# Patient Record
Sex: FEMALE
[De-identification: names, ages, dates, MRNs, and addresses within clinical notes are randomized per-mention and may not be internally consistent; named-entity substitution may affect disease eponyms.]

---

## 2017-03-30 ENCOUNTER — HOSPITAL ENCOUNTER (INPATIENT)
Dept: HOSPITAL 14 - H.ER | Age: 77
LOS: 6 days | Discharge: NURSING, REHAB OR LONG-TERM CARE | DRG: 856 | End: 2017-04-05
Attending: INTERNAL MEDICINE | Admitting: INTERNAL MEDICINE
Payer: MEDICARE

## 2017-03-30 VITALS — BODY MASS INDEX: 32.9 KG/M2

## 2017-03-30 DIAGNOSIS — E11.40: ICD-10-CM

## 2017-03-30 DIAGNOSIS — E78.5: ICD-10-CM

## 2017-03-30 DIAGNOSIS — B96.20: ICD-10-CM

## 2017-03-30 DIAGNOSIS — E78.00: ICD-10-CM

## 2017-03-30 DIAGNOSIS — Z95.0: ICD-10-CM

## 2017-03-30 DIAGNOSIS — N39.0: ICD-10-CM

## 2017-03-30 DIAGNOSIS — Z99.2: ICD-10-CM

## 2017-03-30 DIAGNOSIS — L08.89: ICD-10-CM

## 2017-03-30 DIAGNOSIS — Z79.82: ICD-10-CM

## 2017-03-30 DIAGNOSIS — M19.90: ICD-10-CM

## 2017-03-30 DIAGNOSIS — I13.2: ICD-10-CM

## 2017-03-30 DIAGNOSIS — I48.2: ICD-10-CM

## 2017-03-30 DIAGNOSIS — Z79.4: ICD-10-CM

## 2017-03-30 DIAGNOSIS — I50.32: ICD-10-CM

## 2017-03-30 DIAGNOSIS — Z87.442: ICD-10-CM

## 2017-03-30 DIAGNOSIS — Z95.5: ICD-10-CM

## 2017-03-30 DIAGNOSIS — M86.672: ICD-10-CM

## 2017-03-30 DIAGNOSIS — N18.6: ICD-10-CM

## 2017-03-30 DIAGNOSIS — T81.4XXA: Primary | ICD-10-CM

## 2017-03-30 DIAGNOSIS — Z79.01: ICD-10-CM

## 2017-03-30 DIAGNOSIS — E11.22: ICD-10-CM

## 2017-03-30 DIAGNOSIS — N25.81: ICD-10-CM

## 2017-03-30 DIAGNOSIS — I25.10: ICD-10-CM

## 2017-03-30 DIAGNOSIS — G89.29: ICD-10-CM

## 2017-03-30 DIAGNOSIS — Z95.1: ICD-10-CM

## 2017-03-30 DIAGNOSIS — E11.52: ICD-10-CM

## 2017-03-30 DIAGNOSIS — Z89.422: ICD-10-CM

## 2017-03-30 DIAGNOSIS — I48.0: ICD-10-CM

## 2017-03-30 DIAGNOSIS — D63.1: ICD-10-CM

## 2017-03-30 DIAGNOSIS — E03.9: ICD-10-CM

## 2017-03-30 LAB
ALBUMIN/GLOB SERPL: 0.9 {RATIO} (ref 1–2.1)
ALP SERPL-CCNC: 88 U/L (ref 38–126)
ALT SERPL-CCNC: 16 U/L (ref 9–52)
APTT BLD: 22.1 SECONDS (ref 23.3–32.5)
AST SERPL-CCNC: 23 U/L (ref 14–36)
BACTERIA #/AREA URNS HPF: (no result) /[HPF]
BASOPHILS # BLD AUTO: 0.1 K/UL (ref 0–0.2)
BASOPHILS NFR BLD: 1.3 % (ref 0–2)
BILIRUB SERPL-MCNC: 0.5 MG/DL (ref 0.2–1.3)
BILIRUB UR-MCNC: NEGATIVE MG/DL
BUN SERPL-MCNC: 16 MG/DL (ref 7–17)
CALCIUM SERPL-MCNC: 9.3 MG/DL (ref 8.4–10.2)
CHLORIDE SERPL-SCNC: 94 MMOL/L (ref 98–107)
CO2 SERPL-SCNC: 30 MMOL/L (ref 22–30)
COLOR UR: YELLOW
EOSINOPHIL # BLD AUTO: 0.2 K/UL (ref 0–0.7)
EOSINOPHIL NFR BLD: 2.3 % (ref 0–4)
ERYTHROCYTE [DISTWIDTH] IN BLOOD BY AUTOMATED COUNT: 15.1 % (ref 11.5–14.5)
GLOBULIN SER-MCNC: 4.2 GM/DL (ref 2.2–3.9)
GLUCOSE SERPL-MCNC: 123 MG/DL (ref 65–105)
GLUCOSE UR STRIP-MCNC: (no result) MG/DL
HCT VFR BLD CALC: 32.4 % (ref 34–47)
KETONES UR STRIP-MCNC: NEGATIVE MG/DL
LEUKOCYTE ESTERASE UR-ACNC: (no result) LEU/UL
LYMPHOCYTES # BLD AUTO: 1.4 K/UL (ref 1–4.3)
LYMPHOCYTES NFR BLD AUTO: 16.5 % (ref 20–40)
MCH RBC QN AUTO: 30.3 PG (ref 27–31)
MCHC RBC AUTO-ENTMCNC: 32.1 G/DL (ref 33–37)
MCV RBC AUTO: 94.5 FL (ref 81–99)
MONOCYTES # BLD: 1.3 K/UL (ref 0–0.8)
MONOCYTES NFR BLD: 14.5 % (ref 0–10)
NEUTROPHILS # BLD: 5.7 K/UL (ref 1.8–7)
NEUTROPHILS NFR BLD AUTO: 65.4 % (ref 50–75)
NRBC BLD AUTO-RTO: 0.1 % (ref 0–0)
PH UR STRIP: 7 [PH] (ref 5–8)
PLATELET # BLD: 236 K/UL (ref 130–400)
PMV BLD AUTO: 9.2 FL (ref 7.2–11.7)
POTASSIUM SERPL-SCNC: 4.3 MMOL/L (ref 3.6–5)
PROT SERPL-MCNC: 8.1 G/DL (ref 6.3–8.2)
PROT UR STRIP-MCNC: 100 MG/DL
RBC # UR STRIP: NEGATIVE /UL
RBC #/AREA URNS HPF: 3 /HPF (ref 0–3)
SODIUM SERPL-SCNC: 140 MMOL/L (ref 132–148)
SP GR UR STRIP: 1.01 (ref 1–1.03)
UROBILINOGEN UR-MCNC: (no result) MG/DL (ref 0.2–1)
WBC # BLD AUTO: 8.7 K/UL (ref 4.8–10.8)
WBC #/AREA URNS HPF: 34 /HPF (ref 0–5)

## 2017-03-30 NOTE — CP.PCM.CON
History of Present Illness





- History of Present Illness


History of Present Illness: 


77 yo female patient with PMHx of Anemia, arthritis, Atrial Fib, CAD, CHF, DM, 

HTN, HLD, ESRD (dialysis on Tues/Thurs/Sat), Hyperthyroidism, Hypothyroidism 

was seen at bedside ED this afternoon after request for podiatry consult. 

Patient presents with necrotic changes to Left foot. Patient was referred by 

Dr. Serna for scheduled TMA surgery. (add-on) Patient states that she had a 

lot of pain to Left foot today. Patient understands that we have plan for 

surgical amputation tomorrow with Dr. Serna. Patient is to be admitted for 

Left foot infection and IV Abx.  Denies F/C/N/V/SOB. today.





Past Patient History





- Infectious Disease


Hx of Infectious Diseases: None





- Past Medical History & Family History


Past Medical History?: Yes





- Past Social History


Smoking Status: Never Smoked





- CARDIAC


Hx Cardiac Disorders: Yes


Hx Atrial Fibrillation: Yes


Hx Congestive Heart Failure: Yes


Hx Hypertension: Yes


Hx Pacemaker: Yes





- PULMONARY


Hx Respiratory Disorders: Yes


Hx Pneumonia: Yes





- NEUROLOGICAL


Hx Neurological Disorder: Yes





- HEENT


Hx HEENT Problems: No





- RENAL


Hx Chronic Kidney Disease: Yes


Hx Dialysis: Yes (T,Th,F)


Date of Last Dialysis Treatment: 02/28/17





- ENDOCRINE/METABOLIC


Hx Endocrine Disorders: Yes


Hx Diabetes Insipidus: Yes


Hx Diabetes Mellitus Type 2: Yes


Hx Hyperthyroidism: Yes


Hx Hypothyroidism: Yes





- HEMATOLOGICAL/ONCOLOGICAL


Hx Blood Disorders: Yes


Hx Anemia: Yes


Hx Human Immunodeficiency Virus (HIV): No





- INTEGUMENTARY


Hx Dermatological Problems: No





- MUSCULOSKELETAL/RHEUMATOLOGICAL


Hx Musculoskeletal Disorders: Yes


Hx Arthritis: Yes


Hx Falls: Yes





- GASTROINTESTINAL


Hx Gastrointestinal Disorders: Yes


Hx Gall Bladder Disease: Yes





- GENITOURINARY/GYNECOLOGICAL


Hx Genitourinary Disorders: No





- PSYCHIATRIC


Hx Psychophysiologic Disorder: No


Hx Substance Use: No





- SURGICAL HISTORY


Hx Surgeries: Yes


Hx Cholecystectomy: Yes


Hx Coronary Artery Bypass Graft: Yes


Hx Coronary Stent: Yes


Other/Comment: Stent placement x2 (as per son) to left popliteal





- ANESTHESIA


Hx Anesthesia: Yes


Hx Anesthesia Reactions: No


Hx Malignant Hyperthermia: No





Meds


Allergies/Adverse Reactions: 


 Allergies











Allergy/AdvReac Type Severity Reaction Status Date / Time


 


No Known Allergies Allergy   Verified 02/03/16 18:53














Physical Exam





- Constitutional


Appears: Well, Non-toxic, No Acute Distress





- Extremities Exam


Additional comments: 


Left lower extremity exam





DERM: 


Necrotic changes noted to surgical site of Hallux amputation. Necrotic changes 

also noted to 2nd digit as well as 5th digit from distal end to level of PIPJ. 

No open wound is noted. No drainage is noted. Erythema is noted to Left foot 

around the skin necrosis. 





VASC: Palpable DP noted 2/4. Non-palpable PT. Blenchable digits, CRT less than 

3 seconds to all digits bilaterally noted.





ORTHO: Pain on palpation to right hallux, distal to MPJ





NEURO: Gross sensation diminished





- Neurological Exam


Neurological exam: Alert, Oriented x3





- Psychiatric Exam


Psychiatric exam: Normal Affect, Normal Mood





- Skin


Skin Exam: Normal Color, Warm





Results





- Vital Signs


Recent Vital Signs: 


 Last Vital Signs











Temp  98.2 F   03/30/17 16:58


 


Pulse  76   03/30/17 16:58


 


Resp  16   03/30/17 16:58


 


BP  162/72 H  03/30/17 16:58


 


Pulse Ox  100   03/30/17 16:58














Assessment & Plan





- Assessment and Plan (Free Text)


Assessment: 


76 year old female patient painful Left foot with gangrenous areas to Hallux 

amputation site, 2nd digit and 5th digit 


Plan: 


Patient was seen, evaluated and treated at bedside ED


discussed with Dr. Serna


Patient is scheduled for Left foot TMA tomorrow


Patient needs Medical clearance


Podiatry will follow in-house

## 2017-03-30 NOTE — ED PDOC
Lower Extremity Pain/Injury


Time Seen by Provider: 17 16:00


Chief Complaint (Nursing): Lower Extremity Problem/Injury


Chief Complaint (Provider): Left Foot Pain


History Per: Patient


History/Exam Limitations: no limitations


Current Symptoms Are (Timing): Still Present


Additional Complaint(s): 


17:43





Sophia Osuna is a 76 year old female with a history of CAD, CHF, hypertension, 

diabetes, hyperlipidemia, and end stage renal disease presents to the ED with a 

chief complaint of mild left foot pain and is here in preparation for TMA 

surgery tomorrow with Dr. Serna.





Past Medical History


Reviewed: Historical Data, Nursing Documentation, Vital Signs


Vital Signs: 





 Last Vital Signs











Temp  98.2 F   17 16:58


 


Pulse  76   17 16:58


 


Resp  16   17 16:58


 


BP  162/72 H  17 16:58


 


Pulse Ox  100   17 16:58














- Medical History


PMH: Anemia, Arthritis, Atrial Fibrillation, CAD, CHF, Diabetes, Gall Bladder 

Disease, HTN, Hypercholesterolemia, Hyperthyroidism, Hypothyroidism, Kidney 

Stones, Pneumonia, End Stage Renal Disease (TTF), Chronic Kidney Disease


   Denies: HIV





- Surgical History


Surgical History: CABG, Cholecystectomy, Coronary Stent, Pacemaker





- Family History


Family History: States: Unknown Family Hx





- Social History


Current smoker - smoking cessation education provided: No


Alcohol: Occasional





- Immunization History


Hx Tetanus Toxoid Vaccination: Yes


Hx Influenza Vaccination: Yes


Hx Pneumococcal Vaccination: Yes





- Home Medications


Home Medications: 


 Ambulatory Orders











 Medication  Instructions  Recorded


 


Aspirin [Aspirin EC] 81 mg PO DAILY #30 ect 06/16/15


 


Insulin Detemir [Levemir Flexpen] 40 unit SC HS 07/09/15


 


Rosuvastatin Calcium [Crestor] 40 mg PO HS 07/09/15


 


Pregabalin [Lyrica] 50 mg PO QPM 16


 


Levothyroxine [Synthroid] 75 mcg PO DAILY 17


 


amLODIPine [Norvasc] 10 mg PO DAILY 17


 


Meclizine [Meclizine*] 25 mg PO DAILY PRN 17


 


Metoprolol Tartrate [Lopressor] 50 mg PO BID 17


 


Pantoprazole [Protonix EC Tab] 40 mg PO DAILY 17


 


Sevelamer Carbonate [Renvela] 2 tab PO BID 17


 


guaiFENesin/Dextromethorphan 2 tsp PO QID PRN 17





[Robitussin DM]  


 


oxyCODONE/Acetaminophen [Percocet 1 tab PO Q6 PRN #10 tab 17





5/325 mg Tab]  


 


Clopidogrel [Plavix] 75 mg PO DAILY 17


 


Insulin Aspart [Novolog Flexpen] 20 units SC DAILY 17


 


Linagliptin [Tradjenta] 5 mg PO DAILY 17


 


Liraglutide [Victoza 3-[place]] 1.8 mg SC DAILY 17


 


Omeprazole [Omeprazole] 40 mg PO DAILY 17


 


Valsartan [Diovan] 160 mg PO DAILY 17


 


hydrALAZINE [Apresoline] 25 mg PO QID 17














- Allergies


Allergies/Adverse Reactions: 


 Allergies











Allergy/AdvReac Type Severity Reaction Status Date / Time


 


No Known Allergies Allergy   Verified 16 18:53














Review of Systems


ROS Statement: Except As Marked, All Systems Reviewed And Found Negative


Musculoskeletal: Positive for: Foot Pain (left foot pain)





Physical Exam





- Reviewed


Nursing Documentation Reviewed: Yes


Vital Signs Reviewed: Yes





- Physical Exam


Appears: Positive for: Non-toxic, No Acute Distress


Head Exam: Positive for: ATRAUMATIC, NORMOCEPHALIC


Skin: Positive for: Normal Color, Warm


ENT: Positive for: Normal ENT Inspection


Neck: Positive for: Painless ROM


Cardiovascular/Chest: Positive for: Regular Rate, Rhythm.  Negative for: Murmur


Respiratory: Positive for: Normal Breath Sounds.  Negative for: Wheezing


Gastrointestinal/Abdominal: Positive for: Soft.  Negative for: Tenderness


Extremity: Positive for: Normal ROM, Capillary Refill (less than 2 s), Other (

left foot has been wrapped by podiatry).  Negative for: Tenderness, Pedal Edema

, Calf Tenderness, Deformity, Swelling


Neurologic/Psych: Positive for: Alert, Oriented (x3)





- Laboratory Results


Result Diagrams: 


 17 19:25





 17 19:25





- ECG


O2 Sat by Pulse Oximetry: 100 (RA)


Pulse Ox Interpretation: Normal





Medical Decision Making


Medical Decision Makin:45





Initial Impression: Left Foot Pain surgery tomorrow 





Initial Plan:


* Type and Screen


* CMP


* CBC


* PTT


* PT


* Blood Culture


* Urine C&S


* Urinalysis


* Vancomycin 1 gm IV


* Zosyn 4.5 gm IV


* X-Ray Left Foot


* Reevaluation





labs reviewed. podiatry evaluated pt at bedside. iv abx ordered.


19:22


Spoke to Dr. Guillen, agreed to admit patient.


--------------------------------------------------------------------------------

----------------- 


Scribe Attestation:


Documented by Margot Pierson, acting as a scribe for Amaya Gambino MD.





Provider Scribe Attestation:


All medical record entries made by the Scribe were at my direction and 

personally dictated by me. I have reviewed the chart and agree that the record 

accurately reflects my personal performance of the history, physical exam, 

medical decision making, and the department course for this patient. I have 

also personally directed, reviewed, and agree with the discharge instructions 

and disposition.





Disposition





- Clinical Impression


Clinical Impression: 


 Left foot infection





- Patient ED Disposition


Is Patient to be Admitted: Yes


Discussed With : Fermín Guillen


Counseled Patient/Family Regarding: Studies Performed, Diagnosis





- Disposition


Disposition Time: 18:45


Condition: STABLE

## 2017-03-31 LAB
APTT BLD: 25.9 SECONDS (ref 23.3–32.5)
BASOPHILS # BLD AUTO: 0.1 K/UL (ref 0–0.2)
BASOPHILS NFR BLD: 1.7 % (ref 0–2)
BUN SERPL-MCNC: 22 MG/DL (ref 7–17)
CALCIUM SERPL-MCNC: 8.9 MG/DL (ref 8.4–10.2)
CHLORIDE SERPL-SCNC: 96 MMOL/L (ref 98–107)
CO2 SERPL-SCNC: 29 MMOL/L (ref 22–30)
EOSINOPHIL # BLD AUTO: 0.3 K/UL (ref 0–0.7)
EOSINOPHIL NFR BLD: 4 % (ref 0–4)
ERYTHROCYTE [DISTWIDTH] IN BLOOD BY AUTOMATED COUNT: 14.7 % (ref 11.5–14.5)
GLUCOSE SERPL-MCNC: 109 MG/DL (ref 65–105)
HCT VFR BLD CALC: 28.6 % (ref 34–47)
LYMPHOCYTES # BLD AUTO: 1.3 K/UL (ref 1–4.3)
LYMPHOCYTES NFR BLD AUTO: 17.7 % (ref 20–40)
MCH RBC QN AUTO: 30.4 PG (ref 27–31)
MCHC RBC AUTO-ENTMCNC: 32.6 G/DL (ref 33–37)
MCV RBC AUTO: 93.3 FL (ref 81–99)
MONOCYTES # BLD: 1 K/UL (ref 0–0.8)
MONOCYTES NFR BLD: 14.4 % (ref 0–10)
NEUTROPHILS # BLD: 4.5 K/UL (ref 1.8–7)
NEUTROPHILS NFR BLD AUTO: 62.2 % (ref 50–75)
NRBC BLD AUTO-RTO: 0.1 % (ref 0–0)
PLATELET # BLD: 237 K/UL (ref 130–400)
PMV BLD AUTO: 9 FL (ref 7.2–11.7)
POTASSIUM SERPL-SCNC: 4.4 MMOL/L (ref 3.6–5)
SODIUM SERPL-SCNC: 142 MMOL/L (ref 132–148)
WBC # BLD AUTO: 7.3 K/UL (ref 4.8–10.8)

## 2017-03-31 PROCEDURE — 0Y6N0ZF DETACHMENT AT LEFT FOOT, PARTIAL 5TH RAY, OPEN APPROACH: ICD-10-PCS | Performed by: PODIATRIST

## 2017-03-31 PROCEDURE — 0Y6N0Z9 DETACHMENT AT LEFT FOOT, PARTIAL 1ST RAY, OPEN APPROACH: ICD-10-PCS | Performed by: PODIATRIST

## 2017-03-31 PROCEDURE — 0Y6N0ZB DETACHMENT AT LEFT FOOT, PARTIAL 2ND RAY, OPEN APPROACH: ICD-10-PCS | Performed by: PODIATRIST

## 2017-03-31 PROCEDURE — 0Y6N0ZC DETACHMENT AT LEFT FOOT, PARTIAL 3RD RAY, OPEN APPROACH: ICD-10-PCS | Performed by: PODIATRIST

## 2017-03-31 PROCEDURE — 0Y6N0ZD DETACHMENT AT LEFT FOOT, PARTIAL 4TH RAY, OPEN APPROACH: ICD-10-PCS | Performed by: PODIATRIST

## 2017-03-31 RX ADMIN — PANTOPRAZOLE SODIUM SCH MG: 40 TABLET, DELAYED RELEASE ORAL at 18:06

## 2017-03-31 RX ADMIN — INSULIN LISPRO SCH: 100 INJECTION, SOLUTION INTRAVENOUS; SUBCUTANEOUS at 06:58

## 2017-03-31 RX ADMIN — INSULIN LISPRO SCH: 100 INJECTION, SOLUTION INTRAVENOUS; SUBCUTANEOUS at 12:03

## 2017-03-31 RX ADMIN — INSULIN LISPRO SCH: 100 INJECTION, SOLUTION INTRAVENOUS; SUBCUTANEOUS at 23:35

## 2017-03-31 RX ADMIN — PANTOPRAZOLE SODIUM SCH: 40 TABLET, DELAYED RELEASE ORAL at 17:45

## 2017-03-31 RX ADMIN — INSULIN LISPRO SCH: 100 INJECTION, SOLUTION INTRAVENOUS; SUBCUTANEOUS at 17:44

## 2017-03-31 NOTE — PCM.[email or web address]
Surgeon's Initial Post Op Note





- Surgeon's Notes


Surgeon: Cornelio Serna DPM


Assistant: Cassi Mcdonnell, PGY1


Type of Anesthesia: General IV, Local


Anesthesia Administered By: Dr. Pearosn


Pre-Operative Diagnosis: Left foot dry gangrene forefoot necrosis due to 

ischemia


Operative Findings: See dictation.  3-0 vycryl, 4-0 nylon


Post-Operative Diagnosis: Same as above


Operation Performed: Left foot transmetatarsal amputation


Specimen/Specimens Removed: Bone and soft tissue


Estimated Blood Loss: EBL {In ML}: 5


Blood Products Given: N/A


Drains Used: No Drains


Post-Op Condition: Good


Date of Surgery/Procedure: 03/31/17


Time of Surgery/Procedure: 13:20

## 2017-03-31 NOTE — CP.PCM.PN
Subjective





- Date & Time of Evaluation


Date of Evaluation: 03/31/17


Time of Evaluation: 07:30





- Subjective


Subjective: 


75 yo female patient with PMHx of Anemia, arthritis, Atrial Fib, CAD, CHF, DM, 

HTN, HLD, ESRD (dialysis on Tues/Thurs/Sat), Hyperthyroidism, Hypothyroidism 

was seen at bedside this morning for scheduled surgery of Left foot TMA with 

Dr. Serna today. Patient was resting comfortably in bed this morning without 

any acute distress. AAO x3. Dressing to Left foot remains c/d/i. Patient 

complains of pain to Left foot upon palpation. Patient was explained of the 

podiatry surgical plan and she agrees with the plan. Patient denies F/C/N/V/SOB 

today





Patient to OR today 1PM with Dr. Serna 


Medical clearance, Nephrology clearance, Cardiology clearance in chart 








Objective





- Vital Signs/Intake and Output


Vital Signs (last 24 hours): 


 











Temp Pulse Resp BP Pulse Ox


 


 98.7 F   67   20   144/61   96 


 


 03/31/17 08:18  03/31/17 08:18  03/31/17 08:18  03/31/17 08:18  03/31/17 08:18











- Medications


Medications: 


 Current Medications





Hydromorphone HCl (Dilaudid)  1 mg IVP Q4 PRN


   PRN Reason: Pain, severe (8-10)


   Last Admin: 03/31/17 02:39 Dose:  1 mg


Insulin Human Lispro (Humalog)  0 units SC ACHS BROCK


   PRN Reason: Protocol


   Last Admin: 03/31/17 06:58 Dose:  Not Given











- Labs


Labs: 


 





 03/31/17 05:30 





 03/31/17 05:30 





 











PT  12.3 SECONDS (9.6-11.2)  H  03/31/17  05:30    


 


INR  1.18  (0.92-1.08)  H  03/31/17  05:30    


 


APTT  25.9 SECONDS (23.3-32.5)   03/31/17  05:30    














- Constitutional


Appears: Well, Non-toxic, No Acute Distress





- Extremities Exam


Additional comments: 


Left foot dressing remains clean dry and intact





- Neurological Exam


Neurological Exam: Alert, Awake, Oriented x3





- Psychiatric Exam


Psychiatric exam: Normal Affect, Normal Mood





- Skin


Skin Exam: Normal Color, Warm





Assessment and Plan





- Assessment and Plan (Free Text)


Assessment: 


76 year old female patient painful Left foot with gangrenous areas to Hallux 

amputation site, 2nd digit and 5th digit 





Scheduled for Trans-metatarsal Amputation today at 1:00 PM with Dr. Serna


Plan: 


Pt was seen and examined in SDS


Pt NPO status was confirmed


All Pre-op testing was in the chart


Pt has exhausted all conservative treatment at this time and is opting for 

surgical intervention


Pt was explained procedure and post-operative course


All pt's questions were answered to satisfaction


No guarantees were made


Pt understands all risks, benefits and complications of procedure


Pt will follow-up with Dr. Serna





Medical clearance in chart


Nephrology clearance in chart


Cardiac clearance in chart

## 2017-03-31 NOTE — RAD
PROCEDURE:  Left foot dated 03/31/2017



HISTORY:

s/p left foot surgery



COMPARISON:

Comparison made with prior study dated 03/30/2017



TECHNIQUE:

PA and lateral views of the left foot performed.



FINDINGS:

Current study reveals interval transmetatarsal amputation changes of 

the 1st through 5th rays. Distal bone margins are sharp with no 

definitive evidence of cortical destructive changes at this time to 

suggest osteomyelitis. . The remaining osseous structures appear 

demineralized of likely due to disuse. Vascular calcifications are 

present. No evidence of gas seen within the soft tissues of the 

distal stump.  If symptoms cellulitis or osteomyelitis suspected 

clinically, recommend followup MRI. 



IMPRESSION:

Status post transmetatarsal amputation 1st through 5th rays.  Distant 

bone margins appear sharp with no definitive cortical destructive 

changes to suggest osteomyelitis at this time. .  Consider followup 

MRI if cellulitis or osteomyelitis suspected clinically

## 2017-03-31 NOTE — CP.PCM.PN
Subjective





- Date & Time of Evaluation


Date of Evaluation: 03/31/17


Time of Evaluation: 12:06





- Subjective


Subjective: 


full consult to follow





Objective





- Vital Signs/Intake and Output


Vital Signs (last 24 hours): 


 











Temp Pulse Resp BP Pulse Ox


 


 98.7 F   67   20   144/61   96 


 


 03/31/17 08:18  03/31/17 08:18  03/31/17 08:18  03/31/17 08:18  03/31/17 08:18











- Medications


Medications: 


 Current Medications





Hydromorphone HCl (Dilaudid)  1 mg IVP Q4 PRN


   PRN Reason: Pain, severe (8-10)


   Last Admin: 03/31/17 10:26 Dose:  1 mg


Insulin Human Lispro (Humalog)  0 units SC ACHS BROCK


   PRN Reason: Protocol


   Last Admin: 03/31/17 12:03 Dose:  Not Given











- Labs


Labs: 


 





 03/31/17 05:30 





 03/31/17 05:30 





 











PT  12.3 SECONDS (9.6-11.2)  H  03/31/17  05:30    


 


INR  1.18  (0.92-1.08)  H  03/31/17  05:30    


 


APTT  25.9 SECONDS (23.3-32.5)   03/31/17  05:30    














Assessment and Plan





- Assessment and Plan (Free Text)


Assessment: 


Full consult to follow.








There is no renal contraindication for her surgery today.  


Will plan on dialysis tomorrow per her usual schedule.

## 2017-03-31 NOTE — CP.PCM.CON
History of Present Illness





- History of Present Illness


History of Present Illness: 


76 year old female with a history of CAD, CHF, hypertension, diabetes, 

hyperlipidemia, and end stage renal disease


 presents to the ED with a chief complaint of mild left foot pain 


and is here in preparation for TMA surgery tomorrow with Dr. Serna.





Pt denies chest pains, SOB, AMAYA, Palpitations





PMH: Anemia, 


Arthritis, 


Atrial Fibrillation, 


CAD, 


CHF, 


Diabetes, 


Gall Bladder Disease, 


HTN, 


Hypercholesterolemia, 


Hypothyroidism, 


Kidney Stones, 


Pneumonia, 


End Stage Renal Disease, 


Chronic Kidney Disease


   





- Surgical History


Surgical History:  Cholecystectomy, Coronary Stent, Pacemaker








EKG: Pacemaker rhythm











Past Patient History





- Infectious Disease


Hx of Infectious Diseases: None





- Past Medical History & Family History


Past Medical History?: Yes





- Past Social History


Alcohol: Occasional





- CARDIAC


Hx Atrial Fibrillation: Yes


Hx Congestive Heart Failure: Yes


Hx Hypercholesterolemia: Yes


Hx Hypertension: Yes


Hx Pacemaker: Yes





- PULMONARY


Hx Pneumonia: Yes





- NEUROLOGICAL


Hx Neurological Disorder: No





- HEENT


Hx HEENT Problems: No





- RENAL


Hx Chronic Kidney Disease: Yes


Hx Kidney Stones: Yes





- ENDOCRINE/METABOLIC


Hx Hyperthyroidism: Yes


Hx Hypothyroidism: Yes





- HEMATOLOGICAL/ONCOLOGICAL


Hx Anemia: Yes


Hx Human Immunodeficiency Virus (HIV): No





- INTEGUMENTARY


Hx Dermatological Problems: No





- MUSCULOSKELETAL/RHEUMATOLOGICAL


Hx Arthritis: Yes





- GASTROINTESTINAL


Hx Gall Bladder Disease: Yes





- GENITOURINARY/GYNECOLOGICAL


Hx Genitourinary Disorders: No





- PSYCHIATRIC


Hx Psychophysiologic Disorder: No





- SURGICAL HISTORY


Hx Cholecystectomy: Yes


Hx Coronary Artery Bypass Graft: Yes


Hx Coronary Stent: Yes





- ANESTHESIA


Hx Anesthesia: Yes


Hx Anesthesia Reactions: No


Hx Malignant Hyperthermia: No





Meds


Allergies/Adverse Reactions: 


 Allergies











Allergy/AdvReac Type Severity Reaction Status Date / Time


 


No Known Allergies Allergy   Verified 02/03/16 18:53














- Medications


Medications: 


 Current Medications





Hydromorphone HCl (Dilaudid)  1 mg IVP Q4 PRN


   PRN Reason: Pain, severe (8-10)


   Last Admin: 03/31/17 02:39 Dose:  1 mg


Insulin Human Lispro (Humalog)  0 units SC ACHS BROCK


   PRN Reason: Protocol


   Last Admin: 03/31/17 06:58 Dose:  Not Given











Physical Exam





- Respiratory Exam


Respiratory Exam: NORMAL BREATHING PATTERN





- Cardiovascular Exam


Cardiovascular Exam: REGULAR RHYTHM


Additional comments: 


pacemaker rhythm





Results





- Vital Signs


Recent Vital Signs: 


 Last Vital Signs











Temp  98.5 F   03/31/17 05:52


 


Pulse  70   03/31/17 06:01


 


Resp  16   03/31/17 05:52


 


BP  144/74   03/31/17 06:01


 


Pulse Ox  100   03/30/17 23:38














- Labs


Result Diagrams: 


 03/31/17 05:30





 03/31/17 05:30


Labs: 


 Laboratory Results - last 24 hr











  03/30/17 03/30/17 03/30/17





  19:25 19:59 21:39


 


WBC  8.7  


 


RBC  3.43 L  


 


Hgb  10.4 L  


 


Hct  32.4 L  


 


MCV  94.5  


 


MCH  30.3  


 


MCHC  32.1 L  


 


RDW  15.1 H  


 


Plt Count  236  


 


MPV  9.2  


 


Neut % (Auto)  65.4  


 


Lymph % (Auto)  16.5 L  


 


Mono % (Auto)  14.5 H  


 


Eos % (Auto)  2.3  


 


Baso % (Auto)  1.3  


 


Neut #  5.7  


 


Lymph #  1.4  


 


Mono #  1.3 H  


 


Eos #  0.2  


 


Baso #  0.1  


 


PT  11.9 H  


 


INR  1.14 H  


 


APTT  22.1 L  


 


Sodium  140  


 


Potassium  4.3  


 


Chloride  94 L  


 


Carbon Dioxide  30  


 


Anion Gap  20  


 


BUN  16  


 


Creatinine  2.4 H  


 


Est GFR ( Amer)  24  


 


Est GFR (Non-Af Amer)  20  


 


Random Glucose  123 H  


 


Calcium  9.3  


 


Total Bilirubin  0.5  


 


AST  23  


 


ALT  16  


 


Alkaline Phosphatase  88  


 


Total Protein  8.1  


 


Albumin  3.9  


 


Globulin  4.2 H  


 


Albumin/Globulin Ratio  0.9 L  


 


Urine Color    Yellow


 


Urine Clarity    Slighty-cloudy


 


Urine pH    7.0


 


Ur Specific Gravity    1.015


 


Urine Protein    100


 


Urine Glucose (UA)    Neg


 


Urine Ketones    Negative


 


Urine Blood    Negative


 


Urine Nitrate    Negative


 


Urine Bilirubin    Negative


 


Urine Urobilinogen    0.2-1.0


 


Ur Leukocyte Esterase    Mod


 


Urine RBC (Auto)    3


 


Urine Microscopic WBC    34 H


 


Ur Squamous Epith Cells    6 H


 


Urine Bacteria    Few H


 


Hyaline Casts    0-2


 


Blood Type   AB POSITIVE 


 


Antibody Screen   Negative 


 


BBK History Checked   Patient has bt 














Assessment & Plan


(1) Necrotic toes


Assessment and Plan: 


Cardiac wise the patient is cleared for surgery


Status: Acute   Priority: High





(2) Left foot infection


Status: Acute





(3) ESRD on hemodialysis


Status: Acute   Priority: High





(4) CAD (coronary artery disease)


Status: Chronic   Priority: Medium





(5) HTN (hypertension)


Status: Chronic   Priority: Medium





(6) Hx of cardiac pacemaker


Status: Chronic   Priority: Medium

## 2017-03-31 NOTE — CP.PCM.HP
History of Present Illness





- History of Present Illness


History of Present Illness: 


CC:  L foot infection.





77 y/o F admitted to [facility] on 03/30/17 due to  L foot pain/ infection to have L 

foot surgery.  Pt with no relief of symptoms.





Pt scheduled by hi Podiatrist, Dr Serna for L TMA surgery 2nd to L foot 

surgical site infection, necrotic, associated to swelling and pain on site upon 

palpation of moderated intensity.





Pt is S/P L foot partial Hallux amputation on 03/03/17 2nd to L foot Hallux dry 

gangrene, after, Pt was discharged on 03/06/17 in stable post surgical 

condition to continue PT at home, to f/u with PMD and Podiatrist.  Pt refused 

to go to [facility].





Upon Podiatrist evaluation as out Pt, she was found with increased worsening 

changes in L foot surgical site and was referred to hospital for OR on 03/31/17 

after medical clearances.





Aggravating factor: Difficulty walking, decreased ROM. 





Pt denied:  Fever, chills, dizziness, CP, SOB, abdominal pain, n/v/d, urinary 

symptoms, sick contact.





PMHx: L foot Partial Hallux amputation, ESRD on HD, CKD, HTN, CAD with PPM, , 

Coronary stent, CHF, A Fib, Hypothyroidism, Hypercholesterolemia, Anemia.





CXR shows: No infiltrate.  L Foot X-Ray= No acute fx.  EKG=  Atrial-pace rhythm

, possible lateral infarct age undetermined, Inferior infarct age undetermined.

















Present on Admission





- Present on Admission


Any Indicators Present on Admission: Yes


History Surgical Site Infection Following: Orthopedic Procedures





Review of Systems





- Constitutional


Constitutional: Other (negative)





- EENT


Eyes: Other (negative)


Ears: Other (negative)


Nose/Mouth/Throat: Other (negative)





- Cardiovascular


Cardiovascular: Other (negative)





- Respiratory


Respiratory: Other (negative)





- Gastrointestinal


Gastrointestinal: Other (negative)





- Genitourinary


Genitourinary: Other (negative)





- Musculoskeletal


Musculoskeletal: Other (L foot pain 2nd to infection, s/p L foot amputation)





- Integumentary


Integumentary: Erythema (L foot), Swelling





- Neurological


Neurological: Other (negative)





- Psychiatric


Psychiatric: Other (negative)





- Endocrine


Endocrine: Other (negative)





- Hematologic/Lymphatic


Hematologic: Other (negative)





Past Patient History





- Infectious Disease


Hx of Infectious Diseases: None





- Past Medical History & Family History


Past Medical History?: Yes


Pertinent Family History: 


Unknown





- Past Social History


Smoking Status: Never Smoked


Alcohol: Occasional


Drugs: Denies





- CARDIAC


Hx Cardiac Disorders: Yes


Hx Atrial Fibrillation: Yes


Hx Congestive Heart Failure: Yes


Hx Hypercholesterolemia: Yes


Hx Hypertension: Yes


Hx Pacemaker: Yes





- PULMONARY


Hx Respiratory Disorders: Yes


Hx Pneumonia: Yes





- NEUROLOGICAL


Hx Neurological Disorder: No





- HEENT


Hx HEENT Problems: No





- RENAL


Hx Chronic Kidney Disease: Yes


Hx Dialysis: Yes


Hx Kidney Stones: Yes


Hx Renal Failure: Yes





- ENDOCRINE/METABOLIC


Hx Hyperthyroidism: Yes


Hx Hypothyroidism: Yes





- HEMATOLOGICAL/ONCOLOGICAL


Hx Anemia: Yes


Hx Human Immunodeficiency Virus (HIV): No





- INTEGUMENTARY


Hx Dermatological Problems: No





- MUSCULOSKELETAL/RHEUMATOLOGICAL


Hx Arthritis: Yes





- GASTROINTESTINAL


Hx Gall Bladder Disease: Yes





- GENITOURINARY/GYNECOLOGICAL


Hx Genitourinary Disorders: No





- PSYCHIATRIC


Hx Psychophysiologic Disorder: No





- SURGICAL HISTORY


Hx Surgeries: Yes


Hx Amputation: Yes (L foot partial hallux amputation,)


Hx Cholecystectomy: Yes


Hx Coronary Artery Bypass Graft: Yes


Hx Coronary Stent: Yes





- ANESTHESIA


Hx Anesthesia: Yes


Hx Anesthesia Reactions: No


Hx Malignant Hyperthermia: No





Meds


Allergies/Adverse Reactions: 


 Allergies











Allergy/AdvReac Type Severity Reaction Status Date / Time


 


No Known Allergies Allergy   Verified 02/03/16 18:53














Physical Exam





- Constitutional


Appears: No Acute Distress, Chronically Ill





- Head Exam


Head Exam: NORMAL INSPECTION





- Eye Exam


Eye Exam: PERRL





- ENT Exam


ENT Exam: Normal Oropharynx





- Neck Exam


Neck exam: Positive for: Normal Inspection





- Respiratory Exam


Respiratory Exam: NORMAL BREATHING PATTERN





- Cardiovascular Exam


Cardiovascular Exam: REGULAR RHYTHM


Additional comments: 


PPM





- GI/Abdominal Exam


GI & Abdominal Exam: Normal Bowel Sounds, Soft





- Extremities Exam


Additional comments: 


L foot gangrenous to Hallux amputation site , 2nd and  5th digit





- Back Exam


Back exam: NORMAL INSPECTION





- Neurological Exam


Neurological exam: Alert, Oriented x3


Additional comments: 


No focal motor deficit, decreased sensation R-L foot.





- Psychiatric Exam


Psychiatric exam: Normal Mood





- Skin


Skin Exam: Erythema (L foot), Warm





Results





- Vital Signs


Recent Vital Signs: 





 Last Vital Signs











Temp  98.7 F   03/31/17 08:18


 


Pulse  67   03/31/17 08:18


 


Resp  20   03/31/17 08:18


 


BP  144/61   03/31/17 08:18


 


Pulse Ox  96   03/31/17 08:18








reviewed J.P.





- Labs


Result Diagrams: 


 03/31/17 05:30





 03/31/17 05:30


Labs: 





 Laboratory Results - last 24 hr











  03/30/17 03/30/17 03/30/17





  19:25 19:59 21:39


 


WBC  8.7  


 


RBC  3.43 L  


 


Hgb  10.4 L  


 


Hct  32.4 L  


 


MCV  94.5  


 


MCH  30.3  


 


MCHC  32.1 L  


 


RDW  15.1 H  


 


Plt Count  236  


 


MPV  9.2  


 


Neut % (Auto)  65.4  


 


Lymph % (Auto)  16.5 L  


 


Mono % (Auto)  14.5 H  


 


Eos % (Auto)  2.3  


 


Baso % (Auto)  1.3  


 


Neut #  5.7  


 


Lymph #  1.4  


 


Mono #  1.3 H  


 


Eos #  0.2  


 


Baso #  0.1  


 


PT  11.9 H  


 


INR  1.14 H  


 


APTT  22.1 L  


 


Sodium  140  


 


Potassium  4.3  


 


Chloride  94 L  


 


Carbon Dioxide  30  


 


Anion Gap  20  


 


BUN  16  


 


Creatinine  2.4 H  


 


Est GFR ( Amer)  24  


 


Est GFR (Non-Af Amer)  20  


 


POC Glucose (mg/dL)   


 


Random Glucose  123 H  


 


Calcium  9.3  


 


Total Bilirubin  0.5  


 


AST  23  


 


ALT  16  


 


Alkaline Phosphatase  88  


 


Total Protein  8.1  


 


Albumin  3.9  


 


Globulin  4.2 H  


 


Albumin/Globulin Ratio  0.9 L  


 


Urine Color    Yellow


 


Urine Clarity    Slighty-cloudy


 


Urine pH    7.0


 


Ur Specific Gravity    1.015


 


Urine Protein    100


 


Urine Glucose (UA)    Neg


 


Urine Ketones    Negative


 


Urine Blood    Negative


 


Urine Nitrate    Negative


 


Urine Bilirubin    Negative


 


Urine Urobilinogen    0.2-1.0


 


Ur Leukocyte Esterase    Mod


 


Urine RBC (Auto)    3


 


Urine Microscopic WBC    34 H


 


Ur Squamous Epith Cells    6 H


 


Urine Bacteria    Few H


 


Hyaline Casts    0-2


 


Blood Type   AB POSITIVE 


 


Antibody Screen   Negative 


 


BBK History Checked   Patient has bt 














  03/31/17 03/31/17 03/31/17





  05:30 06:44 10:44


 


WBC  7.3  


 


RBC  3.07 L  


 


Hgb  9.3 L  


 


Hct  28.6 L  


 


MCV  93.3  


 


MCH  30.4  


 


MCHC  32.6 L  


 


RDW  14.7 H  


 


Plt Count  237  


 


MPV  9.0  


 


Neut % (Auto)  62.2  


 


Lymph % (Auto)  17.7 L  


 


Mono % (Auto)  14.4 H  


 


Eos % (Auto)  4.0  


 


Baso % (Auto)  1.7  


 


Neut #  4.5  


 


Lymph #  1.3  


 


Mono #  1.0 H  


 


Eos #  0.3  


 


Baso #  0.1  


 


PT  12.3 H  


 


INR  1.18 H  


 


APTT  25.9  


 


Sodium  142  


 


Potassium  4.4  


 


Chloride  96 L  


 


Carbon Dioxide  29  


 


Anion Gap  21 H  


 


BUN  22 H  


 


Creatinine  3.1 H  


 


Est GFR ( Amer)  18  


 


Est GFR (Non-Af Amer)  15  


 


POC Glucose (mg/dL)   114 H  85


 


Random Glucose  109 H  


 


Calcium  8.9  


 


Total Bilirubin   


 


AST   


 


ALT   


 


Alkaline Phosphatase   


 


Total Protein   


 


Albumin   


 


Globulin   


 


Albumin/Globulin Ratio   


 


Urine Color   


 


Urine Clarity   


 


Urine pH   


 


Ur Specific Gravity   


 


Urine Protein   


 


Urine Glucose (UA)   


 


Urine Ketones   


 


Urine Blood   


 


Urine Nitrate   


 


Urine Bilirubin   


 


Urine Urobilinogen   


 


Ur Leukocyte Esterase   


 


Urine RBC (Auto)   


 


Urine Microscopic WBC   


 


Ur Squamous Epith Cells   


 


Urine Bacteria   


 


Hyaline Casts   


 


Blood Type   


 


Antibody Screen   


 


BBK History Checked   








reviewed J.P.





- EKG Data


EKG comments: 


reviewed J.P.





- Imaging and Cardiology


  ** Chest x-ray


Status: Report reviewed by me ([email or web address])


Additional comment: 


L foot X-Ray= Reviewed J.P.





Assessment & Plan





- Assessment and Plan (Free Text)


Assessment: 


L foot gangrenous area to Hallux amputation site, 2nd and 5th digit  Acute high





L foot pain Chronic high





PVD  Chronic high





Diabetic Neuropathy  Chronic high





ESRD on HD Chronic high





CAD  Chronic





CHF  Chronic





Hx of Paroxysmal A Fib  Chronic





DMII  Chronic





Hx Cardiac Pacemaker  Chronic





HTN  Chronic





Hypothyroidism  Chronic





Hyperlypidemia  Chronic





Anemia  Chronic.











Plan: 


Pt was cleared by Cardiology, Nephrology, Pt is medically cleared for surgery.





- Date & Time


Date: 03/31/17


Time: 12:00

## 2017-03-31 NOTE — CP.PCM.CON
History of Present Illness





- History of Present Illness


History of Present Illness: 


76 year old female w/ PMH of ESRD, HTN, CAD, DM that presented w/ L foot pain. 

Shewas evaluated by podiatry and is going for TMA  surgery today. I saw the 

patient preoperatively. She had just received a dose of narcots and was mildly 

confused and unable to give me any further history. 





ros: a full detailed ROS is negative except as in my hpi








famhx: unable to obtain as pt is confused


sochx: unable to obtain as pt is confused





Past Patient History





- Infectious Disease


Hx of Infectious Diseases: None





- Past Medical History & Family History


Past Medical History?: Yes





- Past Social History


Alcohol: Occasional





- CARDIAC


Hx Atrial Fibrillation: Yes


Hx Congestive Heart Failure: Yes


Hx Hypercholesterolemia: Yes


Hx Hypertension: Yes


Hx Pacemaker: Yes





- PULMONARY


Hx Pneumonia: Yes





- NEUROLOGICAL


Hx Neurological Disorder: No





- HEENT


Hx HEENT Problems: No





- RENAL


Hx Chronic Kidney Disease: Yes


Hx Dialysis: Yes


Hx Kidney Stones: Yes





- ENDOCRINE/METABOLIC


Hx Hyperthyroidism: Yes


Hx Hypothyroidism: Yes





- HEMATOLOGICAL/ONCOLOGICAL


Hx Anemia: Yes


Hx Human Immunodeficiency Virus (HIV): No





- INTEGUMENTARY


Hx Dermatological Problems: No





- MUSCULOSKELETAL/RHEUMATOLOGICAL


Hx Arthritis: Yes





- GASTROINTESTINAL


Hx Gall Bladder Disease: Yes





- GENITOURINARY/GYNECOLOGICAL


Hx Genitourinary Disorders: No





- PSYCHIATRIC


Hx Psychophysiologic Disorder: No





- SURGICAL HISTORY


Hx Cholecystectomy: Yes


Hx Coronary Artery Bypass Graft: Yes


Hx Coronary Stent: Yes





- ANESTHESIA


Hx Anesthesia: Yes


Hx Anesthesia Reactions: No


Hx Malignant Hyperthermia: No





Meds


Allergies/Adverse Reactions: 


 Allergies











Allergy/AdvReac Type Severity Reaction Status Date / Time


 


No Known Allergies Allergy   Verified 02/03/16 18:53














- Medications


Medications: 


 Current Medications





Acetaminophen (Tylenol 325mg Tab)  650 mg PO Q4 PRN


   PRN Reason: Pain, Mild (1-3)


Amlodipine Besylate (Norvasc)  10 mg PO DAILY [facility]


Atorvastatin Calcium (Lipitor)  80 mg PO HS [facility]


Hydromorphone HCl (Dilaudid)  1 mg IVP Q4 PRN


   PRN Reason: Pain, severe (8-10)


   Last Admin: 03/31/17 10:26 Dose:  1 mg


Vancomycin HCl 750 mg/ Sodium (Chloride)  250 mls @ 166.667 mls/hr IVPB TTS [facility]


Piperacillin Sod/Tazobactam (Sod 2.25 gm/ Sodium Ch[facility])  100 mls @ 100 mls/

hr IVPB Q8 [facility]


   Last Admin: 03/31/17 15:01 Dose:  Not Given


Insulin Human Lispro (Humalog)  0 units SC ACHS [facility]


   PRN Reason: Protocol


   Last Admin: 03/31/17 12:03 Dose:  Not Given


Levothyroxine Sodium (Synthroid)  75 mcg PO DAILY@0630 [facility]


Metoprolol Tartrate (Lopressor)  50 mg PO BID [facility]


Oxycodone/Acetaminophen (Percocet 5/325 Mg Tab)  1 tab PO Q4 PRN


   PRN Reason: Pain, moderate (4-7)


   Stop: 04/03/17 15:19


Pantoprazole Sodium (Protonix Ec Tab)  40 mg PO DAILY [facility]


Pregabalin (Lyrica)  50 mg PO QPM [facility]


Sevelamer HCl (Renagel)  800 mg PO BID [facility]


Valsartan (Diovan)  160 mg PO DAILY [facility]











Physical Exam





- Constitutional


Appears: Non-toxic





- Head Exam


Head Exam: ATRAUMATIC





- Eye Exam


Eye Exam: Normal appearance





- ENT Exam


ENT Exam: Normal Exam





- Respiratory Exam


Respiratory Exam: NORMAL BREATHING PATTERN





- Cardiovascular Exam


Cardiovascular Exam: +S1, +S2





- GI/Abdominal Exam


GI & Abdominal Exam: Normal Bowel Sounds





- Extremities Exam


Additional comments: 


no edema





- Neurological Exam


Additional comments: 


confused





- Psychiatric Exam


Psychiatric exam: Flat Affect





- Skin


Additional comments: 


dressing on LLE





Results





- Vital Signs


Recent Vital Signs: 


 Last Vital Signs











Temp  97.4 F L  03/31/17 16:30


 


Pulse  74   03/31/17 16:30


 


Resp  18   03/31/17 16:30


 


BP  122/63   03/31/17 16:30


 


Pulse Ox  97   03/31/17 16:30














- Labs


Result Diagrams: 


 03/31/17 05:30





 03/31/17 05:30


Labs: 


 Laboratory Results - last 24 hr











  03/30/17 03/30/17 03/30/17





  19:25 19:59 21:39


 


WBC  8.7  


 


RBC  3.43 L  


 


Hgb  10.4 L  


 


Hct  32.4 L  


 


MCV  94.5  


 


MCH  30.3  


 


MCHC  32.1 L  


 


RDW  15.1 H  


 


Plt Count  236  


 


MPV  9.2  


 


Neut % (Auto)  65.4  


 


Lymph % (Auto)  16.5 L  


 


Mono % (Auto)  14.5 H  


 


Eos % (Auto)  2.3  


 


Baso % (Auto)  1.3  


 


Neut #  5.7  


 


Lymph #  1.4  


 


Mono #  1.3 H  


 


Eos #  0.2  


 


Baso #  0.1  


 


PT  11.9 H  


 


INR  1.14 H  


 


APTT  22.1 L  


 


Sodium  140  


 


Potassium  4.3  


 


Chloride  94 L  


 


Carbon Dioxide  30  


 


Anion Gap  20  


 


BUN  16  


 


Creatinine  2.4 H  


 


Est GFR ( Amer)  24  


 


Est GFR (Non-Af Amer)  20  


 


POC Glucose (mg/dL)   


 


Random Glucose  123 H  


 


Calcium  9.3  


 


Total Bilirubin  0.5  


 


AST  23  


 


ALT  16  


 


Alkaline Phosphatase  88  


 


Total Protein  8.1  


 


Albumin  3.9  


 


Globulin  4.2 H  


 


Albumin/Globulin Ratio  0.9 L  


 


Urine Color    Yellow


 


Urine Clarity    Slighty-cloudy


 


Urine pH    7.0


 


Ur Specific Gravity    1.015


 


Urine Protein    100


 


Urine Glucose (UA)    Neg


 


Urine Ketones    Negative


 


Urine Blood    Negative


 


Urine Nitrate    Negative


 


Urine Bilirubin    Negative


 


Urine Urobilinogen    0.2-1.0


 


Ur Leukocyte Esterase    Mod


 


Urine RBC (Auto)    3


 


Urine Microscopic WBC    34 H


 


Ur Squamous Epith Cells    6 H


 


Urine Bacteria    Few H


 


Hyaline Casts    0-2


 


Blood Type   AB POSITIVE 


 


Antibody Screen   Negative 


 


BBK History Checked   Patient has bt 














  03/31/17 03/31/17 03/31/17





  05:30 06:44 10:44


 


WBC  7.3  


 


RBC  3.07 L  


 


Hgb  9.3 L  


 


Hct  28.6 L  


 


MCV  93.3  


 


MCH  30.4  


 


MCHC  32.6 L  


 


RDW  14.7 H  


 


Plt Count  237  


 


MPV  9.0  


 


Neut % (Auto)  62.2  


 


Lymph % (Auto)  17.7 L  


 


Mono % (Auto)  14.4 H  


 


Eos % (Auto)  4.0  


 


Baso % (Auto)  1.7  


 


Neut #  4.5  


 


Lymph #  1.3  


 


Mono #  1.0 H  


 


Eos #  0.3  


 


Baso #  0.1  


 


PT  12.3 H  


 


INR  1.18 H  


 


APTT  25.9  


 


Sodium  142  


 


Potassium  4.4  


 


Chloride  96 L  


 


Carbon Dioxide  29  


 


Anion Gap  21 H  


 


BUN  22 H  


 


Creatinine  3.1 H  


 


Est GFR ( Amer)  18  


 


Est GFR (Non-Af Amer)  15  


 


POC Glucose (mg/dL)   114 H  85


 


Random Glucose  109 H  


 


Calcium  8.9  


 


Total Bilirubin   


 


AST   


 


ALT   


 


Alkaline Phosphatase   


 


Total Protein   


 


Albumin   


 


Globulin   


 


Albumin/Globulin Ratio   


 


Urine Color   


 


Urine Clarity   


 


Urine pH   


 


Ur Specific Gravity   


 


Urine Protein   


 


Urine Glucose (UA)   


 


Urine Ketones   


 


Urine Blood   


 


Urine Nitrate   


 


Urine Bilirubin   


 


Urine Urobilinogen   


 


Ur Leukocyte Esterase   


 


Urine RBC (Auto)   


 


Urine Microscopic WBC   


 


Ur Squamous Epith Cells   


 


Urine Bacteria   


 


Hyaline Casts   


 


Blood Type   


 


Antibody Screen   


 


BBK History Checked   














  03/31/17





  15:19


 


WBC 


 


RBC 


 


Hgb 


 


Hct 


 


MCV 


 


MCH 


 


MCHC 


 


RDW 


 


Plt Count 


 


MPV 


 


Neut % (Auto) 


 


Lymph % (Auto) 


 


Mono % (Auto) 


 


Eos % (Auto) 


 


Baso % (Auto) 


 


Neut # 


 


Lymph # 


 


Mono # 


 


Eos # 


 


Baso # 


 


PT 


 


INR 


 


APTT 


 


Sodium 


 


Potassium 


 


Chloride 


 


Carbon Dioxide 


 


Anion Gap 


 


BUN 


 


Creatinine 


 


Est GFR ( Amer) 


 


Est GFR (Non-Af Amer) 


 


POC Glucose (mg/dL)  133 H


 


Random Glucose 


 


Calcium 


 


Total Bilirubin 


 


AST 


 


ALT 


 


Alkaline Phosphatase 


 


Total Protein 


 


Albumin 


 


Globulin 


 


Albumin/Globulin Ratio 


 


Urine Color 


 


Urine Clarity 


 


Urine pH 


 


Ur Specific Gravity 


 


Urine Protein 


 


Urine Glucose (UA) 


 


Urine Ketones 


 


Urine Blood 


 


Urine Nitrate 


 


Urine Bilirubin 


 


Urine Urobilinogen 


 


Ur Leukocyte Esterase 


 


Urine RBC (Auto) 


 


Urine Microscopic WBC 


 


Ur Squamous Epith Cells 


 


Urine Bacteria 


 


Hyaline Casts 


 


Blood Type 


 


Antibody Screen 


 


BBK History Checked 














Assessment & Plan





- Assessment and Plan (Free Text)


Assessment: 


ESRD /  CAD / HTN / Anemia/ Secondary hyperpara of renal origin/ Necrotic Toes 

L 





plan:


hd tomorrow


bp acceptable


will check phos - on renvela currently


willl resume epogen.


for surgery today

## 2017-03-31 NOTE — RAD
HISTORY:

preop  



COMPARISON:

2/28/2017 



FINDINGS:



LUNGS:

No active pulmonary disease.



PLEURA:

No significant pleural effusion identified, no pneumothorax apparent.



CARDIOVASCULAR:

Permanent pacemaker. Vascular stent in the region of right subclavian 

vein



OSSEOUS STRUCTURES:

No significant abnormalities.



VISUALIZED UPPER ABDOMEN:

Normal.



OTHER FINDINGS:

None.



IMPRESSION:

No acute infiltrate. Pacemaker noted.

## 2017-03-31 NOTE — RAD
PROCEDURE:  Left Foot Radiographs.



HISTORY:

left foot pain  



COMPARISON:

3/3/2017



FINDINGS:



BONES:

Status post amputation 1st digit at base of 1st proximal phalanx. 

Unchanged from prior examination.  No osseous fracture. Examination 

limited to two views. 



JOINTS:

Normal. 



SOFT TISSUES:

Vascular calcifications 



OTHER FINDINGS:

None.



IMPRESSION:

No acute fracture.  Amputation 1st digit.  No change from 3/3/2017.

## 2017-03-31 NOTE — CARD
--------------- APPROVED REPORT --------------





EKG Measurement

Heart [id number]

MO [id number]

[id number]

[id number]

[id number]



<Conclusion>

Atrial-paced rhythm

Possible Lateral infarct, age undetermined

Inferior infarct, age undetermined

Abnormal ECG

## 2017-04-01 PROCEDURE — 5A1D60Z: ICD-10-PCS | Performed by: INTERNAL MEDICINE

## 2017-04-01 RX ADMIN — INSULIN LISPRO SCH UNITS: 100 INJECTION, SOLUTION INTRAVENOUS; SUBCUTANEOUS at 12:42

## 2017-04-01 RX ADMIN — INSULIN LISPRO SCH: 100 INJECTION, SOLUTION INTRAVENOUS; SUBCUTANEOUS at 07:09

## 2017-04-01 RX ADMIN — HYDROMORPHONE HYDROCHLORIDE PRN MG: 1 INJECTION, SOLUTION INTRAMUSCULAR; INTRAVENOUS; SUBCUTANEOUS at 20:39

## 2017-04-01 RX ADMIN — PANTOPRAZOLE SODIUM SCH MG: 40 TABLET, DELAYED RELEASE ORAL at 09:47

## 2017-04-01 RX ADMIN — INSULIN LISPRO SCH: 100 INJECTION, SOLUTION INTRAVENOUS; SUBCUTANEOUS at 22:35

## 2017-04-01 RX ADMIN — INSULIN LISPRO SCH UNITS: 100 INJECTION, SOLUTION INTRAVENOUS; SUBCUTANEOUS at 17:08

## 2017-04-01 NOTE — CP.PCM.PN
Subjective





- Subjective


Subjective: 


Pain L foot





Objective





- Vital Signs/Intake and Output


Vital Signs (last 24 hours): 


 











Temp Pulse Resp BP Pulse Ox


 


 97.8 F   63   20   155/72 H  98 


 


 04/01/17 17:00  04/01/17 17:00  04/01/17 17:00  04/01/17 17:00  04/01/17 17:00











- Medications


Medications: 


 Current Medications





Acetaminophen (Tylenol 325mg Tab)  650 mg PO Q4 PRN


   PRN Reason: Pain, Mild (1-3)


Amlodipine Besylate (Norvasc)  10 mg PO DAILY [facility]


   Last Admin: 04/01/17 09:47 Dose:  Not Given


Atorvastatin Calcium (Lipitor)  80 mg PO HS [facility]


   Last Admin: 03/31/17 23:32 Dose:  80 mg


Epoetin Edgardo (Procrit)  10,000 unit IV TTS [facility]


Hydromorphone HCl (Dilaudid)  1 mg IVP Q4 PRN


   PRN Reason: Pain, severe (8-10)


   Last Admin: 04/01/17 13:52 Dose:  1 mg


Vancomycin HCl 750 mg/ Sodium (Chloride)  250 mls @ 166.667 mls/hr IVPB TTS [facility]


   Last Admin: 04/01/17 09:49 Dose:  166.667 mls/hr


Piperacillin Sod/Tazobactam (Sod 2.25 gm/ Sodium [facility])  100 mls @ 100 mls/

hr IVPB Q8 [facility]


   Last Admin: 04/01/17 16:00 Dose:  100 mls/hr


Insulin Human Lispro (Humalog)  0 units SC ACHS [facility]


   PRN Reason: Protocol


   Last Admin: 04/01/17 17:08 Dose:  2 units


Levothyroxine Sodium (Synthroid)  75 mcg PO DAILY@0630 [facility]


   Last Admin: 04/01/17 06:59 Dose:  75 mcg


Metoprolol Tartrate (Lopressor)  50 mg PO BID [facility]


   Last Admin: 04/01/17 12:39 Dose:  50 mg


Oxycodone/Acetaminophen (Percocet 5/325 Mg Tab)  1 tab PO Q4 PRN


   PRN Reason: Pain, moderate (4-7)


   Stop: 04/03/17 15:19


Pantoprazole Sodium (Protonix Ec Tab)  40 mg PO DAILY [facility]


   Last Admin: 04/01/17 09:47 Dose:  40 mg


Pregabalin (Lyrica)  50 mg PO QPM [facility]


   Last Admin: 03/31/17 18:06 Dose:  50 mg


Sevelamer HCl (Renagel)  800 mg PO BID [facility]


   Last Admin: 04/01/17 09:49 Dose:  800 mg


Valsartan (Diovan)  160 mg PO DAILY [facility]


   Last Admin: 04/01/17 09:45 Dose:  160 mg











- Labs


Labs: 


 





 03/31/17 05:30 





 03/31/17 05:30 





 











PT  12.3 SECONDS (9.6-11.2)  H  03/31/17  05:30    


 


INR  1.18  (0.92-1.08)  H  03/31/17  05:30    


 


APTT  25.9 SECONDS (23.3-32.5)   03/31/17  05:30    














- Constitutional


Appears: No Acute Distress, Chronically Ill





- Head Exam


Head Exam: NORMAL INSPECTION





- Eye Exam


Eye Exam: PERRL





- ENT Exam


ENT Exam: Normal Oropharynx





- Neck Exam


Neck Exam: Normal Inspection





- Respiratory Exam


Respiratory Exam: NORMAL BREATHING PATTERN





- Cardiovascular Exam


Cardiovascular Exam: REGULAR RHYTHM


Additional comments: 


PPM





- GI/Abdominal Exam


GI & Abdominal Exam: Soft, Normal Bowel Sounds





- Extremities Exam


Extremities Exam: Tenderness (L foot , dressing in place)





- Back Exam


Back Exam: NORMAL INSPECTION





- Neurological Exam


Neurological Exam: Alert, Oriented x3


Additional comments: 


no focal motor deficit , decreased sensation  R L foot





- Psychiatric Exam


Psychiatric exam: Normal Mood





- Skin


Skin Exam: Warm





Assessment and Plan





- Assessment and Plan (Free Text)


Assessment: 


s/p L foot TMA Acute High


PVD Chronic high


DM neuropathy Chronic high


ESKD HD chronic high


CAD


CHF


Hc PAF


PPM


DMII


HTN


Hypothyroidism


Hyperlipidemia


Anemia


Plan: 


Continue Percocet , Merren and rest of treatment , PT

## 2017-04-01 NOTE — CP.PCM.PN
Subjective





- Date & Time of Evaluation


Date of Evaluation: 04/01/17


Time of Evaluation: 07:40





- Subjective


Subjective: 


75 yo female patient with PMHx of Anemia, arthritis, Atrial Fib, CAD, CHF, DM, 

HTN, HLD, ESRD (dialysis on Tues/Thurs/Sat), Hyperthyroidism, Hypothyroidism 

was seen at bedside this morning. The patient is 1 day s/p Left foot TMA by Dr. Serna. Patient was resting comfortably in bed this morning without any acute 

distress. AAO x3. Dressing to Left foot remains c/d/i. Patient complains of 

mild pain to Left foot upon palpation. Patient denies F/C/N/V/SOB today











Objective





- Vital Signs/Intake and Output


Vital Signs (last 24 hours): 


 











Temp Pulse Resp BP Pulse Ox


 


 98 F   74   18   139/75   93 L


 


 04/01/17 08:40  04/01/17 08:40  04/01/17 08:40  04/01/17 08:40  04/01/17 08:40











- Medications


Medications: 


 Current Medications





Acetaminophen (Tylenol 325mg Tab)  650 mg PO Q4 PRN


   PRN Reason: Pain, Mild (1-3)


Amlodipine Besylate (Norvasc)  10 mg PO DAILY [facility]


   Last Admin: 04/01/17 09:47 Dose:  Not Given


Atorvastatin Calcium (Lipitor)  80 mg PO HS [facility]


   Last Admin: 03/31/17 23:32 Dose:  80 mg


Epoetin Edgardo (Procrit)  10,000 unit IV TTS [facility]


Hydromorphone HCl (Dilaudid)  1 mg IVP Q4 PRN


   PRN Reason: Pain, severe (8-10)


   Last Admin: 04/01/17 06:59 Dose:  1 mg


Vancomycin HCl 750 mg/ Sodium (Chloride)  250 mls @ 166.667 mls/hr IVPB TTS [facility]


   Last Admin: 04/01/17 09:49 Dose:  166.667 mls/hr


Piperacillin Sod/Tazobactam (Sod 2.25 gm/ Sodium [facility])  100 mls @ 100 mls/

hr IVPB Q8 [facility]


   Last Admin: 04/01/17 09:48 Dose:  100 mls/hr


Insulin Human Lispro (Humalog)  0 units SC ACHS [facility]


   PRN Reason: Protocol


   Last Admin: 04/01/17 07:09 Dose:  Not Given


Levothyroxine Sodium (Synthroid)  75 mcg PO DAILY@0630 [facility]


   Last Admin: 04/01/17 06:59 Dose:  75 mcg


Metoprolol Tartrate (Lopressor)  50 mg PO BID [facility]


   Last Admin: 04/01/17 09:46 Dose:  Not Given


Oxycodone/Acetaminophen (Percocet 5/325 Mg Tab)  1 tab PO Q4 PRN


   PRN Reason: Pain, moderate (4-7)


   Stop: 04/03/17 15:19


Pantoprazole Sodium (Protonix Ec Tab)  40 mg PO DAILY [facility]


   Last Admin: 04/01/17 09:47 Dose:  40 mg


Pregabalin (Lyrica)  50 mg PO QPM [facility]


   Last Admin: 03/31/17 18:06 Dose:  50 mg


Sevelamer HCl (Renagel)  800 mg PO BID [facility]


   Last Admin: 04/01/17 09:49 Dose:  800 mg


Valsartan (Diovan)  160 mg PO DAILY [facility]


   Last Admin: 04/01/17 09:45 Dose:  160 mg











- Labs


Labs: 


 





 03/31/17 05:30 





 03/31/17 05:30 





 











PT  12.3 SECONDS (9.6-11.2)  H  03/31/17  05:30    


 


INR  1.18  (0.92-1.08)  H  03/31/17  05:30    


 


APTT  25.9 SECONDS (23.3-32.5)   03/31/17  05:30    














- Constitutional


Appears: Well, Non-toxic, No Acute Distress





- Extremities Exam


Additional comments: 


Left lower extremity exam





DERM: 


Surgical site well coapted with all sutures intact. No dehiscence or maceration 

is noted. No drainage is noted. No erythema is noted. No acute sign of 

infection is noted.





VASC: Palpable DP noted 2/4. Non-palpable PT. Blenchable digits, CRT less than 

3 seconds to all digits bilaterally noted.





ORTHO: Pain on palpation to right hallux, distal to MPJ





NEURO: Gross sensation diminished





- Neurological Exam


Neurological Exam: Alert, Awake, Oriented x3





- Psychiatric Exam


Psychiatric exam: Normal Affect, Normal Mood





- Skin


Skin Exam: Normal Color, Warm





Assessment and Plan





- Assessment and Plan (Free Text)


Assessment: 


76 year old female patient 1 day s/p Left foot TMA by Dr. Serna


Plan: 


Patient was seen, evaluated and treated at bedside


labs and vitals reviewed


discussed with Dr. Serna


Left foot dressed with Adaptic, DSD


Continue IV Abx


Patient to be non-weight bearing to Left foot


Podiatry will follow in-house

## 2017-04-01 NOTE — CP.PCM.PN
Subjective





- Date & Time of Evaluation


Date of Evaluation: 04/01/17


Time of Evaluation: 11:00





- Subjective


Subjective: 


Appears comfortable in bed





Objective





- Vital Signs/Intake and Output


Vital Signs (last 24 hours): 


 











Temp Pulse Resp BP Pulse Ox


 


 98 F   74   18   139/75   93 L


 


 04/01/17 08:40  04/01/17 08:40  04/01/17 08:40  04/01/17 08:40  04/01/17 08:40











- Medications


Medications: 


 Current Medications





Acetaminophen (Tylenol 325mg Tab)  650 mg PO Q4 PRN


   PRN Reason: Pain, Mild (1-3)


Amlodipine Besylate (Norvasc)  10 mg PO DAILY [facility]


   Last Admin: 04/01/17 09:47 Dose:  Not Given


Atorvastatin Calcium (Lipitor)  80 mg PO HS [facility]


   Last Admin: 03/31/17 23:32 Dose:  80 mg


Epoetin Edgardo (Procrit)  10,000 unit IV TTS [facility]


Hydromorphone HCl (Dilaudid)  1 mg IVP Q4 PRN


   PRN Reason: Pain, severe (8-10)


   Last Admin: 04/01/17 06:59 Dose:  1 mg


Vancomycin HCl 750 mg/ Sodium (Chloride)  250 mls @ 166.667 mls/hr IVPB TTS [facility]


   Last Admin: 04/01/17 09:49 Dose:  166.667 mls/hr


Piperacillin Sod/Tazobactam (Sod 2.25 gm/ Sodium Chloride)  100 mls @ 100 mls/

hr IVPB Q8 [facility]


   Last Admin: 04/01/17 09:48 Dose:  100 mls/hr


Insulin Human Lispro (Humalog)  0 units SC ACHS [facility]


   PRN Reason: Protocol


   Last Admin: 04/01/17 07:09 Dose:  Not Given


Levothyroxine Sodium (Synthroid)  75 mcg PO DAILY@0630 [facility]


   Last Admin: 04/01/17 06:59 Dose:  75 mcg


Metoprolol Tartrate (Lopressor)  50 mg PO BID [facility]


   Last Admin: 04/01/17 09:46 Dose:  Not Given


Oxycodone/Acetaminophen (Percocet 5/325 Mg Tab)  1 tab PO Q4 PRN


   PRN Reason: Pain, moderate (4-7)


   Stop: 04/03/17 15:19


Pantoprazole Sodium (Protonix Ec Tab)  40 mg PO DAILY [facility]


   Last Admin: 04/01/17 09:47 Dose:  40 mg


Pregabalin (Lyrica)  50 mg PO QPM [facility]


   Last Admin: 03/31/17 18:06 Dose:  50 mg


Sevelamer HCl (Renagel)  800 mg PO BID [facility]


   Last Admin: 04/01/17 09:49 Dose:  800 mg


Valsartan (Diovan)  160 mg PO DAILY [facility]


   Last Admin: 04/01/17 09:45 Dose:  160 mg











- Labs


Labs: 


 





 03/31/17 05:30 





 03/31/17 05:30 





 











PT  12.3 SECONDS (9.6-11.2)  H  03/31/17  05:30    


 


INR  1.18  (0.92-1.08)  H  03/31/17  05:30    


 


APTT  25.9 SECONDS (23.3-32.5)   03/31/17  05:30    














- Respiratory Exam


Additional comments: 


Lungs clear





- Cardiovascular Exam


Cardiovascular Exam: REGULAR RHYTHM





- Extremities Exam


Additional comments: 


No edema. Lt foot dressed





Assessment and Plan





- Assessment and Plan (Free Text)


Assessment: 


ESRD on HD


S/P Lt transmetatarsl amputation


CAD, A.fib


DM


Plan: 


Scheduled for HD today


Orders entered. Consent obtained


BP stable

## 2017-04-02 RX ADMIN — HYDROMORPHONE HYDROCHLORIDE PRN MG: 1 INJECTION, SOLUTION INTRAMUSCULAR; INTRAVENOUS; SUBCUTANEOUS at 14:49

## 2017-04-02 RX ADMIN — HYDROMORPHONE HYDROCHLORIDE PRN MG: 1 INJECTION, SOLUTION INTRAMUSCULAR; INTRAVENOUS; SUBCUTANEOUS at 03:06

## 2017-04-02 RX ADMIN — INSULIN LISPRO SCH UNITS: 100 INJECTION, SOLUTION INTRAVENOUS; SUBCUTANEOUS at 13:23

## 2017-04-02 RX ADMIN — OXYCODONE HYDROCHLORIDE AND ACETAMINOPHEN PRN TAB: 5; 325 TABLET ORAL at 15:47

## 2017-04-02 RX ADMIN — INSULIN LISPRO SCH: 100 INJECTION, SOLUTION INTRAVENOUS; SUBCUTANEOUS at 21:40

## 2017-04-02 RX ADMIN — PANTOPRAZOLE SODIUM SCH MG: 40 TABLET, DELAYED RELEASE ORAL at 09:09

## 2017-04-02 RX ADMIN — INSULIN LISPRO SCH UNITS: 100 INJECTION, SOLUTION INTRAVENOUS; SUBCUTANEOUS at 09:08

## 2017-04-02 RX ADMIN — INSULIN LISPRO SCH UNITS: 100 INJECTION, SOLUTION INTRAVENOUS; SUBCUTANEOUS at 17:02

## 2017-04-02 NOTE — CP.PCM.PN
Subjective





- Date & Time of Evaluation


Date of Evaluation: 04/09/17


Time of Evaluation: 03:35





- Subjective


Subjective: 


C/o pain in Rt foot





Objective





- Vital Signs/Intake and Output


Vital Signs (last 24 hours): 


 











Temp Pulse Resp BP Pulse Ox


 


 98.5 F   69   18   136/66   93 L


 


 04/02/17 07:59  04/02/17 07:59  04/02/17 07:59  04/02/17 09:09  04/02/17 07:59











- Medications


Medications: 


 Current Medications





Acetaminophen (Tylenol 325mg Tab)  650 mg PO Q4 PRN


   PRN Reason: Pain, Mild (1-3)


Amlodipine Besylate (Norvasc)  10 mg PO DAILY [facility]


   Last Admin: 04/02/17 09:09 Dose:  10 mg


Atorvastatin Calcium (Lipitor)  80 mg PO HS [facility]


   Last Admin: 04/01/17 22:36 Dose:  80 mg


Docusate Sodium (Colace)  200 mg PO DAILY [facility]


Epoetin Edgardo (Procrit)  10,000 unit IV TTS [facility]


   Last Admin: 04/01/17 20:47 Dose:  10,000 unit


Hydromorphone HCl (Dilaudid)  1 mg IVP Q4 PRN


   PRN Reason: Pain, severe (8-10)


   Last Admin: 04/02/17 14:49 Dose:  1 mg


Vancomycin HCl 750 mg/ Sodium (Chloride)  250 mls @ 166.667 mls/hr IVPB TTS [facility]


   Last Admin: 04/01/17 09:49 Dose:  166.667 mls/hr


Meropenem 500 mg/ Sodium (Chloride)  100 mls @ 100 mls/hr IVPB Q12H [facility]


Insulin Human Lispro (Humalog)  0 units SC ACHS [facility]


   PRN Reason: Protocol


   Last Admin: 04/02/17 13:23 Dose:  4 units


Lactulose (Enulose)  20 gm PO BID [facility]


   PRN Reason: Constipation


Levothyroxine Sodium (Synthroid)  75 mcg PO DAILY@0630 [facility]


   Last Admin: 04/02/17 08:00 Dose:  75 mcg


Metoprolol Tartrate (Lopressor)  50 mg PO BID [facility]


   Last Admin: 04/02/17 09:09 Dose:  50 mg


Oxycodone/Acetaminophen (Percocet 5/325 Mg Tab)  1 tab PO Q4 PRN


   PRN Reason: Pain, moderate (4-7)


   Stop: 04/03/17 15:19


   Last Admin: 04/02/17 15:47 Dose:  1 tab


Pantoprazole Sodium (Protonix Ec Tab)  40 mg PO DAILY [facility]


   Last Admin: 04/02/17 09:09 Dose:  40 mg


Pregabalin (Lyrica)  50 mg PO QPM [facility]


   Last Admin: 04/01/17 22:35 Dose:  50 mg


Sevelamer HCl (Renagel)  800 mg PO BID [facility]


   Last Admin: 04/02/17 09:08 Dose:  800 mg


Valsartan (Diovan)  160 mg PO DAILY [facility]


   Last Admin: 04/02/17 09:08 Dose:  160 mg











- Labs


Labs: 


 





 03/31/17 05:30 





 03/31/17 05:30 





 











PT  12.3 SECONDS (9.6-11.2)  H  03/31/17  05:30    


 


INR  1.18  (0.92-1.08)  H  03/31/17  05:30    


 


APTT  25.9 SECONDS (23.3-32.5)   03/31/17  05:30    














- Respiratory Exam


Additional comments: 


Lungs clear





- Cardiovascular Exam


Cardiovascular Exam: Irregular Rhythm





- Extremities Exam


Additional comments: 


BNo edema. Rt foot dressed





Assessment and Plan





- Assessment and Plan (Free Text)


Assessment: 


ESRD


S/P Rt TMA


CAD,A>fib


DM


Plan: 


Etable on dialysis. Continue HD per schedule

## 2017-04-02 NOTE — CP.PCM.CON
History of Present Illness





- History of Present Illness


History of Present Illness: 


76 year old female with a history of CAD, CHF, hypertension, diabetes, 

hyperlipidemia, and end stage renal disease presents to the ED with a chief 

complaint of mild left foot pain


Underwent TMA for chronic left foot infection


referred for ID ecal of ESBL + E coli urine culture


denies burning /pain on urination 














- Medical History


PMH: Anemia, Arthritis, Atrial Fibrillation, CAD, CHF, Diabetes, Gall Bladder 

Disease, HTN, Hypercholesterolemia, Hyperthyroidism, Hypothyroidism, Kidney 

Stones, Pneumonia, End Stage Renal Disease (TTF), Chronic Kidney Disease


   Denies: HIV





- Surgical History


Surgical History: CABG, Cholecystectomy, Coronary Stent, Pacemaker








Review of Systems





- Constitutional


Constitutional: As Per HPI, Anorexia, Malaise.  absent: Fever





- EENT


Eyes: absent: As Per HPI, Blind Spots, Blurred Vision, Change in Vision, 

Decreased Night Vision, Diplopia, Discharge, Dry Eye, Exophthalmos, Floaters, 

Irritation, Itchy Eyes, Loss of Peripheral Vision, Pain, Photophobia, Requires 

Corrective Lenses, Sees Flashes, Spots in Vision, Tunnel Vision, Other Visual 

Disturbances, Loss of Vision, Other


Ears: absent: As Per HPI, Decreased Hearing, Ear Discharge, Ear Pain, Tinnitus, 

Abnormal Hearing, Disequilibrium, Dizziness, Other


Nose/Mouth/Throat: absent: As Per HPI, Epistaxis, Nasal Congestion, Nasal 

Discharge, Nasal Obstruction, Nasal Trauma, Nose Pain, Post Nasal Drip, Sinus 

Pain, Sinus Pressure, Bleeding Gums, Change in Voice, Dental Pain, Dry Mouth, 

Dysphagia, Halitosis, Hoarsness, Lip Swelling, Mouth Lesions, Mouth Pain, 

Odynophagia, Sore Throat, Throat Swelling, Tongue Swelling, Facial Pain, Neck 

Pain, Neck Mass, Other





- Breasts


Breasts: absent: As Per HPI, Change in Shape, Mass, Pain, Nipple Discharge, 

Nipple Inversion, Skin Changes, Swelling, Other





- Cardiovascular


Cardiovascular: absent: As Per HPI, Acrocyanosis, Chest Pain, Chest Pain at Rest

, Chest Pain with Activity, Claudication, Diaphoresis, Dyspnea, Dyspnea on 

Exertion, Edema, Irregular Heart Rhythm, Pain Radiating to Arm/Neck/Jaw, Leg 

Edema, Leg Ulcers, Lightheadedness, Orthopnea, Palpitations, Paroxysmal 

Nocturnal Dyspnea, Pedal Edema, Radiating Pain, Rapid Heart Rate, Slow Heart 

Rate, Syncope, Other





- Respiratory


Respiratory: absent: As Per HPI, Cough, Dyspnea, Hemoptysis, Dyspnea on Exertion

, Wheezing, Snoring, Stridor, Pain on Inspiration, Chest Congestion, Excessive 

Mucous Production, Change in Mucous Color, Pain with Coughing, Other





- Gastrointestinal


Gastrointestinal: absent: As Per HPI, Abdominal Pain, Belching, Bloating, 

Change in Bowel Habits, Change in Stool Character, Coffee Ground Emesis, 

Constipation, Cramping, Diarrhea, Dyspepsia, Dysphagia, Early Satiety, 

Excessive Flatus, Fecal Incontinence, Heartburn, Hematemesis, Hematochezia, 

Loose Stools, Melena, Nausea, Odynophagia, Temesmus, Vomiting, Other





- Genitourinary


Genitourinary: As Per HPI





- Reproductive: Female


Reproductive:Female: absent: As Per HPI, Amenorrhea, Amenorrhea/Birth Control, 

Currently Menstual, Cycle <21 Days, Cycle >35 Days, Cycle Variable, Menses 1-7 

Days, Menses >/= 8 Days, Menses Variable, Cycle > 4 Weeks Between, No Menses 

for 6 Months, Heavy Menses, Light Menses, Normal Menses, Spotting Between Cycles

, S/P Hysterectomy, Menopausal, Post Menopausal, Premenarche, Abnormal Vaginal 

Bleeding, Dysmenorrhea, Dyspareunia, Genital Lesions, Genital Pruritis, Pelvic 

Pain, Prolapse Symptoms, Sexual Dysfunction, Vaginal Discharge, Vaginal Dryness

, Vaginal Odor, Vaginal Pruritis, Other





- Menstruation


Menstruation: absent: As Per HPI, Amenorrhea, Amenorrhea/Birth Control, 

Currently Menstual, Cycle <21 Days, Cycle >35 Days, Cycle Variable, Menses 1-7 

Days, Menses >/= 8 Days, Menses Variable, Cycle > 4 Weeks Between, No Menses 

for 6 Months, Heavy Menses, Light Menses, Normal Menses, Spotting Between Cycles

, S/P Hysterectomy, Menopausal, Post Menopausal, Premenarche, Abnormal Vaginal 

Bleeding, Dysmenorrhea, Other





- Musculoskeletal


Musculoskeletal: As Per HPI





- Integumentary


Integumentary: As Per HPI, Wounds





- Neurological


Neurological: As Per HPI, Tingling





- Psychiatric


Psychiatric: absent: As Per HPI, Abnormal Sleep Pattern, Anhedonia, Anxiety, 

Auditory Hallucinations, Behavioral Changes, Change in Appetite, Change in 

Libido, Confusion, Depression, Difficulty Concentrating, Hallucinations, 

Homicidal Ideation, Hopelessness, Irritability, Memory Loss, Mood Swings, Panic 

Attacks, Paranoia, Suicidal Ideation, Visual Hallucinations, Tactile 

Hallucinations, Other





- Endocrine


Endocrine: As Per HPI





- Hematologic/Lymphatic


Hematologic: absent: As Per HPI, Easy Bleeding, Easy Bruising, Lymphadenopathy, 

Other





Past Patient History





- Infectious Disease


Hx of Infectious Diseases: None





- Past Medical History & Family History


Past Medical History?: Yes





- Past Social History


Smoking Status: Never Smoked


Alcohol: Occasional


Drugs: Denies





- CARDIAC


Hx Cardiac Disorders: Yes


Hx Atrial Fibrillation: Yes


Hx Congestive Heart Failure: Yes


Hx Hypercholesterolemia: Yes


Hx Hypertension: Yes


Hx Pacemaker: Yes





- PULMONARY


Hx Respiratory Disorders: Yes


Hx Pneumonia: Yes





- NEUROLOGICAL


Hx Neurological Disorder: No





- HEENT


Hx HEENT Problems: No





- RENAL


Hx Chronic Kidney Disease: Yes


Hx Dialysis: Yes


Hx Kidney Stones: Yes


Hx Renal Failure: Yes





- ENDOCRINE/METABOLIC


Hx Hyperthyroidism: Yes


Hx Hypothyroidism: Yes





- HEMATOLOGICAL/ONCOLOGICAL


Hx Anemia: Yes


Hx Human Immunodeficiency Virus (HIV): No





- INTEGUMENTARY


Hx Dermatological Problems: No





- MUSCULOSKELETAL/RHEUMATOLOGICAL


Hx Arthritis: Yes





- GASTROINTESTINAL


Hx Gall Bladder Disease: Yes





- GENITOURINARY/GYNECOLOGICAL


Hx Genitourinary Disorders: No





- PSYCHIATRIC


Hx Psychophysiologic Disorder: No





- SURGICAL HISTORY


Hx Surgeries: Yes


Hx Amputation: Yes (L foot partial hallux amputation,)


Hx Cholecystectomy: Yes


Hx Coronary Artery Bypass Graft: Yes


Hx Coronary Stent: Yes





- ANESTHESIA


Hx Anesthesia: Yes


Hx Anesthesia Reactions: No


Hx Malignant Hyperthermia: No





Meds


Allergies/Adverse Reactions: 


 Allergies











Allergy/AdvReac Type Severity Reaction Status Date / Time


 


No Known Allergies Allergy   Verified 02/03/16 18:53














- Medications


Medications: 


 Current Medications





Acetaminophen (Tylenol 325mg Tab)  650 mg PO Q4 PRN


   PRN Reason: Pain, Mild (1-3)


Amlodipine Besylate (Norvasc)  10 mg PO DAILY [facility]


   Last Admin: 04/02/17 09:09 Dose:  10 mg


Atorvastatin Calcium (Lipitor)  80 mg PO HS [facility]


   Last Admin: 04/01/17 22:36 Dose:  80 mg


Epoetin Edgardo (Procrit)  10,000 unit IV TTS [facility]


   Last Admin: 04/01/17 20:47 Dose:  10,000 unit


Hydromorphone HCl (Dilaudid)  1 mg IVP Q4 PRN


   PRN Reason: Pain, severe (8-10)


   Last Admin: 04/02/17 03:06 Dose:  1 mg


Vancomycin HCl 750 mg/ Sodium (Chloride)  250 mls @ 166.667 mls/hr IVPB TTS [facility]


   Last Admin: 04/01/17 09:49 Dose:  166.667 mls/hr


Piperacillin Sod/Tazobactam (Sod 2.25 gm/ Sodium [facility])  100 mls @ 100 mls/

hr IVPB Q8 [facility]


   Last Admin: 04/02/17 09:07 Dose:  100 mls/hr


Insulin Human Lispro (Humalog)  0 units SC ACHS [facility]


   PRN Reason: Protocol


   Last Admin: 04/02/17 13:23 Dose:  4 units


Levothyroxine Sodium (Synthroid)  75 mcg PO DAILY@0630 [facility]


   Last Admin: 04/02/17 08:00 Dose:  75 mcg


Metoprolol Tartrate (Lopressor)  50 mg PO BID [facility]


   Last Admin: 04/02/17 09:09 Dose:  50 mg


Oxycodone/Acetaminophen (Percocet 5/325 Mg Tab)  1 tab PO Q4 PRN


   PRN Reason: Pain, moderate (4-7)


   Stop: 04/03/17 15:19


Pantoprazole Sodium (Protonix Ec Tab)  40 mg PO DAILY [facility]


   Last Admin: 04/02/17 09:09 Dose:  40 mg


Pregabalin (Lyrica)  50 mg PO QPM [facility]


   Last Admin: 04/01/17 22:35 Dose:  50 mg


Sevelamer HCl (Renagel)  800 mg PO BID [facility]


   Last Admin: 04/02/17 09:08 Dose:  800 mg


Valsartan (Diovan)  160 mg PO DAILY [facility]


   Last Admin: 04/02/17 09:08 Dose:  160 mg











Physical Exam





- Constitutional


Appears: Non-toxic, Chronically Ill





- Head Exam


Head Exam: ATRAUMATIC, NORMAL INSPECTION, NORMOCEPHALIC





- Eye Exam


Eye Exam: Normal appearance, PERRL.  absent: Scleral icterus





- ENT Exam


ENT Exam: Mucous Membranes Moist, Normal External Ear Exam, Normal Oropharynx





- Neck Exam


Neck exam: Negative for: Lymphadenopathy, Thyromegaly





- Respiratory Exam


Respiratory Exam: Decreased Breath Sounds, Clear to Auscultation Bilateral





- Cardiovascular Exam


Cardiovascular Exam: REGULAR RHYTHM, +S1, +S2





- GI/Abdominal Exam


GI & Abdominal Exam: Diminished Bowel Sounds, Soft.  absent: Tenderness





- Rectal Exam


Rectal Exam: Deferred





-  Exam


 Exam: NORMAL INSPECTION





- Extremities Exam


Extremities exam: Positive for: pedal pulses present.  Negative for: calf 

tenderness, pedal edema, tenderness





- Back Exam


Back exam: absent: CVA tenderness (L), CVA tenderness (R), paraspinal tenderness





- Neurological Exam


Neurological exam: Alert, CN II-XII Intact, Oriented x3, Reflexes Normal





- Psychiatric Exam


Psychiatric exam: Normal Mood





- Skin


Skin Exam: Dry





Results





- Vital Signs


Recent Vital Signs: 


 Last Vital Signs











Temp  98.5 F   04/02/17 07:59


 


Pulse  69   04/02/17 07:59


 


Resp  18   04/02/17 07:59


 


BP  136/66   04/02/17 09:09


 


Pulse Ox  93 L  04/02/17 07:59














- Labs


Result Diagrams: 


 03/31/17 05:30





 03/31/17 05:30


Labs: 


 Laboratory Results - last 24 hr











  04/01/17 04/01/17 04/02/17





  17:02 21:40 05:55


 


POC Glucose (mg/dL)  152 H  201 H  169 H














  04/02/17





  11:05


 


POC Glucose (mg/dL)  235 H














Assessment & Plan





- Assessment and Plan (Free Text)


Assessment: 


positive blood c/s  likely a contaminant


urine c/s noted  - colonization vs infection


cont merrem for 7 days

## 2017-04-02 NOTE — CP.PCM.PN
Subjective





- Date & Time of Evaluation


Date of Evaluation: 04/02/17





- Subjective


Subjective: 


Pain  L foot





Objective





- Vital Signs/Intake and Output


Vital Signs (last 24 hours): 


 











Temp Pulse Resp BP Pulse Ox


 


 98.5 F   69   18   136/66   93 L


 


 04/02/17 07:59  04/02/17 07:59  04/02/17 07:59  04/02/17 09:09  04/02/17 07:59











- Medications


Medications: 


 Current Medications





Acetaminophen (Tylenol 325mg Tab)  650 mg PO Q4 PRN


   PRN Reason: Pain, Mild (1-3)


Amlodipine Besylate (Norvasc)  10 mg PO DAILY [facility]


   Last Admin: 04/02/17 09:09 Dose:  10 mg


Atorvastatin Calcium (Lipitor)  80 mg PO HS [facility]


   Last Admin: 04/01/17 22:36 Dose:  80 mg


Epoetin Edgardo (Procrit)  10,000 unit IV TTS [facility]


   Last Admin: 04/01/17 20:47 Dose:  10,000 unit


Hydromorphone HCl (Dilaudid)  1 mg IVP Q4 PRN


   PRN Reason: Pain, severe (8-10)


   Last Admin: 04/02/17 14:49 Dose:  1 mg


Vancomycin HCl 750 mg/ Sodium (Chloride)  250 mls @ 166.667 mls/hr IVPB TTS [facility]


   Last Admin: 04/01/17 09:49 Dose:  166.667 mls/hr


Meropenem 500 mg/ Sodium (Chloride)  100 mls @ 100 mls/hr IVPB Q12H [facility]


Insulin Human Lispro (Humalog)  0 units SC ACHS [facility]


   PRN Reason: Protocol


   Last Admin: 04/02/17 13:23 Dose:  4 units


Levothyroxine Sodium (Synthroid)  75 mcg PO DAILY@0630 [facility]


   Last Admin: 04/02/17 08:00 Dose:  75 mcg


Metoprolol Tartrate (Lopressor)  50 mg PO BID [facility]


   Last Admin: 04/02/17 09:09 Dose:  50 mg


Oxycodone/Acetaminophen (Percocet 5/325 Mg Tab)  1 tab PO Q4 PRN


   PRN Reason: Pain, moderate (4-7)


   Stop: 04/03/17 15:19


Pantoprazole Sodium (Protonix Ec Tab)  40 mg PO DAILY [facility]


   Last Admin: 04/02/17 09:09 Dose:  40 mg


Pregabalin (Lyrica)  50 mg PO QPM [facility]


   Last Admin: 04/01/17 22:35 Dose:  50 mg


Sevelamer HCl (Renagel)  800 mg PO BID [facility]


   Last Admin: 04/02/17 09:08 Dose:  800 mg


Valsartan (Diovan)  160 mg PO DAILY [facility]


   Last Admin: 04/02/17 09:08 Dose:  160 mg











- Labs


Labs: 


 





 03/31/17 05:30 





 03/31/17 05:30 





 











PT  12.3 SECONDS (9.6-11.2)  H  03/31/17  05:30    


 


INR  1.18  (0.92-1.08)  H  03/31/17  05:30    


 


APTT  25.9 SECONDS (23.3-32.5)   03/31/17  05:30    














- Constitutional


Appears: No Acute Distress, Chronically Ill





- Head Exam


Head Exam: NORMAL INSPECTION





- Eye Exam


Eye Exam: PERRL





- ENT Exam


ENT Exam: Normal Oropharynx





- Neck Exam


Neck Exam: Normal Inspection





- Respiratory Exam


Respiratory Exam: NORMAL BREATHING PATTERN





- Cardiovascular Exam


Cardiovascular Exam: REGULAR RHYTHM


Additional comments: 


PPM





- GI/Abdominal Exam


GI & Abdominal Exam: Soft, Normal Bowel Sounds





- Extremities Exam


Extremities Exam: Tenderness (L foot  dressing)





- Back Exam


Back Exam: NORMAL INSPECTION





- Neurological Exam


Neurological Exam: Alert, Oriented x3


Additional comments: 


no focal motor deficit  , decreased sensation distal legs  , R L foot





- Skin


Skin Exam: Warm





Assessment and Plan





- Assessment and Plan (Free Text)


Assessment: 


s/p L TMA Acute High


UTI


PVD Chronic high


DM Neuropathy Chronic high


ESKD Chronic high


CAD


CHF


PAF-PPM


DMII


HTN


Hypothyroidism


Hyperlipidemia


Anemia


















































































































































Plan: 


Continue Dilaudid , Percocet ,Lyrica , Merren and rest of treatment

## 2017-04-03 RX ADMIN — INSULIN LISPRO SCH: 100 INJECTION, SOLUTION INTRAVENOUS; SUBCUTANEOUS at 21:27

## 2017-04-03 RX ADMIN — INSULIN LISPRO SCH UNITS: 100 INJECTION, SOLUTION INTRAVENOUS; SUBCUTANEOUS at 12:37

## 2017-04-03 RX ADMIN — INSULIN LISPRO SCH UNITS: 100 INJECTION, SOLUTION INTRAVENOUS; SUBCUTANEOUS at 16:46

## 2017-04-03 RX ADMIN — PANTOPRAZOLE SODIUM SCH MG: 40 TABLET, DELAYED RELEASE ORAL at 08:51

## 2017-04-03 RX ADMIN — OXYCODONE HYDROCHLORIDE AND ACETAMINOPHEN PRN TAB: 5; 325 TABLET ORAL at 12:41

## 2017-04-03 RX ADMIN — HYDROMORPHONE HYDROCHLORIDE PRN MG: 1 INJECTION, SOLUTION INTRAMUSCULAR; INTRAVENOUS; SUBCUTANEOUS at 00:40

## 2017-04-03 RX ADMIN — INSULIN LISPRO SCH: 100 INJECTION, SOLUTION INTRAVENOUS; SUBCUTANEOUS at 08:48

## 2017-04-03 RX ADMIN — HYDROMORPHONE HYDROCHLORIDE PRN MG: 1 INJECTION, SOLUTION INTRAMUSCULAR; INTRAVENOUS; SUBCUTANEOUS at 08:55

## 2017-04-03 NOTE — CP.PCM.PN
Subjective





- Date & Time of Evaluation


Date of Evaluation: 04/03/17


Time of Evaluation: 08:13





- Subjective


Subjective: 


76 year old female with PMHx of anemia, arthritis, Atrial Fib, CAD, CHF, DM, HTN

, HLD, ESRD (dialysis on Tues/Thurs/Sat), Hyperthyroidism, Hypothyroidism was 

seen resting comfortably at bedside with attending, Dr. Serna 3 days s/p 

Left foot TMA. AAO x3. Dressing to Left foot remains c/d/i. Patient complains 

of mild pain to Left foot upon palpation. She admits to less pain now compared 

to before the surgery. Patient denies F/C/N/V/SOB/CP





Objective





- Vital Signs/Intake and Output


Vital Signs (last 24 hours): 


 











Temp Pulse Resp BP Pulse Ox


 


 98.9 F   67   20   131/77   97 


 


 04/02/17 21:00  04/02/17 21:00  04/02/17 21:00  04/02/17 21:00  04/02/17 21:00











- Medications


Medications: 


 Current Medications





Acetaminophen (Tylenol 325mg Tab)  650 mg PO Q4 PRN


   PRN Reason: Pain, Mild (1-3)


Amlodipine Besylate (Norvasc)  10 mg PO DAILY [facility]


   Last Admin: 04/02/17 09:09 Dose:  10 mg


Atorvastatin Calcium (Lipitor)  80 mg PO HS [facility]


   Last Admin: 04/02/17 21:49 Dose:  80 mg


Docusate Sodium (Colace)  200 mg PO DAILY [facility]


   Last Admin: 04/02/17 20:36 Dose:  200 mg


Epoetin Edgardo (Procrit)  10,000 unit IV TTS [facility]


   Last Admin: 04/01/17 20:47 Dose:  10,000 unit


Heparin Sodium (Porcine) (Heparin)  5,000 units SC Q12 [facility]


   PRN Reason: Protocol


   Last Admin: 04/02/17 20:37 Dose:  5,000 units


Hydromorphone HCl (Dilaudid)  1 mg IVP Q4 PRN


   PRN Reason: Pain, severe (8-10)


   Last Admin: 04/03/17 00:40 Dose:  1 mg


Vancomycin HCl 750 mg/ Sodium (Chloride)  250 mls @ 166.667 mls/hr IVPB TTS [facility]


   Last Admin: 04/01/17 09:49 Dose:  166.667 mls/hr


Meropenem 500 mg/ Sodium (Chloride)  100 mls @ 100 mls/hr IVPB Q12H [facility]


   Last Admin: 04/03/17 01:18 Dose:  100 mls/hr


Insulin Human Lispro (Humalog)  0 units SC ACHS [facility]


   PRN Reason: Protocol


   Last Admin: 04/02/17 21:40 Dose:  Not Given


Lactulose (Enulose)  20 gm PO BID PRN


   PRN Reason: Constipation


   Last Admin: 04/02/17 17:07 Dose:  20 gm


Levothyroxine Sodium (Synthroid)  75 mcg PO DAILY@0630 [facility]


   Last Admin: 04/03/17 06:15 Dose:  75 mcg


Metoprolol Tartrate (Lopressor)  50 mg PO BID [facility]


   Last Admin: 04/02/17 17:02 Dose:  50 mg


Oxycodone/Acetaminophen (Percocet 5/325 Mg Tab)  1 tab PO Q4 PRN


   PRN Reason: Pain, moderate (4-7)


   Stop: 04/03/17 15:19


   Last Admin: 04/02/17 15:47 Dose:  1 tab


Pantoprazole Sodium (Protonix Ec Tab)  40 mg PO DAILY [facility]


   Last Admin: 04/02/17 09:09 Dose:  40 mg


Pregabalin (Lyrica)  50 mg PO QPM [facility]


   Last Admin: 04/02/17 18:50 Dose:  50 mg


Sevelamer HCl (Renagel)  800 mg PO BID [facility]


   Last Admin: 04/02/17 17:03 Dose:  800 mg


Valsartan (Diovan)  160 mg PO DAILY [facility]


   Last Admin: 04/02/17 09:08 Dose:  160 mg











- Labs


Labs: 


 





 03/31/17 05:30 





 03/31/17 05:30 





 











PT  12.3 SECONDS (9.6-11.2)  H  03/31/17  05:30    


 


INR  1.18  (0.92-1.08)  H  03/31/17  05:30    


 


APTT  28.0 SECONDS (23.3-32.5)   04/02/17  20:30    














- Constitutional


Appears: Well, Non-toxic, No Acute Distress





- Extremities Exam


Additional comments: 


Left lower extremity focused exam:


VASC: Palpable DP noted 2/4. Non-palpable PT. No edema noted.  Skin temperature 

warm to warm from proximal to distal





DERM: Surgical site well coapted with all sutures intact. No dehiscence or 

maceration is noted. No drainage is noted. No erythema is noted. No acute sign 

of infection noted.





ORTHO: Pain on palpation to TMA site





NEURO: Gross sensation diminished





- Neurological Exam


Neurological Exam: Alert, Awake, Oriented x3





- Psychiatric Exam


Psychiatric exam: Normal Affect, Normal Mood





Assessment and Plan





- Assessment and Plan (Free Text)


Assessment: 


76 year old female patient 3 days s/p Left foot TMA by Dr. Serna


Plan: 


Patient was examined and evaluated with attending, Dr. Serna


labs and vitals reviewed


Left foot dressed with betadine, DSD


Continue IV Abx


Patient to be non-weight bearing to Left foot


Podiatry will follow in-house

## 2017-04-03 NOTE — CP.PCM.PN
Subjective





- Date & Time of Evaluation


Date of Evaluation: 04/03/17


Time of Evaluation: 05:20





- Subjective


Subjective: 


Comfortable in bed





Objective





- Vital Signs/Intake and Output


Vital Signs (last 24 hours): 


 











Temp Pulse Resp BP Pulse Ox


 


 98.8 F   64   20   162/73 H  95 


 


 04/03/17 16:53  04/03/17 16:53  04/03/17 16:53  04/03/17 16:53  04/03/17 16:53











- Medications


Medications: 


 Current Medications





Acetaminophen (Tylenol 325mg Tab)  650 mg PO Q4 PRN


   PRN Reason: Pain, Mild (1-3)


Amlodipine Besylate (Norvasc)  10 mg PO DAILY [facility]


   Last Admin: 04/03/17 08:50 Dose:  10 mg


Atorvastatin Calcium (Lipitor)  80 mg PO HS [facility]


   Last Admin: 04/02/17 21:49 Dose:  80 mg


Docusate Sodium (Colace)  200 mg PO DAILY [facility]


   Last Admin: 04/03/17 08:45 Dose:  200 mg


Epoetin Edgardo (Procrit)  10,000 unit IV TTS [facility]


   Last Admin: 04/01/17 20:47 Dose:  10,000 unit


Heparin Sodium (Porcine) (Heparin)  5,000 units SC Q12 [facility]


   PRN Reason: Protocol


   Last Admin: 04/03/17 08:47 Dose:  5,000 units


Hydromorphone HCl (Dilaudid)  1 mg IVP Q4 PRN


   PRN Reason: Pain, severe (8-10)


   Last Admin: 04/03/17 08:55 Dose:  1 mg


Vancomycin HCl 750 mg/ Sodium (Chloride)  250 mls @ 166.667 mls/hr IVPB TTS [facility]


   Last Admin: 04/01/17 09:49 Dose:  166.667 mls/hr


Meropenem 500 mg/ Sodium (Chloride)  100 mls @ 100 mls/hr IVPB Q12H [facility]


   Last Admin: 04/03/17 16:16 Dose:  100 mls/hr


Insulin Human Lispro (Humalog)  0 units SC ACHS [facility]


   PRN Reason: Protocol


   Last Admin: 04/03/17 16:46 Dose:  2 units


Lactulose (Enulose)  20 gm PO BID PRN


   PRN Reason: Constipation


   Last Admin: 04/02/17 17:07 Dose:  20 gm


Levothyroxine Sodium (Synthroid)  75 mcg PO DAILY@0630 [facility]


   Last Admin: 04/03/17 06:15 Dose:  75 mcg


Metoprolol Tartrate (Lopressor)  50 mg PO BID [facility]


   Last Admin: 04/03/17 16:44 Dose:  50 mg


Pantoprazole Sodium (Protonix Ec Tab)  40 mg PO DAILY [facility]


   Last Admin: 04/03/17 08:51 Dose:  40 mg


Pregabalin (Lyrica)  50 mg PO QPM [facility]


   Last Admin: 04/02/17 18:50 Dose:  50 mg


Sevelamer HCl (Renagel)  800 mg PO BID [facility]


   Last Admin: 04/03/17 16:45 Dose:  800 mg


Valsartan (Diovan)  160 mg PO DAILY [facility]


   Last Admin: 04/03/17 08:46 Dose:  160 mg











- Labs


Labs: 


 





 03/31/17 05:30 





 03/31/17 05:30 





 











PT  12.3 SECONDS (9.6-11.2)  H  03/31/17  05:30    


 


INR  1.18  (0.92-1.08)  H  03/31/17  05:30    


 


APTT  28.0 SECONDS (23.3-32.5)   04/02/17  20:30    














- Respiratory Exam


Respiratory Exam: NORMAL BREATHING PATTERN





- Cardiovascular Exam


Cardiovascular Exam: REGULAR RHYTHM





- Extremities Exam


Additional comments: 


No edema. Rt foot dressed





Assessment and Plan





- Assessment and Plan (Free Text)


Assessment: 


ESRD on HD


CAD, A.fib


HTN


DM


Plan: 


For dialysis tomorrow


Labs in am

## 2017-04-03 NOTE — OP
PROCEDURE DATE: 03/31/2017



PRIMARY SURGEON:  Dr. Cornelio Serna.



ASSISTANT:  Dr. Cassi Mcdonnell, PGY-1.



ANESTHESIOLOGIST:  Dr. Shannan Pearson.



ANESTHESIA TYPE:  IV sedation with local.



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



PROCEDURE PERFORMED:  Left foot transmetatarsal amputation.



INDICATIONS:  The patient is a 76-year-old female with the above diagnosis.  
The patient has exhausted all conservative treatment options at this time and 
now is in need of surgical intervention.  The patient's daughter signed the 
consent after careful explanation of risks, benefits, complications and 
alternatives for the surgical procedure.  No guarantees were neither given nor 
implied.  The patient's n.p.o. status was confirmed prior to taking the patient 
to the OR.



PREPARATION:  The patient was brought to the operating room and placed on the 
operating room table in a supine position.  A well-padded pneumatic ankle 
tourniquet was placed on the patient's left ankle loosely in a supramalleolar 
position for potential use intraoperatively to control arterial flow.  After 
induction of IV sedation, the patient received a total of 15 mL of 0.5% 
Marcaine plain in a local block type fashion to the left foot.  Once local 
anesthesia was confirmed to be achieved, the left foot was then prepped and 
draped in the usual sterile manner.  Esmarch was used to exsanguinate the 
patient's left foot after holding in elevated position for 2 minutes and the 
procedure began.



DESCRIPTION OF PROCEDURE:  The attending was present during the entire case.  
Attention was then directed to the dorsal aspect of the left foot in which, 
using a #15 blade, a circumferential incision was made surrounding the distal 
aspect of the metatarsals down to the level of bone leaving more skin and soft 
tissue plantarly than dorsally.  All excess bleeders were ligated and 
cauterized as needed using electrocautery.  The left foot was disarticulated at 
level of the metatarsophalangeal joints using sharp dissection using a #15 
blade.  All the tissue was then freed from all 5 metatarsal heads using a 
combination of sharp dissection with a #15 blade and periosteal elevators to 
the level of the distal 1/3 margin of their respective metatarsals.  At this 
level, all 5 metatarsals were transected using an oscillating saw and removed 
from the left foot.  The remaining sharp bone was then debrided down using hand 
rasp and oscillating saw to smoothness.  Plantar tissue flap was then brought 
up dorsally to cover the metatarsal stumps.  Soft tissue coverage was noted to 
be adequate at this time with no protruding bony margins into soft tissue flap.
  Wound site was then flushed with copious amounts of normal sterile saline.  
Subcutaneous tissue layer was reapproximated using #3-0 Vicryl.  The skin was 
fully reapproximated using #4-0 nylon in a interrupted simple to horizontal-
mattress stitch pattern.  The wound was then dressed with Betadine-soaked 
Adaptic, 4 x 4 gauze, Amanda, Kerlix and loose Ace.



POSTOPERATIVE CONDITION:  The patient tolerated the anesthesia and procedure 
well and was escorted to the recovery room with vital signs stable and 
neurovascular status intact to the left foot.  The patient will follow up with 
Dr. Serna while in-house on outpatient basis upon discharge.





__________________________________________

Cassi Mcdonnell DPM



__________________________________________

Cornelio Serna DPM







cc:   



DD: 04/03/2017 10:28:01  [id number]

TT: 04/03/2017 11:14:33

Job # [id number]

gamaliel MORRIS

## 2017-04-03 NOTE — CP.PCM.PN
Subjective





- Date & Time of Evaluation


Date of Evaluation: 04/03/17


Time of Evaluation: 12:20





- Subjective


Subjective: 


F/U  L TMA.





Pt c/o of pain in left foot.





Objective





- Vital Signs/Intake and Output


Vital Signs (last 24 hours): 


 











Temp Pulse Resp BP Pulse Ox


 


 98.0 F   64   18   162/73 H  95 


 


 04/03/17 09:00  04/03/17 16:44  04/03/17 09:00  04/03/17 16:44  04/03/17 09:00











- Medications


Medications: 


 Current Medications





Acetaminophen (Tylenol 325mg Tab)  650 mg PO Q4 PRN


   PRN Reason: Pain, Mild (1-3)


Amlodipine Besylate (Norvasc)  10 mg PO DAILY [facility]


   Last Admin: 04/03/17 08:50 Dose:  10 mg


Atorvastatin Calcium (Lipitor)  80 mg PO HS [facility]


   Last Admin: 04/02/17 21:49 Dose:  80 mg


Docusate Sodium (Colace)  200 mg PO DAILY [facility]


   Last Admin: 04/03/17 08:45 Dose:  200 mg


Epoetin Edgardo (Procrit)  10,000 unit IV TTS [facility]


   Last Admin: 04/01/17 20:47 Dose:  10,000 unit


Heparin Sodium (Porcine) (Heparin)  5,000 units SC Q12 [facility]


   PRN Reason: Protocol


   Last Admin: 04/03/17 08:47 Dose:  5,000 units


Hydromorphone HCl (Dilaudid)  1 mg IVP Q4 PRN


   PRN Reason: Pain, severe (8-10)


   Last Admin: 04/03/17 08:55 Dose:  1 mg


Vancomycin HCl 750 mg/ Sodium (Chloride)  250 mls @ 166.667 mls/hr IVPB TTS [facility]


   Last Admin: 04/01/17 09:49 Dose:  166.667 mls/hr


Meropenem 500 mg/ Sodium (Chloride)  100 mls @ 100 mls/hr IVPB Q12H [facility]


   Last Admin: 04/03/17 16:16 Dose:  100 mls/hr


Insulin Human Lispro (Humalog)  0 units SC ACHS [facility]


   PRN Reason: Protocol


   Last Admin: 04/03/17 16:46 Dose:  2 units


Lactulose (Enulose)  20 gm PO BID PRN


   PRN Reason: Constipation


   Last Admin: 04/02/17 17:07 Dose:  20 gm


Levothyroxine Sodium (Synthroid)  75 mcg PO DAILY@0630 [facility]


   Last Admin: 04/03/17 06:15 Dose:  75 mcg


Metoprolol Tartrate (Lopressor)  50 mg PO BID [facility]


   Last Admin: 04/03/17 16:44 Dose:  50 mg


Pantoprazole Sodium (Protonix Ec Tab)  40 mg PO DAILY [facility]


   Last Admin: 04/03/17 08:51 Dose:  40 mg


Pregabalin (Lyrica)  50 mg PO QPM [facility]


   Last Admin: 04/02/17 18:50 Dose:  50 mg


Sevelamer HCl (Renagel)  800 mg PO BID [facility]


   Last Admin: 04/03/17 16:45 Dose:  800 mg


Valsartan (Diovan)  160 mg PO DAILY [facility]


   Last Admin: 04/03/17 08:46 Dose:  160 mg











- Labs


Labs: 


 





 03/31/17 05:30 





 03/31/17 05:30 





 











PT  12.3 SECONDS (9.6-11.2)  H  03/31/17  05:30    


 


INR  1.18  (0.92-1.08)  H  03/31/17  05:30    


 


APTT  28.0 SECONDS (23.3-32.5)   04/02/17  20:30    














- Constitutional


Appears: No Acute Distress, Chronically Ill





- Head Exam


Head Exam: NORMAL INSPECTION





- Eye Exam


Eye Exam: PERRL





- ENT Exam


ENT Exam: Normal Oropharynx





- Neck Exam


Neck Exam: Normal Inspection





- Respiratory Exam


Respiratory Exam: NORMAL BREATHING PATTERN





- Cardiovascular Exam


Cardiovascular Exam: REGULAR RHYTHM


Additional comments: 


PPM





- GI/Abdominal Exam


GI & Abdominal Exam: Soft, Normal Bowel Sounds





- Extremities Exam


Extremities Exam: Tenderness (L foot)


Additional comments: 


L foot dressing in place.





- Back Exam


Back Exam: NORMAL INSPECTION





- Neurological Exam


Neurological Exam: Alert, Oriented x3


Additional comments: 


No focal motor sensory deficit





- Psychiatric Exam


Psychiatric exam: Normal Mood





- Skin


Skin Exam: Warm





Assessment and Plan





- Assessment and Plan (Free Text)


Assessment: 


S/P  L TMA  Acute high





UTI E Coli ESBL Acute High





PVD  Chronic high





DM Neuropathy  Chronin high





ESRD  Chronic high





CAD


CHF


PAF-PPM


DMII


HTN


Hypothyroidism


Hyperlipidemia


Anemia


Plan: 


Continue Meropenem, Vanco, Dilaudid and rest of Tx, F/U PT eval.

## 2017-04-04 VITALS — RESPIRATION RATE: 20 BRPM

## 2017-04-04 LAB
BUN SERPL-MCNC: 36 MG/DL (ref 7–17)
CALCIUM SERPL-MCNC: 8.8 MG/DL (ref 8.4–10.2)
CHLORIDE SERPL-SCNC: 92 MMOL/L (ref 98–107)
CO2 SERPL-SCNC: 24 MMOL/L (ref 22–30)
ERYTHROCYTE [DISTWIDTH] IN BLOOD BY AUTOMATED COUNT: 15.4 % (ref 11.5–14.5)
GLUCOSE SERPL-MCNC: 141 MG/DL (ref 65–105)
HCT VFR BLD CALC: 29.3 % (ref 34–47)
MCH RBC QN AUTO: 29.5 PG (ref 27–31)
MCHC RBC AUTO-ENTMCNC: 31.6 G/DL (ref 33–37)
MCV RBC AUTO: 93.3 FL (ref 81–99)
PHOSPHATE SERPL-MCNC: 6.8 MG/DL (ref 2.5–4.5)
PLATELET # BLD: 233 K/UL (ref 130–400)
POTASSIUM SERPL-SCNC: 4.7 MMOL/L (ref 3.6–5)
SODIUM SERPL-SCNC: 132 MMOL/L (ref 132–148)
WBC # BLD AUTO: 11.6 K/UL (ref 4.8–10.8)

## 2017-04-04 RX ADMIN — HYDROMORPHONE HYDROCHLORIDE PRN MG: 1 INJECTION, SOLUTION INTRAMUSCULAR; INTRAVENOUS; SUBCUTANEOUS at 01:27

## 2017-04-04 RX ADMIN — PANTOPRAZOLE SODIUM SCH MG: 40 TABLET, DELAYED RELEASE ORAL at 09:07

## 2017-04-04 RX ADMIN — HYDROMORPHONE HYDROCHLORIDE PRN MG: 1 INJECTION, SOLUTION INTRAMUSCULAR; INTRAVENOUS; SUBCUTANEOUS at 09:02

## 2017-04-04 RX ADMIN — INSULIN LISPRO SCH UNITS: 100 INJECTION, SOLUTION INTRAVENOUS; SUBCUTANEOUS at 11:39

## 2017-04-04 RX ADMIN — HYDROMORPHONE HYDROCHLORIDE PRN MG: 1 INJECTION, SOLUTION INTRAMUSCULAR; INTRAVENOUS; SUBCUTANEOUS at 17:35

## 2017-04-04 RX ADMIN — INSULIN LISPRO SCH UNITS: 100 INJECTION, SOLUTION INTRAVENOUS; SUBCUTANEOUS at 07:10

## 2017-04-04 RX ADMIN — HYDROMORPHONE HYDROCHLORIDE PRN MG: 1 INJECTION, SOLUTION INTRAMUSCULAR; INTRAVENOUS; SUBCUTANEOUS at 13:09

## 2017-04-04 RX ADMIN — INSULIN LISPRO SCH: 100 INJECTION, SOLUTION INTRAVENOUS; SUBCUTANEOUS at 22:20

## 2017-04-04 RX ADMIN — INSULIN LISPRO SCH UNITS: 100 INJECTION, SOLUTION INTRAVENOUS; SUBCUTANEOUS at 17:38

## 2017-04-04 NOTE — CP.PCM.PN
Subjective





- Date & Time of Evaluation


Date of Evaluation: 04/04/17


Time of Evaluation: 07:33





- Subjective


Subjective: 


76 year old female with PMHx of anemia, arthritis, Atrial Fib, CAD, CHF, DM, HTN

, HLD, ESRD (dialysis on Tues/Thurs/Sat), Hyperthyroidism, Hypothyroidism was 

seen resting comfortably at bedside 4 days s/p Left foot TMA. AAO x3. Dressing 

to Left foot remains c/d/i. Patient complains of mild pain to Left foot upon 

palpation, and complained of pain last night that was relieved by pain 

medication. She admits to less pain now compared to before the surgery. Patient 

denies F/C/N/V/SOB/CP





Objective





- Vital Signs/Intake and Output


Vital Signs (last 24 hours): 


 











Temp Pulse Resp BP Pulse Ox


 


 98.8 F   71   18   152/55 H  95 


 


 04/04/17 01:00  04/04/17 01:00  04/04/17 01:00  04/04/17 01:00  04/04/17 01:00











- Medications


Medications: 


 Current Medications





Acetaminophen (Tylenol 325mg Tab)  650 mg PO Q4 PRN


   PRN Reason: Pain, Mild (1-3)


Amlodipine Besylate (Norvasc)  10 mg PO DAILY [facility]


   Last Admin: 04/03/17 08:50 Dose:  10 mg


Atorvastatin Calcium (Lipitor)  80 mg PO HS [facility]


   Last Admin: 04/03/17 21:28 Dose:  80 mg


Docusate Sodium (Colace)  200 mg PO DAILY [facility]


   Last Admin: 04/03/17 08:45 Dose:  200 mg


Epoetin Edgardo (Procrit)  10,000 unit IV TTS [facility]


   Last Admin: 04/01/17 20:47 Dose:  10,000 unit


Heparin Sodium (Porcine) (Heparin)  5,000 units SC Q12 [facility]


   PRN Reason: Protocol


   Last Admin: 04/03/17 21:26 Dose:  5,000 units


Hydromorphone HCl (Dilaudid)  1 mg IVP Q4 PRN


   PRN Reason: Pain, severe (8-10)


   Last Admin: 04/04/17 01:27 Dose:  1 mg


Vancomycin HCl 750 mg/ Sodium (Chloride)  250 mls @ 166.667 mls/hr IVPB TTS [facility]


   Last Admin: 04/01/17 09:49 Dose:  166.667 mls/hr


Meropenem 500 mg/ Sodium (Chloride)  100 mls @ 100 mls/hr IVPB Q12H [facility]


   Last Admin: 04/04/17 01:30 Dose:  100 mls/hr


Insulin Human Lispro (Humalog)  0 units SC ACHS [facility]


   PRN Reason: Protocol


   Last Admin: 04/04/17 07:10 Dose:  2 units


Lactulose (Enulose)  20 gm PO BID PRN


   PRN Reason: Constipation


   Last Admin: 04/02/17 17:07 Dose:  20 gm


Levothyroxine Sodium (Synthroid)  75 mcg PO DAILY@0630 [facility]


   Last Admin: 04/04/17 05:56 Dose:  75 mcg


Metoprolol Tartrate (Lopressor)  50 mg PO BID [facility]


   Last Admin: 04/03/17 16:44 Dose:  50 mg


Pantoprazole Sodium (Protonix Ec Tab)  40 mg PO DAILY [facility]


   Last Admin: 04/03/17 08:51 Dose:  40 mg


Pregabalin (Lyrica)  50 mg PO QPM [facility]


   Last Admin: 04/03/17 19:55 Dose:  50 mg


Sevelamer HCl (Renagel)  800 mg PO BID [facility]


   Last Admin: 04/03/17 16:45 Dose:  800 mg


Valsartan (Diovan)  160 mg PO DAILY [facility]


   Last Admin: 04/03/17 08:46 Dose:  160 mg











- Labs


Labs: 


 





 03/31/17 05:30 





 03/31/17 05:30 





 











PT  12.3 SECONDS (9.6-11.2)  H  03/31/17  05:30    


 


INR  1.18  (0.92-1.08)  H  03/31/17  05:30    


 


APTT  28.0 SECONDS (23.3-32.5)   04/02/17  20:30    














- Constitutional


Appears: Well, Non-toxic, No Acute Distress





- Extremities Exam


Additional comments: 


Left lower extremity focused exam:





VASC: Palpable DP pulse noted 2/4. Non-palpable PT pulse. No edema noted.  Skin 

temperature warm to warm from proximal to distal.





DERM: Surgical site well coapted with all sutures intact. No dehiscence or 

maceration is noted. No drainage is noted. Blanchable erythema noted to the 

distal aspect of the foot. No acute sign of infection noted.





ORTHO: Pain on palpation to TMA site





NEURO: Gross sensation diminished





- Neurological Exam


Neurological Exam: Alert, Awake, Oriented x3





- Psychiatric Exam


Psychiatric exam: Normal Affect, Normal Mood





Assessment and Plan





- Assessment and Plan (Free Text)


Assessment: 


76 year old female patient 4 days s/p Left foot TMA by Dr. Serna


Plan: 


Patient was examined and evaluated with 


Chart and vitals reviewed


Left foot dressed with betadine, DSD


Continue IV Abx


Patient to be non-weight bearing to Left foot


Podiatry will follow in-house

## 2017-04-04 NOTE — CP.PCM.PN
Subjective





- Date & Time of Evaluation


Date of Evaluation: 04/04/17


Time of Evaluation: 11:20





- Subjective


Subjective: 


F/U  S/P L TMA


Pain L foot





Objective





- Vital Signs/Intake and Output


Vital Signs (last 24 hours): 


 











Temp Pulse Resp BP Pulse Ox


 


 98.2 F   76   18   164/83 H  93 L


 


 04/04/17 12:05  04/04/17 12:05  04/04/17 12:05  04/04/17 12:05  04/04/17 12:05











- Medications


Medications: 


 Current Medications





Acetaminophen (Tylenol 325mg Tab)  650 mg PO Q4 PRN


   PRN Reason: Pain, Mild (1-3)


Amlodipine Besylate (Norvasc)  10 mg PO DAILY [facility]


   Last Admin: 04/03/17 08:50 Dose:  10 mg


Atorvastatin Calcium (Lipitor)  80 mg PO HS [facility]


   Last Admin: 04/03/17 21:28 Dose:  80 mg


Docusate Sodium (Colace)  200 mg PO DAILY [facility]


   Last Admin: 04/04/17 09:07 Dose:  200 mg


Epoetin Edgardo (Procrit)  10,000 unit IV TTS [facility]


   Last Admin: 04/01/17 20:47 Dose:  10,000 unit


Heparin Sodium (Porcine) (Heparin)  5,000 units SC Q12 [facility]


   PRN Reason: Protocol


   Last Admin: 04/04/17 09:05 Dose:  5,000 units


Hydromorphone HCl (Dilaudid)  1 mg IVP Q4 PRN


   PRN Reason: Pain, severe (8-10)


   Last Admin: 04/04/17 13:09 Dose:  1 mg


Vancomycin HCl 750 mg/ Sodium (Chloride)  250 mls @ 166.667 mls/hr IVPB TTS [facility]


   Last Admin: 04/01/17 09:49 Dose:  166.667 mls/hr


Meropenem 500 mg/ Sodium (Chloride)  100 mls @ 100 mls/hr IVPB Q12H [facility]


   Last Admin: 04/04/17 13:10 Dose:  100 mls/hr


Insulin Human Lispro (Humalog)  0 units SC ACHS [facility]


   PRN Reason: Protocol


   Last Admin: 04/04/17 11:39 Dose:  2 units


Lactulose (Enulose)  20 gm PO BID PRN


   PRN Reason: Constipation


   Last Admin: 04/02/17 17:07 Dose:  20 gm


Levothyroxine Sodium (Synthroid)  75 mcg PO DAILY@0630 [facility]


   Last Admin: 04/04/17 05:56 Dose:  75 mcg


Metoprolol Tartrate (Lopressor)  50 mg PO BID [facility]


   Last Admin: 04/04/17 11:30 Dose:  Not Given


Pantoprazole Sodium (Protonix Ec Tab)  40 mg PO DAILY [facility]


   Last Admin: 04/04/17 09:07 Dose:  40 mg


Pregabalin (Lyrica)  50 mg PO QPM [facility]


   Last Admin: 04/03/17 19:55 Dose:  50 mg


Sevelamer HCl (Renagel)  800 mg PO BID [facility]


   Last Admin: 04/04/17 09:00 Dose:  800 mg


Valsartan (Diovan)  160 mg PO DAILY [facility]


   Last Admin: 04/04/17 11:30 Dose:  Not Given











- Labs


Labs: 


 





 04/04/17 06:25 





 04/04/17 06:25 





 











PT  12.3 SECONDS (9.6-11.2)  H  03/31/17  05:30    


 


INR  1.18  (0.92-1.08)  H  03/31/17  05:30    


 


APTT  28.0 SECONDS (23.3-32.5)   04/02/17  20:30    














- Constitutional


Appears: No Acute Distress, Chronically Ill





- Head Exam


Head Exam: NORMAL INSPECTION





- Eye Exam


Eye Exam: PERRL





- ENT Exam


ENT Exam: Normal Exam





- Neck Exam


Neck Exam: Normal Inspection





- Respiratory Exam


Respiratory Exam: NORMAL BREATHING PATTERN





- Cardiovascular Exam


Cardiovascular Exam: REGULAR RHYTHM


Additional comments: 


PPM





- GI/Abdominal Exam


GI & Abdominal Exam: Soft, Normal Bowel Sounds





- Extremities Exam


Extremities Exam: Tenderness (L foot)


Additional comments: 


L foot dressing in place.





- Back Exam


Back Exam: NORMAL INSPECTION





- Neurological Exam


Neurological Exam: Alert, Oriented x3


Additional comments: 


No focal motor/sensory deficit.





- Psychiatric Exam


Psychiatric exam: Normal Mood





- Skin


Skin Exam: Warm





Assessment and Plan





- Assessment and Plan (Free Text)


Assessment: 


S/P L TMA  Acute high





UTI ESBL E Coli





PVD  Chronic high





DM Neuropathy  Chronic high





ESRD  Chronic high





CAD


CHF


PAF PPM


DMII


HTN


Hypothyroidism


Hyperlipidemia


Anemia.





Plan: 


continue Dilaudid , Lyrica , Merren , Vanco and rest of treatment

## 2017-04-04 NOTE — CP.PCM.PN
Subjective





- Date & Time of Evaluation


Date of Evaluation: 04/04/17


Time of Evaluation: 08:00





- Subjective


Subjective: 


76 year old female with a history of CAD, CHF, hypertension, diabetes, 

hyperlipidemia, and end stage renal disease presents to the ED with a chief 

complaint of mild left foot pain


Underwent TMA for chronic left foot infection


referred for ID eval of ESBL + E coli urine culture  started on IV merrem


blood grew corynebacterium- likely a contaminant 





Objective





- Vital Signs/Intake and Output


Vital Signs (last 24 hours): 


 











Temp Pulse Resp BP Pulse Ox


 


 98.7 F   70   20   155/68 H  95 


 


 04/04/17 08:21  04/04/17 08:21  04/04/17 08:21  04/04/17 08:21  04/04/17 08:21











- Medications


Medications: 


 Current Medications





Acetaminophen (Tylenol 325mg Tab)  650 mg PO Q4 PRN


   PRN Reason: Pain, Mild (1-3)


Amlodipine Besylate (Norvasc)  10 mg PO DAILY [facility]


   Last Admin: 04/03/17 08:50 Dose:  10 mg


Atorvastatin Calcium (Lipitor)  80 mg PO HS [facility]


   Last Admin: 04/03/17 21:28 Dose:  80 mg


Docusate Sodium (Colace)  200 mg PO DAILY [facility]


   Last Admin: 04/04/17 09:07 Dose:  200 mg


Epoetin Edgardo (Procrit)  10,000 unit IV TTS [facility]


   Last Admin: 04/01/17 20:47 Dose:  10,000 unit


Heparin Sodium (Porcine) (Heparin)  5,000 units SC Q12 [facility]


   PRN Reason: Protocol


   Last Admin: 04/04/17 09:05 Dose:  5,000 units


Hydromorphone HCl (Dilaudid)  1 mg IVP Q4 PRN


   PRN Reason: Pain, severe (8-10)


   Last Admin: 04/04/17 09:02 Dose:  1 mg


Vancomycin HCl 750 mg/ Sodium (Chloride)  250 mls @ 166.667 mls/hr IVPB TTS [facility]


   Last Admin: 04/01/17 09:49 Dose:  166.667 mls/hr


Meropenem 500 mg/ Sodium (Chloride)  100 mls @ 100 mls/hr IVPB Q12H [facility]


   Last Admin: 04/04/17 01:30 Dose:  100 mls/hr


Insulin Human Lispro (Humalog)  0 units SC ACHS [facility]


   PRN Reason: Protocol


   Last Admin: 04/04/17 07:10 Dose:  2 units


Lactulose (Enulose)  20 gm PO BID PRN


   PRN Reason: Constipation


   Last Admin: 04/02/17 17:07 Dose:  20 gm


Levothyroxine Sodium (Synthroid)  75 mcg PO DAILY@0630 [facility]


   Last Admin: 04/04/17 05:56 Dose:  75 mcg


Metoprolol Tartrate (Lopressor)  50 mg PO BID [facility]


   Last Admin: 04/03/17 16:44 Dose:  50 mg


Pantoprazole Sodium (Protonix Ec Tab)  40 mg PO DAILY [facility]


   Last Admin: 04/04/17 09:07 Dose:  40 mg


Pregabalin (Lyrica)  50 mg PO QPM [facility]


   Last Admin: 04/03/17 19:55 Dose:  50 mg


Sevelamer HCl (Renagel)  800 mg PO BID [facility]


   Last Admin: 04/03/17 16:45 Dose:  800 mg


Valsartan (Diovan)  160 mg PO DAILY [facility]


   Last Admin: 04/03/17 08:46 Dose:  160 mg











- Labs


Labs: 


 





 04/04/17 06:25 





 04/04/17 06:25 





 











PT  12.3 SECONDS (9.6-11.2)  H  03/31/17  05:30    


 


INR  1.18  (0.92-1.08)  H  03/31/17  05:30    


 


APTT  28.0 SECONDS (23.3-32.5)   04/02/17  20:30    














- Constitutional


Appears: Non-toxic, Chronically Ill





- Head Exam


Head Exam: NORMOCEPHALIC





- Eye Exam


Eye Exam: absent: Scleral icterus





- ENT Exam


ENT Exam: Mucous Membranes Dry





- Neck Exam


Neck Exam: absent: Lymphadenopathy





- Respiratory Exam


Respiratory Exam: Decreased Breath Sounds





- Cardiovascular Exam


Cardiovascular Exam: REGULAR RHYTHM





- GI/Abdominal Exam


GI & Abdominal Exam: Distended, Soft.  absent: Tenderness





- Rectal Exam


Rectal Exam: Deferred





-  Exam


 Exam: NORMAL INSPECTION





- Extremities Exam


Extremities Exam: Tenderness.  absent: Calf Tenderness





- Back Exam


Back Exam: absent: CVA tenderness (L), CVA tenderness (R)





- Neurological Exam


Neurological Exam: Alert, Awake, Oriented x3





Assessment and Plan





- Assessment and Plan (Free Text)


Plan: 


to cont iv rx vanco/merrem fo0r 7 days post op 


cont wound care

## 2017-04-04 NOTE — CP.PCM.PN
Subjective





- Date & Time of Evaluation


Date of Evaluation: 04/04/17


Time of Evaluation: 11:00





- Subjective


Subjective: 


Appears more comfortable today





Objective





- Vital Signs/Intake and Output


Vital Signs (last 24 hours): 


 











Temp Pulse Resp BP Pulse Ox


 


 98.7 F   70   20   155/68 H  95 


 


 04/04/17 08:21  04/04/17 08:21  04/04/17 08:21  04/04/17 08:21  04/04/17 08:21











- Medications


Medications: 


 Current Medications





Acetaminophen (Tylenol 325mg Tab)  650 mg PO Q4 PRN


   PRN Reason: Pain, Mild (1-3)


Amlodipine Besylate (Norvasc)  10 mg PO DAILY [facility]


   Last Admin: 04/03/17 08:50 Dose:  10 mg


Atorvastatin Calcium (Lipitor)  80 mg PO HS [facility]


   Last Admin: 04/03/17 21:28 Dose:  80 mg


Docusate Sodium (Colace)  200 mg PO DAILY [facility]


   Last Admin: 04/04/17 09:07 Dose:  200 mg


Epoetin Edgardo (Procrit)  10,000 unit IV TTS [facility]


   Last Admin: 04/01/17 20:47 Dose:  10,000 unit


Heparin Sodium (Porcine) (Heparin)  5,000 units SC Q12 [facility]


   PRN Reason: Protocol


   Last Admin: 04/04/17 09:05 Dose:  5,000 units


Hydromorphone HCl (Dilaudid)  1 mg IVP Q4 PRN


   PRN Reason: Pain, severe (8-10)


   Last Admin: 04/04/17 09:02 Dose:  1 mg


Vancomycin HCl 750 mg/ Sodium (Chloride)  250 mls @ 166.667 mls/hr IVPB TTS [facility]


   Last Admin: 04/01/17 09:49 Dose:  166.667 mls/hr


Meropenem 500 mg/ Sodium (Chloride)  100 mls @ 100 mls/hr IVPB Q12H [facility]


   Last Admin: 04/04/17 01:30 Dose:  100 mls/hr


Insulin Human Lispro (Humalog)  0 units SC ACHS [facility]


   PRN Reason: Protocol


   Last Admin: 04/04/17 11:39 Dose:  2 units


Lactulose (Enulose)  20 gm PO BID PRN


   PRN Reason: Constipation


   Last Admin: 04/02/17 17:07 Dose:  20 gm


Levothyroxine Sodium (Synthroid)  75 mcg PO DAILY@0630 [facility]


   Last Admin: 04/04/17 05:56 Dose:  75 mcg


Metoprolol Tartrate (Lopressor)  50 mg PO BID [facility]


   Last Admin: 04/04/17 11:30 Dose:  Not Given


Pantoprazole Sodium (Protonix Ec Tab)  40 mg PO DAILY [facility]


   Last Admin: 04/04/17 09:07 Dose:  40 mg


Pregabalin (Lyrica)  50 mg PO QPM [facility]


   Last Admin: 04/03/17 19:55 Dose:  50 mg


Sevelamer HCl (Renagel)  800 mg PO BID [facility]


   Last Admin: 04/04/17 09:00 Dose:  800 mg


Valsartan (Diovan)  160 mg PO DAILY [facility]


   Last Admin: 04/04/17 11:30 Dose:  Not Given











- Labs


Labs: 


 





 04/04/17 06:25 





 04/04/17 06:25 





 











PT  12.3 SECONDS (9.6-11.2)  H  03/31/17  05:30    


 


INR  1.18  (0.92-1.08)  H  03/31/17  05:30    


 


APTT  28.0 SECONDS (23.3-32.5)   04/02/17  20:30    














- Respiratory Exam


Additional comments: 


Lungs clear





- Cardiovascular Exam


Cardiovascular Exam: REGULAR RHYTHM





- Extremities Exam


Additional comments: 


No edema Rt foot dressed





Assessment and Plan





- Assessment and Plan (Free Text)


Assessment: 


ESRD on HD


CAD


HTN


S/P Rt TMA


Plan: 


For dialysis today


Labs stable

## 2017-04-05 VITALS — DIASTOLIC BLOOD PRESSURE: 66 MMHG | SYSTOLIC BLOOD PRESSURE: 145 MMHG | TEMPERATURE: 99.2 F

## 2017-04-05 VITALS — HEART RATE: 75 BPM | OXYGEN SATURATION: 93 %

## 2017-04-05 RX ADMIN — PANTOPRAZOLE SODIUM SCH MG: 40 TABLET, DELAYED RELEASE ORAL at 08:37

## 2017-04-05 RX ADMIN — HYDROMORPHONE HYDROCHLORIDE PRN MG: 1 INJECTION, SOLUTION INTRAMUSCULAR; INTRAVENOUS; SUBCUTANEOUS at 10:52

## 2017-04-05 RX ADMIN — INSULIN LISPRO SCH UNITS: 100 INJECTION, SOLUTION INTRAVENOUS; SUBCUTANEOUS at 08:43

## 2017-04-05 RX ADMIN — INSULIN LISPRO SCH UNITS: 100 INJECTION, SOLUTION INTRAVENOUS; SUBCUTANEOUS at 12:30

## 2017-04-05 NOTE — PQF GENQUE
***** This form is a permanent part of the medical record*****



4/5/17  Dr. OKSANA Guillen,



 Would you please document the findings of the pathology report in your notes. 
If acute osteomyelitis is ruled in would you please clarify the possible 
etiology ?diabetic related or other etiology or unable to determine.



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  
Necrotic changes also noted to 2nd digit as well as 5th digit from distal end 
to level of PIPJ. No open wound is noted. No drainage is noted. Erythema is 
noted to Left foot around the skin necrosis. Left foot TMA performed. Pathology 
has come back with " bone underlying gangrene is viable and shows acute 
osteomyelitis" and other information contained in the pathology report.

          

Clarification of your documentation is requested to better reflect the severity 
of illness and intensity of treatment of your patient.  





  

PHYSICIAN'S RESPONSE



[ ] Osteomyelitis due to diabetes



[ ] Osteomyelitis due to Other explanation  ( please clarify) ____________



[ ] Unable to determine



Based on your medical judgment of the clinical indicators outlined above please 
clarify the following:



[]  Practitioner response 



[]  If unable to determine, please check the box, sign and date.  





Present On Admission (POA) Indicator:





[] Present at the time of admission 



[] Not present at the time of admission



[] Clinically Undetermined









In responding to this query, please exercise your independent professional 
judgment.  The fact that a question is asked does not imply that any particular 
answer is desired or expected.  Thank you for your clarification on this 
documentation.



If you have any questions please call:[phone]

* Thank you,

   Jasmina Early RN

   CDMP 
[facility]

## 2017-04-05 NOTE — CP.PCM.PN
Subjective





- Date & Time of Evaluation


Date of Evaluation: 04/05/17


Time of Evaluation: 10:27





- Subjective


Subjective: 


seen and examined


feels ok


no n/v





Objective





- Vital Signs/Intake and Output


Vital Signs (last 24 hours): 


 











Temp Pulse Resp BP Pulse Ox


 


 98.5 F   75   20   147/89   93 L


 


 04/05/17 08:57  04/05/17 08:57  04/05/17 08:57  04/05/17 08:57  04/05/17 08:57











- Medications


Medications: 


 Current Medications





Acetaminophen (Tylenol 325mg Tab)  650 mg PO Q4 PRN


   PRN Reason: Pain, Mild (1-3)


Amlodipine Besylate (Norvasc)  10 mg PO DAILY [facility]


   Last Admin: 04/05/17 08:38 Dose:  10 mg


Atorvastatin Calcium (Lipitor)  80 mg PO HS [facility]


   Last Admin: 04/04/17 22:11 Dose:  80 mg


Docusate Sodium (Colace)  200 mg PO DAILY [facility]


   Last Admin: 04/05/17 08:33 Dose:  200 mg


Epoetin Edgardo (Procrit)  10,000 unit IV TTS [facility]


   Last Admin: 04/01/17 20:47 Dose:  10,000 unit


Heparin Sodium (Porcine) (Heparin)  5,000 units SC Q12 [facility]


   PRN Reason: Protocol


   Last Admin: 04/05/17 08:34 Dose:  5,000 units


Hydromorphone HCl (Dilaudid)  1 mg IVP Q4 PRN


   PRN Reason: Pain, severe (8-10)


   Last Admin: 04/04/17 17:35 Dose:  1 mg


Vancomycin HCl 750 mg/ Sodium (Chloride)  250 mls @ 166.667 mls/hr IVPB TTS [facility]


   Last Admin: 04/01/17 09:49 Dose:  166.667 mls/hr


Meropenem 500 mg/ Sodium (Chloride)  100 mls @ 100 mls/hr IVPB Q12H [facility]


   Last Admin: 04/05/17 01:48 Dose:  100 mls/hr


Insulin Human Lispro (Humalog)  0 units SC ACHS [facility]


   PRN Reason: Protocol


   Last Admin: 04/05/17 08:43 Dose:  4 units


Lactulose (Enulose)  20 gm PO BID PRN


   PRN Reason: Constipation


   Last Admin: 04/02/17 17:07 Dose:  20 gm


Levothyroxine Sodium (Synthroid)  75 mcg PO DAILY@0630 [facility]


   Last Admin: 04/05/17 06:18 Dose:  75 mcg


Metoprolol Tartrate (Lopressor)  50 mg PO BID [facility]


   Last Admin: 04/05/17 08:36 Dose:  50 mg


Pantoprazole Sodium (Protonix Ec Tab)  40 mg PO DAILY [facility]


   Last Admin: 04/05/17 08:37 Dose:  40 mg


Pregabalin (Lyrica)  50 mg PO QPM [facility]


   Last Admin: 04/04/17 18:48 Dose:  50 mg


Sevelamer HCl (Renagel)  800 mg PO BID [facility]


   Last Admin: 04/05/17 08:37 Dose:  800 mg


Valsartan (Diovan)  160 mg PO DAILY [facility]


   Last Admin: 04/05/17 08:33 Dose:  160 mg











- Labs


Labs: 


 





 04/04/17 06:25 





 04/04/17 06:25 





 











PT  12.3 SECONDS (9.6-11.2)  H  03/31/17  05:30    


 


INR  1.18  (0.92-1.08)  H  03/31/17  05:30    


 


APTT  28.0 SECONDS (23.3-32.5)   04/02/17  20:30    














- Constitutional


Appears: Non-toxic





- Head Exam


Head Exam: ATRAUMATIC





- Eye Exam


Eye Exam: Normal appearance





- ENT Exam


ENT Exam: Mucous Membranes Moist





- Neck Exam


Neck Exam: Normal Inspection





- Respiratory Exam


Respiratory Exam: NORMAL BREATHING PATTERN





- Cardiovascular Exam


Cardiovascular Exam: +S1, +S2





- GI/Abdominal Exam


GI & Abdominal Exam: Soft





- Extremities Exam


Additional comments: 


no edema





- Neurological Exam


Neurological Exam: Alert, Oriented x3





- Psychiatric Exam


Psychiatric exam: Normal Affect





- Skin


Additional comments: 


dressing LLE





Assessment and Plan





- Assessment and Plan (Free Text)


Assessment: 


ESRD/ HTN/ L TMA/ Anemia/ DM /Anemia of renal disease








plan:


HD tomorrow


bp acceptable


epo w/ hd


dm per primary


will inc renveal to 1600 tid

## 2017-04-05 NOTE — CP.PCM.PN
Subjective





- Date & Time of Evaluation


Date of Evaluation: 04/05/17


Time of Evaluation: 09:00





- Subjective


Subjective: 


F/U  S/P L TMA





Pt with less pain in L foot.











Objective





- Vital Signs/Intake and Output


Vital Signs (last 24 hours): 


 











Temp Pulse Resp BP Pulse Ox


 


 98.5 F   75   20   147/89   93 L


 


 04/05/17 08:57  04/05/17 08:57  04/05/17 08:57  04/05/17 08:57  04/05/17 08:57











- Medications


Medications: 


 Current Medications





Acetaminophen (Tylenol 325mg Tab)  650 mg PO Q4 PRN


   PRN Reason: Pain, Mild (1-3)


Amlodipine Besylate (Norvasc)  10 mg PO DAILY [facility]


   Last Admin: 04/05/17 08:38 Dose:  10 mg


Atorvastatin Calcium (Lipitor)  80 mg PO HS [facility]


   Last Admin: 04/04/17 22:11 Dose:  80 mg


Docusate Sodium (Colace)  200 mg PO DAILY [facility]


   Last Admin: 04/05/17 08:33 Dose:  200 mg


Epoetin Edgardo (Procrit)  10,000 unit IV TTS [facility]


   Last Admin: 04/01/17 20:47 Dose:  10,000 unit


Heparin Sodium (Porcine) (Heparin)  5,000 units SC Q12 [facility]


   PRN Reason: Protocol


   Last Admin: 04/05/17 08:34 Dose:  5,000 units


Hydromorphone HCl (Dilaudid)  1 mg IVP Q4 PRN


   PRN Reason: Pain, severe (8-10)


   Last Admin: 04/05/17 10:52 Dose:  1 mg


Vancomycin HCl 750 mg/ Sodium (Chloride)  250 mls @ 166.667 mls/hr IVPB TTS [facility]


   Last Admin: 04/01/17 09:49 Dose:  166.667 mls/hr


Meropenem 500 mg/ Sodium (Chloride)  100 mls @ 100 mls/hr IVPB Q12H [facility]


   Last Admin: 04/05/17 13:08 Dose:  100 mls/hr


Insulin Human Lispro (Humalog)  0 units SC ACHS [facility]


   PRN Reason: Protocol


   Last Admin: 04/05/17 12:30 Dose:  2 units


Lactulose (Enulose)  20 gm PO BID PRN


   PRN Reason: Constipation


   Last Admin: 04/02/17 17:07 Dose:  20 gm


Levothyroxine Sodium (Synthroid)  75 mcg PO DAILY@0630 [facility]


   Last Admin: 04/05/17 06:18 Dose:  75 mcg


Metoprolol Tartrate (Lopressor)  50 mg PO BID [facility]


   Last Admin: 04/05/17 08:36 Dose:  50 mg


Pantoprazole Sodium (Protonix Ec Tab)  40 mg PO DAILY [facility]


   Last Admin: 04/05/17 08:37 Dose:  40 mg


Pregabalin (Lyrica)  50 mg PO QPM [facility]


   Last Admin: 04/04/17 18:48 Dose:  50 mg


Sevelamer HCl (Renagel)  800 mg PO BID [facility]


   Last Admin: 04/05/17 08:37 Dose:  800 mg


Sevelamer HCl (Renagel)  1,600 mg PO TID [facility]


   Last Admin: 04/05/17 12:28 Dose:  1,600 mg


Valsartan (Diovan)  160 mg PO DAILY [facility]


   Last Admin: 04/05/17 08:33 Dose:  160 mg











- Labs


Labs: 


 





 04/04/17 06:25 





 04/04/17 06:25 





 











PT  12.3 SECONDS (9.6-11.2)  H  03/31/17  05:30    


 


INR  1.18  (0.92-1.08)  H  03/31/17  05:30    


 


APTT  28.0 SECONDS (23.3-32.5)   04/02/17  20:30    














- Constitutional


Appears: No Acute Distress, Chronically Ill





- Head Exam


Head Exam: NORMAL INSPECTION





- Eye Exam


Eye Exam: PERRL





- ENT Exam


ENT Exam: Normal Oropharynx





- Neck Exam


Neck Exam: Normal Inspection





- Respiratory Exam


Respiratory Exam: NORMAL BREATHING PATTERN





- Cardiovascular Exam


Cardiovascular Exam: REGULAR RHYTHM


Additional comments: 


PPM





- GI/Abdominal Exam


GI & Abdominal Exam: Soft, Normal Bowel Sounds





- Extremities Exam


Extremities Exam: Tenderness (L foot.)


Additional comments: 


L foot dressing in place





- Back Exam


Back Exam: NORMAL INSPECTION





- Neurological Exam


Neurological Exam: Alert, Oriented x3


Additional comments: 


No focal motor/sensory deficit.





- Psychiatric Exam


Psychiatric exam: Normal Mood





- Skin


Skin Exam: Warm





Assessment and Plan





- Assessment and Plan (Free Text)


Assessment: 


S/P L TMA  Acute high





PVD  Chronic high





DM Neuropathy  Chronic high





ESRD  Chronic high





CAD


CHF


PAF-PPM


DMII


HTN


Hypothyroidism


Hyperlipidemia


Anemia.











Plan: 


Pt improved and stable to be discharged to [facility] today.

## 2017-04-05 NOTE — CP.PCM.PN
Subjective





- Date & Time of Evaluation


Date of Evaluation: 04/05/17


Time of Evaluation: 07:19





- Subjective


Subjective: 


76 year old female with PMHx of anemia, arthritis, Atrial Fib, CAD, CHF, DM, HTN

, HLD, ESRD (dialysis on Tues/Thurs/Sat), Hyperthyroidism, Hypothyroidism was 

seen resting comfortably at bedside 5 days s/p Left foot TMA. AAO x3. Dressing 

to Left foot remains c/d/i. Patient states that she does have pain to her left 

foot. She admits to less pain now compared to before the surgery. Patient 

denies n/v/f/c/sob/cp.








Objective





- Vital Signs/Intake and Output


Vital Signs (last 24 hours): 


 











Temp Pulse Resp BP Pulse Ox


 


 97.8 F   84   20   148/64   97 


 


 04/04/17 22:00  04/04/17 22:00  04/04/17 22:00  04/04/17 22:00  04/04/17 22:00











- Medications


Medications: 


 Current Medications





Acetaminophen (Tylenol 325mg Tab)  650 mg PO Q4 PRN


   PRN Reason: Pain, Mild (1-3)


Amlodipine Besylate (Norvasc)  10 mg PO DAILY [facility]


   Last Admin: 04/04/17 18:03 Dose:  Not Given


Atorvastatin Calcium (Lipitor)  80 mg PO HS [facility]


   Last Admin: 04/04/17 22:11 Dose:  80 mg


Docusate Sodium (Colace)  200 mg PO DAILY [facility]


   Last Admin: 04/04/17 09:07 Dose:  200 mg


Epoetin Edgardo (Procrit)  10,000 unit IV TTS [facility]


   Last Admin: 04/01/17 20:47 Dose:  10,000 unit


Heparin Sodium (Porcine) (Heparin)  5,000 units SC Q12 [facility]


   PRN Reason: Protocol


   Last Admin: 04/04/17 22:07 Dose:  5,000 units


Hydromorphone HCl (Dilaudid)  1 mg IVP Q4 PRN


   PRN Reason: Pain, severe (8-10)


   Last Admin: 04/04/17 17:35 Dose:  1 mg


Vancomycin HCl 750 mg/ Sodium (Chloride)  250 mls @ 166.667 mls/hr IVPB TTS [facility]


   Last Admin: 04/01/17 09:49 Dose:  166.667 mls/hr


Meropenem 500 mg/ Sodium (Chloride)  100 mls @ 100 mls/hr IVPB Q12H [facility]


   Last Admin: 04/05/17 01:48 Dose:  100 mls/hr


Insulin Human Lispro (Humalog)  0 units SC ACHS [facility]


   PRN Reason: Protocol


   Last Admin: 04/04/17 22:20 Dose:  Not Given


Lactulose (Enulose)  20 gm PO BID PRN


   PRN Reason: Constipation


   Last Admin: 04/02/17 17:07 Dose:  20 gm


Levothyroxine Sodium (Synthroid)  75 mcg PO DAILY@0630 [facility]


   Last Admin: 04/05/17 06:18 Dose:  75 mcg


Metoprolol Tartrate (Lopressor)  50 mg PO BID [facility]


   Last Admin: 04/04/17 18:00 Dose:  Not Given


Pantoprazole Sodium (Protonix Ec Tab)  40 mg PO DAILY [facility]


   Last Admin: 04/04/17 09:07 Dose:  40 mg


Pregabalin (Lyrica)  50 mg PO QPM [facility]


   Last Admin: 04/04/17 18:48 Dose:  50 mg


Sevelamer HCl (Renagel)  800 mg PO BID [facility]


   Last Admin: 04/04/17 17:39 Dose:  800 mg


Valsartan (Diovan)  160 mg PO DAILY [facility]


   Last Admin: 04/04/17 11:30 Dose:  Not Given











- Labs


Labs: 


 





 04/04/17 06:25 





 04/04/17 06:25 





 











PT  12.3 SECONDS (9.6-11.2)  H  03/31/17  05:30    


 


INR  1.18  (0.92-1.08)  H  03/31/17  05:30    


 


APTT  28.0 SECONDS (23.3-32.5)   04/02/17  20:30    














- Constitutional


Appears: Non-toxic, No Acute Distress





- Extremities Exam


Additional comments: 


Left lower extremity focused exam:





VASC: Palpable DP pulse noted 2/4. Non-palpable PT pulse. No edema noted.  Skin 

temperature warm to warm from proximal to distal.





DERM: Surgical site well coapted with all sutures intact. No dehiscence or 

maceration is noted. No drainage is noted. Blanchable erythema noted to the 

distal aspect of the foot. No acute sign of infection noted.





ORTHO: Pain on palpation to TMA site





NEURO: Gross sensation diminished





- Neurological Exam


Neurological Exam: Alert, Awake, Oriented x3





- Psychiatric Exam


Psychiatric exam: Normal Affect, Normal Mood





Assessment and Plan





- Assessment and Plan (Free Text)


Assessment: 


76 year old female patient 5 days s/p Left foot TMA by Dr. Serna


Plan: 


Patient was examined and evaluated with 


Chart and vitals reviewed


Left foot dressed with betadine, DSD


Continue IV Abx per ID


Patient to be non-weight bearing to Left foot


Podiatry will follow in-house

## 2017-06-17 ENCOUNTER — HOSPITAL ENCOUNTER (INPATIENT)
Dept: HOSPITAL 14 - H.ER | Age: 77
LOS: 10 days | Discharge: NURSING, REHAB OR LONG-TERM CARE | DRG: 558 | End: 2017-06-27
Attending: INTERNAL MEDICINE | Admitting: INTERNAL MEDICINE
Payer: COMMERCIAL

## 2017-06-17 VITALS — BODY MASS INDEX: 32.9 KG/M2

## 2017-06-17 DIAGNOSIS — I48.2: ICD-10-CM

## 2017-06-17 DIAGNOSIS — R10.9: ICD-10-CM

## 2017-06-17 DIAGNOSIS — Z87.01: ICD-10-CM

## 2017-06-17 DIAGNOSIS — Z90.49: ICD-10-CM

## 2017-06-17 DIAGNOSIS — E78.5: ICD-10-CM

## 2017-06-17 DIAGNOSIS — E11.40: ICD-10-CM

## 2017-06-17 DIAGNOSIS — Z95.1: ICD-10-CM

## 2017-06-17 DIAGNOSIS — G89.29: ICD-10-CM

## 2017-06-17 DIAGNOSIS — I25.10: ICD-10-CM

## 2017-06-17 DIAGNOSIS — Z95.5: ICD-10-CM

## 2017-06-17 DIAGNOSIS — I48.0: ICD-10-CM

## 2017-06-17 DIAGNOSIS — B96.20: ICD-10-CM

## 2017-06-17 DIAGNOSIS — L03.116: ICD-10-CM

## 2017-06-17 DIAGNOSIS — E78.00: ICD-10-CM

## 2017-06-17 DIAGNOSIS — E03.9: ICD-10-CM

## 2017-06-17 DIAGNOSIS — Z87.442: ICD-10-CM

## 2017-06-17 DIAGNOSIS — J98.11: ICD-10-CM

## 2017-06-17 DIAGNOSIS — Z79.82: ICD-10-CM

## 2017-06-17 DIAGNOSIS — T87.44: Primary | ICD-10-CM

## 2017-06-17 DIAGNOSIS — B95.62: ICD-10-CM

## 2017-06-17 DIAGNOSIS — T87.81: ICD-10-CM

## 2017-06-17 DIAGNOSIS — E11.22: ICD-10-CM

## 2017-06-17 DIAGNOSIS — N18.6: ICD-10-CM

## 2017-06-17 DIAGNOSIS — Z99.2: ICD-10-CM

## 2017-06-17 DIAGNOSIS — L97.529: ICD-10-CM

## 2017-06-17 DIAGNOSIS — M86.172: ICD-10-CM

## 2017-06-17 DIAGNOSIS — I50.9: ICD-10-CM

## 2017-06-17 DIAGNOSIS — Z89.432: ICD-10-CM

## 2017-06-17 DIAGNOSIS — I73.9: ICD-10-CM

## 2017-06-17 DIAGNOSIS — D63.8: ICD-10-CM

## 2017-06-17 DIAGNOSIS — Z95.0: ICD-10-CM

## 2017-06-17 DIAGNOSIS — I13.2: ICD-10-CM

## 2017-06-17 DIAGNOSIS — J18.9: ICD-10-CM
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
## 2017-06-17 DIAGNOSIS — C44.519: ICD-10-CM

## 2017-06-17 DIAGNOSIS — Z79.02: ICD-10-CM

## 2017-06-17 DIAGNOSIS — E11.69: ICD-10-CM

## 2017-06-17 LAB
ALBUMIN/GLOB SERPL: 0.9 {RATIO} (ref 1–2.1)
ALP SERPL-CCNC: 122 U/L (ref 38–126)
ALT SERPL-CCNC: 31 U/L (ref 9–52)
AST SERPL-CCNC: 38 U/L (ref 14–36)
BASE EXCESS BLDV CALC-SCNC: 6.5 MMOL/L (ref 0–2)
BASOPHILS # BLD AUTO: 0.1 K/UL (ref 0–0.2)
BASOPHILS NFR BLD: 0.6 % (ref 0–2)
BASOPHILS NFR BLD: 1 % (ref 0–2)
BILIRUB SERPL-MCNC: 0.6 MG/DL (ref 0.2–1.3)
BUN SERPL-MCNC: 14 MG/DL (ref 7–17)
CALCIUM SERPL-MCNC: 9.4 MG/DL (ref 8.4–10.2)
CHLORIDE SERPL-SCNC: 93 MMOL/L (ref 98–107)
CO2 SERPL-SCNC: 28 MMOL/L (ref 22–30)
EOSINOPHIL # BLD AUTO: 0.1 K/UL (ref 0–0.7)
EOSINOPHIL NFR BLD: 1 % (ref 0–4)
EOSINOPHIL NFR BLD: 1 % (ref 0–7)
ERYTHROCYTE [DISTWIDTH] IN BLOOD BY AUTOMATED COUNT: 17.1 % (ref 11.5–14.5)
GLOBULIN SER-MCNC: 4.3 GM/DL (ref 2.2–3.9)
GLUCOSE SERPL-MCNC: 142 MG/DL (ref 65–105)
HCT VFR BLD CALC: 33 % (ref 34–47)
LG PLATELETS BLD QL SMEAR: PRESENT
LYMPHOCYTES # BLD AUTO: 0.9 K/UL (ref 1–4.3)
LYMPHOCYTES NFR BLD AUTO: 6.1 % (ref 20–40)
MCH RBC QN AUTO: 28.4 PG (ref 27–31)
MCHC RBC AUTO-ENTMCNC: 31.8 G/DL (ref 33–37)
MCV RBC AUTO: 89.5 FL (ref 81–99)
MONOCYTES # BLD: 1.6 K/UL (ref 0–0.8)
MONOCYTES NFR BLD: 11.1 % (ref 0–10)
MYELOCYTES NFR BLD: 1 % (ref 0–0)
NEUTROPHILS # BLD: 11.5 K/UL (ref 1.8–7)
NEUTROPHILS NFR BLD AUTO: 81.2 % (ref 50–75)
NEUTROPHILS NFR BLD AUTO: 87 % (ref 42–75)
NRBC BLD AUTO-RTO: 0 % (ref 0–0)
PCO2 BLDV: 51 MMHG (ref 40–60)
PH BLDV: 7.41 [PH] (ref 7.32–7.43)
PLATELET # BLD: 323 K/UL (ref 130–400)
PMV BLD AUTO: 9 FL (ref 7.2–11.7)
POTASSIUM SERPL-SCNC: 3.9 MMOL/L (ref 3.6–5)
PROT SERPL-MCNC: 8.2 G/DL (ref 6.3–8.2)
SODIUM SERPL-SCNC: 135 MMOL/L (ref 132–148)
TOTAL CELLS COUNTED BLD: 100
WBC # BLD AUTO: 14.1 K/UL (ref 4.8–10.8)

## 2017-06-17 NOTE — CP.PCM.CON
History of Present Illness





- History of Present Illness


History of Present Illness: 





SURGERY CONSULT NOTE FOR DR. CARBAJAL





77F presents to [facility] with leg pain during dialysis. Patient has a rommel 

catheter in right upper arm for dialysis access. She is a hard stick and has 

been a hard stick every time she comes to the hospital. She requires IV 

antibiotics at this time and surgery is consulted for central line access.





PMH: DM, ESRD, CHF, HTN, HLD, Thyroid disease


PSH: CABG, Breanna, coronary stent, pacemaker, rommel catheter


Allergies: NKDA





Past Patient History





- Infectious Disease


Hx of Infectious Diseases: None





- Past Medical History & Family History


Past Medical History?: Yes





- Past Social History


Alcohol: None


Drugs: Denies





- CARDIAC


Hx Cardiac Disorders: Yes





- PULMONARY


Hx Respiratory Disorders: Yes





- NEUROLOGICAL


Hx Neurological Disorder: No





- HEENT


Hx HEENT Problems: No





- RENAL


Hx Chronic Kidney Disease: Yes





- ENDOCRINE/METABOLIC


Hx Endocrine Disorders: Yes





- HEMATOLOGICAL/ONCOLOGICAL


Hx Blood Disorders: Yes





- INTEGUMENTARY


Hx Dermatological Problems: No





- MUSCULOSKELETAL/RHEUMATOLOGICAL


Hx Musculoskeletal Disorders: Yes





- GASTROINTESTINAL


Hx Gall Bladder Disease: Yes





- GENITOURINARY/GYNECOLOGICAL


Hx Genitourinary Disorders: No





- PSYCHIATRIC


Hx Psychophysiologic Disorder: No





- SURGICAL HISTORY


Hx Cholecystectomy: Yes


Hx Coronary Artery Bypass Graft: Yes


Hx Coronary Stent: Yes





- ANESTHESIA


Hx Anesthesia: Yes


Hx Anesthesia Reactions: No


Hx Malignant Hyperthermia: No





Meds


Allergies/Adverse Reactions: 


 Allergies











Allergy/AdvReac Type Severity Reaction Status Date / Time


 


No Known Allergies Allergy   Verified 02/03/16 18:53














Physical Exam





- Constitutional


Appears: Non-toxic, No Acute Distress





- Eye Exam


Eye Exam: EOMI, PERRL





- ENT Exam


ENT Exam: Mucous Membranes Moist





- Respiratory Exam


Respiratory Exam: Clear to Auscultation Bilateral, NORMAL BREATHING PATTERN





- Cardiovascular Exam


Cardiovascular Exam: REGULAR RHYTHM, +S1, +S2





- GI/Abdominal Exam


GI & Abdominal Exam: Soft.  absent: Distended, Firm, Guarding, Rebound, Rigid, 

Tenderness





- Extremities Exam


Extremities exam: Negative for: pedal edema, tenderness


Additional comments: 





left toes amputated, dressing on





- Neurological Exam


Neurological exam: Alert, Oriented x3





- Skin


Skin Exam: Dry, Intact, Normal Color, Warm





Results





- Vital Signs


Recent Vital Signs: 


 Last Vital Signs











Temp  98.4 F   06/17/17 21:49


 


Pulse  93 H  06/17/17 21:49


 


Resp  16   06/17/17 21:49


 


BP  124/60   06/17/17 21:49


 


Pulse Ox  100   06/17/17 19:21














- Labs


Result Diagrams: 


 06/17/17 18:00





 06/17/17 18:00





Assessment & Plan





- Assessment and Plan (Free Text)


Assessment: 





77F with ESRD





- difficult peripheral venous access


- needs stat antibiotics


- right groin central line placed





Further recs discuss with Dr Philip Keller, PGY1

## 2017-06-17 NOTE — CP.PCM.CON
History of Present Illness





- History of Present Illness


History of Present Illness: 





76 year old female with PMHx of anemia, arthritis, Atrial Fib, CAD, CHF, DM, HTN

, HLD, ESRD (dialysis on Tues/Thurs/Sat), Hyperthyroidism, Hypothyroidism was 

seen at bedside ED. She is 11 weeks s/p Left foot TMA. Patient states that she 

has been experiencing some chills and body aches over the couple days and her 

daughter brought her to ED. Patient complains of pain to Left foot surgical 

site especially upon palpation. Patient denies of any N/V/F/C or SOB today.





Past Patient History





- Infectious Disease


Hx of Infectious Diseases: None





- Past Medical History & Family History


Past Medical History?: Yes





- Past Social History


Alcohol: None


Drugs: Denies





- CARDIAC


Hx Atrial Fibrillation: Yes


Hx Congestive Heart Failure: Yes


Hx Hypercholesterolemia: Yes


Hx Hypertension: Yes


Hx Pacemaker: Yes





- PULMONARY


Hx Pneumonia: Yes





- NEUROLOGICAL


Hx Neurological Disorder: No





- HEENT


Hx HEENT Problems: No





- RENAL


Hx Chronic Kidney Disease: Yes


Hx Kidney Stones: Yes





- ENDOCRINE/METABOLIC


Hx Hyperthyroidism: Yes


Hx Hypothyroidism: Yes





- HEMATOLOGICAL/ONCOLOGICAL


Hx Anemia: Yes


Hx Human Immunodeficiency Virus (HIV): No





- INTEGUMENTARY


Hx Dermatological Problems: No





- MUSCULOSKELETAL/RHEUMATOLOGICAL


Hx Arthritis: Yes





- GASTROINTESTINAL


Hx Gall Bladder Disease: Yes





- GENITOURINARY/GYNECOLOGICAL


Hx Genitourinary Disorders: No





- PSYCHIATRIC


Hx Psychophysiologic Disorder: No


Hx Substance Use: No





- SURGICAL HISTORY


Hx Cholecystectomy: Yes


Hx Coronary Artery Bypass Graft: Yes


Hx Coronary Stent: Yes





- ANESTHESIA


Hx Anesthesia: Yes


Hx Anesthesia Reactions: No


Hx Malignant Hyperthermia: No





Meds


Allergies/Adverse Reactions: 


 Allergies











Allergy/AdvReac Type Severity Reaction Status Date / Time


 


No Known Allergies Allergy   Verified 02/03/16 18:53














- Medications


Medications: 


 Current Medications





Vancomycin HCl 1 gm/ Sodium (Chloride)  250 mls @ 166.667 mls/hr IVPB ONCE ONE


   Stop: 06/17/17 18:57


   Last Admin: 06/17/17 18:40 Dose:  166.667 mls/hr











Physical Exam





- Constitutional


Appears: Well, Non-toxic, No Acute Distress





- Extremities Exam


Additional comments: 





Left lower extremity focused exam:





DERM: Open wound is noted to Left foot stump surgical site measuring 5cm x 2cm 

x 0.2cm with necrotic base. Mild sero-sanguinous drainage is noted. NO purulent 

discharge is noted. No probe to bone. Skin erythematous around the wound. No mal

-odor is noted.








VASC: Palpable DP pulse noted 2/4. Non-palpable PT pulse. No edema noted.  Skin 

temperature warm to warm from proximal to distal.





ORTHO: Pain on palpation to TMA site





NEURO: Gross sensation diminished








- Neurological Exam


Neurological exam: Alert, Oriented x3





- Psychiatric Exam


Psychiatric exam: Normal Affect, Normal Mood





- Skin


Skin Exam: Normal Color, Warm





Results





- Vital Signs


Recent Vital Signs: 


 Last Vital Signs











Temp  98.8 F   06/17/17 16:56


 


Pulse  95 H  06/17/17 16:56


 


Resp  16   06/17/17 16:56


 


BP  126/66   06/17/17 16:56


 


Pulse Ox  95   06/17/17 18:16














- Labs


Result Diagrams: 


 06/17/17 18:00





 06/17/17 18:00





Assessment & Plan





- Assessment and Plan (Free Text)


Assessment: 





76 year old female patient presenting with wound dehiscence to Left TMA site (

DOS 3/31/17)


Plan: 





Patient was examined and evaluated at bedside ED with all questions and 

concerns addressed


discussed in detail with attending Dr. Serna


Chart and vitals reviewed


Left foot dressed with betadine, DSD


Wound culture taken Left foot


Continue IV Abx


Podiatry will follow in-house

## 2017-06-17 NOTE — ED PDOC
Lower Extremity Pain/Injury


Time Seen by Provider: 17 17:05


Chief Complaint (Nursing): Lower Extremity Problem/Injury


Chief Complaint (Provider): Left foot pain 


History Per: Patient


History/Exam Limitations: no limitations


Current Symptoms Are (Timing): Still Present


Severity: Moderate


Additional Complaint(s): 


Sophia Osuna is a 78 y/o female, with a past medical history of Diabetes 

Mellitus and Chronic Renal Failure, presenting to the ER on 2017 after 

being referred from her dialysis center, after completing dialysis, with left 

foot/stump pain. Patient is s/p amputation of digits from her left foot and is 

complaining of pain, myalgia, fever, and chills. 





Past Medical History


Reviewed: Historical Data, Nursing Documentation, Vital Signs


Vital Signs: 





 Last Vital Signs











Temp  98.8 F   17 16:56


 


Pulse  95 H  17 16:56


 


Resp  16   17 16:56


 


BP  126/66   17 16:56


 


Pulse Ox  95   17 16:56














- Medical History


PMH: Anemia, Arthritis, Atrial Fibrillation, CAD, CHF, Diabetes, Gall Bladder 

Disease, HTN, Hypercholesterolemia, Hyperthyroidism, Hypothyroidism, Kidney 

Stones, Pneumonia, End Stage Renal Disease (TTF), Chronic Kidney Disease


   Denies: HIV





- Surgical History


Surgical History: CABG, Cholecystectomy, Coronary Stent, Pacemaker





- Family History


Family History: States: Unknown Family Hx





- Social History


Current smoker - smoking cessation education provided: No


Alcohol: None


Drugs: Denies





- Immunization History


Hx Tetanus Toxoid Vaccination: Yes


Hx Influenza Vaccination: Yes


Hx Pneumococcal Vaccination: Yes





- Home Medications


Home Medications: 


 Ambulatory Orders











 Medication  Instructions  Recorded


 


Aspirin [Aspirin EC] 81 mg PO DAILY #30 ect 06/16/15


 


Insulin Detemir [Levemir Flexpen] 40 unit SC HS 07/09/15


 


Rosuvastatin Calcium [Crestor] 40 mg PO HS 07/09/15


 


Pregabalin [Lyrica] 50 mg PO QPM 16


 


Levothyroxine [Synthroid] 75 mcg PO DAILY 17


 


amLODIPine [Norvasc] 10 mg PO DAILY 17


 


Meclizine [Meclizine*] 25 mg PO DAILY PRN 17


 


Metoprolol Tartrate [Lopressor] 50 mg PO BID 17


 


Pantoprazole [Protonix EC Tab] 40 mg PO DAILY 17


 


guaiFENesin/Dextromethorphan 2 tsp PO QID PRN 17





[Robitussin DM]  


 


oxyCODONE/Acetaminophen [Percocet 1 tab PO Q6 PRN #10 tab 17





5/325 mg Tab]  


 


Clopidogrel [Plavix] 75 mg PO DAILY 17


 


Insulin Aspart [Novolog Flexpen] 20 units SC DAILY 17


 


Linagliptin [Tradjenta] 5 mg PO DAILY 17


 


Liraglutide [Victoza 3-[place]] 1.8 mg SC DAILY 17


 


Omeprazole 40 mg PO DAILY 17


 


Valsartan [Diovan] 160 mg PO DAILY 17


 


hydrALAZINE [Apresoline] 25 mg PO QID 17


 


Lactulose [Enulose] 20 gm PO BID PRN #0 udc 17


 


Meropenem [Merrem IV] 500 mg IV Q12 #12 pds 17


 


Sevelamer [Renagel] 1,600 mg PO TID  tab 17


 


Vancomycin 750mg [Vancomycin 750 750 mg IV TTS #6 bag 17





mg in NS]  














- Allergies


Allergies/Adverse Reactions: 


 Allergies











Allergy/AdvReac Type Severity Reaction Status Date / Time


 


No Known Allergies Allergy   Verified 16 18:53














Review of Systems


ROS Statement: Except As Marked, All Systems Reviewed And Found Negative


Constitutional: Positive for: Fever, Chills, Other ((+) myalgia )


Musculoskeletal: Positive for: Foot Pain ((+) left foot pain )


Neurological: Negative for: Weakness, Numbness





Physical Exam





- Reviewed


Nursing Documentation Reviewed: Yes


Vital Signs Reviewed: Yes





- Physical Exam


Appears: Positive for: Non-toxic, No Acute Distress


Head Exam: Positive for: ATRAUMATIC, NORMOCEPHALIC


Skin: Positive for: Normal Color.  Negative for: Rash


Eye Exam: Positive for: Normal appearance, EOMI, PERRL


Neck: Positive for: Normal, Painless ROM, Supple


Cardiovascular/Chest: Positive for: Regular Rate, Rhythm.  Negative for: Murmur


Respiratory: Positive for: Normal Breath Sounds.  Negative for: Wheezing, 

Respiratory Distress


Gastrointestinal/Abdominal: Positive for: Normal Exam, Soft.  Negative for: 

Tenderness


Extremity: Positive for: Normal ROM, Tenderness, Other (left foot stump 

inferior flap erythematous and tender with necrotic ulcer laterally with small 

amount of purulent drainage ).  Negative for: Deformity, Swelling


Neurologic/Psych: Positive for: Alert, Oriented.  Negative for: Motor/Sensory 

Deficits





- ECG


O2 Sat by Pulse Oximetry: 95





Medical Decision Making


Medical Decision Makin:05





Initial Impression- 78 y/o female with left foot/stump pain





Initial Plan-


* VBG Shock Panel


* CMP


* Urine dip 


* CBC w/ differential 


* XR Left Foot


* Blood Cx


* Re-evaluate





17:20


Additional orders-


* Wound Cx


* Vancomycin IVPB





Documented by Gildardo Welch, acting as a scribe for Francisco Javier Brown MD.





All medical record entries made by the Scribe were at my direction and 

personally dictated by me. I


have reviewed the chart and agree that the record accurately reflects my 

personal performance of the


history, physical exam, medical decision making, and the department course for 

this patient. I have


also personally directed, reviewed, and agree with the discharge instructions 

and disposition.





Disposition





- Clinical Impression


Clinical Impression: 


 ESRD on hemodialysis, Infection of amputation stump








- Patient ED Disposition


Is Patient to be Admitted: Yes





- Disposition


Disposition Time: 18:15


Condition: FAIR





- Pt Status Changed To:


Hospital Disposition Of: Inpatient





- Admit Certification


Admit to Inpatient:: After my assessment, the patient will require 

hospitalization for at least two midnights.  This is because of the severity of 

symptoms shown, intensity of services needed, and/or the medical risk in this 

patient being treated as an outpatient.





- POA


Present On Arrival: Surgical Site Infection

## 2017-06-17 NOTE — RAD
PROCEDURE:  Left foot dated 06/17/2017 



HISTORY:

Status post amputation.  Rule out infection.



COMPARISON:

Comparison made with prior study 03/31/2017.



FINDINGS:



BONES:

Re- demonstrated are transmetatarsal amputation involving 1st through 

5th metatarsals. .  The distal bone margins exhibit irregular lucent 

changes which may represent osteomyelitis particularly involving the 

5th and 4th metatarsals. Additionally,, there appears to be localized 

ulceration along the lateral soft tissues just distal to the distal 

margin of the 5th metatarsal. There is diffuse soft tissue swelling 

and infiltration of the surrounding metatarsals and stump likely 

representing cellulitis. No definitive air is seen within the soft 

tissues. Findings findings are felt to represent osteomyelitis and 

surrounding cellulitis therefore followup MRI recommended confirm. 



JOINTS:

The the joint spaces of the bones of the midfoot appear preserved.  

Questionable degenerative osteoarthritis involving the at tibiotalar 

articulation. 



SOFT TISSUES:

Vascular calcifications are present. 



OTHER FINDINGS:

None.



IMPRESSION:

Transmetatarsal amputation changes 1st through 5th metatarsals with 

areas of lucency involving the distal margins of wall of the 

metatarsals (particularly the 4th and 5th metatarsals). Additionally, 

there appears to be localized ulceration along the lateral soft 

tissues just distal to the distal margin of the 5th metatarsal.  

There is also diffuse surrounding soft tissue swelling.  Findings are 

consistent with osteomyelitis and cellulitis.  Recommend followup 

MRI. Note that this report was placed in PA review folder for 

followup. In addition, findings discussed with Dr. Brown at 

approximately 6:54 p.m. with written down and read back verification.

## 2017-06-18 LAB
ALBUMIN/GLOB SERPL: 0.9 {RATIO} (ref 1–2.1)
ALP SERPL-CCNC: 105 U/L (ref 38–126)
ALT SERPL-CCNC: 30 U/L (ref 9–52)
APTT BLD: 30.8 SECONDS (ref 25.6–37.1)
AST SERPL-CCNC: 49 U/L (ref 14–36)
BILIRUB SERPL-MCNC: 0.3 MG/DL (ref 0.2–1.3)
BUN SERPL-MCNC: 16 MG/DL (ref 7–17)
CALCIUM SERPL-MCNC: 9.1 MG/DL (ref 8.4–10.2)
CHLORIDE SERPL-SCNC: 95 MMOL/L (ref 98–107)
CHOLEST SERPL-MCNC: 89 MG/DL (ref 0–199)
CO2 SERPL-SCNC: 31 MMOL/L (ref 22–30)
ERYTHROCYTE [DISTWIDTH] IN BLOOD BY AUTOMATED COUNT: 16.7 % (ref 11.5–14.5)
GLOBULIN SER-MCNC: 4 GM/DL (ref 2.2–3.9)
GLUCOSE SERPL-MCNC: 106 MG/DL (ref 65–105)
HCT VFR BLD CALC: 28.4 % (ref 34–47)
MCH RBC QN AUTO: 28.4 PG (ref 27–31)
MCHC RBC AUTO-ENTMCNC: 31.8 G/DL (ref 33–37)
MCV RBC AUTO: 89.3 FL (ref 81–99)
PLATELET # BLD: 333 K/UL (ref 130–400)
POTASSIUM SERPL-SCNC: 3.6 MMOL/L (ref 3.6–5)
PROT SERPL-MCNC: 7.3 G/DL (ref 6.3–8.2)
SODIUM SERPL-SCNC: 137 MMOL/L (ref 132–148)
T4 SERPL-MCNC: 6.29 UG/DL (ref 5.5–11)
TSH SERPL-ACNC: 3.72 MIU/ML (ref 0.46–4.68)
WBC # BLD AUTO: 10.1 K/UL (ref 4.8–10.8)

## 2017-06-18 PROCEDURE — 05H533Z INSERTION OF INFUSION DEVICE INTO RIGHT SUBCLAVIAN VEIN, PERCUTANEOUS APPROACH: ICD-10-PCS

## 2017-06-18 RX ADMIN — OXYCODONE HYDROCHLORIDE AND ACETAMINOPHEN PRN TAB: 5; 325 TABLET ORAL at 00:26

## 2017-06-18 RX ADMIN — INSULIN DETEMIR SCH UNITS: 100 INJECTION, SOLUTION SUBCUTANEOUS at 22:30

## 2017-06-18 RX ADMIN — OXYCODONE HYDROCHLORIDE AND ACETAMINOPHEN PRN TAB: 5; 325 TABLET ORAL at 16:41

## 2017-06-18 RX ADMIN — OXYCODONE HYDROCHLORIDE AND ACETAMINOPHEN PRN TAB: 5; 325 TABLET ORAL at 05:55

## 2017-06-18 NOTE — CP.PCM.CON
History of Present Illness





- History of Present Illness


History of Present Illness: 





Infectious Disease Consultation Note-





Asked to see this patient at the request of 





HPI-


History obtained mostly from the medical chart and the nurse.


Pt. is a 77 year old female with ESRD on HD , DM II who was admitted yesterday 

from the HD center with complaints of pain in her left foot stump site.


Pt. is s /p left foot TMA amputation 11 weeks ago and has wound dehiscence and 

is being f/u with podiatry.


I'm being asked to evaluate for abx management and possible infected stump site.


pt. currently is resting comfortably in bed and other than pain at stump site 

denies any other complaints.


Also as per pt's nurse pt. has a mid-chest wound that as per pt's daughter has 

been present for 2 years and is superficial and occcasionally bleeds but has 

never had any pus.





Review of Systems





- Review of Systems


Review of Systems: 





ROS-


denies any fever or chills, denies any HA, denies any cough, denies any sob, 

denies any chest pain, denies any abd. pain, denies any diarrhea, denies any n/v

,


pain at left foot stump site, denies any pus discharge











Past Patient History





- Infectious Disease


Hx of Infectious Diseases: None





- Past Medical History & Family History


Past Medical History?: Yes





- Past Social History


Smoking Status: Never Smoked


Alcohol: None


Drugs: Denies


Home Situation {Lives}: With Family





- CARDIAC


Hx Cardiac Disorders: Yes





- PULMONARY


Hx Respiratory Disorders: Yes





- NEUROLOGICAL


Hx Neurological Disorder: No





- HEENT


Hx HEENT Problems: No





- RENAL


Hx Chronic Kidney Disease: Yes





- ENDOCRINE/METABOLIC


Hx Endocrine Disorders: Yes


Hx Diabetes Mellitus Type 2: Yes





- INTEGUMENTARY


Hx Dermatological Problems: No





- MUSCULOSKELETAL/RHEUMATOLOGICAL


Hx Musculoskeletal Disorders: Yes





- GASTROINTESTINAL


Hx Gall Bladder Disease: Yes





- GENITOURINARY/GYNECOLOGICAL


Hx Genitourinary Disorders: No





- PSYCHIATRIC


Hx Psychophysiologic Disorder: No





- SURGICAL HISTORY


Hx Cholecystectomy: Yes


Hx Coronary Artery Bypass Graft: Yes


Hx Coronary Stent: Yes





- ANESTHESIA


Hx Anesthesia: Yes


Hx Anesthesia Reactions: No


Hx Malignant Hyperthermia: No





Meds


Allergies/Adverse Reactions: 


 Allergies











Allergy/AdvReac Type Severity Reaction Status Date / Time


 


No Known Allergies Allergy   Verified 02/03/16 18:53














- Medications


Medications: 


 Current Medications





Amlodipine Besylate (Norvasc)  10 mg PO DAILY [facility]


   Last Admin: 06/18/17 09:06 Dose:  10 mg


Aspirin (Ecotrin)  81 mg PO DAILY [facility]


   Last Admin: 06/18/17 09:04 Dose:  81 mg


Atorvastatin Calcium (Lipitor)  80 mg PO DAILY [facility]


   Last Admin: 06/18/17 09:06 Dose:  80 mg


Epoetin Edgardo (Procrit)  4,000 unit IV TTS [facility]


Heparin Sodium (Porcine) (Heparin)  5,000 units SC Q12 [facility]


   PRN Reason: Protocol


   Last Admin: 06/18/17 09:05 Dose:  5,000 units


Vancomycin HCl 1 gm/ Sodium (Chloride)  250 mls @ 166.667 mls/hr IVPB DAILY [facility]


   Last Admin: 06/18/17 16:44 Dose:  166.667 mls/hr


Piperacillin Sod/Tazobactam (Sod 2.25 gm/ Sodium Chloride)  100 mls @ 100 mls/

hr IVPB Q8 [facility]


   Last Admin: 06/18/17 16:45 Dose:  100 mls/hr


Insulin Detemir (Levemir)  30 units SC HS [facility]


Levothyroxine Sodium (Synthroid)  75 mcg PO DAILY@0630 [facility]


   Last Admin: 06/18/17 05:46 Dose:  75 mcg


Metoprolol Tartrate (Lopressor)  100 mg PO Q12 [facility]


   Last Admin: 06/18/17 09:02 Dose:  100 mg


Oxycodone/Acetaminophen (Percocet 5/325 Mg Tab)  2 tab PO Q4 PRN


   PRN Reason: Pain, moderate (4-7)


   Stop: 06/21/17 08:18


   Last Admin: 06/18/17 16:41 Dose:  2 tab


Pantoprazole Sodium (Protonix Ec Tab)  40 mg PO DAILY@0600 [facility]


Pregabalin (Lyrica)  50 mg PO DAILY [facility]


   Last Admin: 06/18/17 13:06 Dose:  50 mg


Sevelamer HCl (Renagel)  800 mg PO TID [facility]


   Last Admin: 06/18/17 16:43 Dose:  800 mg


Sitagliptin Phosphate (Januvia)  25 mg PO DAILY [facility]


   Last Admin: 06/18/17 09:05 Dose:  25 mg


Valsartan (Diovan)  160 mg PO DAILY [facility]


   Last Admin: 06/18/17 09:03 Dose:  160 mg











Physical Exam





- Constitutional


Appears: Non-toxic, No Acute Distress





- Head Exam


Head Exam: ATRAUMATIC





- Eye Exam


Eye Exam: EOMI


Pupil Exam: PERRL





- ENT Exam


ENT Exam: Normal Oropharynx





- Neck Exam


Neck exam: Positive for: Full Rom





- Respiratory Exam


Respiratory Exam: Clear to Auscultation Bilateral, NORMAL BREATHING PATTERN





- Cardiovascular Exam


Cardiovascular Exam: RRR, +S1, +S2


Additional comments: 





midchest superficail area of denuded skin 2 x 4 cm wirh mild bleeding, no pus, 

no malodor





- GI/Abdominal Exam


GI & Abdominal Exam: Normal Bowel Sounds, Soft


Additional comments: 





NT, ND





- Extremities Exam


Additional comments: 





left foot TMA site the lateral edge with eschar necrotic , no discharge, no 

malodor





- Neurological Exam


Neurological exam: Alert, Oriented x3





Results





- Vital Signs


Recent Vital Signs: 


 Last Vital Signs











Temp  99.4 F   06/18/17 16:14


 


Pulse  63   06/18/17 16:14


 


Resp  20   06/18/17 16:14


 


BP  146/67   06/18/17 16:14


 


Pulse Ox  95   06/18/17 16:14














- Labs


Result Diagrams: 


 06/18/17 06:05





 06/18/17 06:05


Labs: 


 Laboratory Results - last 24 hr











  06/18/17 06/18/17 06/18/17





  02:15 06:05 06:05


 


WBC   10.1 


 


RBC   3.18 L 


 


Hgb   9.0 L 


 


Hct   28.4 L 


 


MCV   89.3 


 


MCH   28.4 


 


MCHC   31.8 L 


 


RDW   16.7 H 


 


Plt Count   333 


 


PT    15.2 H


 


INR    1.3 H


 


APTT    30.8


 


Sodium   


 


Potassium   


 


Chloride   


 


Carbon Dioxide   


 


Anion Gap   


 


BUN   


 


Creatinine   


 


Est GFR ( Amer)   


 


Est GFR (Non-Af Amer)   


 


POC Glucose (mg/dL)  123 H  


 


Random Glucose   


 


Calcium   


 


Total Bilirubin   


 


AST   


 


ALT   


 


Alkaline Phosphatase   


 


Total Protein   


 


Albumin   


 


Globulin   


 


Albumin/Globulin Ratio   


 


Triglycerides   


 


Cholesterol   


 


LDL Cholesterol Direct   


 


HDL Cholesterol   


 


Thyroxine (T4)   


 


TSH 3rd Generation   














  06/18/17 06/18/17 06/18/17





  06:05 06:51 11:37


 


WBC   


 


RBC   


 


Hgb   


 


Hct   


 


MCV   


 


MCH   


 


MCHC   


 


RDW   


 


Plt Count   


 


PT   


 


INR   


 


APTT   


 


Sodium  137  


 


Potassium  3.6  


 


Chloride  95 L  


 


Carbon Dioxide  31 H  


 


Anion Gap  15  


 


BUN  16  


 


Creatinine  3.1 H  


 


Est GFR ( Amer)  18  


 


Est GFR (Non-Af Amer)  15  


 


POC Glucose (mg/dL)   124 H  184 H


 


Random Glucose  106 H  


 


Calcium  9.1  


 


Total Bilirubin  0.3  


 


AST  49 H D  


 


ALT  30  


 


Alkaline Phosphatase  105  


 


Total Protein  7.3  


 


Albumin  3.4 L  


 


Globulin  4.0 H  


 


Albumin/Globulin Ratio  0.9 L  


 


Triglycerides  121  


 


Cholesterol  89  


 


LDL Cholesterol Direct  < 30  


 


HDL Cholesterol  30  


 


Thyroxine (T4)  6.29  


 


TSH 3rd Generation  3.72  














  06/18/17





  16:34


 


WBC 


 


RBC 


 


Hgb 


 


Hct 


 


MCV 


 


MCH 


 


MCHC 


 


RDW 


 


Plt Count 


 


PT 


 


INR 


 


APTT 


 


Sodium 


 


Potassium 


 


Chloride 


 


Carbon Dioxide 


 


Anion Gap 


 


BUN 


 


Creatinine 


 


Est GFR ( Amer) 


 


Est GFR (Non-Af Amer) 


 


POC Glucose (mg/dL)  169 H


 


Random Glucose 


 


Calcium 


 


Total Bilirubin 


 


AST 


 


ALT 


 


Alkaline Phosphatase 


 


Total Protein 


 


Albumin 


 


Globulin 


 


Albumin/Globulin Ratio 


 


Triglycerides 


 


Cholesterol 


 


LDL Cholesterol Direct 


 


HDL Cholesterol 


 


Thyroxine (T4) 


 


TSH 3rd Generation 








 Microbiology





06/17/17 18:00   Blood   Blood Culture - Preliminary


                            NO GROWTH AFTER 24 HOURS


06/17/17 18:00   Blood   Blood Culture - Preliminary


                            NO GROWTH AFTER 24 HOURS


06/17/17 17:40   Foot - Left   Gram Stain - Final





 





 Accession No. : [id number]


Patient Name / ID : ASA MARI  / [id number]


Exam Date : 06/17/2017 17:22:06 ( Approved )


Study Comment : 


Sex / Age : F  / 77Y





Creator : Dayo Ruiz MD


Dictator : Dayo Ruiz MD


 : 


Approver : Dayo Ruiz MD


Approver2 : 





Report Date : 06/17/2017 18:55:00


My Comment : 


********************************************************************************

***





PROCEDURE:  Left foot dated 06/17/2017 





HISTORY:


Status post amputation.  Rule out infection.





COMPARISON:


Comparison made with prior study 03/31/2017.





FINDINGS:





BONES:


Re- demonstrated are transmetatarsal amputation involving 1st through 5th 

metatarsals. .  The distal bone margins exhibit irregular lucent changes which 

may represent osteomyelitis particularly involving the 5th and 4th metatarsals. 

Additionally,, there appears to be localized ulceration along the lateral soft 

tissues just distal to the distal margin of the 5th metatarsal. There is 

diffuse soft tissue swelling and infiltration of the surrounding metatarsals 

and stump likely representing cellulitis. No definitive air is seen within the 

soft tissues. Findings findings are felt to represent osteomyelitis and 

surrounding cellulitis therefore followup MRI recommended confirm. 





JOINTS:


The the joint spaces of the bones of the midfoot appear preserved.  

Questionable degenerative osteoarthritis involving the at tibiotalar 

articulation. 





SOFT TISSUES:


Vascular calcifications are present. 





OTHER FINDINGS:


None.





IMPRESSION:


Transmetatarsal amputation changes 1st through 5th metatarsals with areas of 

lucency involving the distal margins of wall of the metatarsals (particularly 

the 4th and 5th metatarsals). Additionally, there appears to be localized 

ulceration along the lateral soft tissues just distal to the distal margin of 

the 5th metatarsal.  There is also diffuse surrounding soft tissue swelling.  

Findings are consistent with osteomyelitis and cellulitis.  Recommend followup 

MRI. Note that this report was placed in PA review folder for followup. In 

addition, findings discussed with Dr. Brown at approximately 6:54 p.m. with 

written down and read back verification.























Assessment & Plan


(1) Infection of amputation stump


Status: Acute   





(2) ESRD on hemodialysis


Status: Acute   Priority: High   





- Assessment and Plan (Free Text)


Assessment: 





A/P-


77 year old female with DM II, ESRD on HD, s/p left foot TMA admitted with left 

foot TMA infected stump.





afebrile


minimal leukocytosis resolving.


blood cx- neg x 2


wound cx- pending


foot xray- OM as per report.





plan-


check ESR.


check MRI if no contraindication for f/o OM .


No objection to continuing with current abx vanco and zosyn pending further 

results.


may need arterial dopplers .


May need further debridement or amputation pending further results.








Thank you for allowing me to take part in the care of this patient.

## 2017-06-18 NOTE — CP.PCM.HP
History of Present Illness





- History of Present Illness


History of Present Illness: 


CC:  Pain L foot stump.





76 y/o F with Hx of ESRD on HD TTS, was sent via EMS to [facility] from 

HD center  dialysis for evaluation and Tx of intermittent L foot Stump pain, 

onset 3 wks PTA with no relief.





Pt c/o of increased L foot aching pain, severe intensity of 9:10 not relief 

with pain medication, associated to tenderness and purulent discharge from site 

on DOA.





Worsening symptoms:  C/O of RUQ abdominal pain x one week increased with meals, 

denied: Nausea, vomiting or diarrhea , complains of  open skin lesion mid upper 

sternum for  one year , initially  She had  a verrucae , She took it out  by 

herself , there after lesion  was opening and  closing.





Pt denied: Fever, chills, n/v/d, melena, SOB, CP, dizziness, urinary symptoms, 

headache, sick contact.





PMHx:  S/P L TMA, PVD, Hx CABG, Coronary Stent, DM Neuropathy, DMII, ESRD on HD

, CKD, CAD, CHF, PAF, PPM, HTN, Hypothyroidism, Hyperlipidemia, Anemia.





Foot X-Ray consistent with Osteomyelitis and Cellulites.





Abdomen X-Ray shows:  No bowel obstructions.    











Present on Admission





- Present on Admission


Any Indicators Present on Admission: Yes


History Surgical Site Infection Following: Orthopedic Procedures





Review of Systems





- Constitutional


Constitutional: Other (negative)





- EENT


Eyes: Other (negative)


Ears: Other (negative)


Nose/Mouth/Throat: Other (negative)





- Cardiovascular


Cardiovascular: Other (negative)





- Respiratory


Respiratory: Other (negative)





- Gastrointestinal


Gastrointestinal: Abdominal Pain (RUQ)





- Genitourinary


Genitourinary: Other (negative)





- Musculoskeletal


Musculoskeletal: Other (L foot pain)





- Integumentary


Integumentary: Lesions (mid upper chest)





- Neurological


Neurological: Other (negative)





- Psychiatric


Psychiatric: Other (negative)





- Endocrine


Endocrine: Other (negative)





- Hematologic/Lymphatic


Hematologic: Other (negative)





Past Patient History





- Infectious Disease


Hx of Infectious Diseases: None





- Past Medical History & Family History


Past Medical History?: Yes


Pertinent Family History: 





Unknown





- Past Social History


Alcohol: None


Drugs: Denies





- CARDIAC


Hx Cardiac Disorders: Yes


Hx Atrial Fibrillation: Yes


Hx Congestive Heart Failure: Yes


Hx Hypercholesterolemia: Yes


Hx Hypertension: Yes


Hx Pacemaker: Yes





- PULMONARY


Hx Respiratory Disorders: Yes


Hx Pneumonia: Yes





- NEUROLOGICAL


Hx Neurological Disorder: No





- HEENT


Hx HEENT Problems: No





- RENAL


Hx Chronic Kidney Disease: Yes


Hx Dialysis: Yes


Hx Kidney Stones: Yes


Hx Renal Failure: Yes





- ENDOCRINE/METABOLIC


Hx Endocrine Disorders: Yes


Hx Diabetes Mellitus Type 2: Yes


Hx Hypothyroidism: Yes





- HEMATOLOGICAL/ONCOLOGICAL


Hx Blood Disorders: Yes


Hx Anemia: Yes





- INTEGUMENTARY


Hx Dermatological Problems: No





- MUSCULOSKELETAL/RHEUMATOLOGICAL


Hx Musculoskeletal Disorders: Yes


Hx Arthritis: Yes





- GASTROINTESTINAL


Hx Gastrointestinal Disorders: Yes


Hx Gall Bladder Disease: Yes





- GENITOURINARY/GYNECOLOGICAL


Hx Genitourinary Disorders: No





- PSYCHIATRIC


Hx Psychophysiologic Disorder: No





- SURGICAL HISTORY


Hx Surgeries: Yes


Hx Amputation: Yes (L TMA)


Hx Cholecystectomy: Yes


Hx Coronary Artery Bypass Graft: Yes


Hx Coronary Stent: Yes





- ANESTHESIA


Hx Anesthesia: Yes


Hx Anesthesia Reactions: No


Hx Malignant Hyperthermia: No





Meds


Allergies/Adverse Reactions: 


 Allergies











Allergy/AdvReac Type Severity Reaction Status Date / Time


 


No Known Allergies Allergy   Verified 02/03/16 18:53














Physical Exam





- Constitutional


Appears: No Acute Distress, Chronically Ill





- Head Exam


Head Exam: NORMAL INSPECTION





- Eye Exam


Eye Exam: PERRL





- ENT Exam


ENT Exam: Normal Oropharynx





- Neck Exam


Neck exam: Positive for: Normal Inspection





- Respiratory Exam


Respiratory Exam: NORMAL BREATHING PATTERN





- Cardiovascular Exam


Cardiovascular Exam: Irregular Rhythm


Additional comments: 


 Mid upper chest with chronic non healing superficial abrasion.   PPM.





- GI/Abdominal Exam


GI & Abdominal Exam: Normal Bowel Sounds, Soft, Tenderness (RUQ).  absent: 

Guarding, Rebound





- Extremities Exam


Additional comments: 


 Pain on palpation in L TMA, dressing , open wound L foot surgical stump, 

necrotic base , no supuration , DP palpable , R femoral LTC in place.





- Back Exam


Additional comments: 





Sacrum with dry healed scar and redness.





- Neurological Exam


Neurological exam: Alert, Oriented x3


Additional comments: 


Gross sensation  diminished L R foot , motor no focal deficit.





- Psychiatric Exam


Psychiatric exam: Normal Affect, Normal Mood





- Skin


Skin Exam: Warm


Additional comments: 





upper  area skin  mid upper  sternum , round aprox  3 cm , small bleeding after 

taking out dressing, 





Results





- Vital Signs


Recent Vital Signs: 





 Last Vital Signs











Temp  99.4 F   06/18/17 16:14


 


Pulse  63   06/18/17 16:14


 


Resp  20   06/18/17 16:14


 


BP  146/67   06/18/17 16:14


 


Pulse Ox  95   06/18/17 16:14








reviewed J.P.





- Labs


Result Diagrams: 


 06/20/17 06:00





 06/20/17 06:00


Labs: 





 Laboratory Results - last 24 hr











  06/18/17 06/18/17 06/18/17





  02:15 06:05 06:05


 


WBC   10.1 


 


RBC   3.18 L 


 


Hgb   9.0 L 


 


Hct   28.4 L 


 


MCV   89.3 


 


MCH   28.4 


 


MCHC   31.8 L 


 


RDW   16.7 H 


 


Plt Count   333 


 


PT    15.2 H


 


INR    1.3 H


 


APTT    30.8


 


Sodium   


 


Potassium   


 


Chloride   


 


Carbon Dioxide   


 


Anion Gap   


 


BUN   


 


Creatinine   


 


Est GFR ( Amer)   


 


Est GFR (Non-Af Amer)   


 


POC Glucose (mg/dL)  123 H  


 


Random Glucose   


 


Calcium   


 


Total Bilirubin   


 


AST   


 


ALT   


 


Alkaline Phosphatase   


 


Total Protein   


 


Albumin   


 


Globulin   


 


Albumin/Globulin Ratio   


 


Triglycerides   


 


Cholesterol   


 


LDL Cholesterol Direct   


 


HDL Cholesterol   


 


Thyroxine (T4)   


 


TSH 3rd Generation   














  06/18/17 06/18/17 06/18/17





  06:05 06:51 11:37


 


WBC   


 


RBC   


 


Hgb   


 


Hct   


 


MCV   


 


MCH   


 


MCHC   


 


RDW   


 


Plt Count   


 


PT   


 


INR   


 


APTT   


 


Sodium  137  


 


Potassium  3.6  


 


Chloride  95 L  


 


Carbon Dioxide  31 H  


 


Anion Gap  15  


 


BUN  16  


 


Creatinine  3.1 H  


 


Est GFR ( Amer)  18  


 


Est GFR (Non-Af Amer)  15  


 


POC Glucose (mg/dL)   124 H  184 H


 


Random Glucose  106 H  


 


Calcium  9.1  


 


Total Bilirubin  0.3  


 


AST  49 H D  


 


ALT  30  


 


Alkaline Phosphatase  105  


 


Total Protein  7.3  


 


Albumin  3.4 L  


 


Globulin  4.0 H  


 


Albumin/Globulin Ratio  0.9 L  


 


Triglycerides  121  


 


Cholesterol  89  


 


LDL Cholesterol Direct  < 30  


 


HDL Cholesterol  30  


 


Thyroxine (T4)  6.29  


 


TSH 3rd Generation  3.72  














  06/18/17





  16:34


 


WBC 


 


RBC 


 


Hgb 


 


Hct 


 


MCV 


 


MCH 


 


MCHC 


 


RDW 


 


Plt Count 


 


PT 


 


INR 


 


APTT 


 


Sodium 


 


Potassium 


 


Chloride 


 


Carbon Dioxide 


 


Anion Gap 


 


BUN 


 


Creatinine 


 


Est GFR ( Amer) 


 


Est GFR (Non-Af Amer) 


 


POC Glucose (mg/dL)  169 H


 


Random Glucose 


 


Calcium 


 


Total Bilirubin 


 


AST 


 


ALT 


 


Alkaline Phosphatase 


 


Total Protein 


 


Albumin 


 


Globulin 


 


Albumin/Globulin Ratio 


 


Triglycerides 


 


Cholesterol 


 


LDL Cholesterol Direct 


 


HDL Cholesterol 


 


Thyroxine (T4) 


 


TSH 3rd Generation 








reviewed J.P.





- EKG Data


EKG comments: 





reviewed J.P.





- Impressions


Impression: 





Foot and Abdomen X-Ray reviewed J.P.





Assessment & Plan


(1) Osteomyelitis of left foot


Status: Acute   





(2) Infection of amputation stump


Status: Acute   





(3) ESRD on hemodialysis


Status: Acute   Priority: High   





(4) Abdominal pain


Status: Acute   Priority: High   





(5) Anemia


Status: Chronic   Priority: Medium   





(6) DM2 (diabetes mellitus, type 2)


Status: Chronic   Priority: Medium   





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


Status: Acute   





(8) HTN (hypertension)


Status: Chronic   Priority: Medium   





(9) Skin lesion of chest wall


Status: Acute   





- Assessment and Plan (Free Text)


Plan: 


F/U  Abd U-S, continue Vanco, Zosyn, Percocet, Heparin, Diovan, Lopressor and 

rest of Tx. Surgical and Podiatrist consult appreciated,  F/U:  GI, ID, 

Nephrology consults.








- Date & Time


Date: 06/18/17


Time: 14:00

## 2017-06-18 NOTE — CP.PCM.PN
Subjective





- Date & Time of Evaluation


Date of Evaluation: 06/18/17


Time of Evaluation: 11:45





- Subjective


Subjective: 





76 year old female 11 weeks s/p Left foot TMA was seen at bedside this morning 

concerning Left foot wound dehiscence. Dressing to Left foot remains clean dry 

and intact. Patient denies of any overnight distress. Patient complains of pain 

to left foot while changing dressing but no pain is induced when resting. 

Patient denies of any N/V/F/C or SOB today.





Objective





- Vital Signs/Intake and Output


Vital Signs (last 24 hours): 


 











Temp Pulse Resp BP Pulse Ox


 


 98.8 F   72   20   167/92 H  96 


 


 06/18/17 08:44  06/18/17 09:06  06/18/17 08:44  06/18/17 09:06  06/18/17 08:44











- Medications


Medications: 


 Current Medications





Amlodipine Besylate (Norvasc)  10 mg PO DAILY [facility]


   Last Admin: 06/18/17 09:06 Dose:  10 mg


Aspirin (Ecotrin)  81 mg PO DAILY [facility]


   Last Admin: 06/18/17 09:04 Dose:  81 mg


Atorvastatin Calcium (Lipitor)  80 mg PO DAILY [facility]


   Last Admin: 06/18/17 09:06 Dose:  80 mg


Heparin Sodium (Porcine) (Heparin)  5,000 units SC Q12 [facility]


   PRN Reason: Protocol


   Last Admin: 06/18/17 09:05 Dose:  5,000 units


Insulin Detemir (Levemir)  30 units SC HS [facility]


Levothyroxine Sodium (Synthroid)  75 mcg PO DAILY@0630 [facility]


   Last Admin: 06/18/17 05:46 Dose:  75 mcg


Metoprolol Tartrate (Lopressor)  100 mg PO Q12 [facility]


   Last Admin: 06/18/17 09:02 Dose:  100 mg


Oxycodone/Acetaminophen (Percocet 5/325 Mg Tab)  2 tab PO Q4 PRN


   PRN Reason: Pain, moderate (4-7)


   Stop: 06/21/17 08:18


Pantoprazole Sodium (Protonix Ec Tab)  40 mg PO DAILY@0600 [facility]


Pregabalin (Lyrica)  50 mg PO DAILY [facility]


Sevelamer HCl (Renagel)  800 mg PO TID [facility]


   Last Admin: 06/18/17 09:02 Dose:  800 mg


Sitagliptin Phosphate (Januvia)  25 mg PO DAILY [facility]


   Last Admin: 06/18/17 09:05 Dose:  25 mg


Valsartan (Diovan)  160 mg PO DAILY [facility]


   Last Admin: 06/18/17 09:03 Dose:  160 mg











- Labs


Labs: 


 





 06/18/17 06:05 





 06/18/17 06:05 





 











PT  15.2 Seconds (9.8-13.1)  H  06/18/17  06:05    


 


INR  1.3  (0.9-1.2)  H  06/18/17  06:05    


 


APTT  30.8 Seconds (25.6-37.1)   06/18/17  06:05    














- Constitutional


Appears: Well, Non-toxic, No Acute Distress





- Extremities Exam


Additional comments: 





Left lower extremity focused exam:





DERM: Open wound is noted to Left foot stump surgical site measuring 5cm x 2cm 

x 0.2cm with necrotic base. Mild sero-sanguinous drainage is noted. NO purulent 

discharge is noted. No probe to bone. Skin erythematous around the wound. No mal

-odor is noted.








VASC: Palpable DP pulse noted 2/4. Non-palpable PT pulse. No edema noted.  Skin 

temperature warm to warm from proximal to distal.





ORTHO: Pain on palpation to TMA site





NEURO: Gross sensation diminished





- Neurological Exam


Neurological Exam: Alert, Awake, Oriented x3





- Psychiatric Exam


Psychiatric exam: Normal Affect, Normal Mood





- Skin


Skin Exam: Normal Color, Warm





Assessment and Plan





- Assessment and Plan (Free Text)


Assessment: 





76 year old female patient presenting with wound dehiscence to Left TMA site (

DOS 3/31/17)


Plan: 





Patient was examined and evaluated at bedside ED with all questions and 

concerns addressed


discussed in detail with attending Dr. Serna


Chart and vitals reviewed


Left foot dressed with betadine, DSD


Wound culture Left foot; pending


Continue IV Abx as per ID


Podiatry will follow in-house

## 2017-06-18 NOTE — PCM.PROC
Procedures


Attestation:: I certify that I have explained the specified Operation(s) or 

Procedure(s), risks, benefits and reasonable alternatives to the Patient and/or 

other person responsible. The opportunity was given to ask questions and all 

questions answered





- Central Line Placement


  ** Right Femoral Triple Lumen Catheter


Aseptic technique was employed throughout the procedure: Full sterile barriers (

mask, hair cover, sterile gown, sterile gloves), Full body sterile drape, 

Chloraprep Antiseptic: 2 minute prep for Femoral


CVP Time Out Performed: Yes


Central Line Prep: Chlorhexidine-Alcohol Combination


Local Anesthesia Used: Lidocaine 1%


Amount of Anesthesia Used (mls): 2


Ultrasound Used for Placement: Yes


Central Line Lumen Inserted: triple


Central Line Length: 20 cm


Post Procedure: Sutured in Place, Good Blood Return, All Ports Aspirated, 

Flushed, Capped, Sterile Dressing Applied


Secured by: Suture


Post procedure dressing: Gauze, Clear vapor permeable, Chlorhexidine disc (

Biopatch)


Patient Tolerated Procedure: Well


Immediate Complications: None

## 2017-06-18 NOTE — CP.PCM.CON
History of Present Illness





- History of Present Illness


History of Present Illness: 





76 y/o female with ESRD on maintenance HD, Amputation of Lt toes, CAD,CHF, 

chronic A.fib, DM ,HLD is admitted for c/o Pain at lt foot amputation site


Pts dalysis schedule is TTS & she had dialysis yesterday





Past Patient History





- Infectious Disease


Hx of Infectious Diseases: None





- Past Medical History & Family History


Past Medical History?: Yes





- Past Social History


Alcohol: None


Drugs: Denies





- CARDIAC


Hx Cardiac Disorders: Yes





- PULMONARY


Hx Respiratory Disorders: Yes





- NEUROLOGICAL


Hx Neurological Disorder: No





- HEENT


Hx HEENT Problems: No





- RENAL


Hx Chronic Kidney Disease: Yes





- ENDOCRINE/METABOLIC


Hx Endocrine Disorders: Yes





- HEMATOLOGICAL/ONCOLOGICAL


Hx Blood Disorders: Yes





- INTEGUMENTARY


Hx Dermatological Problems: No





- MUSCULOSKELETAL/RHEUMATOLOGICAL


Hx Musculoskeletal Disorders: Yes





- GASTROINTESTINAL


Hx Gall Bladder Disease: Yes





- GENITOURINARY/GYNECOLOGICAL


Hx Genitourinary Disorders: No





- PSYCHIATRIC


Hx Psychophysiologic Disorder: No





- SURGICAL HISTORY


Hx Cholecystectomy: Yes


Hx Coronary Artery Bypass Graft: Yes


Hx Coronary Stent: Yes





- ANESTHESIA


Hx Anesthesia: Yes


Hx Anesthesia Reactions: No


Hx Malignant Hyperthermia: No





Meds


Allergies/Adverse Reactions: 


 Allergies











Allergy/AdvReac Type Severity Reaction Status Date / Time


 


No Known Allergies Allergy   Verified 02/03/16 18:53














- Medications


Medications: 


 Current Medications





Amlodipine Besylate (Norvasc)  10 mg PO DAILY [facility]


   Last Admin: 06/18/17 09:06 Dose:  10 mg


Aspirin (Ecotrin)  81 mg PO DAILY [facility]


   Last Admin: 06/18/17 09:04 Dose:  81 mg


Atorvastatin Calcium (Lipitor)  80 mg PO DAILY [facility]


   Last Admin: 06/18/17 09:06 Dose:  80 mg


Heparin Sodium (Porcine) (Heparin)  5,000 units SC Q12 [facility]


   PRN Reason: Protocol


   Last Admin: 06/18/17 09:05 Dose:  5,000 units


Vancomycin HCl 1 gm/ Sodium (Chloride)  250 mls @ 166.667 mls/hr IVPB DAILY [facility]


Piperacillin Sod/Tazobactam (Sod 2.25 gm/ Sodium Chloride)  100 mls @ 100 mls/

hr IVPB Q8 [facility]


Insulin Detemir (Levemir)  30 units SC HS [facility]


Levothyroxine Sodium (Synthroid)  75 mcg PO DAILY@0630 [facility]


   Last Admin: 06/18/17 05:46 Dose:  75 mcg


Metoprolol Tartrate (Lopressor)  100 mg PO Q12 [facility]


   Last Admin: 06/18/17 09:02 Dose:  100 mg


Oxycodone/Acetaminophen (Percocet 5/325 Mg Tab)  2 tab PO Q4 PRN


   PRN Reason: Pain, moderate (4-7)


   Stop: 06/21/17 08:18


Pantoprazole Sodium (Protonix Ec Tab)  40 mg PO DAILY@0600 [facility]


Pregabalin (Lyrica)  50 mg PO DAILY [facility]


   Last Admin: 06/18/17 13:06 Dose:  50 mg


Sevelamer HCl (Renagel)  800 mg PO TID [facility]


   Last Admin: 06/18/17 13:04 Dose:  800 mg


Sitagliptin Phosphate (Januvia)  25 mg PO DAILY [facility]


   Last Admin: 06/18/17 09:05 Dose:  25 mg


Valsartan (Diovan)  160 mg PO DAILY [facility]


   Last Admin: 06/18/17 09:03 Dose:  160 mg











Physical Exam





- Constitutional


Appears: No Acute Distress


Additional comments: 





C/o pain Lt foot





- Head Exam


Head Exam: ATRAUMATIC, NORMOCEPHALIC





- Eye Exam


Additional comments: 





No icterus





- ENT Exam


ENT Exam: Mucous Membranes Dry





- Neck Exam


Additional comments: 





JVD neg





- Respiratory Exam


Additional comments: 





Lungs clear





- Cardiovascular Exam


Cardiovascular Exam: Irregular Rhythm





- GI/Abdominal Exam


GI & Abdominal Exam: Soft


Additional comments: 





Tenderness Over RLQ





- Extremities Exam


Additional comments: 





No edema. Lt foot dressed





Results





- Vital Signs


Recent Vital Signs: 


 Last Vital Signs











Temp  98.8 F   06/18/17 08:44


 


Pulse  72   06/18/17 09:06


 


Resp  20   06/18/17 08:44


 


BP  167/92 H  06/18/17 09:06


 


Pulse Ox  96   06/18/17 08:44














- Labs


Result Diagrams: 


 06/18/17 06:05





 06/18/17 06:05


Labs: 


 Laboratory Results - last 24 hr











  06/18/17 06/18/17 06/18/17





  02:15 06:05 06:05


 


WBC   10.1 


 


RBC   3.18 L 


 


Hgb   9.0 L 


 


Hct   28.4 L 


 


MCV   89.3 


 


MCH   28.4 


 


MCHC   31.8 L 


 


RDW   16.7 H 


 


Plt Count   333 


 


PT    15.2 H


 


INR    1.3 H


 


APTT    30.8


 


Sodium   


 


Potassium   


 


Chloride   


 


Carbon Dioxide   


 


Anion Gap   


 


BUN   


 


Creatinine   


 


Est GFR ( Amer)   


 


Est GFR (Non-Af Amer)   


 


POC Glucose (mg/dL)  123 H  


 


Random Glucose   


 


Calcium   


 


Total Bilirubin   


 


AST   


 


ALT   


 


Alkaline Phosphatase   


 


Total Protein   


 


Albumin   


 


Globulin   


 


Albumin/Globulin Ratio   


 


Triglycerides   


 


Cholesterol   


 


LDL Cholesterol Direct   


 


HDL Cholesterol   


 


Thyroxine (T4)   


 


TSH 3rd Generation   














  06/18/17 06/18/17 06/18/17





  06:05 06:51 11:37


 


WBC   


 


RBC   


 


Hgb   


 


Hct   


 


MCV   


 


MCH   


 


MCHC   


 


RDW   


 


Plt Count   


 


PT   


 


INR   


 


APTT   


 


Sodium  137  


 


Potassium  3.6  


 


Chloride  95 L  


 


Carbon Dioxide  31 H  


 


Anion Gap  15  


 


BUN  16  


 


Creatinine  3.1 H  


 


Est GFR ( Amer)  18  


 


Est GFR (Non-Af Amer)  15  


 


POC Glucose (mg/dL)   124 H  184 H


 


Random Glucose  106 H  


 


Calcium  9.1  


 


Total Bilirubin  0.3  


 


AST  49 H D  


 


ALT  30  


 


Alkaline Phosphatase  105  


 


Total Protein  7.3  


 


Albumin  3.4 L  


 


Globulin  4.0 H  


 


Albumin/Globulin Ratio  0.9 L  


 


Triglycerides  121  


 


Cholesterol  89  


 


LDL Cholesterol Direct  < 30  


 


HDL Cholesterol  30  


 


Thyroxine (T4)  6.29  


 


TSH 3rd Generation  3.72  














Assessment & Plan





- Assessment and Plan (Free Text)


Assessment: 





ESRD on maintenance HD


S/P Lt foot toes amputation


Abdominal pain


CAD, A.fib


DM


Plan: 





Continue HD every TTS


Abdominal is ordered


Labs stable


Epogen with HD

## 2017-06-18 NOTE — RAD
Abdomen dated 06/18/2017



Single AP supine portable view of the abdomen performed.  Comparison 

made with prior study 09/25/2014. Comparison also made with CT scan 

of the abdomen and pelvis dated 07/10/2015. Comparison also made with 

chest radiograph dated 03/30/2017 



No evidence of acute mechanical bowel obstruction. . Metallic clips 

seen in the right upper quadrant of the abdomen consisted prior 

cholecystectomy. Re- demonstrated are splenic artery calcifications. 

Calcification of the abdominal aorta and iliac arteries. Central 

venous catheter overlying the right aspect of the pelvis. 



Vague calcification again seen overlying hepatic parenchyma. 



There is scoliotic deformity of the lower thoracic and lumbar spine. 



Patchy atelectasis and or infiltrate changes in the left lung base.  



Cardiomegaly. Distal tips of disease bipolar pacemaker wires are 

present 



Impression: No evidence acute mechanical bowel obstruction.  Left 

lower lobe atelectasis and or infiltrate. Cardiomegaly. 



In situ central venous line overlying the right aspect of the pelvis 

as above.

## 2017-06-19 PROCEDURE — 0JBR0ZZ EXCISION OF LEFT FOOT SUBCUTANEOUS TISSUE AND FASCIA, OPEN APPROACH: ICD-10-PCS | Performed by: PODIATRIST

## 2017-06-19 RX ADMIN — INSULIN DETEMIR SCH UNITS: 100 INJECTION, SOLUTION SUBCUTANEOUS at 23:16

## 2017-06-19 RX ADMIN — PANTOPRAZOLE SODIUM SCH: 40 TABLET, DELAYED RELEASE ORAL at 06:08

## 2017-06-19 RX ADMIN — PANTOPRAZOLE SODIUM SCH MG: 40 TABLET, DELAYED RELEASE ORAL at 10:44

## 2017-06-19 NOTE — CP.PCM.PN
Subjective





- Date & Time of Evaluation


Date of Evaluation: 06/19/17


Time of Evaluation: 12:00





- Subjective


Subjective: 





General Surgery progress note for Dr. Palacios





General Surgery was asked to come see the patient again regarding a non-healing 

wound on the chest for 2 years. They are requesting a biopsy. Patient describes 

some bumps on her skin which she tried to remove at home. Patient says the 

wound periodically improves and then worsens again. Patient states that it 

starts bleeding when she gets dialysis. Patient denies any previous work up for 

this problem





Objective





- Vital Signs/Intake and Output


Vital Signs (last 24 hours): 


 











Temp Pulse Resp BP Pulse Ox


 


 97.8 F   61   20   168/69 H  96 


 


 06/19/17 07:35  06/19/17 07:35  06/19/17 07:35  06/19/17 10:43  06/19/17 07:35











- Medications


Medications: 


 Current Medications





Amlodipine Besylate (Norvasc)  10 mg PO DAILY [facility]


   Last Admin: 06/19/17 10:43 Dose:  10 mg


Aspirin (Ecotrin)  81 mg PO DAILY [facility]


   Last Admin: 06/19/17 10:44 Dose:  81 mg


Atorvastatin Calcium (Lipitor)  80 mg PO DAILY [facility]


   Last Admin: 06/19/17 10:44 Dose:  80 mg


Collagenase (Santyl)  1 applic TOP DAILY [facility]


Epoetin Edgardo (Procrit)  4,000 unit IV TTS [facility]


Heparin Sodium (Porcine) (Heparin)  5,000 units SC Q12 [facility]


   PRN Reason: Protocol


   Last Admin: 06/19/17 10:45 Dose:  5,000 units


Vancomycin HCl 1 gm/ Sodium (Chloride)  250 mls @ 166.667 mls/hr IVPB DAILY [facility]


   Last Admin: 06/19/17 10:42 Dose:  166.667 mls/hr


Piperacillin Sod/Tazobactam (Sod 2.25 gm/ Sodium Chloride)  100 mls @ 100 mls/

hr IVPB Q8 [facility]


   Last Admin: 06/19/17 10:42 Dose:  100 mls/hr


Insulin Detemir (Levemir)  30 units SC HS [facility]


   Last Admin: 06/18/17 22:30 Dose:  30 units


Levothyroxine Sodium (Synthroid)  75 mcg PO DAILY@0630 [facility]


   Last Admin: 06/19/17 10:44 Dose:  75 mcg


Metoprolol Tartrate (Lopressor)  100 mg PO Q12 [facility]


   Last Admin: 06/19/17 10:43 Dose:  100 mg


Oxycodone/Acetaminophen (Percocet 5/325 Mg Tab)  2 tab PO Q4 PRN


   PRN Reason: Pain, moderate (4-7)


   Stop: 06/21/17 08:18


   Last Admin: 06/18/17 16:41 Dose:  2 tab


Pantoprazole Sodium (Protonix Ec Tab)  40 mg PO DAILY@0600 [facility]


   Last Admin: 06/19/17 10:44 Dose:  40 mg


Pregabalin (Lyrica)  50 mg PO DAILY [facility]


   Last Admin: 06/19/17 10:47 Dose:  50 mg


Sevelamer HCl (Renagel)  800 mg PO TID [facility]


   Last Admin: 06/19/17 10:43 Dose:  800 mg


Sitagliptin Phosphate (Januvia)  25 mg PO DAILY [facility]


   Last Admin: 06/19/17 10:44 Dose:  25 mg


Valsartan (Diovan)  160 mg PO DAILY [facility]


   Last Admin: 06/19/17 10:43 Dose:  160 mg











- Labs


Labs: 


 





 06/18/17 06:05 





 06/18/17 06:05 





 











PT  15.2 Seconds (9.8-13.1)  H  06/18/17  06:05    


 


INR  1.3  (0.9-1.2)  H  06/18/17  06:05    


 


APTT  30.8 Seconds (25.6-37.1)   06/18/17  06:05    














- Constitutional


Appears: Well, Non-toxic, No Acute Distress





- Head Exam


Head Exam: ATRAUMATIC, NORMOCEPHALIC





- Eye Exam


Eye Exam: Periorbital tenderness.  absent: Conjunctival injection, Scleral 

icterus





- ENT Exam


ENT Exam: Mucous Membranes Moist, Normal Oropharynx





- Respiratory Exam


Respiratory Exam: NORMAL BREATHING PATTERN.  absent: Accessory Muscle Use, 

Respiratory Distress





- Cardiovascular Exam


Cardiovascular Exam: RRR





- GI/Abdominal Exam


GI & Abdominal Exam: absent: Distended





- Extremities Exam


Extremities Exam: absent: Calf Tenderness, Pedal Edema, Tenderness


Additional comments: 





right femoral triple lumen catheter in place with no erythema, discharge, bleed 

or hematoma appreciated





- Neurological Exam


Neurological Exam: Alert, Awake, Oriented x3





- Psychiatric Exam


Psychiatric exam: Normal Affect, Normal Mood





- Skin


Additional comments: 





superficial skin wound in the upper chest approximately 3cm wide and 2cm 

vertical not extending into the subcutaneous tissue, actively bleeding, no 

purulent drainage expressible, no surrounding erythema. Borders of elevated 

scar tissue present.





Assessment and Plan





- Assessment and Plan (Free Text)


Assessment: 





77F with extensive PMH including CHF, DM, and ESRD on HD with a chronic non-

healing skin wound of the chest for 2 years





Plan:


-Continue current manaement per primary


-Apply compression dressings and change as needed


-Consult wound care


-Further recs regarding biopsy after evaluation by Dr. Palacios


Thank you for this consult





Discussed with Dr. Philip Hamm, PGY-1


[phone]

## 2017-06-19 NOTE — CP.PCM.PN
Subjective





- Date & Time of Evaluation


Date of Evaluation: 06/19/17


Time of Evaluation: 10:20





- Subjective


Subjective: 





Pain L foot





Objective





- Vital Signs/Intake and Output


Vital Signs (last 24 hours): 


 











Temp Pulse Resp BP Pulse Ox


 


 98.7 F   64   20   131/65   94 L


 


 06/19/17 16:20  06/19/17 16:20  06/19/17 16:20  06/19/17 16:20  06/19/17 16:20











- Medications


Medications: 


 Current Medications





Amlodipine Besylate (Norvasc)  10 mg PO DAILY [facility]


   Last Admin: 06/19/17 10:43 Dose:  10 mg


Aspirin (Ecotrin)  81 mg PO DAILY [facility]


   Last Admin: 06/19/17 10:44 Dose:  81 mg


Atorvastatin Calcium (Lipitor)  80 mg PO DAILY [facility]


   Last Admin: 06/19/17 10:44 Dose:  80 mg


Collagenase (Santyl)  1 applic TOP DAILY [facility]


Epoetin Edgardo (Procrit)  4,000 unit IV TTS [facility]


Heparin Sodium (Porcine) (Heparin)  5,000 units SC Q12 [facility]


   PRN Reason: Protocol


   Last Admin: 06/19/17 10:45 Dose:  5,000 units


Vancomycin HCl 1 gm/ Sodium (Chloride)  250 mls @ 166.667 mls/hr IVPB DAILY [facility]


   Last Admin: 06/19/17 10:42 Dose:  166.667 mls/hr


Piperacillin Sod/Tazobactam (Sod 2.25 gm/ Sodium Chloride)  100 mls @ 100 mls/

hr IVPB Q8 [facility]


   Last Admin: 06/19/17 10:42 Dose:  100 mls/hr


Insulin Detemir (Levemir)  30 units SC HS [facility]


   Last Admin: 06/18/17 22:30 Dose:  30 units


Levothyroxine Sodium (Synthroid)  75 mcg PO DAILY@0630 [facility]


   Last Admin: 06/19/17 10:44 Dose:  75 mcg


Metoprolol Tartrate (Lopressor)  100 mg PO Q12 [facility]


   Last Admin: 06/19/17 10:43 Dose:  100 mg


Oxycodone/Acetaminophen (Percocet 5/325 Mg Tab)  2 tab PO Q4 [facility]


   PRN Reason: Pain, moderate (4-7)


   Stop: 06/21/17 08:18


   Last Admin: 06/18/17 16:41 Dose:  2 tab


Pantoprazole Sodium (Protonix Ec Tab)  40 mg PO DAILY@0600 [facility]


   Last Admin: 06/19/17 10:44 Dose:  40 mg


Pregabalin (Lyrica)  50 mg PO DAILY [facility]


   Last Admin: 06/19/17 10:47 Dose:  50 mg


Sevelamer HCl (Renagel)  800 mg PO TID [facility]


   Last Admin: 06/19/17 10:43 Dose:  800 mg


Sitagliptin Phosphate (Januvia)  25 mg PO DAILY [facility]


   Last Admin: 06/19/17 10:44 Dose:  25 mg


Valsartan (Diovan)  160 mg PO DAILY [facility]


   Last Admin: 06/19/17 10:43 Dose:  160 mg











- Labs


Labs: 


 





 06/18/17 06:05 





 06/18/17 06:05 





 











PT  15.2 Seconds (9.8-13.1)  H  06/18/17  06:05    


 


INR  1.3  (0.9-1.2)  H  06/18/17  06:05    


 


APTT  30.8 Seconds (25.6-37.1)   06/18/17  06:05    














- Constitutional


Appears: No Acute Distress, Chronically Ill





- Eye Exam


Eye Exam: PERRL





- ENT Exam


ENT Exam: Normal Exam





- Neck Exam


Neck Exam: Normal Inspection





- Respiratory Exam


Respiratory Exam: NORMAL BREATHING PATTERN





- Cardiovascular Exam


Cardiovascular Exam: Irregular Rhythm





- GI/Abdominal Exam


GI & Abdominal Exam: Soft, Tenderness (RUQ).  absent: Guarding, Rebound





- Extremities Exam


Extremities Exam: Tenderness (L foot, dressing in place)





- Back Exam


Back Exam: NORMAL INSPECTION





- Neurological Exam


Neurological Exam: Alert, Oriented x3


Additional comments: 





Decreased sensation  R L foot , no focal motor deficit





- Skin


Additional comments: 





mid upper chest round  3 cm lesionopen skin area ,mild bleeding  taking out 

dressing





Assessment and Plan


(1) ESRD on hemodialysis


Status: Acute   





(2) Infection of amputation stump


Status: Acute   





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


Status: Acute   





(4) Osteomyelitis of left foot


Status: Acute   





(5) Skin lesion of chest wall


Status: Acute   





(6) Abdominal pain


Status: Acute   





- Assessment and Plan (Free Text)


Plan: 





Continure Vanco , HD, f/u MRI L foot, Podiatric and  ID  consultant  

recommended conservative  treatment with antibiotic , no Surgiical treatment , f

/u GI consult US  negative , Hx  Lap Dayday for  abdominal pain, Surgical consult 

for  non healing lesion for one year mid upper chest

## 2017-06-19 NOTE — RAD
Right foot radiographs 



Comparison: None available 



Indication: Right heel pain 



Findings: 



Osseous demineralization limits evaluation for acute fracture lines. 

No acute displaced fracture identified.  Mild degenerative changes. 

Small calcaneal enthesophyte/heel spur.  No evidence of dislocation.  

Vascular calcifications.  Soft tissue swelling.  No evidence of 

radiopaque foreign body. 



Impression: 



No acute displaced fracture or dislocation identified. If symptoms 

persist or if there is continued clinical concern, x-ray follow-up in 

7-10 days should be considered. 



Osseous demineralization.  Degenerative changes.

## 2017-06-19 NOTE — CP.PCM.PN
Subjective





- Date & Time of Evaluation


Date of Evaluation: 06/19/17


Time of Evaluation: 13:10





- Subjective


Subjective: 





ID Note-





Pt. seen and examined today.


Denies any fever or chills.





s/p bedside debridement of the necrotic TMA site by podiatry this am.





Objective





- Vital Signs/Intake and Output


Vital Signs (last 24 hours): 


 











Temp Pulse Resp BP Pulse Ox


 


 97.8 F   61   20   168/69 H  96 


 


 06/19/17 07:35  06/19/17 07:35  06/19/17 07:35  06/19/17 10:43  06/19/17 07:35











- Medications


Medications: 


 Current Medications





Amlodipine Besylate (Norvasc)  10 mg PO DAILY [facility]


   Last Admin: 06/19/17 10:43 Dose:  10 mg


Aspirin (Ecotrin)  81 mg PO DAILY [facility]


   Last Admin: 06/19/17 10:44 Dose:  81 mg


Atorvastatin Calcium (Lipitor)  80 mg PO DAILY [facility]


   Last Admin: 06/19/17 10:44 Dose:  80 mg


Collagenase (Santyl)  1 applic TOP DAILY [facility]


Epoetin Edgardo (Procrit)  4,000 unit IV TTS [facility]


Heparin Sodium (Porcine) (Heparin)  5,000 units SC Q12 [facility]


   PRN Reason: Protocol


   Last Admin: 06/19/17 10:45 Dose:  5,000 units


Vancomycin HCl 1 gm/ Sodium (Chloride)  250 mls @ 166.667 mls/hr IVPB DAILY [facility]


   Last Admin: 06/19/17 10:42 Dose:  166.667 mls/hr


Piperacillin Sod/Tazobactam (Sod 2.25 gm/ Sodium Chloride)  100 mls @ 100 mls/

hr IVPB Q8 [facility]


   Last Admin: 06/19/17 10:42 Dose:  100 mls/hr


Insulin Detemir (Levemir)  30 units SC HS [facility]


   Last Admin: 06/18/17 22:30 Dose:  30 units


Levothyroxine Sodium (Synthroid)  75 mcg PO DAILY@0630 [facility]


   Last Admin: 06/19/17 10:44 Dose:  75 mcg


Metoprolol Tartrate (Lopressor)  100 mg PO Q12 [facility]


   Last Admin: 06/19/17 10:43 Dose:  100 mg


Oxycodone/Acetaminophen (Percocet 5/325 Mg Tab)  2 tab PO Q4 [facility]


   PRN Reason: Pain, moderate (4-7)


   Stop: 06/21/17 08:18


   Last Admin: 06/18/17 16:41 Dose:  2 tab


Pantoprazole Sodium (Protonix Ec Tab)  40 mg PO DAILY@0600 [facility]


   Last Admin: 06/19/17 10:44 Dose:  40 mg


Pregabalin (Lyrica)  50 mg PO DAILY [facility]


   Last Admin: 06/19/17 10:47 Dose:  50 mg


Sevelamer HCl (Renagel)  800 mg PO TID [facility]


   Last Admin: 06/19/17 10:43 Dose:  800 mg


Sitagliptin Phosphate (Januvia)  25 mg PO DAILY [facility]


   Last Admin: 06/19/17 10:44 Dose:  25 mg


Valsartan (Diovan)  160 mg PO DAILY [facility]


   Last Admin: 06/19/17 10:43 Dose:  160 mg











- Labs


Labs: 


 





 06/18/17 06:05 





 06/18/17 06:05 





 











PT  15.2 Seconds (9.8-13.1)  H  06/18/17  06:05    


 


INR  1.3  (0.9-1.2)  H  06/18/17  06:05    


 


APTT  30.8 Seconds (25.6-37.1)   06/18/17  06:05    














- Additional Findings


Additional findings: 





- Constitutional


Appears: Non-toxic, No Acute Distress





- Head Exam


Head Exam: ATRAUMATIC





- Eye Exam


Eye Exam: EOMI


Pupil Exam: PERRL





- ENT Exam


ENT Exam: Normal Oropharynx





- Neck Exam


Neck exam: Positive for: Full Rom





- Respiratory Exam


Respiratory Exam: Clear to Auscultation Bilateral, NORMAL BREATHING PATTERN





- Cardiovascular Exam


Cardiovascular Exam: RRR, +S1, +S2


Additional comments: 





midchest superficail area of denuded skin 2 x 4 cm wirh mild bleeding, no pus, 

no malodor





- GI/Abdominal Exam


GI & Abdominal Exam: Normal Bowel Sounds, Soft


Additional comments: 





NT, ND





- Extremities Exam


Additional comments: 





left foot TMA site dressing clean/dry/intact





- Neurological Exam


Neurological exam: Alert, Oriented x 3





 Laboratory Results - last 72 hr











  06/17/17 06/17/17 06/17/17





  17:18 17:24 18:00


 


WBC    14.1 H


 


RBC    3.69 L


 


Hgb    10.5 L


 


Hct    33.0 L


 


MCV    89.5  D


 


MCH    28.4


 


MCHC    31.8 L


 


RDW    17.1 H


 


Plt Count    323


 


MPV    9.0


 


Neut % (Auto)    81.2 H


 


Lymph % (Auto)    6.1 L


 


Mono % (Auto)    11.1 H


 


Eos % (Auto)    1.0


 


Baso % (Auto)    0.6


 


Neut #    11.5 H


 


Lymph #    0.9 L


 


Mono #    1.6 H


 


Eos #    0.1


 


Baso #    0.1


 


Neutrophils % (Manual)    87 H


 


Lymphocytes % (Manual)    5 L


 


Monocytes % (Manual)    5


 


Eosinophils % (Manual)    1


 


Basophils % (Manual)    1


 


Myelocytes %    1 H


 


Platelet Estimate    Normal


 


Large Platelets    Present


 


Hypochromasia (manual)    Slight


 


Anisocytosis (manual)    Slight


 


PT   


 


INR   


 


APTT   


 


pO2  21 L  


 


VBG pH  7.41  


 


VBG pCO2  51  


 


VBG HCO3  28.8  


 


VBG Total CO2  33.9 H  


 


VBG O2 Sat (Calc)  45.1  


 


VBG Base Excess  6.5 H  


 


VBG Potassium  3.9  


 


Sodium  130.0 L  


 


Chloride  95.0 L  


 


Glucose  140 H  


 


Lactate  1.6  


 


FiO2  21.0  


 


Potassium   


 


Carbon Dioxide   


 


Anion Gap   


 


BUN   


 


Creatinine   


 


Est GFR ( Amer)   


 


Est GFR (Non-Af Amer)   


 


POC Glucose (mg/dL)   166 H 


 


Random Glucose   


 


Hemoglobin A1c   


 


Calcium   


 


Total Bilirubin   


 


AST   


 


ALT   


 


Alkaline Phosphatase   


 


Total Protein   


 


Albumin   


 


Globulin   


 


Albumin/Globulin Ratio   


 


Triglycerides   


 


Cholesterol   


 


LDL Cholesterol Direct   


 


HDL Cholesterol   


 


Thyroxine (T4)   


 


TSH 3rd Generation   


 


Venous Blood Potassium  3.9  














  06/17/17 06/18/17 06/18/17





  18:00 02:15 06:05


 


WBC    10.1


 


RBC    3.18 L


 


Hgb    9.0 L


 


Hct    28.4 L


 


MCV    89.3


 


MCH    28.4


 


MCHC    31.8 L


 


RDW    16.7 H


 


Plt Count    333


 


MPV   


 


Neut % (Auto)   


 


Lymph % (Auto)   


 


Mono % (Auto)   


 


Eos % (Auto)   


 


Baso % (Auto)   


 


Neut #   


 


Lymph #   


 


Mono #   


 


Eos #   


 


Baso #   


 


Neutrophils % (Manual)   


 


Lymphocytes % (Manual)   


 


Monocytes % (Manual)   


 


Eosinophils % (Manual)   


 


Basophils % (Manual)   


 


Myelocytes %   


 


Platelet Estimate   


 


Large Platelets   


 


Hypochromasia (manual)   


 


Anisocytosis (manual)   


 


PT   


 


INR   


 


APTT   


 


pO2   


 


VBG pH   


 


VBG pCO2   


 


VBG HCO3   


 


VBG Total CO2   


 


VBG O2 Sat (Calc)   


 


VBG Base Excess   


 


VBG Potassium   


 


Sodium  135  


 


Chloride  93 L  


 


Glucose   


 


Lactate   


 


FiO2   


 


Potassium  3.9  


 


Carbon Dioxide  28  


 


Anion Gap  18  


 


BUN  14  


 


Creatinine  2.2 H  


 


Est GFR ( Amer)  26  


 


Est GFR (Non-Af Amer)  22  


 


POC Glucose (mg/dL)   123 H 


 


Random Glucose  142 H  


 


Hemoglobin A1c   


 


Calcium  9.4  


 


Total Bilirubin  0.6  


 


AST  38 H D  


 


ALT  31  


 


Alkaline Phosphatase  122  


 


Total Protein  8.2  


 


Albumin  3.9  


 


Globulin  4.3 H  


 


Albumin/Globulin Ratio  0.9 L  


 


Triglycerides   


 


Cholesterol   


 


LDL Cholesterol Direct   


 


HDL Cholesterol   


 


Thyroxine (T4)   


 


TSH 3rd Generation   


 


Venous Blood Potassium   














  06/18/17 06/18/17 06/18/17





  06:05 06:05 06:05


 


WBC   


 


RBC   


 


Hgb   


 


Hct   


 


MCV   


 


MCH   


 


MCHC   


 


RDW   


 


Plt Count   


 


MPV   


 


Neut % (Auto)   


 


Lymph % (Auto)   


 


Mono % (Auto)   


 


Eos % (Auto)   


 


Baso % (Auto)   


 


Neut #   


 


Lymph #   


 


Mono #   


 


Eos #   


 


Baso #   


 


Neutrophils % (Manual)   


 


Lymphocytes % (Manual)   


 


Monocytes % (Manual)   


 


Eosinophils % (Manual)   


 


Basophils % (Manual)   


 


Myelocytes %   


 


Platelet Estimate   


 


Large Platelets   


 


Hypochromasia (manual)   


 


Anisocytosis (manual)   


 


PT  15.2 H  


 


INR  1.3 H  


 


APTT  30.8  


 


pO2   


 


VBG pH   


 


VBG pCO2   


 


VBG HCO3   


 


VBG Total CO2   


 


VBG O2 Sat (Calc)   


 


VBG Base Excess   


 


VBG Potassium   


 


Sodium   137 


 


Chloride   95 L 


 


Glucose   


 


Lactate   


 


FiO2   


 


Potassium   3.6 


 


Carbon Dioxide   31 H 


 


Anion Gap   15 


 


BUN   16 


 


Creatinine   3.1 H 


 


Est GFR ( Amer)   18 


 


Est GFR (Non-Af Amer)   15 


 


POC Glucose (mg/dL)   


 


Random Glucose   106 H 


 


Hemoglobin A1c    6.2


 


Calcium   9.1 


 


Total Bilirubin   0.3 


 


AST   49 H D 


 


ALT   30 


 


Alkaline Phosphatase   105 


 


Total Protein   7.3 


 


Albumin   3.4 L 


 


Globulin   4.0 H 


 


Albumin/Globulin Ratio   0.9 L 


 


Triglycerides   121 


 


Cholesterol   89 


 


LDL Cholesterol Direct   < 30 


 


HDL Cholesterol   30 


 


Thyroxine (T4)   6.29 


 


TSH 3rd Generation   3.72 


 


Venous Blood Potassium   














  06/18/17 06/18/17 06/18/17





  06:51 11:37 16:34


 


WBC   


 


RBC   


 


Hgb   


 


Hct   


 


MCV   


 


MCH   


 


MCHC   


 


RDW   


 


Plt Count   


 


MPV   


 


Neut % (Auto)   


 


Lymph % (Auto)   


 


Mono % (Auto)   


 


Eos % (Auto)   


 


Baso % (Auto)   


 


Neut #   


 


Lymph #   


 


Mono #   


 


Eos #   


 


Baso #   


 


Neutrophils % (Manual)   


 


Lymphocytes % (Manual)   


 


Monocytes % (Manual)   


 


Eosinophils % (Manual)   


 


Basophils % (Manual)   


 


Myelocytes %   


 


Platelet Estimate   


 


Large Platelets   


 


Hypochromasia (manual)   


 


Anisocytosis (manual)   


 


PT   


 


INR   


 


APTT   


 


pO2   


 


VBG pH   


 


VBG pCO2   


 


VBG HCO3   


 


VBG Total CO2   


 


VBG O2 Sat (Calc)   


 


VBG Base Excess   


 


VBG Potassium   


 


Sodium   


 


Chloride   


 


Glucose   


 


Lactate   


 


FiO2   


 


Potassium   


 


Carbon Dioxide   


 


Anion Gap   


 


BUN   


 


Creatinine   


 


Est GFR ( Amer)   


 


Est GFR (Non-Af Amer)   


 


POC Glucose (mg/dL)  124 H  184 H  169 H


 


Random Glucose   


 


Hemoglobin A1c   


 


Calcium   


 


Total Bilirubin   


 


AST   


 


ALT   


 


Alkaline Phosphatase   


 


Total Protein   


 


Albumin   


 


Globulin   


 


Albumin/Globulin Ratio   


 


Triglycerides   


 


Cholesterol   


 


LDL Cholesterol Direct   


 


HDL Cholesterol   


 


Thyroxine (T4)   


 


TSH 3rd Generation   


 


Venous Blood Potassium   














  06/18/17 06/19/17 06/19/17





  21:47 05:58 10:48


 


WBC   


 


RBC   


 


Hgb   


 


Hct   


 


MCV   


 


MCH   


 


MCHC   


 


RDW   


 


Plt Count   


 


MPV   


 


Neut % (Auto)   


 


Lymph % (Auto)   


 


Mono % (Auto)   


 


Eos % (Auto)   


 


Baso % (Auto)   


 


Neut #   


 


Lymph #   


 


Mono #   


 


Eos #   


 


Baso #   


 


Neutrophils % (Manual)   


 


Lymphocytes % (Manual)   


 


Monocytes % (Manual)   


 


Eosinophils % (Manual)   


 


Basophils % (Manual)   


 


Myelocytes %   


 


Platelet Estimate   


 


Large Platelets   


 


Hypochromasia (manual)   


 


Anisocytosis (manual)   


 


PT   


 


INR   


 


APTT   


 


pO2   


 


VBG pH   


 


VBG pCO2   


 


VBG HCO3   


 


VBG Total CO2   


 


VBG O2 Sat (Calc)   


 


VBG Base Excess   


 


VBG Potassium   


 


Sodium   


 


Chloride   


 


Glucose   


 


Lactate   


 


FiO2   


 


Potassium   


 


Carbon Dioxide   


 


Anion Gap   


 


BUN   


 


Creatinine   


 


Est GFR ( Amer)   


 


Est GFR (Non-Af Amer)   


 


POC Glucose (mg/dL)  148 H  113 H  140 H


 


Random Glucose   


 


Hemoglobin A1c   


 


Calcium   


 


Total Bilirubin   


 


AST   


 


ALT   


 


Alkaline Phosphatase   


 


Total Protein   


 


Albumin   


 


Globulin   


 


Albumin/Globulin Ratio   


 


Triglycerides   


 


Cholesterol   


 


LDL Cholesterol Direct   


 


HDL Cholesterol   


 


Thyroxine (T4)   


 


TSH 3rd Generation   


 


Venous Blood Potassium   








 Microbiology





06/17/17 17:40   Foot - Left   Gram Stain - Final


06/17/17 17:40   Foot - Left   Wound Culture - Preliminary


                            Gram Negative Ashwin


                            Gram Positive Cocci


06/17/17 18:00   Blood   Blood Culture - Preliminary


                            NO GROWTH AFTER 24 HOURS


06/17/17 18:00   Blood   Blood Culture - Preliminary


                            NO GROWTH AFTER 24 HOURS





 





 














Assessment and Plan


(1) Infection of amputation stump


Status: Acute   





(2) ESRD on hemodialysis


Status: Acute   





- Assessment and Plan (Free Text)


Assessment: 





A/P-


77 year old female with DM II, ESRD on HD, s/p left foot TMA admitted with left 

foot TMA infected stump.





s/p  excisional debridement  of hyperkeratotic borders of the TMA ulcer


afebrile


minimal leukocytosis resolved.


blood cx- neg x 2


wound cx- GNR and GPC


foot xray- OM as per report.





plan-


as per podiatry no further surgical intervention needed at this time.


since OM on plain xray advise to treat with IV abx for at least 4-6 weeks as 

outpatient.


await ID and sensitivity of the GNR and GPC before deciding on abx as 

outpatient .


for now continue current vanco and zosyn pending ID and sensitivity of the foot 

wound cx.


pt. may be able to receive abx post HD days.

## 2017-06-19 NOTE — CP.PCM.PN
Subjective





- Date & Time of Evaluation


Date of Evaluation: 06/19/17


Time of Evaluation: 06:40





- Subjective


Subjective: 





76 year old female pt seen at bedside this AM 11 weeks s/p Left foot TMA 

concerning Left foot wound dehiscence with Dr. Serna present. Dressing to 

Left foot remains clean dry and intact. Patient denies of any overnight 

distress. Pt does say the foot is tender, denies any calf pain however. Denies f

/n/v/c/sob/cp at this time. 

















Objective





- Vital Signs/Intake and Output


Vital Signs (last 24 hours): 


 











Temp Pulse Resp BP Pulse Ox


 


 99.4 F   63   20   149/73   95 


 


 06/18/17 16:14  06/18/17 21:38  06/18/17 16:14  06/18/17 21:38  06/18/17 16:14











- Medications


Medications: 


 Current Medications





Amlodipine Besylate (Norvasc)  10 mg PO DAILY [facility] to [facility]


   Last Admin: 06/18/17 09:06 Dose:  10 mg


Aspirin (Ecotrin)  81 mg PO DAILY [facility] to [facility]


   Last Admin: 06/18/17 09:04 Dose:  81 mg


Atorvastatin Calcium (Lipitor)  80 mg PO DAILY [facility] to [facility]


   Last Admin: 06/18/17 09:06 Dose:  80 mg


Epoetin Edgardo (Procrit)  4,000 unit IV TTS [facility] to [facility]


Heparin Sodium (Porcine) (Heparin)  5,000 units SC Q12 [facility] to [facility]


   PRN Reason: Protocol


   Last Admin: 06/18/17 20:18 Dose:  5,000 units


Vancomycin HCl 1 gm/ Sodium (Chloride)  250 mls @ 166.667 mls/hr IVPB DAILY [facility] to [facility]


   Last Admin: 06/18/17 16:44 Dose:  166.667 mls/hr


Piperacillin Sod/Tazobactam (Sod 2.25 gm/ Sodium Chloride)  100 mls @ 100 mls/

hr IVPB Q8 [facility] to [facility]


   Last Admin: 06/19/17 00:36 Dose:  100 mls/hr


Insulin Detemir (Levemir)  30 units SC HS [facility]


   Last Admin: 06/18/17 22:30 Dose:  30 units


Levothyroxine Sodium (Synthroid)  75 mcg PO DAILY@0630 [facility]


   Last Admin: 06/19/17 06:08 Dose:  Not Given


Metoprolol Tartrate (Lopressor)  100 mg PO Q12 [facility] to [facility]


   Last Admin: 06/18/17 21:38 Dose:  100 mg


Oxycodone/Acetaminophen (Percocet 5/325 Mg Tab)  2 tab PO Q4 PRN


   PRN Reason: Pain, moderate (4-7)


   Stop: 06/21/17 08:18


   Last Admin: 06/18/17 16:41 Dose:  2 tab


Pantoprazole Sodium (Protonix Ec Tab)  40 mg PO DAILY@0600 [facility]


   Last Admin: 06/19/17 06:08 Dose:  Not Given


Pregabalin (Lyrica)  50 mg PO DAILY [facility]


   Last Admin: 06/18/17 13:06 Dose:  50 mg


Sevelamer HCl (Renagel)  800 mg PO TID [facility]


   Last Admin: 06/18/17 16:43 Dose:  800 mg


Sitagliptin Phosphate (Januvia)  25 mg PO DAILY [facility]


   Last Admin: 06/18/17 09:05 Dose:  25 mg


Valsartan (Diovan)  160 mg PO DAILY [facility]


   Last Admin: 06/18/17 09:03 Dose:  160 mg











- Labs


Labs: 


 





 06/18/17 06:05 





 06/18/17 06:05 





 











PT  15.2 Seconds (9.8-13.1)  H  06/18/17  06:05    


 


INR  1.3  (0.9-1.2)  H  06/18/17  06:05    


 


APTT  30.8 Seconds (25.6-37.1)   06/18/17  06:05    














- Constitutional


Appears: Non-toxic, No Acute Distress





- Extremities Exam


Extremities Exam: absent: Calf Tenderness


Additional comments: 





Left lower extremity focused exam:





DERM: open wound is noted to left foot stump surgical site measuring 5cm x 2cm 

x 0.2cm with necrotic base. No drainage noted today, no purulence,No probe to 

bone. Skin erythematous around the wound. No mal-odor is noted.








VASC: Palpable DP pulse noted 2/4. Non-palpable PT pulse. No edema noted.  Skin 

temperature warm to warm from proximal to distal.





ORTHO: tenderness to palpation to TMA site





NEURO: Gross sensation diminished





- Neurological Exam


Neurological Exam: Alert, Awake, Oriented x3





- Psychiatric Exam


Psychiatric exam: Normal Affect, Normal Mood





Assessment and Plan





- Assessment and Plan (Free Text)


Assessment: 





76 year old female patient presenting with wound dehiscence to Left TMA site (

DOS 3/31/17)


Plan: 


Patient S&E at bedside with Dr. Serna present


Chart, labs and vitals reviewed


All sutures removed from left foot stump site with sterile #15 blade and 

ulceration was excisionally debrided of hyperkeratotic borders


Pt tolerated procedure well without incident.


Foot redressed with DSD. Santyl ordered and to be applied tomorrow


C/w IV abx as per ID (vanc and zosyn)


F/u wound cx left foot. 


No surgical interventions indicated at this time


Podiatry will follow

## 2017-06-19 NOTE — US
HISTORY:

right quad pain



COMPARISON:

None available.



TECHNIQUE:

Sonographic evaluation of the right upper quadrant of the abdomen.



FINDINGS:



LIVER:

Measures 19.3 cm in length. Echotexture appears within normal limits. 

No focal hepatic mass identified.  The main portal vein appears 

patent with normal directional flow. No intrahepatic bile duct 

dilatation. 



GALLBLADDER:

Cholecystectomy. 



COMMON BILE DUCT:

Measures approximately 7-8 mm, within normal limits in a patient of 

this age and cholecystectomy state. 



PANCREAS:

Not visualized.



RIGHT KIDNEY:

Measures 9.8 x 4.7 x 4.1 cm. No obstructing calculus or 

hydronephrosis identified. 



AORTA:

Limited visualization appears grossly unremarkable.



IVC:

Limited visualization appears grossly unremarkable.



OTHER FINDINGS:

None .



IMPRESSION:

Cholecystectomy.



Hepatomegaly.

## 2017-06-19 NOTE — CP.PCM.PN
Subjective





- Date & Time of Evaluation


Date of Evaluation: 06/19/17


Time of Evaluation: 11:11





- Subjective


Subjective: 





No new event reported overnight


Patient and bed


Complaining of some pain in the foot








Objective





- Vital Signs/Intake and Output


Vital Signs (last 24 hours): 


 











Temp Pulse Resp BP Pulse Ox


 


 97.8 F   61   20   168/69 H  96 


 


 06/19/17 07:35  06/19/17 07:35  06/19/17 07:35  06/19/17 10:43  06/19/17 07:35











- Medications


Medications: 


 Current Medications





Amlodipine Besylate (Norvasc)  10 mg PO DAILY [facility]


   Last Admin: 06/19/17 10:43 Dose:  10 mg


Aspirin (Ecotrin)  81 mg PO DAILY [facility]


   Last Admin: 06/19/17 10:44 Dose:  81 mg


Atorvastatin Calcium (Lipitor)  80 mg PO DAILY [facility]


   Last Admin: 06/19/17 10:44 Dose:  80 mg


Epoetin Edgardo (Procrit)  4,000 unit IV TTS [facility]


Heparin Sodium (Porcine) (Heparin)  5,000 units SC Q12 [facility]


   PRN Reason: Protocol


   Last Admin: 06/19/17 10:45 Dose:  5,000 units


Vancomycin HCl 1 gm/ Sodium (Chloride)  250 mls @ 166.667 mls/hr IVPB DAILY [facility]


   Last Admin: 06/19/17 10:42 Dose:  166.667 mls/hr


Piperacillin Sod/Tazobactam (Sod 2.25 gm/ Sodium Chloride)  100 mls @ 100 mls/

hr IVPB Q8 [facility]


   Last Admin: 06/19/17 10:42 Dose:  100 mls/hr


Insulin Detemir (Levemir)  30 units SC HS [facility]


   Last Admin: 06/18/17 22:30 Dose:  30 units


Levothyroxine Sodium (Synthroid)  75 mcg PO DAILY@0630 [facility]


   Last Admin: 06/19/17 10:44 Dose:  75 mcg


Metoprolol Tartrate (Lopressor)  100 mg PO Q12 [facility]


   Last Admin: 06/19/17 10:43 Dose:  100 mg


Oxycodone/Acetaminophen (Percocet 5/325 Mg Tab)  2 tab PO Q4 PRN


   PRN Reason: Pain, moderate (4-7)


   Stop: 06/21/17 08:18


   Last Admin: 06/18/17 16:41 Dose:  2 tab


Pantoprazole Sodium (Protonix Ec Tab)  40 mg PO DAILY@0600 [facility]


   Last Admin: 06/19/17 10:44 Dose:  40 mg


Pregabalin (Lyrica)  50 mg PO DAILY [facility]


   Last Admin: 06/19/17 10:47 Dose:  50 mg


Sevelamer HCl (Renagel)  800 mg PO TID [facility]


   Last Admin: 06/19/17 10:43 Dose:  800 mg


Sitagliptin Phosphate (Januvia)  25 mg PO DAILY [facility]


   Last Admin: 06/19/17 10:44 Dose:  25 mg


Valsartan (Diovan)  160 mg PO DAILY [facility]


   Last Admin: 06/19/17 10:43 Dose:  160 mg











- Labs


Labs: 


 





 06/18/17 06:05 





 06/18/17 06:05 





 











PT  15.2 Seconds (9.8-13.1)  H  06/18/17  06:05    


 


INR  1.3  (0.9-1.2)  H  06/18/17  06:05    


 


APTT  30.8 Seconds (25.6-37.1)   06/18/17  06:05    














- Constitutional


Appears: No Acute Distress





- ENT Exam


ENT Exam: Mucous Membranes Moist





- Respiratory Exam


Respiratory Exam: NORMAL BREATHING PATTERN.  absent: Chest Wall Tenderness





- Cardiovascular Exam


Cardiovascular Exam: absent: JVD, Rubs





- GI/Abdominal Exam


GI & Abdominal Exam: Normal Bowel Sounds





- Extremities Exam


Extremities Exam: absent: Calf Tenderness





- Back Exam


Back Exam: absent: CVA tenderness (L), CVA tenderness (R)





- Neurological Exam


Neurological Exam: Alert





Assessment and Plan


(1) ESRD on hemodialysis


Assessment & Plan: 


Patient with end stage renal disease on maintenance dialysis admitted with 

infected transmetatarsal amputation site.


Patient receiving antibiotics as per ID


Scheduled for hemodialysis TTS


Continue monitoring


Status: Acute

## 2017-06-20 LAB
BUN SERPL-MCNC: 34 MG/DL (ref 7–17)
CALCIUM SERPL-MCNC: 8.6 MG/DL (ref 8.4–10.2)
CHLORIDE SERPL-SCNC: 93 MMOL/L (ref 98–107)
CO2 SERPL-SCNC: 23 MMOL/L (ref 22–30)
ERYTHROCYTE [DISTWIDTH] IN BLOOD BY AUTOMATED COUNT: 17.5 % (ref 11.5–14.5)
GLUCOSE SERPL-MCNC: 128 MG/DL (ref 65–105)
HCT VFR BLD CALC: 30.1 % (ref 34–47)
MCH RBC QN AUTO: 28.3 PG (ref 27–31)
MCHC RBC AUTO-ENTMCNC: 30.9 G/DL (ref 33–37)
MCV RBC AUTO: 91.6 FL (ref 81–99)
PLATELET # BLD: 386 K/UL (ref 130–400)
POTASSIUM SERPL-SCNC: 4.6 MMOL/L (ref 3.6–5)
SODIUM SERPL-SCNC: 130 MMOL/L (ref 132–148)
WBC # BLD AUTO: 10.9 K/UL (ref 4.8–10.8)

## 2017-06-20 PROCEDURE — B518ZZA FLUOROSCOPY OF SUPERIOR VENA CAVA, GUIDANCE: ICD-10-PCS

## 2017-06-20 PROCEDURE — 3E04329 INTRODUCTION OF OTHER ANTI-INFECTIVE INTO CENTRAL VEIN, PERCUTANEOUS APPROACH: ICD-10-PCS

## 2017-06-20 PROCEDURE — 5A1D60Z: ICD-10-PCS | Performed by: SPECIALIST

## 2017-06-20 PROCEDURE — 02HV33Z INSERTION OF INFUSION DEVICE INTO SUPERIOR VENA CAVA, PERCUTANEOUS APPROACH: ICD-10-PCS

## 2017-06-20 RX ADMIN — OXYCODONE HYDROCHLORIDE AND ACETAMINOPHEN PRN TAB: 5; 325 TABLET ORAL at 07:25

## 2017-06-20 RX ADMIN — COLLAGENASE SANTYL SCH UNIT: 250 OINTMENT TOPICAL at 14:35

## 2017-06-20 RX ADMIN — PANTOPRAZOLE SODIUM SCH MG: 40 TABLET, DELAYED RELEASE ORAL at 07:23

## 2017-06-20 RX ADMIN — INSULIN DETEMIR SCH UNITS: 100 INJECTION, SOLUTION SUBCUTANEOUS at 22:00

## 2017-06-20 RX ADMIN — OXYCODONE HYDROCHLORIDE AND ACETAMINOPHEN PRN TAB: 5; 325 TABLET ORAL at 01:11

## 2017-06-20 RX ADMIN — OXYCODONE HYDROCHLORIDE AND ACETAMINOPHEN PRN TAB: 5; 325 TABLET ORAL at 14:39

## 2017-06-20 RX ADMIN — ERYTHROPOIETIN SCH UNIT: 4000 INJECTION, SOLUTION INTRAVENOUS; SUBCUTANEOUS at 14:35

## 2017-06-20 NOTE — CP.PCM.PN
Subjective





- Date & Time of Evaluation


Date of Evaluation: 06/20/17


Time of Evaluation: 11:00





- Subjective


Subjective: 





She was seen on hemodialysis


Patient is awake and conscious


Tolerating dialysis well


Ultrafiltration goal about 3109-7596 mL


Potassium about 2 mEq


Sodium 138


Bicarbonate 34


Chest no rales


Heart no rubs


Abdomen soft


Extremity no edema


Status post transmetatarsal on the left with infected wound patient receiving 

intravenous antibiotics as per primary team


Continue monitoring.





Objective





- Vital Signs/Intake and Output


Vital Signs (last 24 hours): 


 











Temp Pulse Resp BP Pulse Ox


 


 97.9 F   62   20   146/67   94 L


 


 06/20/17 08:38  06/20/17 08:38  06/20/17 08:38  06/20/17 08:38  06/20/17 08:38











- Medications


Medications: 


 Current Medications





Amlodipine Besylate (Norvasc)  10 mg PO DAILY [facility]


   Last Admin: 06/19/17 10:43 Dose:  10 mg


Aspirin (Ecotrin)  81 mg PO DAILY [facility]


   Last Admin: 06/20/17 10:49 Dose:  81 mg


Atorvastatin Calcium (Lipitor)  80 mg PO DAILY [facility]


   Last Admin: 06/20/17 10:50 Dose:  80 mg


Collagenase (Santyl)  1 applic TOP DAILY [facility]


Epoetin Edgardo (Procrit)  4,000 unit IV TTS [facility]


Heparin Sodium (Porcine) (Heparin)  5,000 units SC Q12 [facility]


   PRN Reason: Protocol


   Last Admin: 06/20/17 10:49 Dose:  5,000 units


Vancomycin HCl 1 gm/ Sodium (Chloride)  250 mls @ 166.667 mls/hr IVPB DAILY [facility]


   Last Admin: 06/20/17 10:47 Dose:  166.667 mls/hr


Piperacillin Sod/Tazobactam (Sod 2.25 gm/ Sodium Chloride)  100 mls @ 100 mls/

hr IVPB Q8 [facility]


   Last Admin: 06/20/17 10:48 Dose:  100 mls/hr


Insulin Detemir (Levemir)  30 units SC HS [facility]


   Last Admin: 06/19/17 23:16 Dose:  30 units


Levothyroxine Sodium (Synthroid)  75 mcg PO DAILY@0630 [facility]


   Last Admin: 06/20/17 07:23 Dose:  75 mcg


Metoprolol Tartrate (Lopressor)  100 mg PO Q12 [facility]


   Last Admin: 06/19/17 21:32 Dose:  100 mg


Oxycodone/Acetaminophen (Percocet 5/325 Mg Tab)  2 tab PO Q4 PRN


   PRN Reason: Pain, moderate (4-7)


   Stop: 06/21/17 08:18


   Last Admin: 06/20/17 07:25 Dose:  2 tab


Pantoprazole Sodium (Protonix Ec Tab)  40 mg PO DAILY@0600 [facility]


   Last Admin: 06/20/17 07:23 Dose:  40 mg


Pregabalin (Lyrica)  50 mg PO DAILY [facility]


   Last Admin: 06/19/17 10:47 Dose:  50 mg


Sevelamer HCl (Renagel)  800 mg PO TID [facility]


   Last Admin: 06/20/17 10:50 Dose:  800 mg


Sitagliptin Phosphate (Januvia)  25 mg PO DAILY [facility]


   Last Admin: 06/20/17 10:49 Dose:  25 mg


Valsartan (Diovan)  160 mg PO DAILY [facility]


   Last Admin: 06/19/17 10:43 Dose:  160 mg











- Labs


Labs: 


 





 06/20/17 06:00 





 06/20/17 06:00 





 











PT  15.2 Seconds (9.8-13.1)  H  06/18/17  06:05    


 


INR  1.3  (0.9-1.2)  H  06/18/17  06:05    


 


APTT  30.8 Seconds (25.6-37.1)   06/18/17  06:05    














Assessment and Plan


(1) ESRD on hemodialysis


Status: Acute

## 2017-06-20 NOTE — PQF CHF
***** This form is a permanent part of the medical record*****



6/20/17 Dr. Guillen, 



Documentation of a history of CHF. Would you please clarify the type of CHF if 
known.



Clarification of your documentation is requested to better reflect the severity 
of illness and intensity of treatment of your patient.  



Indicators present   



[x] Diagnosis of a history of CHF            



[] BNP > 200                        

 

[] Imaging Finding of Pulmonary Edema /Pleural Effusions      

 

[] Fluid/Volume Overload                  

 

[] Pitting edema                     



[] Ejection Fraction < 40% (Indicative of Systolic Heart Failure)      



[x] Ejection Fraction > 40% (Indicative of Diastolic Heart Failure)   

 

[] Dyspnea / Orthopenea / Paroxysmal Nocturnal Dyspnea      

 

[] Other:

                  

Location in the medical record that reflects the above clinical findings: []





Treatment Provided: []



PHYSICIAN'S RESPONSE





Based on your medical judgment of the clinical indicators outlined above, are 
you treating this patient for a known or suspected: 



[] Acute CHF            [] Systolic    []  Diastolic     []  Combined

 

[] Chronic CHF       [] Systolic    [] Diastolic     [] Combined



[] Acute on Chronic CHF        []Systolic    [] Diastolic     [] Combined



[] CHF due hypertension       [] Acute  systolic  []Chronic systolic  [] Acute/
chronic systolic

 

[] Other, please indicate: []



[] If Unable to Determine, please check the box, sign and date.   



Present On Admission (POA) Indicator:



[] Present at the time of admission



[] Not present at the time of admission 



[] Clinically Undetermined







In responding to this query, please exercise your independent professional 
judgment.  The fact that a question is asked does not imply that any particular 
answer is desired or expected.  Thank you for your clarification on this 
documentation.





If you have any questions please call:extension [phone] Medical Records Dept

* Thank you,

   Jasmina Early RN

   CDMP
MTDD

## 2017-06-20 NOTE — CP.PCM.PN
Subjective





- Date & Time of Evaluation


Date of Evaluation: 06/20/17


Time of Evaluation: 12:05





- Subjective


Subjective: 





no overnight events





Objective





- Vital Signs/Intake and Output


Vital Signs (last 24 hours): 


 











Temp Pulse Resp BP Pulse Ox


 


 98.4 F   70   17   164/73 H  97 


 


 06/20/17 16:06  06/20/17 16:06  06/20/17 16:06  06/20/17 16:06  06/20/17 16:06











- Medications


Medications: 


 Current Medications





Amlodipine Besylate (Norvasc)  10 mg PO DAILY [facility]


   Last Admin: 06/20/17 14:34 Dose:  10 mg


Aspirin (Ecotrin)  81 mg PO DAILY [facility]


   Last Admin: 06/20/17 10:49 Dose:  81 mg


Atorvastatin Calcium (Lipitor)  80 mg PO DAILY [facility]


   Last Admin: 06/20/17 10:50 Dose:  80 mg


Collagenase (Santyl)  1 applic TOP DAILY [facility]


   Last Admin: 06/20/17 14:35 Dose:  1 unit


Epoetin Edgardo (Procrit)  4,000 unit IV TTS [facility]


   Last Admin: 06/20/17 14:35 Dose:  4,000 unit


Heparin Sodium (Porcine) (Heparin)  5,000 units SC Q12 [facility]


   PRN Reason: Protocol


   Last Admin: 06/20/17 10:49 Dose:  5,000 units


Vancomycin HCl 1 gm/ Sodium (Chloride)  250 mls @ 166.667 mls/hr IVPB DAILY [facility]


   Last Admin: 06/20/17 10:47 Dose:  166.667 mls/hr


Meropenem 1 gm/ Sodium (Chloride)  100 mls @ 100 mls/hr IVPB DAILY [facility]


Insulin Detemir (Levemir)  30 units SC HS [facility]


   Last Admin: 06/19/17 23:16 Dose:  30 units


Levothyroxine Sodium (Synthroid)  75 mcg PO DAILY@0630 [facility]


   Last Admin: 06/20/17 07:23 Dose:  75 mcg


Metoprolol Tartrate (Lopressor)  100 mg PO Q12 [facility]


   Last Admin: 06/20/17 14:34 Dose:  100 mg


Oxycodone/Acetaminophen (Percocet 5/325 Mg Tab)  2 tab PO Q4 PRN


   PRN Reason: Pain, moderate (4-7)


   Stop: 06/21/17 08:18


   Last Admin: 06/20/17 14:39 Dose:  2 tab


Pantoprazole Sodium (Protonix Ec Tab)  40 mg PO DAILY@0600 [facility]


   Last Admin: 06/20/17 07:23 Dose:  40 mg


Pregabalin (Lyrica)  50 mg PO DAILY [facility]


   Last Admin: 06/20/17 11:30 Dose:  50 mg


Sevelamer HCl (Renagel)  800 mg PO TID [facility]


   Last Admin: 06/20/17 17:19 Dose:  800 mg


Sitagliptin Phosphate (Januvia)  25 mg PO DAILY [facility]


   Last Admin: 06/20/17 10:49 Dose:  25 mg


Valsartan (Diovan)  160 mg PO DAILY [facility]


   Last Admin: 06/20/17 14:33 Dose:  160 mg











- Labs


Labs: 


 





 06/20/17 06:00 





 06/20/17 06:00 





 











PT  15.2 Seconds (9.8-13.1)  H  06/18/17  06:05    


 


INR  1.3  (0.9-1.2)  H  06/18/17  06:05    


 


APTT  30.8 Seconds (25.6-37.1)   06/18/17  06:05    














- GI/Abdominal Exam


GI & Abdominal Exam: Soft, Normal Bowel Sounds





Assessment and Plan





- Assessment and Plan (Free Text)


Assessment: 





78 yo female with abd pain





continue current plan


abx


laxatives prn

## 2017-06-20 NOTE — CON
DATE: 06/19/2017



REFERRING PHYSICIAN:  Dr. Guillen.



REASON FOR CONSULTATION:  Abdominal pain.



HISTORY OF PRESENT ILLNESS:  This is a 77-year-old female with history of end-stage renal dialysis, noemi keith, initially admitted for pain in the left foot stump site. She had a TMA about 11 weeks ago wi
th dehiscence and basically had an infected wound site, for which she is being moderate.  GI _____ sh
e has some intermittent chronic abdominal pain.  The patient is lying in bed, comfortable, no apparen
t distress.



PAST MEDICAL HISTORY:  As above.



PAST SURGICAL HISTORY:  As above.



MEDICATIONS:  Have been reviewed.



REVIEW OF SYSTEMS:  All other systems have been reviewed and negative apart from the HPI.



PHYSICAL EXAMINATION:

VITAL SIGNS:  Here in the hospital grossly unremarkable.

GENERAL:  This is a pleasant, elderly-appearing female lying in bed, comfortable, in no apparent dist
ress.

HEAD:  Normocephalic, atraumatic.

EYES:  Pupils equally reactive to light bilaterally.  No conjunctival pallor or icterus.

NECK:  Supple, normal range of motion.  No lymphadenopathy appreciated.

LUNGS:  Coarse breath sounds bilaterally.

HEART:  S1, S2.  Regular rate and rhythm.  No murmurs appreciated.

ALLERGIES:  Abdomen soft, nontender.  Some discomfort in the right upper quadrant. _____.

RECTAL:  Deferred.

EXTREMITIES:  The patient had a left TMA.

NEUROLOGIC:  Alert and oriented x 3.



LABORATORY DATA:  _____  WBC _____, hemoglobin _____, hematocrit 28.4, platelet count is normal.  Abd
ominal ultrasound shows cholecystectomy.



ASSESSMENT AND PLAN:  This is a 77-year-old female with abdominal pain and discomfort. This is _____ 
probably more likely secondary to _____. From a gastroenterology standpoint, advance diet as tolerate
d.  Proton pump inhibitor twice a day.  Consider CAT scan if patient's pain is not improved.



Thank you for the consult.





__________________________________________

Tyrell Acevedo MD, PhD







cc:Fermín Guillen MD



DD: 06/19/2017 14:52:58  [id number]

TT: 06/19/2017 17:41:27

Confirmation # [id number]

Dictation # [id number]

ln

## 2017-06-20 NOTE — RAD
HISTORY:

Attention right rib cage-pain  



COMPARISON:

Chest x-ray performed 3/30/17 



TECHNIQUE:

Chest, one view.



FINDINGS:





LUNGS:

The left lung base is not well visualized, presumably due to soft 

tissue attenuation due to habitus. Mild atelectasis/ infiltrate or 

small effusion cannot be excluded. No definite pneumothorax.



Please note that chest x-ray has limited sensitivity for the 

detection of pulmonary masses.



CARDIOVASCULAR:

Dual lead left-sided pacemaker.  Cardiomegaly.  Atherosclerotic 

calcification of the aorta.



OSSEOUS STRUCTURES:

 Degenerative changes of the spine and shoulders. Acromioclavicular 

arthropathy. Evidence of bilateral calcific tendinitis. 



VISUALIZED UPPER ABDOMEN:

Unremarkable.



OTHER FINDINGS:

Vascular stent in expected location of the right subclavian vein.



IMPRESSION:

The left lung base is not well visualized, presumably due to soft 

tissue attenuation secondary to habitus. Mild atelectasis/ infiltrate 

or small effusion cannot be excluded.  



Cardiomegaly.  Dual lead left-sided pacemaker.

## 2017-06-20 NOTE — CP.PCM.PN
Subjective





- Date & Time of Evaluation


Date of Evaluation: 06/20/17


Time of Evaluation: 13:00





- Subjective


Subjective: 





Id Note-





pt. seen and examined today.


Pt. denies any fever or chills.


Only c/o pain in the left foot and some abd pain after eating.


denies any nausea, denies any diarrhea.








Objective





- Vital Signs/Intake and Output


Vital Signs (last 24 hours): 


 











Temp Pulse Resp BP Pulse Ox


 


 97.9 F   62   20   146/67   94 L


 


 06/20/17 08:38  06/20/17 08:38  06/20/17 08:38  06/20/17 08:38  06/20/17 08:38











- Medications


Medications: 


 Current Medications





Amlodipine Besylate (Norvasc)  10 mg PO DAILY [facility]


   Last Admin: 06/19/17 10:43 Dose:  10 mg


Aspirin (Ecotrin)  81 mg PO DAILY [facility]


   Last Admin: 06/20/17 10:49 Dose:  81 mg


Atorvastatin Calcium (Lipitor)  80 mg PO DAILY [facility]


   Last Admin: 06/20/17 10:50 Dose:  80 mg


Collagenase (Santyl)  1 applic TOP DAILY [facility]


Epoetin Edgardo (Procrit)  4,000 unit IV TTS [facility]


Heparin Sodium (Porcine) (Heparin)  5,000 units SC Q12 [facility]


   PRN Reason: Protocol


   Last Admin: 06/20/17 10:49 Dose:  5,000 units


Vancomycin HCl 1 gm/ Sodium (Chloride)  250 mls @ 166.667 mls/hr IVPB DAILY [facility]


   Last Admin: 06/20/17 10:47 Dose:  166.667 mls/hr


Piperacillin Sod/Tazobactam (Sod 2.25 gm/ Sodium Chloride)  100 mls @ 100 mls/

hr IVPB Q8 [facility]


   Last Admin: 06/20/17 10:48 Dose:  100 mls/hr


Insulin Detemir (Levemir)  30 units SC HS [facility]


   Last Admin: 06/19/17 23:16 Dose:  30 units


Levothyroxine Sodium (Synthroid)  75 mcg PO DAILY@0630 [facility]


   Last Admin: 06/20/17 07:23 Dose:  75 mcg


Metoprolol Tartrate (Lopressor)  100 mg PO Q12 [facility]


   Last Admin: 06/19/17 21:32 Dose:  100 mg


Oxycodone/Acetaminophen (Percocet 5/325 Mg Tab)  2 tab PO Q4 PRN


   PRN Reason: Pain, moderate (4-7)


   Stop: 06/21/17 08:18


   Last Admin: 06/20/17 07:25 Dose:  2 tab


Pantoprazole Sodium (Protonix Ec Tab)  40 mg PO DAILY@0600 [facility]


   Last Admin: 06/20/17 07:23 Dose:  40 mg


Pregabalin (Lyrica)  50 mg PO DAILY [facility]


   Last Admin: 06/19/17 10:47 Dose:  50 mg


Sevelamer HCl (Renagel)  800 mg PO TID [facility]


   Last Admin: 06/20/17 10:50 Dose:  800 mg


Sitagliptin Phosphate (Januvia)  25 mg PO DAILY [facility]


   Last Admin: 06/20/17 10:49 Dose:  25 mg


Valsartan (Diovan)  160 mg PO DAILY [facility]


   Last Admin: 06/19/17 10:43 Dose:  160 mg











- Labs


Labs: 


 





 





- Additional Findings


Additional findings: 








- Constitutional


Appears: Non-toxic, No Acute Distress





- Head Exam


Head Exam: ATRAUMATIC





- Eye Exam


Eye Exam: EOMI


Pupil Exam: PERRL





- ENT Exam


ENT Exam: Normal Oropharynx





- Neck Exam


Neck exam: Positive for: Full Rom





- Respiratory Exam


Respiratory Exam: Clear to Auscultation Bilateral, NORMAL BREATHING PATTERN





- Cardiovascular Exam


Cardiovascular Exam: RRR, +S1, +S2


Additional comments: 





midchest superficial area of denuded skin 2 x 4 cm wit


h mild bleeding, no pus, no malodor





- GI/Abdominal Exam


GI & Abdominal Exam: Normal Bowel Sounds, Soft


Additional comments: 





NT, ND





- Extremities Exam


Additional comments: 





left foot TMA site dressing clean/dry/intact





- Neurological Exam


Neurological exam: Alert, Oriented x 3





 Laboratory Results - last 72 hr











  06/17/17 06/17/17 06/17/17





  17:18 17:24 18:00


 


WBC    14.1 H


 


RBC    3.69 L


 


Hgb    10.5 L


 


Hct    33.0 L


 


MCV    89.5  D


 


MCH    28.4


 


MCHC    31.8 L


 


RDW    17.1 H


 


Plt Count    323


 


MPV    9.0


 


Neut % (Auto)    81.2 H


 


Lymph % (Auto)    6.1 L


 


Mono % (Auto)    11.1 H


 


Eos % (Auto)    1.0


 


Baso % (Auto)    0.6


 


Neut #    11.5 H


 


Lymph #    0.9 L


 


Mono #    1.6 H


 


Eos #    0.1


 


Baso #    0.1


 


Neutrophils % (Manual)    87 H


 


Lymphocytes % (Manual)    5 L


 


Monocytes % (Manual)    5


 


Eosinophils % (Manual)    1


 


Basophils % (Manual)    1


 


Myelocytes %    1 H


 


Platelet Estimate    Normal


 


Large Platelets    Present


 


Hypochromasia (manual)    Slight


 


Anisocytosis (manual)    Slight


 


ESR   


 


PT   


 


INR   


 


APTT   


 


pO2  21 L  


 


VBG pH  7.41  


 


VBG pCO2  51  


 


VBG HCO3  28.8  


 


VBG Total CO2  33.9 H  


 


VBG O2 Sat (Calc)  45.1  


 


VBG Base Excess  6.5 H  


 


VBG Potassium  3.9  


 


Sodium  130.0 L  


 


Chloride  95.0 L  


 


Glucose  140 H  


 


Lactate  1.6  


 


FiO2  21.0  


 


Potassium   


 


Carbon Dioxide   


 


Anion Gap   


 


BUN   


 


Creatinine   


 


Est GFR ( Amer)   


 


Est GFR (Non-Af Amer)   


 


POC Glucose (mg/dL)   166 H 


 


Random Glucose   


 


Hemoglobin A1c   


 


Calcium   


 


Total Bilirubin   


 


AST   


 


ALT   


 


Alkaline Phosphatase   


 


Total Protein   


 


Albumin   


 


Globulin   


 


Albumin/Globulin Ratio   


 


Triglycerides   


 


Cholesterol   


 


LDL Cholesterol Direct   


 


HDL Cholesterol   


 


Thyroxine (T4)   


 


TSH 3rd Generation   


 


Venous Blood Potassium  3.9  


 


Vancomycin Trough   














  06/17/17 06/18/17 06/18/17





  18:00 02:15 06:05


 


WBC    10.1


 


RBC    3.18 L


 


Hgb    9.0 L


 


Hct    28.4 L


 


MCV    89.3


 


MCH    28.4


 


MCHC    31.8 L


 


RDW    16.7 H


 


Plt Count    333


 


MPV   


 


Neut % (Auto)   


 


Lymph % (Auto)   


 


Mono % (Auto)   


 


Eos % (Auto)   


 


Baso % (Auto)   


 


Neut #   


 


Lymph #   


 


Mono #   


 


Eos #   


 


Baso #   


 


Neutrophils % (Manual)   


 


Lymphocytes % (Manual)   


 


Monocytes % (Manual)   


 


Eosinophils % (Manual)   


 


Basophils % (Manual)   


 


Myelocytes %   


 


Platelet Estimate   


 


Large Platelets   


 


Hypochromasia (manual)   


 


Anisocytosis (manual)   


 


ESR   


 


PT   


 


INR   


 


APTT   


 


pO2   


 


VBG pH   


 


VBG pCO2   


 


VBG HCO3   


 


VBG Total CO2   


 


VBG O2 Sat (Calc)   


 


VBG Base Excess   


 


VBG Potassium   


 


Sodium  135  


 


Chloride  93 L  


 


Glucose   


 


Lactate   


 


FiO2   


 


Potassium  3.9  


 


Carbon Dioxide  28  


 


Anion Gap  18  


 


BUN  14  


 


Creatinine  2.2 H  


 


Est GFR ( Amer)  26  


 


Est GFR (Non-Af Amer)  22  


 


POC Glucose (mg/dL)   123 H 


 


Random Glucose  142 H  


 


Hemoglobin A1c   


 


Calcium  9.4  


 


Total Bilirubin  0.6  


 


AST  38 H D  


 


ALT  31  


 


Alkaline Phosphatase  122  


 


Total Protein  8.2  


 


Albumin  3.9  


 


Globulin  4.3 H  


 


Albumin/Globulin Ratio  0.9 L  


 


Triglycerides   


 


Cholesterol   


 


LDL Cholesterol Direct   


 


HDL Cholesterol   


 


Thyroxine (T4)   


 


TSH 3rd Generation   


 


Venous Blood Potassium   


 


Vancomycin Trough   














  06/18/17 06/18/17 06/18/17





  06:05 06:05 06:05


 


WBC   


 


RBC   


 


Hgb   


 


Hct   


 


MCV   


 


MCH   


 


MCHC   


 


RDW   


 


Plt Count   


 


MPV   


 


Neut % (Auto)   


 


Lymph % (Auto)   


 


Mono % (Auto)   


 


Eos % (Auto)   


 


Baso % (Auto)   


 


Neut #   


 


Lymph #   


 


Mono #   


 


Eos #   


 


Baso #   


 


Neutrophils % (Manual)   


 


Lymphocytes % (Manual)   


 


Monocytes % (Manual)   


 


Eosinophils % (Manual)   


 


Basophils % (Manual)   


 


Myelocytes %   


 


Platelet Estimate   


 


Large Platelets   


 


Hypochromasia (manual)   


 


Anisocytosis (manual)   


 


ESR   


 


PT  15.2 H  


 


INR  1.3 H  


 


APTT  30.8  


 


pO2   


 


VBG pH   


 


VBG pCO2   


 


VBG HCO3   


 


VBG Total CO2   


 


VBG O2 Sat (Calc)   


 


VBG Base Excess   


 


VBG Potassium   


 


Sodium   137 


 


Chloride   95 L 


 


Glucose   


 


Lactate   


 


FiO2   


 


Potassium   3.6 


 


Carbon Dioxide   31 H 


 


Anion Gap   15 


 


BUN   16 


 


Creatinine   3.1 H 


 


Est GFR ( Amer)   18 


 


Est GFR (Non-Af Amer)   15 


 


POC Glucose (mg/dL)   


 


Random Glucose   106 H 


 


Hemoglobin A1c    6.2


 


Calcium   9.1 


 


Total Bilirubin   0.3 


 


AST   49 H D 


 


ALT   30 


 


Alkaline Phosphatase   105 


 


Total Protein   7.3 


 


Albumin   3.4 L 


 


Globulin   4.0 H 


 


Albumin/Globulin Ratio   0.9 L 


 


Triglycerides   121 


 


Cholesterol   89 


 


LDL Cholesterol Direct   < 30 


 


HDL Cholesterol   30 


 


Thyroxine (T4)   6.29 


 


TSH 3rd Generation   3.72 


 


Venous Blood Potassium   


 


Vancomycin Trough   














  06/18/17 06/18/17 06/18/17





  06:51 11:37 16:34


 


WBC   


 


RBC   


 


Hgb   


 


Hct   


 


MCV   


 


MCH   


 


MCHC   


 


RDW   


 


Plt Count   


 


MPV   


 


Neut % (Auto)   


 


Lymph % (Auto)   


 


Mono % (Auto)   


 


Eos % (Auto)   


 


Baso % (Auto)   


 


Neut #   


 


Lymph #   


 


Mono #   


 


Eos #   


 


Baso #   


 


Neutrophils % (Manual)   


 


Lymphocytes % (Manual)   


 


Monocytes % (Manual)   


 


Eosinophils % (Manual)   


 


Basophils % (Manual)   


 


Myelocytes %   


 


Platelet Estimate   


 


Large Platelets   


 


Hypochromasia (manual)   


 


Anisocytosis (manual)   


 


ESR   


 


PT   


 


INR   


 


APTT   


 


pO2   


 


VBG pH   


 


VBG pCO2   


 


VBG HCO3   


 


VBG Total CO2   


 


VBG O2 Sat (Calc)   


 


VBG Base Excess   


 


VBG Potassium   


 


Sodium   


 


Chloride   


 


Glucose   


 


Lactate   


 


FiO2   


 


Potassium   


 


Carbon Dioxide   


 


Anion Gap   


 


BUN   


 


Creatinine   


 


Est GFR ( Amer)   


 


Est GFR (Non-Af Amer)   


 


POC Glucose (mg/dL)  124 H  184 H  169 H


 


Random Glucose   


 


Hemoglobin A1c   


 


Calcium   


 


Total Bilirubin   


 


AST   


 


ALT   


 


Alkaline Phosphatase   


 


Total Protein   


 


Albumin   


 


Globulin   


 


Albumin/Globulin Ratio   


 


Triglycerides   


 


Cholesterol   


 


LDL Cholesterol Direct   


 


HDL Cholesterol   


 


Thyroxine (T4)   


 


TSH 3rd Generation   


 


Venous Blood Potassium   


 


Vancomycin Trough   














  06/18/17 06/19/17 06/19/17





  21:47 05:58 10:48


 


WBC   


 


RBC   


 


Hgb   


 


Hct   


 


MCV   


 


MCH   


 


MCHC   


 


RDW   


 


Plt Count   


 


MPV   


 


Neut % (Auto)   


 


Lymph % (Auto)   


 


Mono % (Auto)   


 


Eos % (Auto)   


 


Baso % (Auto)   


 


Neut #   


 


Lymph #   


 


Mono #   


 


Eos #   


 


Baso #   


 


Neutrophils % (Manual)   


 


Lymphocytes % (Manual)   


 


Monocytes % (Manual)   


 


Eosinophils % (Manual)   


 


Basophils % (Manual)   


 


Myelocytes %   


 


Platelet Estimate   


 


Large Platelets   


 


Hypochromasia (manual)   


 


Anisocytosis (manual)   


 


ESR   


 


PT   


 


INR   


 


APTT   


 


pO2   


 


VBG pH   


 


VBG pCO2   


 


VBG HCO3   


 


VBG Total CO2   


 


VBG O2 Sat (Calc)   


 


VBG Base Excess   


 


VBG Potassium   


 


Sodium   


 


Chloride   


 


Glucose   


 


Lactate   


 


FiO2   


 


Potassium   


 


Carbon Dioxide   


 


Anion Gap   


 


BUN   


 


Creatinine   


 


Est GFR ( Amer)   


 


Est GFR (Non-Af Amer)   


 


POC Glucose (mg/dL)  148 H  113 H  140 H


 


Random Glucose   


 


Hemoglobin A1c   


 


Calcium   


 


Total Bilirubin   


 


AST   


 


ALT   


 


Alkaline Phosphatase   


 


Total Protein   


 


Albumin   


 


Globulin   


 


Albumin/Globulin Ratio   


 


Triglycerides   


 


Cholesterol   


 


LDL Cholesterol Direct   


 


HDL Cholesterol   


 


Thyroxine (T4)   


 


TSH 3rd Generation   


 


Venous Blood Potassium   


 


Vancomycin Trough   














  06/19/17 06/19/17 06/19/17





  15:20 18:06 23:03


 


WBC   


 


RBC   


 


Hgb   


 


Hct   


 


MCV   


 


MCH   


 


MCHC   


 


RDW   


 


Plt Count   


 


MPV   


 


Neut % (Auto)   


 


Lymph % (Auto)   


 


Mono % (Auto)   


 


Eos % (Auto)   


 


Baso % (Auto)   


 


Neut #   


 


Lymph #   


 


Mono #   


 


Eos #   


 


Baso #   


 


Neutrophils % (Manual)   


 


Lymphocytes % (Manual)   


 


Monocytes % (Manual)   


 


Eosinophils % (Manual)   


 


Basophils % (Manual)   


 


Myelocytes %   


 


Platelet Estimate   


 


Large Platelets   


 


Hypochromasia (manual)   


 


Anisocytosis (manual)   


 


ESR   105 H 


 


PT   


 


INR   


 


APTT   


 


pO2   


 


VBG pH   


 


VBG pCO2   


 


VBG HCO3   


 


VBG Total CO2   


 


VBG O2 Sat (Calc)   


 


VBG Base Excess   


 


VBG Potassium   


 


Sodium   


 


Chloride   


 


Glucose   


 


Lactate   


 


FiO2   


 


Potassium   


 


Carbon Dioxide   


 


Anion Gap   


 


BUN   


 


Creatinine   


 


Est GFR ( Amer)   


 


Est GFR (Non-Af Amer)   


 


POC Glucose (mg/dL)  184 H   152 H


 


Random Glucose   


 


Hemoglobin A1c   


 


Calcium   


 


Total Bilirubin   


 


AST   


 


ALT   


 


Alkaline Phosphatase   


 


Total Protein   


 


Albumin   


 


Globulin   


 


Albumin/Globulin Ratio   


 


Triglycerides   


 


Cholesterol   


 


LDL Cholesterol Direct   


 


HDL Cholesterol   


 


Thyroxine (T4)   


 


TSH 3rd Generation   


 


Venous Blood Potassium   


 


Vancomycin Trough   














  06/20/17 06/20/17 06/20/17





  05:04 06:00 06:00


 


WBC   10.9 H 


 


RBC   3.29 L 


 


Hgb   9.3 L 


 


Hct   30.1 L 


 


MCV   91.6  D 


 


MCH   28.3 


 


MCHC   30.9 L 


 


RDW   17.5 H 


 


Plt Count   386 


 


MPV   


 


Neut % (Auto)   


 


Lymph % (Auto)   


 


Mono % (Auto)   


 


Eos % (Auto)   


 


Baso % (Auto)   


 


Neut #   


 


Lymph #   


 


Mono #   


 


Eos #   


 


Baso #   


 


Neutrophils % (Manual)   


 


Lymphocytes % (Manual)   


 


Monocytes % (Manual)   


 


Eosinophils % (Manual)   


 


Basophils % (Manual)   


 


Myelocytes %   


 


Platelet Estimate   


 


Large Platelets   


 


Hypochromasia (manual)   


 


Anisocytosis (manual)   


 


ESR   


 


PT   


 


INR   


 


APTT   


 


pO2   


 


VBG pH   


 


VBG pCO2   


 


VBG HCO3   


 


VBG Total CO2   


 


VBG O2 Sat (Calc)   


 


VBG Base Excess   


 


VBG Potassium   


 


Sodium    130 L


 


Chloride    93 L


 


Glucose   


 


Lactate   


 


FiO2   


 


Potassium    4.6


 


Carbon Dioxide    23


 


Anion Gap    19


 


BUN    34 H


 


Creatinine    4.9 H


 


Est GFR ( Amer)    10


 


Est GFR (Non-Af Amer)    9


 


POC Glucose (mg/dL)  149 H  


 


Random Glucose    128 H


 


Hemoglobin A1c   


 


Calcium    8.6


 


Total Bilirubin   


 


AST   


 


ALT   


 


Alkaline Phosphatase   


 


Total Protein   


 


Albumin   


 


Globulin   


 


Albumin/Globulin Ratio   


 


Triglycerides   


 


Cholesterol   


 


LDL Cholesterol Direct   


 


HDL Cholesterol   


 


Thyroxine (T4)   


 


TSH 3rd Generation   


 


Venous Blood Potassium   


 


Vancomycin Trough   














  06/20/17 06/20/17





  06:00 11:44


 


WBC  


 


RBC  


 


Hgb  


 


Hct  


 


MCV  


 


MCH  


 


MCHC  


 


RDW  


 


Plt Count  


 


MPV  


 


Neut % (Auto)  


 


Lymph % (Auto)  


 


Mono % (Auto)  


 


Eos % (Auto)  


 


Baso % (Auto)  


 


Neut #  


 


Lymph #  


 


Mono #  


 


Eos #  


 


Baso #  


 


Neutrophils % (Manual)  


 


Lymphocytes % (Manual)  


 


Monocytes % (Manual)  


 


Eosinophils % (Manual)  


 


Basophils % (Manual)  


 


Myelocytes %  


 


Platelet Estimate  


 


Large Platelets  


 


Hypochromasia (manual)  


 


Anisocytosis (manual)  


 


ESR  


 


PT  


 


INR  


 


APTT  


 


pO2  


 


VBG pH  


 


VBG pCO2  


 


VBG HCO3  


 


VBG Total CO2  


 


VBG O2 Sat (Calc)  


 


VBG Base Excess  


 


VBG Potassium  


 


Sodium  


 


Chloride  


 


Glucose  


 


Lactate  


 


FiO2  


 


Potassium  


 


Carbon Dioxide  


 


Anion Gap  


 


BUN  


 


Creatinine  


 


Est GFR ( Amer)  


 


Est GFR (Non-Af Amer)  


 


POC Glucose (mg/dL)   117 H


 


Random Glucose  


 


Hemoglobin A1c  


 


Calcium  


 


Total Bilirubin  


 


AST  


 


ALT  


 


Alkaline Phosphatase  


 


Total Protein  


 


Albumin  


 


Globulin  


 


Albumin/Globulin Ratio  


 


Triglycerides  


 


Cholesterol  


 


LDL Cholesterol Direct  


 


HDL Cholesterol  


 


Thyroxine (T4)  


 


TSH 3rd Generation  


 


Venous Blood Potassium  


 


Vancomycin Trough  29.2 H 








 Microbiology





06/19/17 18:06   Chest   Gram Stain - Final


06/17/17 17:40   Foot - Left   Gram Stain - Final


06/17/17 17:40   Foot - Left   Wound Culture - Final


                            Escherichia Coli


                            Methicillin Resistant S Aureus


06/17/17 18:00   Blood   Blood Culture - Preliminary


                            NO GROWTH AFTER 48 HOURS


06/17/17 18:00   Blood   Blood Culture - Preliminary


                            NO GROWTH AFTER 48 HOURS





 





 











Assessment and Plan


(1) Infection of amputation stump


Status: Acute   





(2) ESRD on hemodialysis


Status: Acute   





- Assessment and Plan (Free Text)


Assessment: 





A/P-


77 year old female with DM II, ESRD on HD, s/p left foot TMA admitted with left 

foot TMA infected stump.





s/p  excisional debridement  of hyperkeratotic borders of the TMA ulcer 1 day 

ago


afebrile


minimal leukocytosis resolved.


blood cx- neg x 2


wound cx- MRSA, ESBL e.coli


foot xray- OM as per report.





plan-


as per podiatry no further surgical intervention needed at this time.


since OM on plain xray advise to treat with IV abx for at least 4-6 weeks as 

outpatient.


based on the Id and sensitivity of the foot cx result advise to change zosyn to 

meropenem (renal dose ) and to continue with vancomycin .


 advise to get vancomycin 1 gram post HD days for total of 6 weeks and 

Meropenem 1 gram IV daily for total of 6 weeks.


keep vanco trough <15.








All above d/w patient and the NP shaunna at length.


.

## 2017-06-20 NOTE — CP.PCM.PN
Subjective





- Date & Time of Evaluation


Date of Evaluation: 06/20/17


Time of Evaluation: 08:50





- Subjective


Subjective: 





75 y/o female seen at bedside s/p 11 weeks of Left foot TMA concerning Left 

foot wound dehiscence. Patient complaints of mild pain at the surgical site but 

denies of any calf pain. Dressing appears to be C/D/I.  Denies f/n/v/c/sob/cp 

at this time. No other pedal complaints at this time. 





Objective





- Vital Signs/Intake and Output


Vital Signs (last 24 hours): 


 











Temp Pulse Resp BP Pulse Ox


 


 98.6 F   65   19   172/66 H  98 


 


 06/20/17 00:41  06/20/17 00:41  06/20/17 00:41  06/20/17 00:41  06/20/17 00:41











- Medications


Medications: 


 Current Medications





Amlodipine Besylate (Norvasc)  10 mg PO DAILY [facility]


   Last Admin: 06/19/17 10:43 Dose:  10 mg


Aspirin (Ecotrin)  81 mg PO DAILY [facility]


   Last Admin: 06/19/17 10:44 Dose:  81 mg


Atorvastatin Calcium (Lipitor)  80 mg PO DAILY [facility]


   Last Admin: 06/19/17 10:44 Dose:  80 mg


Collagenase (Santyl)  1 applic TOP DAILY [facility]


Epoetin Edgardo (Procrit)  4,000 unit IV TTS [facility]


Heparin Sodium (Porcine) (Heparin)  5,000 units SC Q12 [facility]


   PRN Reason: Protocol


   Last Admin: 06/19/17 21:24 Dose:  5,000 units


Vancomycin HCl 1 gm/ Sodium (Chloride)  250 mls @ 166.667 mls/hr IVPB DAILY [facility]


   Last Admin: 06/19/17 10:42 Dose:  166.667 mls/hr


Piperacillin Sod/Tazobactam (Sod 2.25 gm/ Sodium Chloride)  100 mls @ 100 mls/

hr IVPB Q8 [facility]


   Last Admin: 06/20/17 01:12 Dose:  100 mls/hr


Insulin Detemir (Levemir)  30 units SC HS [facility]


   Last Admin: 06/19/17 23:16 Dose:  30 units


Levothyroxine Sodium (Synthroid)  75 mcg PO DAILY@0630 [facility]


   Last Admin: 06/20/17 07:23 Dose:  75 mcg


Metoprolol Tartrate (Lopressor)  100 mg PO Q12 [facility]


   Last Admin: 06/19/17 21:32 Dose:  100 mg


Oxycodone/Acetaminophen (Percocet 5/325 Mg Tab)  2 tab PO Q4 [facility]


   PRN Reason: Pain, moderate (4-7)


   Stop: 06/21/17 08:18


   Last Admin: 06/20/17 07:25 Dose:  2 tab


Pantoprazole Sodium (Protonix Ec Tab)  40 mg PO DAILY@0600 [facility]


   Last Admin: 06/20/17 07:23 Dose:  40 mg


Pregabalin (Lyrica)  50 mg PO DAILY [facility]


   Last Admin: 06/19/17 10:47 Dose:  50 mg


Sevelamer HCl (Renagel)  800 mg PO TID [facility]


   Last Admin: 06/19/17 17:33 Dose:  800 mg


Sitagliptin Phosphate (Januvia)  25 mg PO DAILY [facility]


   Last Admin: 06/19/17 10:44 Dose:  25 mg


Valsartan (Diovan)  160 mg PO DAILY [facility]


   Last Admin: 06/19/17 10:43 Dose:  160 mg











- Labs


Labs: 


 





 06/20/17 06:00 





 06/20/17 06:00 





 











PT  15.2 Seconds (9.8-13.1)  H  06/18/17  06:05    


 


INR  1.3  (0.9-1.2)  H  06/18/17  06:05    


 


APTT  30.8 Seconds (25.6-37.1)   06/18/17  06:05    














- Constitutional


Appears: Well, Non-toxic, No Acute Distress





- Extremities Exam


Additional comments: 








Left lower extremity focused exam:





DERM: Open wound is noted to left foot stump surgical site measuring 5cm x 2cm 

x 0.2cm. Wound base is necrotic. No noted purulence, drainage, or malodor. No 

probe to bone. Periwound is erythematous in nature.








VASC: DP 2/4 bilaterally. Non-palpable PT pulse. No edema noted to the LE.  

Skin temperature warm to warm from proximal to distal.





ORTHO: mild tenderness elicited to palpation to TMA site





NEURO: Gross sensation diminished





- Neurological Exam


Neurological Exam: Alert, Awake, Oriented x3





- Psychiatric Exam


Psychiatric exam: Normal Affect, Normal Mood





Assessment and Plan





- Assessment and Plan (Free Text)


Assessment: 





75 y/o female with wound dehiscence to Left TMA site (DOS 3/31/17)


Plan: 





Patient was seen and evaluated at bedside


Discussed plan with Dr. Serna


Chart, labs and vitals reviewed


Open wound is dressed with saline wet to dry, Santyl application, DSD, and 

kerlix.


C/w IV abx as per ID (yesy and paul), Dr. Chris on consult recommendations 

appreciated


Left foot wound culture positive for E.coli and positive for MRSA


No surgical interventions indicated at this time


Will order offloading prevalon boots to be worn at all times to both feet while 

in bed


Podiatry will continue to follow

## 2017-06-20 NOTE — CP.PCM.PN
Subjective





- Date & Time of Evaluation


Date of Evaluation: 06/20/17


Time of Evaluation: 07:03





- Subjective


Subjective: 





General Surgery progress note for Dr. Palacios





76 yo Female patient was seen at bedside concerning chronic non-healing skin 

wound of the chest for 2 years. Pain controlled with Percocet. Patient denies 

of N/V/F/C or SOB





Objective





- Vital Signs/Intake and Output


Vital Signs (last 24 hours): 


 











Temp Pulse Resp BP Pulse Ox


 


 98.6 F   65   19   172/66 H  98 


 


 06/20/17 00:41  06/20/17 00:41  06/20/17 00:41  06/20/17 00:41  06/20/17 00:41











- Medications


Medications: 


 Current Medications





Amlodipine Besylate (Norvasc)  10 mg PO DAILY [facility]


   Last Admin: 06/19/17 10:43 Dose:  10 mg


Aspirin (Ecotrin)  81 mg PO DAILY [facility]


   Last Admin: 06/19/17 10:44 Dose:  81 mg


Atorvastatin Calcium (Lipitor)  80 mg PO DAILY [facility]


   Last Admin: 06/19/17 10:44 Dose:  80 mg


Collagenase (Santyl)  1 applic TOP DAILY [facility]


Epoetin Edgardo (Procrit)  4,000 unit IV TTS [facility]


Heparin Sodium (Porcine) (Heparin)  5,000 units SC Q12 [facility]


   PRN Reason: Protocol


   Last Admin: 06/19/17 21:24 Dose:  5,000 units


Vancomycin HCl 1 gm/ Sodium (Chloride)  250 mls @ 166.667 mls/hr IVPB DAILY [facility]


   Last Admin: 06/19/17 10:42 Dose:  166.667 mls/hr


Piperacillin Sod/Tazobactam (Sod 2.25 gm/ Sodium Chloride)  100 mls @ 100 mls/

hr IVPB Q8 [facility]


   Last Admin: 06/20/17 01:12 Dose:  100 mls/hr


Insulin Detemir (Levemir)  30 units SC HS [facility]


   Last Admin: 06/19/17 23:16 Dose:  30 units


Levothyroxine Sodium (Synthroid)  75 mcg PO DAILY@0630 [facility]


   Last Admin: 06/20/17 07:23 Dose:  75 mcg


Metoprolol Tartrate (Lopressor)  100 mg PO Q12 [facility]


   Last Admin: 06/19/17 21:32 Dose:  100 mg


Oxycodone/Acetaminophen (Percocet 5/325 Mg Tab)  2 tab PO Q4 PRN


   PRN Reason: Pain, moderate (4-7)


   Stop: 06/21/17 08:18


   Last Admin: 06/20/17 07:25 Dose:  2 tab


Pantoprazole Sodium (Protonix Ec Tab)  40 mg PO DAILY@0600 [facility]


   Last Admin: 06/20/17 07:23 Dose:  40 mg


Pregabalin (Lyrica)  50 mg PO DAILY [facility]


   Last Admin: 06/19/17 10:47 Dose:  50 mg


Sevelamer HCl (Renagel)  800 mg PO TID [facility]


   Last Admin: 06/19/17 17:33 Dose:  800 mg


Sitagliptin Phosphate (Januvia)  25 mg PO DAILY [facility]


   Last Admin: 06/19/17 10:44 Dose:  25 mg


Valsartan (Diovan)  160 mg PO DAILY [facility]


   Last Admin: 06/19/17 10:43 Dose:  160 mg











- Labs


Labs: 


 





 06/18/17 06:05 





 06/18/17 06:05 





 











PT  15.2 Seconds (9.8-13.1)  H  06/18/17  06:05    


 


INR  1.3  (0.9-1.2)  H  06/18/17  06:05    


 


APTT  30.8 Seconds (25.6-37.1)   06/18/17  06:05    














- Constitutional


Appears: Well, Non-toxic, No Acute Distress





- Head Exam


Head Exam: ATRAUMATIC, NORMOCEPHALIC





- Eye Exam


Eye Exam: Periorbital tenderness.  absent: Conjunctival injection, Scleral 

icterus





- ENT Exam


ENT Exam: Mucous Membranes Moist, Normal Oropharynx





- Cardiovascular Exam


Cardiovascular Exam: RRR





- Extremities Exam


Additional comments: 





right femoral triple lumen catheter in place with no erythema, discharge, bleed 

or hematoma appreciated





- Psychiatric Exam


Psychiatric exam: Normal Affect, Normal Mood





- Skin


Additional comments: 





superficial skin wound in the upper chest approximately 3cm wide and 2cm 

vertical not extending into the subcutaneous tissue, actively bleeding, no 

purulent drainage expressible, no surrounding erythema. Borders of elevated 

scar tissue present.








Assessment and Plan





- Assessment and Plan (Free Text)


Assessment: 





77F with extensive PMH including CHF, DM, and ESRD on HD with a chronic non-

healing skin wound of the chest for 2 years


Plan: 








-Continue current management per primary


-Apply compression dressings and change as needed


-Discussed with Dr. Palacios


-Continue Woundcare 


-Planned for bedside skip biopsy tomorrow by Dr. Palacios

## 2017-06-20 NOTE — CP.PCM.PN
Subjective





- Date & Time of Evaluation


Date of Evaluation: 06/20/17


Time of Evaluation: 09:40





- Subjective


Subjective: 


F/U  Osteomyelitis L foot.








Pt c/o of pain in L foot, no c/o of pain in chest open lesion.





Objective





- Vital Signs/Intake and Output


Vital Signs (last 24 hours): 


 











Temp Pulse Resp BP Pulse Ox


 


 98.4 F   70   17   164/73 H  97 


 


 06/20/17 16:06  06/20/17 16:06  06/20/17 16:06  06/20/17 16:06  06/20/17 16:06











- Medications


Medications: 


 Current Medications





Amlodipine Besylate (Norvasc)  10 mg PO DAILY [facility]


   Last Admin: 06/20/17 14:34 Dose:  10 mg


Aspirin (Ecotrin)  81 mg PO DAILY [facility]


   Last Admin: 06/20/17 10:49 Dose:  81 mg


Atorvastatin Calcium (Lipitor)  80 mg PO DAILY [facility]


   Last Admin: 06/20/17 10:50 Dose:  80 mg


Collagenase (Santyl)  1 applic TOP DAILY [facility]


   Last Admin: 06/20/17 14:35 Dose:  1 unit


Epoetin Edgardo (Procrit)  4,000 unit IV TTS [facility]


   Last Admin: 06/20/17 14:35 Dose:  4,000 unit


Heparin Sodium (Porcine) (Heparin)  5,000 units SC Q12 [facility]


   PRN Reason: Protocol


   Last Admin: 06/20/17 10:49 Dose:  5,000 units


Vancomycin HCl 1 gm/ Sodium (Chloride)  250 mls @ 166.667 mls/hr IVPB DAILY [facility]


   Last Admin: 06/20/17 10:47 Dose:  166.667 mls/hr


Piperacillin Sod/Tazobactam (Sod 2.25 gm/ Sodium Chloride)  100 mls @ 100 mls/

hr IVPB Q8 [facility]


   Last Admin: 06/20/17 10:48 Dose:  100 mls/hr


Insulin Detemir (Levemir)  30 units SC HS [facility]


   Last Admin: 06/19/17 23:16 Dose:  30 units


Levothyroxine Sodium (Synthroid)  75 mcg PO DAILY@0630 [facility]


   Last Admin: 06/20/17 07:23 Dose:  75 mcg


Metoprolol Tartrate (Lopressor)  100 mg PO Q12 [facility]


   Last Admin: 06/20/17 14:34 Dose:  100 mg


Oxycodone/Acetaminophen (Percocet 5/325 Mg Tab)  2 tab PO Q4 PRN


   PRN Reason: Pain, moderate (4-7)


   Stop: 06/21/17 08:18


   Last Admin: 06/20/17 14:39 Dose:  2 tab


Pantoprazole Sodium (Protonix Ec Tab)  40 mg PO DAILY@0600 [facility]


   Last Admin: 06/20/17 07:23 Dose:  40 mg


Pregabalin (Lyrica)  50 mg PO DAILY [facility]


   Last Admin: 06/20/17 11:30 Dose:  50 mg


Sevelamer HCl (Renagel)  800 mg PO TID [facility]


   Last Admin: 06/20/17 14:35 Dose:  800 mg


Sitagliptin Phosphate (Januvia)  25 mg PO DAILY [facility]


   Last Admin: 06/20/17 10:49 Dose:  25 mg


Valsartan (Diovan)  160 mg PO DAILY [facility]


   Last Admin: 06/20/17 14:33 Dose:  160 mg











- Labs


Labs: 


 





 06/20/17 06:00 





 06/20/17 06:00 





 











PT  15.2 Seconds (9.8-13.1)  H  06/18/17  06:05    


 


INR  1.3  (0.9-1.2)  H  06/18/17  06:05    


 


APTT  30.8 Seconds (25.6-37.1)   06/18/17  06:05    














- Constitutional


Appears: No Acute Distress, Chronically Ill





- Eye Exam


Eye Exam: PERRL





- ENT Exam


ENT Exam: Normal Oropharynx





- Neck Exam


Neck Exam: Normal Inspection





- Respiratory Exam


Respiratory Exam: NORMAL BREATHING PATTERN





- Cardiovascular Exam


Cardiovascular Exam: Irregular Rhythm





- GI/Abdominal Exam


GI & Abdominal Exam: Soft, Tenderness (RUQ).  absent: Guarding, Rebound





- Extremities Exam


Extremities Exam: Tenderness (L foot dressing in place)





- Back Exam


Back Exam: NORMAL INSPECTION





- Neurological Exam


Neurological Exam: Alert, Oriented x3


Additional comments: 





Decreased sensation R-L foot, no focal motor deficit.





- Psychiatric Exam


Psychiatric exam: Normal Mood





- Skin


Skin Exam: Warm (Mild upper chest round 3 cm skin lesion open, mild bleeding.)





Assessment and Plan


(1) Osteomyelitis of left foot


Status: Acute   





(2) Infection of amputation stump


Status: Acute   





(3) ESRD on hemodialysis


Status: Acute   





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


Status: Acute   





(5) Skin lesion of chest wall


Status: Acute   





(6) Abdominal pain


Status: Acute   





- Assessment and Plan (Free Text)


Plan: 


Pediatric and ID recommend no surgical intervention, to be treated with abx for 

4 weeks. Check up MRI, DC R femoral Catheter, to have pick line, continue Merro

, Vanco and rest of Tx.

## 2017-06-20 NOTE — CP.PCM.PN
Subjective





- Date & Time of Evaluation


Date of Evaluation: 06/20/17


Time of Evaluation: 07:20





Objective





- Vital Signs/Intake and Output


Vital Signs (last 24 hours): 


 











Temp Pulse Resp BP Pulse Ox


 


 97.9 F   62   20   146/67   94 L


 


 06/20/17 08:38  06/20/17 08:38  06/20/17 08:38  06/20/17 08:38  06/20/17 08:38











- Medications


Medications: 


 Current Medications





Amlodipine Besylate (Norvasc)  10 mg PO DAILY [facility]


   Last Admin: 06/19/17 10:43 Dose:  10 mg


Aspirin (Ecotrin)  81 mg PO DAILY [facility]


   Last Admin: 06/20/17 10:49 Dose:  81 mg


Atorvastatin Calcium (Lipitor)  80 mg PO DAILY [facility]


   Last Admin: 06/20/17 10:50 Dose:  80 mg


Collagenase (Santyl)  1 applic TOP DAILY [facility]


Epoetin Edgardo (Procrit)  4,000 unit IV TTS [facility]


Heparin Sodium (Porcine) (Heparin)  5,000 units SC Q12 [facility]


   PRN Reason: Protocol


   Last Admin: 06/20/17 10:49 Dose:  5,000 units


Vancomycin HCl 1 gm/ Sodium (Chloride)  250 mls @ 166.667 mls/hr IVPB DAILY [facility]


   Last Admin: 06/20/17 10:47 Dose:  166.667 mls/hr


Piperacillin Sod/Tazobactam (Sod 2.25 gm/ Sodium Chloride)  100 mls @ 100 mls/

hr IVPB Q8 [facility]


   Last Admin: 06/20/17 10:48 Dose:  100 mls/hr


Insulin Detemir (Levemir)  30 units SC HS [facility]


   Last Admin: 06/19/17 23:16 Dose:  30 units


Levothyroxine Sodium (Synthroid)  75 mcg PO DAILY@0630 [facility]


   Last Admin: 06/20/17 07:23 Dose:  75 mcg


Metoprolol Tartrate (Lopressor)  100 mg PO Q12 [facility]


   Last Admin: 06/19/17 21:32 Dose:  100 mg


Oxycodone/Acetaminophen (Percocet 5/325 Mg Tab)  2 tab PO Q4 PRN


   PRN Reason: Pain, moderate (4-7)


   Stop: 06/21/17 08:18


   Last Admin: 06/20/17 07:25 Dose:  2 tab


Pantoprazole Sodium (Protonix Ec Tab)  40 mg PO DAILY@0600 [facility]


   Last Admin: 06/20/17 07:23 Dose:  40 mg


Pregabalin (Lyrica)  50 mg PO DAILY [facility]


   Last Admin: 06/19/17 10:47 Dose:  50 mg


Sevelamer HCl (Renagel)  800 mg PO TID [facility]


   Last Admin: 06/20/17 10:50 Dose:  800 mg


Sitagliptin Phosphate (Januvia)  25 mg PO DAILY [facility]


   Last Admin: 06/20/17 10:49 Dose:  25 mg


Valsartan (Diovan)  160 mg PO DAILY [facility]


   Last Admin: 06/19/17 10:43 Dose:  160 mg











- Labs


Labs: 


 





 06/20/17 06:00 





 06/20/17 06:00 





 











PT  15.2 Seconds (9.8-13.1)  H  06/18/17  06:05    


 


INR  1.3  (0.9-1.2)  H  06/18/17  06:05    


 


APTT  30.8 Seconds (25.6-37.1)   06/18/17  06:05

## 2017-06-21 LAB
ALBUMIN/GLOB SERPL: 0.9 {RATIO} (ref 1–2.1)
ALP SERPL-CCNC: 71 U/L (ref 38–126)
ALT SERPL-CCNC: 25 U/L (ref 9–52)
AST SERPL-CCNC: 38 U/L (ref 14–36)
BILIRUB SERPL-MCNC: 0.1 MG/DL (ref 0.2–1.3)
BUN SERPL-MCNC: 19 MG/DL (ref 7–17)
CALCIUM SERPL-MCNC: 8.8 MG/DL (ref 8.4–10.2)
CHLORIDE SERPL-SCNC: 98 MMOL/L (ref 98–107)
CO2 SERPL-SCNC: 29 MMOL/L (ref 22–30)
ERYTHROCYTE [DISTWIDTH] IN BLOOD BY AUTOMATED COUNT: 17.2 % (ref 11.5–14.5)
GLOBULIN SER-MCNC: 3.7 GM/DL (ref 2.2–3.9)
GLUCOSE SERPL-MCNC: 53 MG/DL (ref 65–105)
HCT VFR BLD CALC: 29.4 % (ref 34–47)
MCH RBC QN AUTO: 28.2 PG (ref 27–31)
MCHC RBC AUTO-ENTMCNC: 30.9 G/DL (ref 33–37)
MCV RBC AUTO: 91.3 FL (ref 81–99)
PLATELET # BLD: 377 K/UL (ref 130–400)
POTASSIUM SERPL-SCNC: 4.2 MMOL/L (ref 3.6–5)
PROT SERPL-MCNC: 7 G/DL (ref 6.3–8.2)
SODIUM SERPL-SCNC: 139 MMOL/L (ref 132–148)
WBC # BLD AUTO: 12.8 K/UL (ref 4.8–10.8)

## 2017-06-21 PROCEDURE — 0HB5XZX EXCISION OF CHEST SKIN, EXTERNAL APPROACH, DIAGNOSTIC: ICD-10-PCS

## 2017-06-21 RX ADMIN — PANTOPRAZOLE SODIUM SCH MG: 40 TABLET, DELAYED RELEASE ORAL at 05:32

## 2017-06-21 RX ADMIN — INSULIN DETEMIR SCH UNITS: 100 INJECTION, SOLUTION SUBCUTANEOUS at 21:51

## 2017-06-21 RX ADMIN — OXYCODONE HYDROCHLORIDE AND ACETAMINOPHEN PRN TAB: 5; 325 TABLET ORAL at 14:03

## 2017-06-21 RX ADMIN — OXYCODONE HYDROCHLORIDE AND ACETAMINOPHEN PRN TAB: 5; 325 TABLET ORAL at 20:36

## 2017-06-21 NOTE — CP.PCM.PN
Subjective





- Date & Time of Evaluation


Date of Evaluation: 06/21/17


Time of Evaluation: 07:20





- Subjective


Subjective: 





Pt S/e at bedside this AM. NAEO. Patient's wound is being treated with 

medihoney and has stopped bleeding.





Objective





- Vital Signs/Intake and Output


Vital Signs (last 24 hours): 


 











Temp Pulse Resp BP Pulse Ox


 


 98.1 F   61   20   135/69   96 


 


 06/21/17 00:30  06/21/17 00:30  06/21/17 00:30  06/21/17 00:30  06/21/17 00:30











- Medications


Medications: 


 Current Medications





Amlodipine Besylate (Norvasc)  10 mg PO DAILY [facility]


   Last Admin: 06/20/17 14:34 Dose:  10 mg


Aspirin (Ecotrin)  81 mg PO DAILY [facility]


   Last Admin: 06/20/17 10:49 Dose:  81 mg


Atorvastatin Calcium (Lipitor)  80 mg PO DAILY [facility]


   Last Admin: 06/20/17 10:50 Dose:  80 mg


Collagenase (Santyl)  1 applic TOP DAILY [facility]


   Last Admin: 06/20/17 14:35 Dose:  1 unit


Epoetin Edgardo (Procrit)  4,000 unit IV TTS [facility]


   Last Admin: 06/20/17 14:35 Dose:  4,000 unit


Heparin Sodium (Porcine) (Heparin)  5,000 units SC Q12 [facility]


   PRN Reason: Protocol


   Last Admin: 06/20/17 21:21 Dose:  5,000 units


Vancomycin HCl 1 gm/ Sodium (Chloride)  250 mls @ 166.667 mls/hr IVPB DAILY [facility]


   Last Admin: 06/20/17 10:47 Dose:  166.667 mls/hr


Meropenem 1 gm/ Sodium (Chloride)  100 mls @ 100 mls/hr IVPB DAILY [facility]


Insulin Detemir (Levemir)  30 units SC HS [facility]


   Last Admin: 06/20/17 22:00 Dose:  30 units


Levothyroxine Sodium (Synthroid)  75 mcg PO DAILY@0630 [facility]


   Last Admin: 06/21/17 05:33 Dose:  75 mcg


Metoprolol Tartrate (Lopressor)  100 mg PO Q12 [facility]


   Last Admin: 06/20/17 21:58 Dose:  100 mg


Oxycodone/Acetaminophen (Percocet 5/325 Mg Tab)  2 tab PO Q4 PRN


   PRN Reason: Pain, moderate (4-7)


   Stop: 06/21/17 08:18


   Last Admin: 06/20/17 14:39 Dose:  2 tab


Pantoprazole Sodium (Protonix Ec Tab)  40 mg PO DAILY@0600 [facility]


   Last Admin: 06/21/17 05:32 Dose:  40 mg


Pregabalin (Lyrica)  50 mg PO DAILY [facility]


   Last Admin: 06/20/17 11:30 Dose:  50 mg


Sevelamer HCl (Renagel)  800 mg PO TID [facility]


   Last Admin: 06/20/17 17:19 Dose:  800 mg


Sitagliptin Phosphate (Januvia)  25 mg PO DAILY [facility]


   Last Admin: 06/20/17 10:49 Dose:  25 mg


Valsartan (Diovan)  160 mg PO DAILY [facility]


   Last Admin: 06/20/17 14:33 Dose:  160 mg











- Labs


Labs: 


 





 06/21/17 05:15 





 06/21/17 05:15 





 











PT  15.2 Seconds (9.8-13.1)  H  06/18/17  06:05    


 


INR  1.3  (0.9-1.2)  H  06/18/17  06:05    


 


APTT  30.8 Seconds (25.6-37.1)   06/18/17  06:05    














- Constitutional


Appears: Well, Non-toxic, No Acute Distress





- Head Exam


Head Exam: ATRAUMATIC, NORMOCEPHALIC





- Eye Exam


Eye Exam: Normal appearance.  absent: Conjunctival injection, Scleral icterus





- ENT Exam


ENT Exam: Mucous Membranes Moist, Normal Oropharynx





- Respiratory Exam


Respiratory Exam: NORMAL BREATHING PATTERN.  absent: Accessory Muscle Use, 

Respiratory Distress





- Extremities Exam


Extremities Exam: absent: Calf Tenderness, Pedal Edema, Tenderness





- Neurological Exam


Neurological Exam: Alert, Awake, Oriented x3





- Psychiatric Exam


Psychiatric exam: Normal Affect, Normal Mood





- Skin


Additional comments: 





Superficial skin wound of the anterior chest not actively bleeding with heaped 

up scar tissue border





Assessment and Plan





- Assessment and Plan (Free Text)


Assessment: 


77F with extensive PMH including CHF, DM, and ESRD on HD with a chronic non-

healing skin wound of the chest for 2 years


Plan: 


-Continue current management per primary


-Apply compression dressings and change as needed


-Continue woundcare per woundcare nurse


-Planned for bedside skip biopsy today





Discussed with Dr. Philip aHmm, PGY1


[phone]

## 2017-06-21 NOTE — CP.PCM.PN
Subjective





- Date & Time of Evaluation


Date of Evaluation: 06/21/17


Time of Evaluation: 10:08





- Subjective


Subjective: 





Patient and bed no significant changes


Appeared to be comfortable


Vital sign noted to be acceptable


Chest no rales


Heart no rubs


Abdomen soft


Extremity no edema


Impression and plan


Patient has a foot ulcer receiving intravenous antibiotics as recommended


End stage renal disease hemodialysis TTS as ordered


Continue monitoring





Objective





- Vital Signs/Intake and Output


Vital Signs (last 24 hours): 


 











Temp Pulse Resp BP Pulse Ox


 


 98 F   67   20   180/77 H  93 L


 


 06/21/17 08:40  06/21/17 08:54  06/21/17 08:40  06/21/17 08:54  06/21/17 08:40











- Medications


Medications: 


 Current Medications





Amlodipine Besylate (Norvasc)  10 mg PO DAILY [facility]


   Last Admin: 06/21/17 08:48 Dose:  10 mg


Aspirin (Ecotrin)  81 mg PO DAILY [facility]


   Last Admin: 06/21/17 08:52 Dose:  81 mg


Atorvastatin Calcium (Lipitor)  80 mg PO DAILY [facility]


   Last Admin: 06/21/17 08:53 Dose:  80 mg


Collagenase (Santyl)  1 applic TOP DAILY [facility]


   Last Admin: 06/20/17 14:35 Dose:  1 unit


Epoetin Edgardo (Procrit)  4,000 unit IV TTS [facility]


   Last Admin: 06/20/17 14:35 Dose:  4,000 unit


Heparin Sodium (Porcine) (Heparin)  5,000 units SC Q12 [facility]


   PRN Reason: Protocol


   Last Admin: 06/20/17 21:21 Dose:  5,000 units


Vancomycin HCl 1 gm/ Sodium (Chloride)  250 mls @ 166.667 mls/hr IVPB DAILY [facility]


   Last Admin: 06/20/17 10:47 Dose:  166.667 mls/hr


Meropenem 1 gm/ Sodium (Chloride)  100 mls @ 100 mls/hr IVPB DAILY [facility]


   Last Admin: 06/21/17 08:49 Dose:  100 mls/hr


Insulin Detemir (Levemir)  30 units SC HS [facility]


   Last Admin: 06/20/17 22:00 Dose:  30 units


Levothyroxine Sodium (Synthroid)  75 mcg PO DAILY@0630 [facility]


   Last Admin: 06/21/17 05:33 Dose:  75 mcg


Metoprolol Tartrate (Lopressor)  100 mg PO Q12 [facility]


   Last Admin: 06/21/17 08:54 Dose:  100 mg


Oxycodone/Acetaminophen (Percocet 5/325 Mg Tab)  2 tab PO Q6 PRN


   PRN Reason: Pain, severe (8-10)


   Stop: 06/24/17 10:01


   Last Admin: 06/21/17 10:05 Dose:  2 tab


Pantoprazole Sodium (Protonix Ec Tab)  40 mg PO DAILY@0600 [facility]


   Last Admin: 06/21/17 05:32 Dose:  40 mg


Pregabalin (Lyrica)  50 mg PO DAILY [facility]


   Last Admin: 06/21/17 09:00 Dose:  50 mg


Sevelamer HCl (Renagel)  800 mg PO TID [facility]


   Last Admin: 06/21/17 08:50 Dose:  800 mg


Sitagliptin Phosphate (Januvia)  25 mg PO DAILY [facility]


   Last Admin: 06/20/17 10:49 Dose:  25 mg


Valsartan (Diovan)  160 mg PO DAILY [facility]


   Last Admin: 06/21/17 08:52 Dose:  160 mg











- Labs


Labs: 


 





 06/21/17 05:15 





 06/21/17 05:15 





 











PT  15.2 Seconds (9.8-13.1)  H  06/18/17  06:05    


 


INR  1.3  (0.9-1.2)  H  06/18/17  06:05    


 


APTT  30.8 Seconds (25.6-37.1)   06/18/17  06:05    














Assessment and Plan


(1) ESRD on hemodialysis


Status: Acute

## 2017-06-21 NOTE — CP.PCM.PN
Subjective





- Date & Time of Evaluation


Date of Evaluation: 06/21/17


Time of Evaluation: 12:00





- Subjective


Subjective: 





F/U Osteomyelitis L foot.





Pt c/o of severe pain in L foot earlier, intensity 8:10, taking Percocet.











Objective





- Vital Signs/Intake and Output


Vital Signs (last 24 hours): 


 











Temp Pulse Resp BP Pulse Ox


 


 98 F   67   20   180/77 H  93 L


 


 06/21/17 08:40  06/21/17 08:54  06/21/17 08:40  06/21/17 08:54  06/21/17 08:40











- Medications


Medications: 


 Current Medications





Amlodipine Besylate (Norvasc)  10 mg PO DAILY [facility]


   Last Admin: 06/21/17 08:48 Dose:  10 mg


Aspirin (Ecotrin)  81 mg PO DAILY [facility]


   Last Admin: 06/21/17 08:52 Dose:  81 mg


Atorvastatin Calcium (Lipitor)  80 mg PO DAILY [facility]


   Last Admin: 06/21/17 08:53 Dose:  80 mg


Collagenase (Santyl)  1 applic TOP DAILY [facility]


   Last Admin: 06/20/17 14:35 Dose:  1 unit


Epoetin Edgardo (Procrit)  4,000 unit IV TTS [facility]


   Last Admin: 06/20/17 14:35 Dose:  4,000 unit


Heparin Sodium (Porcine) (Heparin)  5,000 units SC Q12 [facility]


   PRN Reason: Protocol


   Last Admin: 06/20/17 21:21 Dose:  5,000 units


Vancomycin HCl 1 gm/ Sodium (Chloride)  250 mls @ 166.667 mls/hr IVPB DAILY [facility]


   Last Admin: 06/20/17 10:47 Dose:  166.667 mls/hr


Meropenem 1 gm/ Sodium (Chloride)  100 mls @ 100 mls/hr IVPB DAILY [facility]


   Last Admin: 06/21/17 08:49 Dose:  100 mls/hr


Insulin Detemir (Levemir)  30 units SC HS [facility]


   Last Admin: 06/20/17 22:00 Dose:  30 units


Levothyroxine Sodium (Synthroid)  75 mcg PO DAILY@0630 [facility]


   Last Admin: 06/21/17 05:33 Dose:  75 mcg


Metoprolol Tartrate (Lopressor)  100 mg PO Q12 [facility]


   Last Admin: 06/21/17 08:54 Dose:  100 mg


Oxycodone/Acetaminophen (Percocet 5/325 Mg Tab)  2 tab PO Q4 PRN


   PRN Reason: Pain, severe (8-10)


   Stop: 06/24/17 17:01


   Last Admin: 06/21/17 14:03 Dose:  2 tab


Pantoprazole Sodium (Protonix Ec Tab)  40 mg PO DAILY@0600 [facility]


   Last Admin: 06/21/17 05:32 Dose:  40 mg


Pregabalin (Lyrica)  50 mg PO DAILY [facility]


   Last Admin: 06/21/17 09:00 Dose:  50 mg


Sevelamer HCl (Renagel)  800 mg PO TID [facility]


   Last Admin: 06/21/17 08:50 Dose:  800 mg


Sitagliptin Phosphate (Januvia)  25 mg PO DAILY [facility]


   Last Admin: 06/20/17 10:49 Dose:  25 mg


Valsartan (Diovan)  160 mg PO DAILY [facility]


   Last Admin: 06/21/17 08:52 Dose:  160 mg











- Labs


Labs: 


 





 06/21/17 05:15 





 06/21/17 05:15 





 











PT  15.2 Seconds (9.8-13.1)  H  06/18/17  06:05    


 


INR  1.3  (0.9-1.2)  H  06/18/17  06:05    


 


APTT  30.8 Seconds (25.6-37.1)   06/18/17  06:05    














- Constitutional


Appears: No Acute Distress, Chronically Ill





- Head Exam


Head Exam: NORMAL INSPECTION





- Eye Exam


Eye Exam: PERRL





- ENT Exam


ENT Exam: Normal Oropharynx





- Neck Exam


Neck Exam: Normal Inspection





- Respiratory Exam


Respiratory Exam: NORMAL BREATHING PATTERN





- Cardiovascular Exam


Cardiovascular Exam: Irregular Rhythm





- GI/Abdominal Exam


GI & Abdominal Exam: Soft, Tenderness (RUQ), Normal Bowel Sounds.  absent: 

Guarding, Rebound





- Extremities Exam


Extremities Exam: Tenderness (L foot, dressing in place)





- Back Exam


Back Exam: NORMAL INSPECTION





- Neurological Exam


Neurological Exam: Alert, Oriented x3


Additional comments: 





Decreased sensation R-L foot, no focal motor deficit.





- Psychiatric Exam


Psychiatric exam: Normal Mood





- Skin


Skin Exam: Warm (mild upper chest open skin lesion.)





Assessment and Plan


(1) Osteomyelitis of left foot


Status: Acute   





(2) Infection of amputation stump


Status: Acute   





(3) ESRD on hemodialysis


Status: Acute   





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


Status: Acute   





(5) Skin lesion of chest wall


Status: Acute   





(6) Abdominal pain


Status: Acute   





- Assessment and Plan (Free Text)


Plan: 


Discussed with nurse practitioner, she will need a PICC line inserted for abx IV

, C-S + Staph MRSA and  E Coli ESBL, she needs Vanco IV during HD 3 x weekly


and Merrem 1 gram qd for 4 weeks.

## 2017-06-21 NOTE — CP.PCM.PN
Subjective





- Date & Time of Evaluation


Date of Evaluation: 06/21/17


Time of Evaluation: 15:30





- Subjective


Subjective: 





no overnight events





Objective





- Vital Signs/Intake and Output


Vital Signs (last 24 hours): 


 











Temp Pulse Resp BP Pulse Ox


 


 98.1 F   71   20   117/58 L  90 L


 


 06/21/17 16:18  06/21/17 16:18  06/21/17 16:18  06/21/17 16:18  06/21/17 16:18











- Medications


Medications: 


 Current Medications





Amlodipine Besylate (Norvasc)  10 mg PO DAILY [facility]


   Last Admin: 06/21/17 08:48 Dose:  10 mg


Aspirin (Ecotrin)  81 mg PO DAILY [facility]


   Last Admin: 06/21/17 08:52 Dose:  81 mg


Atorvastatin Calcium (Lipitor)  80 mg PO DAILY [facility]


   Last Admin: 06/21/17 08:53 Dose:  80 mg


Collagenase (Santyl)  1 applic TOP DAILY [facility]


   Last Admin: 06/20/17 14:35 Dose:  1 unit


Epoetin Edgardo (Procrit)  4,000 unit IV TTS [facility]


   Last Admin: 06/20/17 14:35 Dose:  4,000 unit


Heparin Sodium (Porcine) (Heparin)  5,000 units SC Q12 [facility]


   PRN Reason: Protocol


   Last Admin: 06/21/17 09:00 Dose:  5,000 units


Vancomycin HCl 1 gm/ Sodium (Chloride)  250 mls @ 166.667 mls/hr IVPB DAILY [facility]


   Last Admin: 06/20/17 10:47 Dose:  166.667 mls/hr


Meropenem 1 gm/ Sodium (Chloride)  100 mls @ 100 mls/hr IVPB DAILY [facility]


   Last Admin: 06/21/17 08:49 Dose:  100 mls/hr


Insulin Detemir (Levemir)  30 units SC HS [facility]


   Last Admin: 06/20/17 22:00 Dose:  30 units


Levothyroxine Sodium (Synthroid)  75 mcg PO DAILY@0630 [facility]


   Last Admin: 06/21/17 05:33 Dose:  75 mcg


Metoprolol Tartrate (Lopressor)  100 mg PO Q12 [facility]


   Last Admin: 06/21/17 08:54 Dose:  100 mg


Oxycodone/Acetaminophen (Percocet 5/325 Mg Tab)  2 tab PO Q4 PRN


   PRN Reason: Pain, severe (8-10)


   Stop: 06/24/17 17:01


   Last Admin: 06/21/17 20:36 Dose:  2 tab


Pantoprazole Sodium (Protonix Ec Tab)  40 mg PO DAILY@0600 [facility]


   Last Admin: 06/21/17 05:32 Dose:  40 mg


Pregabalin (Lyrica)  50 mg PO DAILY [facility]


   Last Admin: 06/21/17 09:00 Dose:  50 mg


Sevelamer HCl (Renagel)  800 mg PO TID [facility]


   Last Admin: 06/21/17 17:58 Dose:  800 mg


Sitagliptin Phosphate (Januvia)  25 mg PO DAILY [facility]


   Last Admin: 06/21/17 09:00 Dose:  25 mg


Valsartan (Diovan)  160 mg PO DAILY [facility]


   Last Admin: 06/21/17 08:52 Dose:  160 mg











- Labs


Labs: 


 





 06/21/17 05:15 





 06/21/17 05:15 





 











PT  15.2 Seconds (9.8-13.1)  H  06/18/17  06:05    


 


INR  1.3  (0.9-1.2)  H  06/18/17  06:05    


 


APTT  30.8 Seconds (25.6-37.1)   06/18/17  06:05    














- GI/Abdominal Exam


GI & Abdominal Exam: Tenderness, Normal Bowel Sounds





Assessment and Plan





- Assessment and Plan (Free Text)


Assessment: 





76 yo female with multiple medical problems





cont current care


await bx results

## 2017-06-21 NOTE — CP.PCM.PN
Subjective





- Date & Time of Evaluation


Date of Evaluation: 06/21/17


Time of Evaluation: 08:30





- Subjective


Subjective: 





75 y/o female seen at bedside today 11 weeks s/p left foot TMA concerning left 

foot wound dehiscence. Patient complaints of mild pain at the surgical site and 

in the general area of the left foot. Dressing appears to be C/D/I.  Denies f/n/

v/c/sob/cp at this time. Patient has no other pedal complaints at this time. 








Objective





- Vital Signs/Intake and Output


Vital Signs (last 24 hours): 


 











Temp Pulse Resp BP Pulse Ox


 


 98.1 F   61   20   135/69   96 


 


 06/21/17 00:30  06/21/17 00:30  06/21/17 00:30  06/21/17 00:30  06/21/17 00:30











- Medications


Medications: 


 Current Medications





Amlodipine Besylate (Norvasc)  10 mg PO DAILY [facility]


   Last Admin: 06/20/17 14:34 Dose:  10 mg


Aspirin (Ecotrin)  81 mg PO DAILY [facility]


   Last Admin: 06/20/17 10:49 Dose:  81 mg


Atorvastatin Calcium (Lipitor)  80 mg PO DAILY [facility]


   Last Admin: 06/20/17 10:50 Dose:  80 mg


Collagenase (Santyl)  1 applic TOP DAILY [facility]


   Last Admin: 06/20/17 14:35 Dose:  1 unit


Epoetin Edgardo (Procrit)  4,000 unit IV TTS [facility]


   Last Admin: 06/20/17 14:35 Dose:  4,000 unit


Heparin Sodium (Porcine) (Heparin)  5,000 units SC Q12 [facility]


   PRN Reason: Protocol


   Last Admin: 06/20/17 21:21 Dose:  5,000 units


Vancomycin HCl 1 gm/ Sodium (Chloride)  250 mls @ 166.667 mls/hr IVPB DAILY [facility]


   Last Admin: 06/20/17 10:47 Dose:  166.667 mls/hr


Meropenem 1 gm/ Sodium (Chloride)  100 mls @ 100 mls/hr IVPB DAILY [facility]


Insulin Detemir (Levemir)  30 units SC HS [facility]


   Last Admin: 06/20/17 22:00 Dose:  30 units


Levothyroxine Sodium (Synthroid)  75 mcg PO DAILY@0630 [facility]


   Last Admin: 06/21/17 05:33 Dose:  75 mcg


Metoprolol Tartrate (Lopressor)  100 mg PO Q12 [facility]


   Last Admin: 06/20/17 21:58 Dose:  100 mg


Oxycodone/Acetaminophen (Percocet 5/325 Mg Tab)  2 tab PO Q4 PRN


   PRN Reason: Pain, moderate (4-7)


   Stop: 06/21/17 08:18


   Last Admin: 06/20/17 14:39 Dose:  2 tab


Pantoprazole Sodium (Protonix Ec Tab)  40 mg PO DAILY@0600 [facility]


   Last Admin: 06/21/17 05:32 Dose:  40 mg


Pregabalin (Lyrica)  50 mg PO DAILY [facility]


   Last Admin: 06/20/17 11:30 Dose:  50 mg


Sevelamer HCl (Renagel)  800 mg PO TID [facility]


   Last Admin: 06/20/17 17:19 Dose:  800 mg


Sitagliptin Phosphate (Januvia)  25 mg PO DAILY [facility]


   Last Admin: 06/20/17 10:49 Dose:  25 mg


Valsartan (Diovan)  160 mg PO DAILY [facility]


   Last Admin: 06/20/17 14:33 Dose:  160 mg











- Labs


Labs: 


 





 06/21/17 05:15 





 06/21/17 05:15 





 











PT  15.2 Seconds (9.8-13.1)  H  06/18/17  06:05    


 


INR  1.3  (0.9-1.2)  H  06/18/17  06:05    


 


APTT  30.8 Seconds (25.6-37.1)   06/18/17  06:05    














- Constitutional


Appears: Well, Non-toxic, No Acute Distress





- Extremities Exam


Additional comments: 





Left lower extremity focused exam:





DERM: Open wound noted to left foot stump surgical site measuring 5cm x 2cm x 

0.2cm. Wound base is necrotic. No noted purulence, drainage, or malodor. Wound 

does not probe to bone. Periwound is erythematous.





VASC: DP 2/4. Non-palpable PT pulse. No edema noted to the LE.  Skin 

temperature warm to warm from proximal to distal.





ORTHO: Mild tenderness to palpation at TMA surgical ite





NEURO: Gross sensation diminished





- Neurological Exam


Neurological Exam: Alert, Awake, Oriented x3





- Psychiatric Exam


Psychiatric exam: Normal Affect, Normal Mood





Assessment and Plan





- Assessment and Plan (Free Text)


Assessment: 





75 y/o female with wound dehiscence to Left TMA site (DOS 3/31/17)


Plan: 





Patient was seen and evaluated at bedside


Discussed plan with Dr. Serna


Chart, labs and vitals reviewed


Open wound is dressed with saline wet to dry dressing, Santyl, DSD, and kerlix.


Per Dr. Serna pt will require daily dressing changes with santyl x 4-6 weeks 

at nursing home 


C/w IV abx as per ID - Dr. Chris changed to Vanco and Meropenem, 

recommending IB abx for 4-6 weeks


Left foot wound culture positive for E.coli and positive for MRSA


No surgical interventions indicated at this time


Applied offloading prevalon boots to both feet to be worn at all times while in 

bed


Stable per podiatry


Podiatry will continue to follow

## 2017-06-22 LAB
BUN SERPL-MCNC: 10 MG/DL (ref 7–17)
CALCIUM SERPL-MCNC: 9 MG/DL (ref 8.4–10.2)
CHLORIDE SERPL-SCNC: 98 MMOL/L (ref 98–107)
CO2 SERPL-SCNC: 32 MMOL/L (ref 22–30)
ERYTHROCYTE [DISTWIDTH] IN BLOOD BY AUTOMATED COUNT: 17.9 % (ref 11.5–14.5)
GLUCOSE SERPL-MCNC: 129 MG/DL (ref 65–105)
HCT VFR BLD CALC: 32.6 % (ref 34–47)
MCH RBC QN AUTO: 28 PG (ref 27–31)
MCHC RBC AUTO-ENTMCNC: 31.2 G/DL (ref 33–37)
MCV RBC AUTO: 89.7 FL (ref 81–99)
PLATELET # BLD: 395 K/UL (ref 130–400)
POTASSIUM SERPL-SCNC: 4 MMOL/L (ref 3.6–5)
SODIUM SERPL-SCNC: 139 MMOL/L (ref 132–148)
WBC # BLD AUTO: 16.6 K/UL (ref 4.8–10.8)

## 2017-06-22 RX ADMIN — PANTOPRAZOLE SODIUM SCH MG: 40 TABLET, DELAYED RELEASE ORAL at 05:52

## 2017-06-22 RX ADMIN — INSULIN DETEMIR SCH UNITS: 100 INJECTION, SOLUTION SUBCUTANEOUS at 21:40

## 2017-06-22 RX ADMIN — OXYCODONE HYDROCHLORIDE AND ACETAMINOPHEN PRN TAB: 5; 325 TABLET ORAL at 03:46

## 2017-06-22 RX ADMIN — OXYCODONE HYDROCHLORIDE AND ACETAMINOPHEN PRN TAB: 5; 325 TABLET ORAL at 21:37

## 2017-06-22 RX ADMIN — ERYTHROPOIETIN SCH UNIT: 4000 INJECTION, SOLUTION INTRAVENOUS; SUBCUTANEOUS at 18:15

## 2017-06-22 NOTE — CP.PCM.PN
Subjective





- Date & Time of Evaluation


Date of Evaluation: 06/22/17


Time of Evaluation: 11:50





- Subjective


Subjective: 





ID Note-





pt. seen and examined today.


 denies any fever or chills .


c/o left foot pain.





Objective





- Vital Signs/Intake and Output


Vital Signs (last 24 hours): 


 











Temp Pulse Resp BP Pulse Ox


 


 98 F   84   18   148/87   99 


 


 06/22/17 11:21  06/22/17 11:21  06/22/17 11:21  06/22/17 11:21  06/22/17 11:21











- Medications


Medications: 


 Current Medications





Amlodipine Besylate (Norvasc)  10 mg PO DAILY [facility]


   Last Admin: 06/22/17 09:38 Dose:  10 mg


Aspirin (Ecotrin)  81 mg PO DAILY [facility]


   Last Admin: 06/22/17 09:35 Dose:  81 mg


Atorvastatin Calcium (Lipitor)  80 mg PO DAILY [facility]


   Last Admin: 06/22/17 09:36 Dose:  80 mg


Collagenase (Santyl)  1 applic TOP DAILY [facility]


   Last Admin: 06/20/17 14:35 Dose:  1 unit


Epoetin Edgardo (Procrit)  8,000 unit IV TTS [facility]


Heparin Sodium (Porcine) (Heparin)  5,000 units SC Q12 [facility]


   PRN Reason: Protocol


   Last Admin: 06/21/17 21:52 Dose:  5,000 units


Vancomycin HCl 1 gm/ Sodium (Chloride)  250 mls @ 166.667 mls/hr IVPB DAILY [facility]


   Last Admin: 06/20/17 10:47 Dose:  166.667 mls/hr


Meropenem 1 gm/ Sodium (Chloride)  100 mls @ 100 mls/hr IVPB DAILY [facility]


   Last Admin: 06/22/17 09:37 Dose:  100 mls/hr


Insulin Detemir (Levemir)  30 units SC HS [facility]


   Last Admin: 06/21/17 21:51 Dose:  30 units


Levothyroxine Sodium (Synthroid)  75 mcg PO DAILY@0630 [facility]


   Last Admin: 06/22/17 05:52 Dose:  75 mcg


Metoprolol Tartrate (Lopressor)  100 mg PO Q12 [facility]


   Last Admin: 06/22/17 09:36 Dose:  100 mg


Oxycodone/Acetaminophen (Percocet 5/325 Mg Tab)  2 tab PO Q4 [facility]


   PRN Reason: Pain, severe (8-10)


   Stop: 06/24/17 17:01


   Last Admin: 06/22/17 03:46 Dose:  2 tab


Pantoprazole Sodium (Protonix Ec Tab)  40 mg PO DAILY@0600 [facility]


   Last Admin: 06/22/17 05:52 Dose:  40 mg


Pregabalin (Lyrica)  50 mg PO DAILY [facility]


   Last Admin: 06/22/17 09:48 Dose:  50 mg


Sevelamer HCl (Renagel)  800 mg PO TID [facility]


   Last Admin: 06/22/17 09:41 Dose:  800 mg


Sitagliptin Phosphate (Januvia)  25 mg PO DAILY [facility]


   Last Admin: 06/22/17 09:42 Dose:  25 mg


Valsartan (Diovan)  160 mg PO DAILY [facility]


   Last Admin: 06/22/17 09:35 Dose:  160 mg











- Labs


Labs: 


 





 





- Additional Findings


Additional findings: 








- Constitutional


Appears: Non-toxic, No Acute Distress





- Head Exam


Head Exam: ATRAUMATIC





- Eye Exam


Eye Exam: EOMI


Pupil Exam: PERRL





- ENT Exam


ENT Exam: Normal Oropharynx





- Neck Exam


Neck exam: Positive for: Full Rom





- Respiratory Exam


Respiratory Exam: Clear to Auscultation Bilateral, NORMAL BREATHING PATTERN





- Cardiovascular Exam


Cardiovascular Exam: RRR, +S1, +S2


Additional comments: 





midchest superficial area of denuded skin 2 x 4 cm wit


h mild bleeding, no pus, no malodor





- GI/Abdominal Exam


GI & Abdominal Exam: Normal Bowel Sounds, Soft


Additional comments: 





NT, ND





- Extremities Exam


Additional comments: 





left foot TMA site dressing clean/dry/intact





- Neurological Exam


Neurological exam: Alert, Oriented x 3





 Laboratory Results - last 72 hr











  06/19/17 06/19/17 06/19/17





  15:20 18:06 23:03


 


WBC   


 


RBC   


 


Hgb   


 


Hct   


 


MCV   


 


MCH   


 


MCHC   


 


RDW   


 


Plt Count   


 


ESR   105 H 


 


Sodium   


 


Potassium   


 


Chloride   


 


Carbon Dioxide   


 


Anion Gap   


 


BUN   


 


Creatinine   


 


Est GFR ( Amer)   


 


Est GFR (Non-Af Amer)   


 


POC Glucose (mg/dL)  184 H   152 H


 


Random Glucose   


 


Calcium   


 


Total Bilirubin   


 


AST   


 


ALT   


 


Alkaline Phosphatase   


 


Total Protein   


 


Albumin   


 


Globulin   


 


Albumin/Globulin Ratio   


 


Vancomycin Trough   


 


Hepatitis A IgM Ab   


 


Hep Bs Antigen   


 


Hep B Core IgM Ab   


 


Hepatitis C Antibody   














  06/20/17 06/20/17 06/20/17





  05:04 06:00 06:00


 


WBC   10.9 H 


 


RBC   3.29 L 


 


Hgb   9.3 L 


 


Hct   30.1 L 


 


MCV   91.6  D 


 


MCH   28.3 


 


MCHC   30.9 L 


 


RDW   17.5 H 


 


Plt Count   386 


 


ESR   


 


Sodium    130 L


 


Potassium    4.6


 


Chloride    93 L


 


Carbon Dioxide    23


 


Anion Gap    19


 


BUN    34 H


 


Creatinine    4.9 H


 


Est GFR ( Amer)    10


 


Est GFR (Non-Af Amer)    9


 


POC Glucose (mg/dL)  149 H  


 


Random Glucose    128 H


 


Calcium    8.6


 


Total Bilirubin   


 


AST   


 


ALT   


 


Alkaline Phosphatase   


 


Total Protein   


 


Albumin   


 


Globulin   


 


Albumin/Globulin Ratio   


 


Vancomycin Trough   


 


Hepatitis A IgM Ab   


 


Hep Bs Antigen   


 


Hep B Core IgM Ab   


 


Hepatitis C Antibody   














  06/20/17 06/20/17 06/20/17





  06:00 10:59 11:44


 


WBC   


 


RBC   


 


Hgb   


 


Hct   


 


MCV   


 


MCH   


 


MCHC   


 


RDW   


 


Plt Count   


 


ESR   


 


Sodium   


 


Potassium   


 


Chloride   


 


Carbon Dioxide   


 


Anion Gap   


 


BUN   


 


Creatinine   


 


Est GFR ( Amer)   


 


Est GFR (Non-Af Amer)   


 


POC Glucose (mg/dL)    117 H


 


Random Glucose   


 


Calcium   


 


Total Bilirubin   


 


AST   


 


ALT   


 


Alkaline Phosphatase   


 


Total Protein   


 


Albumin   


 


Globulin   


 


Albumin/Globulin Ratio   


 


Vancomycin Trough  29.2 H  


 


Hepatitis A IgM Ab   Negative 


 


Hep Bs Antigen   Negative 


 


Hep B Core IgM Ab   Negative 


 


Hepatitis C Antibody   Reactive H 














  06/20/17 06/20/17 06/21/17





  16:17 21:47 05:14


 


WBC   


 


RBC   


 


Hgb   


 


Hct   


 


MCV   


 


MCH   


 


MCHC   


 


RDW   


 


Plt Count   


 


ESR   


 


Sodium   


 


Potassium   


 


Chloride   


 


Carbon Dioxide   


 


Anion Gap   


 


BUN   


 


Creatinine   


 


Est GFR ( Amer)   


 


Est GFR (Non-Af Amer)   


 


POC Glucose (mg/dL)  155 H  157 H  70


 


Random Glucose   


 


Calcium   


 


Total Bilirubin   


 


AST   


 


ALT   


 


Alkaline Phosphatase   


 


Total Protein   


 


Albumin   


 


Globulin   


 


Albumin/Globulin Ratio   


 


Vancomycin Trough   


 


Hepatitis A IgM Ab   


 


Hep Bs Antigen   


 


Hep B Core IgM Ab   


 


Hepatitis C Antibody   














  06/21/17 06/21/17 06/21/17





  05:15 05:15 08:22


 


WBC  12.8 H  


 


RBC  3.22 L  


 


Hgb  9.1 L  


 


Hct  29.4 L  


 


MCV  91.3  


 


MCH  28.2  


 


MCHC  30.9 L  


 


RDW  17.2 H  


 


Plt Count  377  


 


ESR   


 


Sodium   139 


 


Potassium   4.2 


 


Chloride   98 


 


Carbon Dioxide   29 


 


Anion Gap   16 


 


BUN   19 H 


 


Creatinine   3.9 H 


 


Est GFR ( Amer)   13 


 


Est GFR (Non-Af Amer)   11 


 


POC Glucose (mg/dL)   


 


Random Glucose   53 L 


 


Calcium   8.8 


 


Total Bilirubin   0.1 L 


 


AST   38 H D 


 


ALT   25 


 


Alkaline Phosphatase   71 


 


Total Protein   7.0 


 


Albumin   3.3 L 


 


Globulin   3.7 


 


Albumin/Globulin Ratio   0.9 L 


 


Vancomycin Trough    34.9 H


 


Hepatitis A IgM Ab   


 


Hep Bs Antigen   


 


Hep B Core IgM Ab   


 


Hepatitis C Antibody   














  06/21/17 06/21/17 06/21/17





  11:10 15:46 21:48


 


WBC   


 


RBC   


 


Hgb   


 


Hct   


 


MCV   


 


MCH   


 


MCHC   


 


RDW   


 


Plt Count   


 


ESR   


 


Sodium   


 


Potassium   


 


Chloride   


 


Carbon Dioxide   


 


Anion Gap   


 


BUN   


 


Creatinine   


 


Est GFR ( Amer)   


 


Est GFR (Non-Af Amer)   


 


POC Glucose (mg/dL)  100  132 H  143 H


 


Random Glucose   


 


Calcium   


 


Total Bilirubin   


 


AST   


 


ALT   


 


Alkaline Phosphatase   


 


Total Protein   


 


Albumin   


 


Globulin   


 


Albumin/Globulin Ratio   


 


Vancomycin Trough   


 


Hepatitis A IgM Ab   


 


Hep Bs Antigen   


 


Hep B Core IgM Ab   


 


Hepatitis C Antibody   














  06/22/17 06/22/17 06/22/17





  06:17 06:53 12:11


 


WBC   


 


RBC   


 


Hgb   


 


Hct   


 


MCV   


 


MCH   


 


MCHC   


 


RDW   


 


Plt Count   


 


ESR   


 


Sodium   


 


Potassium   


 


Chloride   


 


Carbon Dioxide   


 


Anion Gap   


 


BUN   


 


Creatinine   


 


Est GFR ( Amer)   


 


Est GFR (Non-Af Amer)   


 


POC Glucose (mg/dL)  66  68  91


 


Random Glucose   


 


Calcium   


 


Total Bilirubin   


 


AST   


 


ALT   


 


Alkaline Phosphatase   


 


Total Protein   


 


Albumin   


 


Globulin   


 


Albumin/Globulin Ratio   


 


Vancomycin Trough   


 


Hepatitis A IgM Ab   


 


Hep Bs Antigen   


 


Hep B Core IgM Ab   


 


Hepatitis C Antibody   








 Microbiology





06/19/17 18:06   Chest   Gram Stain - Final


06/19/17 18:06   Chest   Wound Culture - Preliminary


                            No growth.


06/17/17 18:00   Blood   Blood Culture - Preliminary


                            NO GROWTH AFTER 4 DAYS


06/17/17 18:00   Blood   Blood Culture - Preliminary


                            NO GROWTH AFTER 4 DAYS


06/17/17 17:40   Foot - Left   Gram Stain - Final


06/17/17 17:40   Foot - Left   Wound Culture - Final


                            Escherichia Coli


                            Methicillin Resistant S Aureus





 Accession No. : [id number]


Patient Name / ID : ASA GUERRA  / [id number]


Exam Date : 06/20/2017 17:13:46 ( Approved )


Study Comment : 


Sex / Age : F  / 77Y





Creator : Bree Pal MD


Dictator : Bree Pal MD


 : 


Approver : Bree Pal MD


Approver2 : 





Report Date : 06/20/2017 18:53:36


My Comment : 


********************************************************************************

***





HISTORY:


Attention right rib cage-pain  





COMPARISON:


Chest x-ray performed 3/30/17 





TECHNIQUE:


Chest, one view.





FINDINGS:








LUNGS:


The left lung base is not well visualized, presumably due to soft tissue 

attenuation due to habitus. Mild atelectasis/ infiltrate or small effusion 

cannot be excluded. No definite pneumothorax.





Please note that chest x-ray has limited sensitivity for the detection of 

pulmonary masses.





CARDIOVASCULAR:


Dual lead left-sided pacemaker.  Cardiomegaly.  Atherosclerotic calcification 

of the aorta.





OSSEOUS STRUCTURES:


 Degenerative changes of the spine and shoulders. Acromioclavicular 

arthropathy. Evidence of bilateral calcific tendinitis. 





VISUALIZED UPPER ABDOMEN:


Unremarkable.





OTHER FINDINGS:


Vascular stent in expected location of the right subclavian vein.





IMPRESSION:


The left lung base is not well visualized, presumably due to soft tissue 

attenuation secondary to habitus. Mild atelectasis/ infiltrate or small 

effusion cannot be excluded.  





Cardiomegaly.  Dual lead left-sided pacemaker.

















 











Assessment and Plan


(1) Infection of amputation stump


Status: Acute   





(2) ESRD on hemodialysis


Status: Acute   





- Assessment and Plan (Free Text)


Assessment: 





A/P-


77 year old female with DM II, ESRD on HD, s/p left foot TMA admitted with left 

foot TMA infected stump.





s/p  excisional debridement  of hyperkeratotic borders of the TMA ulcer 2 day 

ago


afebrile


minimal leukocytosis  today


blood cx- neg x 2


wound cx- MRSA, ESBL e.coli


foot xray- OM as per report.


chest wound cx- negative





plan-


as per podiatry no further surgical intervention needed at this time.


since OM on plain xray advise to treat with IV abx for at least 4-6 weeks as 

outpatient.


based on the Id and sensitivity of the foot cx result advise  to  continue 

meropenem (renal dose ) and to continue with vancomycin .


 advise to get vancomycin 1 gram post HD days for total of 6 weeks and 

Meropenem 1 gram IV daily for total of 6 weeks.


keep vanco trough <15.


check wbc in am.








All above d/w patient and the NP shaunna at length.


.

## 2017-06-22 NOTE — CP.PCM.PN
Subjective





- Date & Time of Evaluation


Date of Evaluation: 06/22/17


Time of Evaluation: 14:00





- Subjective


Subjective: 





Pt s/e at bedside this AM. NAEO. Patient underwent a PICC today successfully. 

Patient was resting comfortably when we went in. We removed the femoral TLC 

without any complications





Objective





- Vital Signs/Intake and Output


Vital Signs (last 24 hours): 


 











Temp Pulse Resp BP Pulse Ox


 


 98 F   84   18   148/87   99 


 


 06/22/17 11:21  06/22/17 11:21  06/22/17 11:21  06/22/17 11:21 06/22/17 11:21











- Medications


Medications: 


 Current Medications





Amlodipine Besylate (Norvasc)  10 mg PO DAILY [facility]


   Last Admin: 06/22/17 09:38 Dose:  10 mg


Aspirin (Ecotrin)  81 mg PO DAILY [facility]


   Last Admin: 06/22/17 09:35 Dose:  81 mg


Atorvastatin Calcium (Lipitor)  80 mg PO DAILY [facility]


   Last Admin: 06/22/17 09:36 Dose:  80 mg


Collagenase (Santyl)  1 applic TOP DAILY [facility]


   Last Admin: 06/20/17 14:35 Dose:  1 unit


Epoetin Edgardo (Procrit)  8,000 unit IV TTS [facility]


Heparin Sodium (Porcine) (Heparin)  5,000 units SC Q12 [facility]


   PRN Reason: Protocol


   Last Admin: 06/21/17 21:52 Dose:  5,000 units


Vancomycin HCl 1 gm/ Sodium (Chloride)  250 mls @ 166.667 mls/hr IVPB DAILY [facility]


   Last Admin: 06/20/17 10:47 Dose:  166.667 mls/hr


Meropenem 1 gm/ Sodium (Chloride)  100 mls @ 100 mls/hr IVPB DAILY [facility]


   Last Admin: 06/22/17 09:37 Dose:  100 mls/hr


Insulin Detemir (Levemir)  30 units SC HS [facility]


   Last Admin: 06/21/17 21:51 Dose:  30 units


Levothyroxine Sodium (Synthroid)  75 mcg PO DAILY@0630 [facility]


   Last Admin: 06/22/17 05:52 Dose:  75 mcg


Metoprolol Tartrate (Lopressor)  100 mg PO Q12 [facility]


   Last Admin: 06/22/17 09:36 Dose:  100 mg


Oxycodone/Acetaminophen (Percocet 5/325 Mg Tab)  2 tab PO Q4 [facility]


   PRN Reason: Pain, severe (8-10)


   Stop: 06/24/17 17:01


   Last Admin: 06/22/17 03:46 Dose:  2 tab


Pantoprazole Sodium (Protonix Ec Tab)  40 mg PO DAILY@0600 [facility]


   Last Admin: 06/22/17 05:52 Dose:  40 mg


Pregabalin (Lyrica)  50 mg PO DAILY [facility]


   Last Admin: 06/22/17 09:48 Dose:  50 mg


Sevelamer HCl (Renagel)  800 mg PO TID [facility]


   Last Admin: 06/22/17 09:41 Dose:  800 mg


Sitagliptin Phosphate (Januvia)  25 mg PO DAILY [facility]


   Last Admin: 06/22/17 09:42 Dose:  25 mg


Valsartan (Diovan)  160 mg PO DAILY [facility]


   Last Admin: 06/22/17 09:35 Dose:  160 mg











- Labs


Labs: 


 





 06/21/17 05:15 





 06/21/17 05:15 





 











PT  15.2 Seconds (9.8-13.1)  H  06/18/17  06:05    


 


INR  1.3  (0.9-1.2)  H  06/18/17  06:05    


 


APTT  30.8 Seconds (25.6-37.1)   06/18/17  06:05    














- Constitutional


Appears: Non-toxic, No Acute Distress





- Head Exam


Head Exam: ATRAUMATIC, NORMOCEPHALIC





- Eye Exam


Eye Exam: Normal appearance.  absent: Conjunctival injection, Scleral icterus





- ENT Exam


ENT Exam: Mucous Membranes Moist, Normal Oropharynx





- Respiratory Exam


Respiratory Exam: NORMAL BREATHING PATTERN.  absent: Accessory Muscle Use, 

Wheezes





- GI/Abdominal Exam


GI & Abdominal Exam: absent: Distended





- Extremities Exam


Additional comments: 





TLC in the right groin without sign of hematoma, drainage, or infection





- Neurological Exam


Neurological Exam: Alert, Awake, Oriented x3





- Psychiatric Exam


Psychiatric exam: Flat Affect, Normal Mood





- Skin


Skin Exam: Dry, Normal Color, Warm


Additional comments: 





Chronic wound on chest covered in dressing intact and medihoney, no active 

bleeding





Assessment and Plan





- Assessment and Plan (Free Text)


Assessment: 





77F with extensive past medical history with TLC in the right groin and chronic 

skin wound of the chest POD#1 s/p punch biopsy


-PICC placed today


-Removed TLC


-Skin wound doing well, follow up pathology


At this time surgery will sign off the patient. Please contact surgery again 

with any concerns or questions.





Discussed with Dr. Philip Hamm, PGY1


[phone]

## 2017-06-22 NOTE — CP.PCM.PN
Subjective





- Date & Time of Evaluation


Date of Evaluation: 06/22/17


Time of Evaluation: 16:50





- Subjective


Subjective: 





F/U  L foot Osteomyelitis.





Pt continue with pain in L leg and L foot, having HD now, L arm PICC inserted 

today.





Objective





- Vital Signs/Intake and Output


Vital Signs (last 24 hours): 


 











Temp Pulse Resp BP Pulse Ox


 


 98.2 F   72   20   124/59 L  93 L


 


 06/22/17 16:06  06/22/17 16:06  06/22/17 16:06  06/22/17 16:06  06/22/17 16:06











- Medications


Medications: 


 Current Medications





Amlodipine Besylate (Norvasc)  10 mg PO DAILY [facility]


   Last Admin: 06/22/17 09:38 Dose:  10 mg


Aspirin (Ecotrin)  81 mg PO DAILY [facility]


   Last Admin: 06/22/17 09:35 Dose:  81 mg


Atorvastatin Calcium (Lipitor)  80 mg PO DAILY [facility]


   Last Admin: 06/22/17 09:36 Dose:  80 mg


Collagenase (Santyl)  1 applic TOP DAILY [facility]


   Last Admin: 06/20/17 14:35 Dose:  1 unit


Epoetin Edgardo (Procrit)  8,000 unit IV TTS [facility]


Heparin Sodium (Porcine) (Heparin)  5,000 units SC Q12 [facility]


   PRN Reason: Protocol


   Last Admin: 06/22/17 09:00 Dose:  5,000 units


Vancomycin HCl 1 gm/ Sodium (Chloride)  250 mls @ 166.667 mls/hr IVPB DAILY [facility]


   Last Admin: 06/20/17 10:47 Dose:  166.667 mls/hr


Meropenem 1 gm/ Sodium (Chloride)  100 mls @ 100 mls/hr IVPB DAILY [facility]


   Last Admin: 06/22/17 09:37 Dose:  100 mls/hr


Insulin Detemir (Levemir)  30 units SC HS [facility]


   Last Admin: 06/21/17 21:51 Dose:  30 units


Levothyroxine Sodium (Synthroid)  75 mcg PO DAILY@0630 [facility]


   Last Admin: 06/22/17 05:52 Dose:  75 mcg


Metoprolol Tartrate (Lopressor)  100 mg PO Q12 [facility]


   Last Admin: 06/22/17 09:36 Dose:  100 mg


Oxycodone/Acetaminophen (Percocet 5/325 Mg Tab)  2 tab PO Q4 PRN


   PRN Reason: Pain, severe (8-10)


   Stop: 06/24/17 17:01


   Last Admin: 06/22/17 03:46 Dose:  2 tab


Pantoprazole Sodium (Protonix Ec Tab)  40 mg PO DAILY@0600 [facility]


   Last Admin: 06/22/17 05:52 Dose:  40 mg


Pregabalin (Lyrica)  50 mg PO DAILY [facility]


   Last Admin: 06/22/17 09:48 Dose:  50 mg


Sevelamer HCl (Renagel)  800 mg PO TID [facility]


   Last Admin: 06/22/17 17:40 Dose:  800 mg


Sitagliptin Phosphate (Januvia)  25 mg PO DAILY [facility]


   Last Admin: 06/22/17 09:42 Dose:  25 mg


Valsartan (Diovan)  160 mg PO DAILY [facility]


   Last Admin: 06/22/17 09:35 Dose:  160 mg











- Labs


Labs: 


 





 06/21/17 05:15 





 06/21/17 05:15 





 











PT  15.2 Seconds (9.8-13.1)  H  06/18/17  06:05    


 


INR  1.3  (0.9-1.2)  H  06/18/17  06:05    


 


APTT  30.8 Seconds (25.6-37.1)   06/18/17  06:05    














- Constitutional


Appears: No Acute Distress, Chronically Ill





- Head Exam


Head Exam: NORMAL INSPECTION





- Eye Exam


Eye Exam: PERRL





- ENT Exam


ENT Exam: Normal Oropharynx





- Neck Exam


Neck Exam: Normal Inspection





- Respiratory Exam


Respiratory Exam: NORMAL BREATHING PATTERN





- Cardiovascular Exam


Cardiovascular Exam: Irregular Rhythm





- GI/Abdominal Exam


GI & Abdominal Exam: Soft, Tenderness (RUQ), Normal Bowel Sounds.  absent: 

Guarding, Rebound





- Extremities Exam


Extremities Exam: Tenderness (L foot, dressing in place)


Additional comments: 


 L arm PICC line in place.





- Back Exam


Back Exam: NORMAL INSPECTION





- Neurological Exam


Neurological Exam: Alert, Oriented x3


Additional comments: 


Decreased sensation R-L foot, no focal motor deficit





- Psychiatric Exam


Psychiatric exam: Normal Mood





- Skin


Skin Exam: Warm (mild open skin lesion on chest)





Assessment and Plan


(1) Osteomyelitis of left foot


Status: Acute   





(2) Infection of amputation stump


Status: Acute   





(3) ESRD on hemodialysis


Status: Acute   





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


Status: Acute   





(5) Skin lesion of chest wall


Status: Acute   





(6) Abdominal pain


Status: Acute   





- Assessment and Plan (Free Text)


Plan: 





Continue Merren, Pecocet and rest of Tx.

## 2017-06-22 NOTE — CP.PCM.PN
Subjective





- Date & Time of Evaluation


Date of Evaluation: 06/22/17


Time of Evaluation: 06:30





- Subjective


Subjective: 





77 y/o female seen at bedside today 11 weeks s/p left foot TMA concerning left 

foot wound dehiscence. Patient continues to experience mild to moderate pain at 

the surgical site. Dressing is clean, dry and intact. Patient denies f/n/v/c/sob

/cp at this time. Patient has no other pedal complaints. 





Objective





- Vital Signs/Intake and Output


Vital Signs (last 24 hours): 


 











Temp Pulse Resp BP Pulse Ox


 


 98.3 F   61   20   150/70   91 L


 


 06/22/17 00:06  06/22/17 00:06  06/22/17 00:06  06/22/17 00:06  06/22/17 00:06











- Medications


Medications: 


 Current Medications





Amlodipine Besylate (Norvasc)  10 mg PO DAILY [facility]


   Last Admin: 06/21/17 08:48 Dose:  10 mg


Aspirin (Ecotrin)  81 mg PO DAILY [facility]


   Last Admin: 06/21/17 08:52 Dose:  81 mg


Atorvastatin Calcium (Lipitor)  80 mg PO DAILY [facility]


   Last Admin: 06/21/17 08:53 Dose:  80 mg


Collagenase (Santyl)  1 applic TOP DAILY [facility]


   Last Admin: 06/20/17 14:35 Dose:  1 unit


Epoetin Edgardo (Procrit)  4,000 unit IV TTS [facility]


   Last Admin: 06/20/17 14:35 Dose:  4,000 unit


Heparin Sodium (Porcine) (Heparin)  5,000 units SC Q12 [facility]


   PRN Reason: Protocol


   Last Admin: 06/21/17 21:52 Dose:  5,000 units


Vancomycin HCl 1 gm/ Sodium (Chloride)  250 mls @ 166.667 mls/hr IVPB DAILY [facility]


   Last Admin: 06/20/17 10:47 Dose:  166.667 mls/hr


Meropenem 1 gm/ Sodium (Chloride)  100 mls @ 100 mls/hr IVPB DAILY [facility]


   Last Admin: 06/21/17 08:49 Dose:  100 mls/hr


Insulin Detemir (Levemir)  30 units SC HS [facility]


   Last Admin: 06/21/17 21:51 Dose:  30 units


Levothyroxine Sodium (Synthroid)  75 mcg PO DAILY@0630 [facility]


   Last Admin: 06/22/17 05:52 Dose:  75 mcg


Metoprolol Tartrate (Lopressor)  100 mg PO Q12 [facility]


   Last Admin: 06/21/17 21:51 Dose:  100 mg


Oxycodone/Acetaminophen (Percocet 5/325 Mg Tab)  2 tab PO Q4 PRN


   PRN Reason: Pain, severe (8-10)


   Stop: 06/24/17 17:01


   Last Admin: 06/22/17 03:46 Dose:  2 tab


Pantoprazole Sodium (Protonix Ec Tab)  40 mg PO DAILY@0600 [facility]


   Last Admin: 06/22/17 05:52 Dose:  40 mg


Pregabalin (Lyrica)  50 mg PO DAILY [facility]


   Last Admin: 06/21/17 09:00 Dose:  50 mg


Sevelamer HCl (Renagel)  800 mg PO TID [facility]


   Last Admin: 06/21/17 17:58 Dose:  800 mg


Sitagliptin Phosphate (Januvia)  25 mg PO DAILY [facility]


   Last Admin: 06/21/17 09:00 Dose:  25 mg


Valsartan (Diovan)  160 mg PO DAILY [facility]


   Last Admin: 06/21/17 08:52 Dose:  160 mg











- Labs


Labs: 


 





 06/21/17 05:15 





 06/21/17 05:15 





 











PT  15.2 Seconds (9.8-13.1)  H  06/18/17  06:05    


 


INR  1.3  (0.9-1.2)  H  06/18/17  06:05    


 


APTT  30.8 Seconds (25.6-37.1)   06/18/17  06:05    














- Constitutional


Appears: Well, Non-toxic, No Acute Distress





- Extremities Exam


Additional comments: 





Left lower extremity focused exam:





DERM: Open wound noted to left foot stump surgical site measuring 5cm x 2cm x 

0.2cm. Wound base is necrotic. No noted purulence, drainage, or malodor. Wound 

does not probe to bone. Periwound is erythematous.





VASC: DP 2/4. PT pulse non-palpable. No edema noted to the LE.  Skin 

temperature warm to warm from proximal to distal.





ORTHO: Mild tenderness to palpation at TMA surgical site





NEURO: Gross sensation diminished








- Neurological Exam


Neurological Exam: Alert, Awake, Oriented x3





Assessment and Plan





- Assessment and Plan (Free Text)


Assessment: 





77 y/o female with wound dehiscence to Left TMA site (DOS 3/31/17)


Plan: 


Patient was seen and evaluated at bedside


Discussed plan with Dr. Serna


Chart, labs and vitals reviewed


Open wound dressed with saline wet to dry dressing, Santyl, DSD, and kerlix.


Per Dr. Serna pt will require daily dressing changes with santyl x 4-6 weeks 

at nursing home 


C/w IV abx as per ID - Dr. Chris changed to Vanco and Meropenem, 

recommending IV abx for 4-6 weeks


Left foot wound culture positive for E.coli and positive for MRSA


No surgical interventions indicated at this time


Continue with offloading prevalon boots to both feet to be worn at all times 

while in bed


Stable per podiatry


Podiatry will continue to follow

## 2017-06-22 NOTE — [id number].[email or web address]
Surgeon's Initial Post Op Note





- Surgeon's Notes


Surgeon: Yang


Assistant: None


Type of Anesthesia: Local


Pre-Operative Diagnosis: Infection


Operative Findings: Patent left basilic vein. Possible occlusion/stenosis in 

the brachicephalic vein junction.


Post-Operative Diagnosis: Infection.


Operation Performed: Left arm basilic vein 4F SL 35cm PICC placed with tip at 

the brachiocephalic vein junction.


Specimen/Specimens Removed: None.


Estimated Blood Loss: EBL {In ML}: 1


Blood Products Given: N/A


Drains Used: No Drains


Post-Op Condition: Good


Date of Surgery/Procedure: 06/22/17


Time of Surgery/Procedure: 11:30

## 2017-06-22 NOTE — CP.PCM.PN
Subjective





- Date & Time of Evaluation


Date of Evaluation: 06/22/17


Time of Evaluation: 11:20





- Subjective


Subjective: 





Patient about to have hemodialysis now


No significant changes reported


Vital signs stable


Chest no rales


Heart no rubs


Abdomen soft


Extremity no edema


Impression and plan


End stage renal disease hemodialysis


Scheduled was sodium bath 138


Potassium bath 2.5 mEq


Bicarbonate bath 34


Patient has anemia I disease EPO to 8000u


Continue the same otherwise





Objective





- Vital Signs/Intake and Output


Vital Signs (last 24 hours): 


 











Temp Pulse Resp BP Pulse Ox


 


 97.8 F   70   20   160/71 H  93 L


 


 06/22/17 08:18  06/22/17 09:38  06/22/17 08:18  06/22/17 09:38  06/22/17 08:18











- Medications


Medications: 


 Current Medications





Amlodipine Besylate (Norvasc)  10 mg PO DAILY [facility]


   Last Admin: 06/22/17 09:38 Dose:  10 mg


Aspirin (Ecotrin)  81 mg PO DAILY [facility]


   Last Admin: 06/22/17 09:35 Dose:  81 mg


Atorvastatin Calcium (Lipitor)  80 mg PO DAILY [facility]


   Last Admin: 06/22/17 09:36 Dose:  80 mg


Collagenase (Santyl)  1 applic TOP DAILY [facility]


   Last Admin: 06/20/17 14:35 Dose:  1 unit


Epoetin Edgardo (Procrit)  8,000 unit IV TTS [facility]


Heparin Sodium (Porcine) (Heparin)  5,000 units SC Q12 [facility]


   PRN Reason: Protocol


   Last Admin: 06/21/17 21:52 Dose:  5,000 units


Vancomycin HCl 1 gm/ Sodium (Chloride)  250 mls @ 166.667 mls/hr IVPB DAILY [facility]


   Last Admin: 06/20/17 10:47 Dose:  166.667 mls/hr


Meropenem 1 gm/ Sodium (Chloride)  100 mls @ 100 mls/hr IVPB DAILY [facility]


   Last Admin: 06/22/17 09:37 Dose:  100 mls/hr


Insulin Detemir (Levemir)  30 units SC HS [facility]


   Last Admin: 06/21/17 21:51 Dose:  30 units


Levothyroxine Sodium (Synthroid)  75 mcg PO DAILY@0630 [facility]


   Last Admin: 06/22/17 05:52 Dose:  75 mcg


Metoprolol Tartrate (Lopressor)  100 mg PO Q12 [facility]


   Last Admin: 06/22/17 09:36 Dose:  100 mg


Oxycodone/Acetaminophen (Percocet 5/325 Mg Tab)  2 tab PO Q4 PRN


   PRN Reason: Pain, severe (8-10)


   Stop: 06/24/17 17:01


   Last Admin: 06/22/17 03:46 Dose:  2 tab


Pantoprazole Sodium (Protonix Ec Tab)  40 mg PO DAILY@0600 [facility]


   Last Admin: 06/22/17 05:52 Dose:  40 mg


Pregabalin (Lyrica)  50 mg PO DAILY [facility]


   Last Admin: 06/22/17 09:48 Dose:  50 mg


Sevelamer HCl (Renagel)  800 mg PO TID [facility]


   Last Admin: 06/22/17 09:41 Dose:  800 mg


Sitagliptin Phosphate (Januvia)  25 mg PO DAILY [facility]


   Last Admin: 06/22/17 09:42 Dose:  25 mg


Valsartan (Diovan)  160 mg PO DAILY [facility]


   Last Admin: 06/22/17 09:35 Dose:  160 mg











- Labs


Labs: 


 





 06/21/17 05:15 





 06/21/17 05:15 





 











PT  15.2 Seconds (9.8-13.1)  H  06/18/17  06:05    


 


INR  1.3  (0.9-1.2)  H  06/18/17  06:05    


 


APTT  30.8 Seconds (25.6-37.1)   06/18/17  06:05    














Assessment and Plan


(1) ESRD on hemodialysis


Status: Acute

## 2017-06-22 NOTE — CP.PCM.PN
Subjective





- Date & Time of Evaluation


Date of Evaluation: 06/22/17


Time of Evaluation: 10:00





- Subjective


Subjective: 





no new complaints





Objective





- Vital Signs/Intake and Output


Vital Signs (last 24 hours): 


 











Temp Pulse Resp BP Pulse Ox


 


 97.8 F   70   20   160/71 H  93 L


 


 06/22/17 08:18  06/22/17 09:38  06/22/17 08:18  06/22/17 09:38  06/22/17 08:18











- Medications


Medications: 


 Current Medications





Amlodipine Besylate (Norvasc)  10 mg PO DAILY [facility]


   Last Admin: 06/22/17 09:38 Dose:  10 mg


Aspirin (Ecotrin)  81 mg PO DAILY [facility]


   Last Admin: 06/22/17 09:35 Dose:  81 mg


Atorvastatin Calcium (Lipitor)  80 mg PO DAILY [facility]


   Last Admin: 06/22/17 09:36 Dose:  80 mg


Collagenase (Santyl)  1 applic TOP DAILY [facility]


   Last Admin: 06/20/17 14:35 Dose:  1 unit


Epoetin Edgardo (Procrit)  4,000 unit IV TTS [facility]


   Last Admin: 06/20/17 14:35 Dose:  4,000 unit


Heparin Sodium (Porcine) (Heparin)  5,000 units SC Q12 [facility]


   PRN Reason: Protocol


   Last Admin: 06/21/17 21:52 Dose:  5,000 units


Vancomycin HCl 1 gm/ Sodium (Chloride)  250 mls @ 166.667 mls/hr IVPB DAILY [facility]


   Last Admin: 06/20/17 10:47 Dose:  166.667 mls/hr


Meropenem 1 gm/ Sodium (Chloride)  100 mls @ 100 mls/hr IVPB DAILY [facility]


   Last Admin: 06/22/17 09:37 Dose:  100 mls/hr


Insulin Detemir (Levemir)  30 units SC HS [facility]


   Last Admin: 06/21/17 21:51 Dose:  30 units


Levothyroxine Sodium (Synthroid)  75 mcg PO DAILY@0630 [facility]


   Last Admin: 06/22/17 05:52 Dose:  75 mcg


Metoprolol Tartrate (Lopressor)  100 mg PO Q12 [facility]


   Last Admin: 06/22/17 09:36 Dose:  100 mg


Oxycodone/Acetaminophen (Percocet 5/325 Mg Tab)  2 tab PO Q4 PRN


   PRN Reason: Pain, severe (8-10)


   Stop: 06/24/17 17:01


   Last Admin: 06/22/17 03:46 Dose:  2 tab


Pantoprazole Sodium (Protonix Ec Tab)  40 mg PO DAILY@0600 [facility]


   Last Admin: 06/22/17 05:52 Dose:  40 mg


Pregabalin (Lyrica)  50 mg PO DAILY [facility]


   Last Admin: 06/22/17 09:48 Dose:  50 mg


Sevelamer HCl (Renagel)  800 mg PO TID [facility]


   Last Admin: 06/22/17 09:41 Dose:  800 mg


Sitagliptin Phosphate (Januvia)  25 mg PO DAILY [facility]


   Last Admin: 06/22/17 09:42 Dose:  25 mg


Valsartan (Diovan)  160 mg PO DAILY [facility]


   Last Admin: 06/22/17 09:35 Dose:  160 mg











- Labs


Labs: 


 





 06/21/17 05:15 





 06/21/17 05:15 





 











PT  15.2 Seconds (9.8-13.1)  H  06/18/17  06:05    


 


INR  1.3  (0.9-1.2)  H  06/18/17  06:05    


 


APTT  30.8 Seconds (25.6-37.1)   06/18/17  06:05    














- GI/Abdominal Exam


GI & Abdominal Exam: Soft, Normal Bowel Sounds





Assessment and Plan





- Assessment and Plan (Free Text)


Assessment: 





78 yo female with abd discomfort





await bx results


laxatives prn

## 2017-06-23 RX ADMIN — COLLAGENASE SANTYL SCH UNIT: 250 OINTMENT TOPICAL at 14:49

## 2017-06-23 RX ADMIN — OXYCODONE HYDROCHLORIDE AND ACETAMINOPHEN PRN TAB: 5; 325 TABLET ORAL at 05:58

## 2017-06-23 RX ADMIN — PANTOPRAZOLE SODIUM SCH MG: 40 TABLET, DELAYED RELEASE ORAL at 05:57

## 2017-06-23 RX ADMIN — OXYCODONE HYDROCHLORIDE AND ACETAMINOPHEN PRN TAB: 5; 325 TABLET ORAL at 18:47

## 2017-06-23 RX ADMIN — INSULIN DETEMIR SCH UNITS: 100 INJECTION, SOLUTION SUBCUTANEOUS at 22:51

## 2017-06-23 NOTE — CP.PCM.PN
Subjective





- Date & Time of Evaluation


Date of Evaluation: 06/23/17


Time of Evaluation: 13:10





- Subjective


Subjective: 





Patient in bed awake consciousness


No acute distress


Eating okay with good appetite


Physical exam


Chest clear no rales


Heart no rubs


Abdomen soft


Extremity no edema


Lab data reviewed because of the anemia we'll increase EPO yesterday


Continue hemodialysis TTS


Antibiotics as per primary team





Objective





- Vital Signs/Intake and Output


Vital Signs (last 24 hours): 


 











Temp Pulse Resp BP Pulse Ox


 


 98.4 F   71   20   108/61   92 L


 


 06/23/17 09:00  06/23/17 11:21  06/23/17 09:00  06/23/17 11:21  06/23/17 09:00











- Medications


Medications: 


 Current Medications





Amlodipine Besylate (Norvasc)  10 mg PO DAILY [facility]


   Last Admin: 06/23/17 11:21 Dose:  10 mg


Aspirin (Ecotrin)  81 mg PO DAILY [facility]


   Last Admin: 06/23/17 11:19 Dose:  81 mg


Atorvastatin Calcium (Lipitor)  80 mg PO DAILY [facility]


   Last Admin: 06/23/17 11:19 Dose:  80 mg


Collagenase (Santyl)  1 applic TOP DAILY [facility]


   Last Admin: 06/20/17 14:35 Dose:  1 unit


Epoetin Edgardo (Procrit)  8,000 unit IV TTS [facility]


Heparin Sodium (Porcine) (Heparin)  5,000 units SC Q12 [facility]


   PRN Reason: Protocol


   Last Admin: 06/23/17 11:19 Dose:  5,000 units


Vancomycin HCl 1 gm/ Sodium (Chloride)  250 mls @ 166.667 mls/hr IVPB DAILY [facility]


   Last Admin: 06/20/17 10:47 Dose:  166.667 mls/hr


Meropenem 1 gm/ Sodium (Chloride)  100 mls @ 100 mls/hr IVPB DAILY [facility]


   Last Admin: 06/23/17 11:20 Dose:  100 mls/hr


Insulin Detemir (Levemir)  30 units SC HS [facility]


   Last Admin: 06/22/17 21:40 Dose:  30 units


Levothyroxine Sodium (Synthroid)  75 mcg PO DAILY@0630 [facility]


   Last Admin: 06/23/17 05:57 Dose:  75 mcg


Metoprolol Tartrate (Lopressor)  100 mg PO Q12 [facility]


   Last Admin: 06/23/17 11:20 Dose:  100 mg


Oxycodone/Acetaminophen (Percocet 5/325 Mg Tab)  2 tab PO Q4 PRN


   PRN Reason: Pain, severe (8-10)


   Stop: 06/24/17 17:01


   Last Admin: 06/23/17 05:58 Dose:  2 tab


Pantoprazole Sodium (Protonix Ec Tab)  40 mg PO DAILY@0600 [facility]


   Last Admin: 06/23/17 05:57 Dose:  40 mg


Pregabalin (Lyrica)  50 mg PO DAILY [facility]


   Last Admin: 06/23/17 11:27 Dose:  50 mg


Sevelamer HCl (Renagel)  800 mg PO TID [facility]


   Last Admin: 06/23/17 11:21 Dose:  800 mg


Sitagliptin Phosphate (Januvia)  25 mg PO DAILY [facility]


   Last Admin: 06/23/17 11:19 Dose:  25 mg


Valsartan (Diovan)  160 mg PO DAILY [facility]


   Last Admin: 06/23/17 11:18 Dose:  160 mg











- Labs


Labs: 


 





 06/22/17 18:29 





 06/22/17 18:29 





 











PT  15.2 Seconds (9.8-13.1)  H  06/18/17  06:05    


 


INR  1.3  (0.9-1.2)  H  06/18/17  06:05    


 


APTT  30.8 Seconds (25.6-37.1)   06/18/17  06:05    














Assessment and Plan


(1) ESRD on hemodialysis


Status: Acute

## 2017-06-23 NOTE — CP.PCM.PN
Subjective





- Date & Time of Evaluation


Date of Evaluation: 06/23/17





- Subjective


Subjective: 





F/U Osteomyelitis L foot.


Pain  L foot  requiring  pain medication, edema  RUE





Objective





- Vital Signs/Intake and Output


Vital Signs (last 24 hours): 


 











Temp Pulse Resp BP Pulse Ox


 


 98.4 F   71   20   108/61   92 L


 


 06/23/17 09:00  06/23/17 11:21  06/23/17 09:00  06/23/17 11:21  06/23/17 09:00











- Medications


Medications: 


 Current Medications





Amlodipine Besylate (Norvasc)  10 mg PO DAILY [facility]


   Last Admin: 06/23/17 11:21 Dose:  10 mg


Aspirin (Ecotrin)  81 mg PO DAILY [facility]


   Last Admin: 06/23/17 11:19 Dose:  81 mg


Atorvastatin Calcium (Lipitor)  80 mg PO DAILY [facility]


   Last Admin: 06/23/17 11:19 Dose:  80 mg


Collagenase (Santyl)  1 applic TOP DAILY [facility]


   Last Admin: 06/23/17 14:49 Dose:  1 unit


Epoetin Edgardo (Procrit)  8,000 unit IV TTS [facility]


Heparin Sodium (Porcine) (Heparin)  5,000 units SC Q12 [facility]


   PRN Reason: Protocol


   Last Admin: 06/23/17 11:19 Dose:  5,000 units


Vancomycin HCl 1 gm/ Sodium (Chloride)  250 mls @ 166.667 mls/hr IVPB DAILY [facility]


   Last Admin: 06/20/17 10:47 Dose:  166.667 mls/hr


Meropenem 1 gm/ Sodium (Chloride)  100 mls @ 100 mls/hr IVPB DAILY [facility]


   Last Admin: 06/23/17 11:20 Dose:  100 mls/hr


Insulin Detemir (Levemir)  30 units SC HS [facility]


   Last Admin: 06/22/17 21:40 Dose:  30 units


Levothyroxine Sodium (Synthroid)  75 mcg PO DAILY@0630 [facility]


   Last Admin: 06/23/17 05:57 Dose:  75 mcg


Metoprolol Tartrate (Lopressor)  100 mg PO Q12 [facility]


   Last Admin: 06/23/17 11:20 Dose:  100 mg


Oxycodone/Acetaminophen (Percocet 5/325 Mg Tab)  2 tab PO Q4 [facility]


   PRN Reason: Pain, severe (8-10)


   Stop: 06/24/17 17:01


   Last Admin: 06/23/17 05:58 Dose:  2 tab


Pantoprazole Sodium (Protonix Ec Tab)  40 mg PO DAILY@0600 [facility]


   Last Admin: 06/23/17 05:57 Dose:  40 mg


Pregabalin (Lyrica)  50 mg PO DAILY [facility]


   Last Admin: 06/23/17 11:27 Dose:  50 mg


Sevelamer HCl (Renagel)  800 mg PO TID [facility]


   Last Admin: 06/23/17 14:49 Dose:  800 mg


Sitagliptin Phosphate (Januvia)  25 mg PO DAILY [facility]


   Last Admin: 06/23/17 11:19 Dose:  25 mg


Valsartan (Diovan)  160 mg PO DAILY [facility]


   Last Admin: 06/23/17 11:18 Dose:  160 mg











- Labs


Labs: 


 





 06/22/17 18:29 





 06/22/17 18:29 





 











PT  15.2 Seconds (9.8-13.1)  H  06/18/17  06:05    


 


INR  1.3  (0.9-1.2)  H  06/18/17  06:05    


 


APTT  30.8 Seconds (25.6-37.1)   06/18/17  06:05    














- Constitutional


Appears: No Acute Distress, Chronically Ill





- Head Exam


Head Exam: NORMAL INSPECTION





- Eye Exam


Eye Exam: PERRL





- ENT Exam


ENT Exam: Normal Oropharynx





- Neck Exam


Neck Exam: Normal Inspection





- Respiratory Exam


Respiratory Exam: NORMAL BREATHING PATTERN





- Cardiovascular Exam


Cardiovascular Exam: Irregular Rhythm





- GI/Abdominal Exam


GI & Abdominal Exam: Soft, Normal Bowel Sounds





- Extremities Exam


Extremities Exam: Tenderness (L foot TMA, dressing in place)


Additional comments: 





Swelling ,edema  RUE , AV shunt  R arm , PICC line L arm in place





- Back Exam


Back Exam: NORMAL INSPECTION





- Neurological Exam


Neurological Exam: Alert, Oriented x3


Additional comments: 





Decreased sensation R-L foot, no focal motor deficit.





- Psychiatric Exam


Psychiatric exam: Normal Mood





- Skin


Skin Exam: Warm


Additional comments: 





open skin lesion mid upper chest





Assessment and Plan


(1) Osteomyelitis of left foot


Status: Acute   





(2) Infection of amputation stump


Status: Acute   





(3) ESRD on hemodialysis


Status: Acute   





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


Status: Acute   





(5) Skin lesion of chest wall


Status: Acute   





(6) Abdominal pain


Status: Acute   





(7) Edema of extremity of unknown cause


Status: Acute   





(8) Edema of extremity of unknown cause


Status: Acute   





(9) Basal cell carcinoma of chest


Status: Acute   





- Assessment and Plan (Free Text)


Plan: 





Dopler venous RUE, CT Chest ,  continue Vanco , Merren , Percocet, HD and rest 

of Rx , Bx Skin chest wall lesion Basal cell CA , Hematology consult

## 2017-06-23 NOTE — VASCULAR
Procedure: Ultrasound and fluoroscopically placed left upper 

extremity PICC.



Clinical indication: Long-term IV antibiotics.



Technique:



The relative risks and indications of the procedure were explained to 

the patient and written informed consent obtained. The patient was 

placed supine on the angiographic table and the left arm prepped and 

draped in the usual sterile fashion. A tourniquet was applied to the 

left axilla. 



1% lidocaine was used to anesthetize the skin and soft tissues at the 

puncture site above the elbow. The left basilic vein was punctured 

under direct ultrasound guidance with a micropuncture set. Permanent 

image was stored. A 0.018 guidewire was advanced centrally and used 

to measure the length to the SVC/RA junction. A for Greek single 

-lumen PICC size 35 cm long was advanced to the SVC/RA junction under 

fluoroscopic guidance. The catheter was flushed and secured. The 

patient tolerated the procedure well. 



Postprocedure chest image was obtained to ensure location of the 

catheter tip at the SVC right atrial junction. No postprocedure 

pneumothorax identified. 



Impression:



Ultrasound and fluoroscopically placed left upper extremity PICC. A 4 

Greek single -lumen PICC line size 35 cm long was advanced to the 

SVC/RA junction. PICC is ready for use.

## 2017-06-23 NOTE — CP.PCM.PN
Subjective





- Date & Time of Evaluation


Date of Evaluation: 06/23/17


Time of Evaluation: 06:27





- Subjective


Subjective: 





77 year old female patient seen at bedside today 11 weeks s/p left foot TMA 

with distal stump wound dehiscence. Patient reports persistent pain at the 

surgical site and on the bottom of her foot. Patient denies any calf pain to 

her LLE. Patient denies pain to her RLE. Patient denies N/V/F/C/SOB. No other 

pedal complaints.





Objective





- Vital Signs/Intake and Output


Vital Signs (last 24 hours): 


 











Temp Pulse Resp BP Pulse Ox


 


 98.2 F   81   20   121/61   93 L


 


 06/22/17 16:06  06/22/17 21:38  06/22/17 16:06  06/22/17 21:38  06/22/17 16:06











- Medications


Medications: 


 Current Medications





Amlodipine Besylate (Norvasc)  10 mg PO DAILY [facility]


   Last Admin: 06/22/17 09:38 Dose:  10 mg


Aspirin (Ecotrin)  81 mg PO DAILY [facility]


   Last Admin: 06/22/17 09:35 Dose:  81 mg


Atorvastatin Calcium (Lipitor)  80 mg PO DAILY [facility]


   Last Admin: 06/22/17 09:36 Dose:  80 mg


Collagenase (Santyl)  1 applic TOP DAILY [facility]


   Last Admin: 06/20/17 14:35 Dose:  1 unit


Epoetin Edgardo (Procrit)  8,000 unit IV TTS [facility]


Heparin Sodium (Porcine) (Heparin)  5,000 units SC Q12 [facility]


   PRN Reason: Protocol


   Last Admin: 06/22/17 21:39 Dose:  5,000 units


Vancomycin HCl 1 gm/ Sodium (Chloride)  250 mls @ 166.667 mls/hr IVPB DAILY [facility]


   Last Admin: 06/20/17 10:47 Dose:  166.667 mls/hr


Meropenem 1 gm/ Sodium (Chloride)  100 mls @ 100 mls/hr IVPB DAILY [facility]


   Last Admin: 06/22/17 09:37 Dose:  100 mls/hr


Insulin Detemir (Levemir)  30 units SC HS [facility]


   Last Admin: 06/22/17 21:40 Dose:  30 units


Levothyroxine Sodium (Synthroid)  75 mcg PO DAILY@0630 [facility]


   Last Admin: 06/23/17 05:57 Dose:  75 mcg


Metoprolol Tartrate (Lopressor)  100 mg PO Q12 [facility]


   Last Admin: 06/22/17 21:38 Dose:  100 mg


Oxycodone/Acetaminophen (Percocet 5/325 Mg Tab)  2 tab PO Q4 PRN


   PRN Reason: Pain, severe (8-10)


   Stop: 06/24/17 17:01


   Last Admin: 06/23/17 05:58 Dose:  2 tab


Pantoprazole Sodium (Protonix Ec Tab)  40 mg PO DAILY@0600 [facility]


   Last Admin: 06/23/17 05:57 Dose:  40 mg


Pregabalin (Lyrica)  50 mg PO DAILY [facility]


   Last Admin: 06/22/17 09:48 Dose:  50 mg


Sevelamer HCl (Renagel)  800 mg PO TID [facility]


   Last Admin: 06/22/17 17:40 Dose:  800 mg


Sitagliptin Phosphate (Januvia)  25 mg PO DAILY [facility]


   Last Admin: 06/22/17 09:42 Dose:  25 mg


Valsartan (Diovan)  160 mg PO DAILY [facility]


   Last Admin: 06/22/17 09:35 Dose:  160 mg











- Labs


Labs: 


 





 06/22/17 18:29 





 06/22/17 18:29 





 











PT  15.2 Seconds (9.8-13.1)  H  06/18/17  06:05    


 


INR  1.3  (0.9-1.2)  H  06/18/17  06:05    


 


APTT  30.8 Seconds (25.6-37.1)   06/18/17  06:05    














- Constitutional


Appears: Well, Non-toxic, No Acute Distress





- Extremities Exam


Additional comments: 





Focused LLE exam:





Vasc: DP pulse 2/4. PT pulse nonpalpable. No edema noted. Skin temperature warm 

to cool from proximal to distal b/l.


Neuro: Gross sensation diminished.


Derm: Wound noted to lateral aspect of left foot stump surgical site measuring 

5 x 2 x 0.2 cm. Wound base is 50% necrotic and 50% fibrous. Mild serosanguinous 

drainage noted. No purulence, fluctuance, or malodor noted. Periwound erythema 

noted. Superficial wound noted to distal medial stump surgical site. Wound is 20

% necrotic and 80% fibrous. No drainage noted. No purulence, fluctuance, or 

malodor noted.


Ortho: Pain on palpation to distal stump. Mild pain on palpation noted to 

plantar aspect of foot.





- Neurological Exam


Neurological Exam: Alert, Awake, Oriented x3





- Psychiatric Exam


Psychiatric exam: Normal Affect, Normal Mood





Assessment and Plan





- Assessment and Plan (Free Text)


Assessment: 





76 year old female with wound dehiscence to left TMA surgical site (DOS 3/31/17)


Plan: 





Patient seen and evaluated at bedside


Discussed with attending, Dr. Serna


Charts, labs, vitals reviewed: afebrile, leukocytosis


Santyl was not at bedside - wounds dressed with DSD, Santyl to be reordered 

from pharmacy


Per Dr. Serna pt will require daily dressing changes with santyl x 4-6 weeks 

at nursing home 


C/w IV abx as per ID - Dr. Chris changed to Meropenem, recommending IB abx 

for 4-6 weeks


Left foot wound culture positive for E.coli and positive for MRSA


No surgical interventions indicated at this time


offloading prevalon boots to both feet to be worn at all times while in bed


Stable per podiatry


Podiatry will continue to follow while in-house

## 2017-06-23 NOTE — CP.PCM.PN
Subjective





- Date & Time of Evaluation


Date of Evaluation: 06/23/17


Time of Evaluation: 15:00





- Subjective


Subjective: 





Patient s/e at bedside this AM. NAEO. Patient denies any chest pain or pain in 

the skin wound of her chest.





Objective





- Vital Signs/Intake and Output


Vital Signs (last 24 hours): 


 











Temp Pulse Resp BP Pulse Ox


 


 98.4 F   71   20   108/61   92 L


 


 06/23/17 09:00  06/23/17 11:21  06/23/17 09:00  06/23/17 11:21  06/23/17 09:00











- Medications


Medications: 


 Current Medications





Amlodipine Besylate (Norvasc)  10 mg PO DAILY [facility]


   Last Admin: 06/23/17 11:21 Dose:  10 mg


Aspirin (Ecotrin)  81 mg PO DAILY [facility]


   Last Admin: 06/23/17 11:19 Dose:  81 mg


Atorvastatin Calcium (Lipitor)  80 mg PO DAILY [facility]


   Last Admin: 06/23/17 11:19 Dose:  80 mg


Collagenase (Santyl)  1 applic TOP DAILY [facility]


   Last Admin: 06/20/17 14:35 Dose:  1 unit


Epoetin Edgardo (Procrit)  8,000 unit IV TTS [facility]


Heparin Sodium (Porcine) (Heparin)  5,000 units SC Q12 [facility]


   PRN Reason: Protocol


   Last Admin: 06/23/17 11:19 Dose:  5,000 units


Vancomycin HCl 1 gm/ Sodium (Chloride)  250 mls @ 166.667 mls/hr IVPB DAILY [facility]


   Last Admin: 06/20/17 10:47 Dose:  166.667 mls/hr


Meropenem 1 gm/ Sodium (Chloride)  100 mls @ 100 mls/hr IVPB DAILY [facility]


   Last Admin: 06/23/17 11:20 Dose:  100 mls/hr


Insulin Detemir (Levemir)  30 units SC HS [facility]


   Last Admin: 06/22/17 21:40 Dose:  30 units


Levothyroxine Sodium (Synthroid)  75 mcg PO DAILY@0630 [facility]


   Last Admin: 06/23/17 05:57 Dose:  75 mcg


Metoprolol Tartrate (Lopressor)  100 mg PO Q12 [facility]


   Last Admin: 06/23/17 11:20 Dose:  100 mg


Oxycodone/Acetaminophen (Percocet 5/325 Mg Tab)  2 tab PO Q4 [facility]


   PRN Reason: Pain, severe (8-10)


   Stop: 06/24/17 17:01


   Last Admin: 06/23/17 05:58 Dose:  2 tab


Pantoprazole Sodium (Protonix Ec Tab)  40 mg PO DAILY@0600 [facility]


   Last Admin: 06/23/17 05:57 Dose:  40 mg


Pregabalin (Lyrica)  50 mg PO DAILY [facility]


   Last Admin: 06/23/17 11:27 Dose:  50 mg


Sevelamer HCl (Renagel)  800 mg PO TID [facility]


   Last Admin: 06/23/17 11:21 Dose:  800 mg


Sitagliptin Phosphate (Januvia)  25 mg PO DAILY [facility]


   Last Admin: 06/23/17 11:19 Dose:  25 mg


Valsartan (Diovan)  160 mg PO DAILY [facility]


   Last Admin: 06/23/17 11:18 Dose:  160 mg











- Labs


Labs: 


 





 06/22/17 18:29 





 06/22/17 18:29 





 











PT  15.2 Seconds (9.8-13.1)  H  06/18/17  06:05    


 


INR  1.3  (0.9-1.2)  H  06/18/17  06:05    


 


APTT  30.8 Seconds (25.6-37.1)   06/18/17  06:05    














- Constitutional


Appears: No Acute Distress





- Head Exam


Head Exam: ATRAUMATIC, NORMOCEPHALIC





- Eye Exam


Eye Exam: Normal appearance.  absent: Conjunctival injection, Scleral icterus





- ENT Exam


ENT Exam: Mucous Membranes Moist, Normal Oropharynx





- Respiratory Exam


Respiratory Exam: NORMAL BREATHING PATTERN.  absent: Accessory Muscle Use, 

Respiratory Distress





- GI/Abdominal Exam


GI & Abdominal Exam: absent: Distended





- Extremities Exam


Additional comments: 





right groin site where the TLC was removed is covered in a dressing c/d/i with 

no swelling or surrounding erythema





- Neurological Exam


Neurological Exam: Altered (lethargic), Awake





- Psychiatric Exam


Psychiatric exam: Normal Affect, Normal Mood





- Skin


Skin Exam: Dry, Normal Color, Warm


Additional comments: 





Skin lesion on anterior chest--raised, smooth borders with central ulceration, 

approximately 4xcm horizontal and 3cm vertical dimensions





Assessment and Plan





- Assessment and Plan (Free Text)


Assessment: 





77F with extensive past medical history with chronic skin lesion of the chest 

POD#2 s/p punch biopsy


Pathology from skin lesion: basal cell carcinoma


Surgical recommendation is excision of skin lesion. Given its size it will 

likely need a skin graft after excision. 


Discussed results with Dr. Guillen who consulted heme/onc and is awaiting their 

recommendations regarding treatment


Please contact surgery team once decision has been made about treatment plan 

and patient/family decisions regarding possible surgery


We will continue to follow with you


CT of chest ordered by primary d/t increasing WBC. Will follow up





Discussed with Dr. Philip Hamm, PGY1


[phone]

## 2017-06-23 NOTE — CT
CT chest without IV contrast 



Indication: Leukocytosis 



Technique: 



Contiguous axial images were obtained through the chest without 

intravenous contrast enhancement. Sagittal and coronal 

reconstructions were generated and reviewed. 



This CT exam was performed using 1 or more of the falling dose 

reduction techniques: Automated exposure control, adjustment of the 

MAA and/or kV according to patient size, and/or use of iterative 

reconstruction technique. 







Radiation dose (DLP):  650.74 MGy-cm. 



Comparison: Chest x-ray performed 6/20/17, CT chest without contrast 

performed 1/15/17 



Findings: 



The inferior thyroid gland is not well visualized due to streak 

artifact from left-sided pacer. Question calcified nodule within the 

right lobe. Consider thyroid ultrasound follow-up if indicated. 



Endovascular stent within the right subclavian vein extending to the 

SVC. Dual lead left-sided pacemaker. Cardiomegaly. Dense coronary 

artery and valvular calcifications. Dense atherosclerotic 

calcifications of the aorta. 



Moderate size left lower lobe and lingular consolidation. Small left 

pleural effusion. Right basilar atelectasis. No pneumothorax. 



Small hiatal hernia/distal esophageal wall thickening. 



Limited visualization of the noncontrast upper abdomen: 

Cholecystectomy.  Dense calcifications in the right hepatic lobe of 

unclear significance. Dense vascular calcifications. 







Degenerative changes. Chronic T11 and L1 vertebral body compression 

fracture deformities. Minor fish mouth end-plate deformities 

involving several upper thoracic vertebral bodies. 



Impression: 



Moderate size left lower lobe and lingular consolidation. Small left 

pleural effusion. Right basilar atelectasis. 



Additional findings as above.

## 2017-06-23 NOTE — CP.PCM.PN
Subjective





- Date & Time of Evaluation


Date of Evaluation: 06/23/17


Time of Evaluation: 13:31





- Subjective


Subjective: 





ID Note-





pt. s/p punch bx of her chest skin lesion .


pt. denies any fever.














Objective





- Vital Signs/Intake and Output


Vital Signs (last 24 hours): 


 











Temp Pulse Resp BP Pulse Ox


 


 98.4 F   71   20   108/61   92 L


 


 06/23/17 09:00  06/23/17 11:21  06/23/17 09:00  06/23/17 11:21  06/23/17 09:00











- Medications


Medications: 


 Current Medications





Amlodipine Besylate (Norvasc)  10 mg PO DAILY [facility]


   Last Admin: 06/23/17 11:21 Dose:  10 mg


Aspirin (Ecotrin)  81 mg PO DAILY [facility]


   Last Admin: 06/23/17 11:19 Dose:  81 mg


Atorvastatin Calcium (Lipitor)  80 mg PO DAILY [facility]


   Last Admin: 06/23/17 11:19 Dose:  80 mg


Collagenase (Santyl)  1 applic TOP DAILY [facility]


   Last Admin: 06/20/17 14:35 Dose:  1 unit


Epoetin Edgardo (Procrit)  8,000 unit IV TTS [facility]


Heparin Sodium (Porcine) (Heparin)  5,000 units SC Q12 [facility]


   PRN Reason: Protocol


   Last Admin: 06/23/17 11:19 Dose:  5,000 units


Vancomycin HCl 1 gm/ Sodium (Chloride)  250 mls @ 166.667 mls/hr IVPB DAILY [facility]


   Last Admin: 06/20/17 10:47 Dose:  166.667 mls/hr


Meropenem 1 gm/ Sodium (Chloride)  100 mls @ 100 mls/hr IVPB DAILY [facility]


   Last Admin: 06/23/17 11:20 Dose:  100 mls/hr


Insulin Detemir (Levemir)  30 units SC HS [facility]


   Last Admin: 06/22/17 21:40 Dose:  30 units


Levothyroxine Sodium (Synthroid)  75 mcg PO DAILY@0630 [facility]


   Last Admin: 06/23/17 05:57 Dose:  75 mcg


Metoprolol Tartrate (Lopressor)  100 mg PO Q12 [facility]


   Last Admin: 06/23/17 11:20 Dose:  100 mg


Oxycodone/Acetaminophen (Percocet 5/325 Mg Tab)  2 tab PO Q4 PRN


   PRN Reason: Pain, severe (8-10)


   Stop: 06/24/17 17:01


   Last Admin: 06/23/17 05:58 Dose:  2 tab


Pantoprazole Sodium (Protonix Ec Tab)  40 mg PO DAILY@0600 [facility]


   Last Admin: 06/23/17 05:57 Dose:  40 mg


Pregabalin (Lyrica)  50 mg PO DAILY [facility]


   Last Admin: 06/23/17 11:27 Dose:  50 mg


Sevelamer HCl (Renagel)  800 mg PO TID [facility]


   Last Admin: 06/23/17 11:21 Dose:  800 mg


Sitagliptin Phosphate (Januvia)  25 mg PO DAILY [facility]


   Last Admin: 06/23/17 11:19 Dose:  25 mg


Valsartan (Diovan)  160 mg PO DAILY [facility]


   Last Admin: 06/23/17 11:18 Dose:  160 mg











- Labs


Labs: 


 





 





- Additional Findings


Additional findings: 








- Constitutional


Appears: Non-toxic, No Acute Distress





- Head Exam


Head Exam: ATRAUMATIC





- Eye Exam


Eye Exam: EOMI


Pupil Exam: PERRL





- ENT Exam


ENT Exam: Normal Oropharynx





- Neck Exam


Neck exam: Positive for: Full Rom





- Respiratory Exam


Respiratory Exam: Clear to Auscultation Bilateral, NORMAL BREATHING PATTERN





- Cardiovascular Exam


Cardiovascular Exam: RRR, +S1, +S2


Additional comments: 





midchest superficial area of denuded skin 2 x 4 cm with superfical skin lesion 

and mild pus, no malodor


 Laboratory Results - last 72 hr











  06/20/17 06/20/17 06/20/17





  10:59 16:17 21:47


 


WBC   


 


RBC   


 


Hgb   


 


Hct   


 


MCV   


 


MCH   


 


MCHC   


 


RDW   


 


Plt Count   


 


Sodium   


 


Potassium   


 


Chloride   


 


Carbon Dioxide   


 


Anion Gap   


 


BUN   


 


Creatinine   


 


Est GFR ( Amer)   


 


Est GFR (Non-Af Amer)   


 


POC Glucose (mg/dL)   155 H  157 H


 


Random Glucose   


 


Calcium   


 


Total Bilirubin   


 


AST   


 


ALT   


 


Alkaline Phosphatase   


 


Total Protein   


 


Albumin   


 


Globulin   


 


Albumin/Globulin Ratio   


 


Vancomycin Trough   


 


Hepatitis A IgM Ab  Negative  


 


Hep Bs Antigen  Negative  


 


Hep B Core IgM Ab  Negative  


 


Hepatitis C Antibody  Reactive H  














  06/21/17 06/21/17 06/21/17





  05:14 05:15 05:15


 


WBC   12.8 H 


 


RBC   3.22 L 


 


Hgb   9.1 L 


 


Hct   29.4 L 


 


MCV   91.3 


 


MCH   28.2 


 


MCHC   30.9 L 


 


RDW   17.2 H 


 


Plt Count   377 


 


Sodium    139


 


Potassium    4.2


 


Chloride    98


 


Carbon Dioxide    29


 


Anion Gap    16


 


BUN    19 H


 


Creatinine    3.9 H


 


Est GFR ( Amer)    13


 


Est GFR (Non-Af Amer)    11


 


POC Glucose (mg/dL)  70  


 


Random Glucose    53 L


 


Calcium    8.8


 


Total Bilirubin    0.1 L


 


AST    38 H D


 


ALT    25


 


Alkaline Phosphatase    71


 


Total Protein    7.0


 


Albumin    3.3 L


 


Globulin    3.7


 


Albumin/Globulin Ratio    0.9 L


 


Vancomycin Trough   


 


Hepatitis A IgM Ab   


 


Hep Bs Antigen   


 


Hep B Core IgM Ab   


 


Hepatitis C Antibody   














  06/21/17 06/21/17 06/21/17





  08:22 11:10 15:46


 


WBC   


 


RBC   


 


Hgb   


 


Hct   


 


MCV   


 


MCH   


 


MCHC   


 


RDW   


 


Plt Count   


 


Sodium   


 


Potassium   


 


Chloride   


 


Carbon Dioxide   


 


Anion Gap   


 


BUN   


 


Creatinine   


 


Est GFR ( Amer)   


 


Est GFR (Non-Af Amer)   


 


POC Glucose (mg/dL)   100  132 H


 


Random Glucose   


 


Calcium   


 


Total Bilirubin   


 


AST   


 


ALT   


 


Alkaline Phosphatase   


 


Total Protein   


 


Albumin   


 


Globulin   


 


Albumin/Globulin Ratio   


 


Vancomycin Trough  34.9 H  


 


Hepatitis A IgM Ab   


 


Hep Bs Antigen   


 


Hep B Core IgM Ab   


 


Hepatitis C Antibody   














  06/21/17 06/22/17 06/22/17





  21:48 06:17 06:53


 


WBC   


 


RBC   


 


Hgb   


 


Hct   


 


MCV   


 


MCH   


 


MCHC   


 


RDW   


 


Plt Count   


 


Sodium   


 


Potassium   


 


Chloride   


 


Carbon Dioxide   


 


Anion Gap   


 


BUN   


 


Creatinine   


 


Est GFR ( Amer)   


 


Est GFR (Non-Af Amer)   


 


POC Glucose (mg/dL)  143 H  66  68


 


Random Glucose   


 


Calcium   


 


Total Bilirubin   


 


AST   


 


ALT   


 


Alkaline Phosphatase   


 


Total Protein   


 


Albumin   


 


Globulin   


 


Albumin/Globulin Ratio   


 


Vancomycin Trough   


 


Hepatitis A IgM Ab   


 


Hep Bs Antigen   


 


Hep B Core IgM Ab   


 


Hepatitis C Antibody   














  06/22/17 06/22/17 06/22/17





  12:11 15:48 18:29


 


WBC    16.6 H


 


RBC    3.64 L


 


Hgb    10.2 L


 


Hct    32.6 L


 


MCV    89.7


 


MCH    28.0


 


MCHC    31.2 L


 


RDW    17.9 H


 


Plt Count    395


 


Sodium   


 


Potassium   


 


Chloride   


 


Carbon Dioxide   


 


Anion Gap   


 


BUN   


 


Creatinine   


 


Est GFR ( Amer)   


 


Est GFR (Non-Af Amer)   


 


POC Glucose (mg/dL)  91  99 


 


Random Glucose   


 


Calcium   


 


Total Bilirubin   


 


AST   


 


ALT   


 


Alkaline Phosphatase   


 


Total Protein   


 


Albumin   


 


Globulin   


 


Albumin/Globulin Ratio   


 


Vancomycin Trough   


 


Hepatitis A IgM Ab   


 


Hep Bs Antigen   


 


Hep B Core IgM Ab   


 


Hepatitis C Antibody   














  06/22/17 06/22/17 06/23/17





  18:29 21:30 07:10


 


WBC   


 


RBC   


 


Hgb   


 


Hct   


 


MCV   


 


MCH   


 


MCHC   


 


RDW   


 


Plt Count   


 


Sodium  139  


 


Potassium  4.0  


 


Chloride  98  


 


Carbon Dioxide  32 H  


 


Anion Gap  13  


 


BUN  10  


 


Creatinine  2.2 H  


 


Est GFR ( Amer)  26  


 


Est GFR (Non-Af Amer)  22  


 


POC Glucose (mg/dL)   187 H  107


 


Random Glucose  129 H  


 


Calcium  9.0  


 


Total Bilirubin   


 


AST   


 


ALT   


 


Alkaline Phosphatase   


 


Total Protein   


 


Albumin   


 


Globulin   


 


Albumin/Globulin Ratio   


 


Vancomycin Trough   


 


Hepatitis A IgM Ab   


 


Hep Bs Antigen   


 


Hep B Core IgM Ab   


 


Hepatitis C Antibody   














  06/23/17 06/23/17 06/23/17





  11:29 11:30 15:46


 


WBC   


 


RBC   


 


Hgb   


 


Hct   


 


MCV   


 


MCH   


 


MCHC   


 


RDW   


 


Plt Count   


 


Sodium   


 


Potassium   


 


Chloride   


 


Carbon Dioxide   


 


Anion Gap   


 


BUN   


 


Creatinine   


 


Est GFR ( Amer)   


 


Est GFR (Non-Af Amer)   


 


POC Glucose (mg/dL)  103   151 H


 


Random Glucose   


 


Calcium   


 


Total Bilirubin   


 


AST   


 


ALT   


 


Alkaline Phosphatase   


 


Total Protein   


 


Albumin   


 


Globulin   


 


Albumin/Globulin Ratio   


 


Vancomycin Trough   24.6 H 


 


Hepatitis A IgM Ab   


 


Hep Bs Antigen   


 


Hep B Core IgM Ab   


 


Hepatitis C Antibody   








 Microbiology





06/19/17 18:06   Chest   Gram Stain - Final


06/19/17 18:06   Chest   Wound Culture - Final


                            Coagulase Neg Staphylococcus


06/17/17 18:00   Blood   Blood Culture - Final


                            NO GROWTH AFTER 5 DAYS


06/17/17 18:00   Blood   Gram Stain - Final


                            TEST NOT PERFORMED


06/17/17 18:00   Blood   Blood Culture - Final


                            NO GROWTH AFTER 5 DAYS


06/17/17 18:00   Blood   Gram Stain - Final


                            TEST NOT PERFORMED


06/17/17 17:40   Foot - Left   Gram Stain - Final


06/17/17 17:40   Foot - Left   Wound Culture - Final


                            Escherichia Coli


                            Methicillin Resistant S Aureus





 





 











- GI/Abdominal Exam


GI & Abdominal Exam: Normal Bowel Sounds, Soft


Additional comments: 





NT, ND





- Extremities Exam


Additional comments: 





left foot TMA site dressing clean/dry/intact





- Neurological Exam


Neurological exam: Alert, Oriented x 3





Assessment and Plan


(1) Infection of amputation stump


Status: Acute   





(2) ESRD on hemodialysis


Status: Acute   





- Assessment and Plan (Free Text)


Assessment: 





A/P-


77 year old female with DM II, ESRD on HD, s/p left foot TMA admitted with left 

foot TMA infected stump.





s/p  excisional debridement  of hyperkeratotic borders of the TMA ulcer 3 day 

ago at bedside by podiatry.


s/p punch bx of the chest lesion 2 days ago - Path report basal cell carcinoma


afebrile


rise in  leukocytosis  today


blood cx- neg x 2


foot wound cx- MRSA, ESBL e.coli


chest wound ccx- coag neg staph


foot xray- OM as per report.


chest wound cx- negative





plan-


as per podiatry no further surgical intervention needed at this time.


since OM on plain xray advise to treat with IV abx for at least 4-6 weeks as 

outpatient.


based on the Id and sensitivity of the foot cx result advise  to  continue 

meropenem (renal dose ) and to continue with vancomycin .


 advise to get vancomycin 1 gram post HD days for total of 6 weeks and 

Meropenem 1 gram IV daily for total of 6 weeks.


today is day #6 of meropenem,


keep vanco trough <15.


check wbc in am.


await chest Ct result r/o any effusoin or infiltrate.


await oncology rec regarding new finding of basal cell carcinoma of the chest 

wall lesion.








All above d/w patient and the NP shaunna at length.


.

## 2017-06-24 LAB
ALBUMIN/GLOB SERPL: 0.9 {RATIO} (ref 1–2.1)
ALP SERPL-CCNC: 71 U/L (ref 38–126)
ALT SERPL-CCNC: 27 U/L (ref 9–52)
AST SERPL-CCNC: 19 U/L (ref 14–36)
BASOPHILS # BLD AUTO: 0.1 K/UL (ref 0–0.2)
BASOPHILS NFR BLD: 0.8 % (ref 0–2)
BILIRUB SERPL-MCNC: 0.2 MG/DL (ref 0.2–1.3)
BUN SERPL-MCNC: 27 MG/DL (ref 7–17)
CALCIUM SERPL-MCNC: 9.3 MG/DL (ref 8.4–10.2)
CHLORIDE SERPL-SCNC: 98 MMOL/L (ref 98–107)
CO2 SERPL-SCNC: 27 MMOL/L (ref 22–30)
EOSINOPHIL # BLD AUTO: 0.2 K/UL (ref 0–0.7)
EOSINOPHIL NFR BLD: 2 % (ref 0–4)
ERYTHROCYTE [DISTWIDTH] IN BLOOD BY AUTOMATED COUNT: 18.4 % (ref 11.5–14.5)
GLOBULIN SER-MCNC: 4.1 GM/DL (ref 2.2–3.9)
GLUCOSE SERPL-MCNC: 47 MG/DL (ref 65–105)
HCT VFR BLD CALC: 32.7 % (ref 34–47)
LYMPHOCYTES # BLD AUTO: 1.6 K/UL (ref 1–4.3)
LYMPHOCYTES NFR BLD AUTO: 13.7 % (ref 20–40)
MCH RBC QN AUTO: 27.9 PG (ref 27–31)
MCHC RBC AUTO-ENTMCNC: 30.4 G/DL (ref 33–37)
MCV RBC AUTO: 91.9 FL (ref 81–99)
MONOCYTES # BLD: 1.7 K/UL (ref 0–0.8)
MONOCYTES NFR BLD: 14.5 % (ref 0–10)
NEUTROPHILS # BLD: 8.2 K/UL (ref 1.8–7)
NEUTROPHILS NFR BLD AUTO: 69 % (ref 50–75)
NRBC BLD AUTO-RTO: 0.2 % (ref 0–0)
PLATELET # BLD: 375 K/UL (ref 130–400)
PMV BLD AUTO: 8.3 FL (ref 7.2–11.7)
POTASSIUM SERPL-SCNC: 5 MMOL/L (ref 3.6–5)
PROT SERPL-MCNC: 7.6 G/DL (ref 6.3–8.2)
SODIUM SERPL-SCNC: 138 MMOL/L (ref 132–148)
WBC # BLD AUTO: 11.8 K/UL (ref 4.8–10.8)

## 2017-06-24 RX ADMIN — PANTOPRAZOLE SODIUM SCH MG: 40 TABLET, DELAYED RELEASE ORAL at 08:36

## 2017-06-24 RX ADMIN — COLLAGENASE SANTYL SCH: 250 OINTMENT TOPICAL at 16:31

## 2017-06-24 RX ADMIN — OXYCODONE HYDROCHLORIDE AND ACETAMINOPHEN PRN TAB: 5; 325 TABLET ORAL at 09:50

## 2017-06-24 RX ADMIN — ERYTHROPOIETIN SCH UNIT: 20000 INJECTION, SOLUTION INTRAVENOUS; SUBCUTANEOUS at 16:28

## 2017-06-24 RX ADMIN — OXYCODONE HYDROCHLORIDE AND ACETAMINOPHEN PRN TAB: 5; 325 TABLET ORAL at 22:06

## 2017-06-24 RX ADMIN — INSULIN DETEMIR SCH UNITS: 100 INJECTION, SOLUTION SUBCUTANEOUS at 22:00

## 2017-06-24 NOTE — CP.PCM.CON
History of Present Illness





- History of Present Illness


History of Present Illness: 


This is a 77 yrs old female who was admitted for severe stump pain after 

amputation of 3 digits of her left foot.


She has a h/o HTN, DM, CAD, CRD, Hypothyroidism, hyperlopidemia, and Gall 

bladder disease. She has been in the hospital since 6/17 and her foot has been 

taken care of  with minimal pain, no signs of infection. Now she had a lesion 

on the right chest wall , and the biopsy of this shows a basal cell carcinoma. 

A consult is requested  for the treatment of the same. She does have some 

itching of the lesion, but no pain.





Past Patient History





- Infectious Disease


Hx of Infectious Diseases: None





- Past Medical History & Family History


Past Medical History?: Yes





- Past Social History


Alcohol: None


Drugs: Denies





- CARDIAC


Hx Cardiac Disorders: Yes


Hx Atrial Fibrillation: Yes


Hx Congestive Heart Failure: Yes


Hx Hypercholesterolemia: Yes


Hx Hypertension: Yes


Hx Pacemaker: Yes





- PULMONARY


Hx Respiratory Disorders: Yes


Hx Pneumonia: Yes





- NEUROLOGICAL


Hx Neurological Disorder: No





- HEENT


Hx HEENT Problems: No





- RENAL


Hx Chronic Kidney Disease: Yes


Hx Dialysis: Yes


Hx Kidney Stones: Yes


Hx Renal Failure: Yes





- ENDOCRINE/METABOLIC


Hx Endocrine Disorders: Yes


Hx Diabetes Mellitus Type 2: Yes


Hx Hypothyroidism: Yes





- HEMATOLOGICAL/ONCOLOGICAL


Hx Blood Disorders: Yes


Hx Anemia: Yes





- INTEGUMENTARY


Hx Dermatological Problems: No





- MUSCULOSKELETAL/RHEUMATOLOGICAL


Hx Musculoskeletal Disorders: Yes


Hx Arthritis: Yes





- GASTROINTESTINAL


Hx Gastrointestinal Disorders: Yes


Hx Gall Bladder Disease: Yes





- GENITOURINARY/GYNECOLOGICAL


Hx Genitourinary Disorders: No





- PSYCHIATRIC


Hx Psychophysiologic Disorder: No





- SURGICAL HISTORY


Hx Surgeries: Yes


Hx Amputation: Yes (L TMA)


Hx Cholecystectomy: Yes


Hx Coronary Artery Bypass Graft: Yes


Hx Coronary Stent: Yes





- ANESTHESIA


Hx Anesthesia: Yes


Hx Anesthesia Reactions: No


Hx Malignant Hyperthermia: No





Meds


Home Medications: 


 Home Medication List











 Medication  Instructions  Recorded  Confirmed  Type


 


Collagenase [Santyl] 1 applic TOP DAILY 06/22/17  Rx


 


Epoetin Edgardo [Procrit] 8,000 unit IV TTS  ml 06/22/17  Rx


 


Heparin 5,000 units SC Q12  vial 06/22/17  Rx


 


Meropenem [Merrem IV] 1 gm IVPB DAILY #42 vial 06/22/17  Rx


 


Vancomycin 1 GM [Vancomycin 1GM in 1 gm IVPB TTS #18 bag 06/22/17  Rx





Normal Saline Addvantage]    


 


oxyCODONE/Acetaminophen [Percocet 2 tab PO Q4 PRN #20 tab 06/22/17  Rx





5/325 mg Tab]    











Allergies/Adverse Reactions: 


 Allergies











Allergy/AdvReac Type Severity Reaction Status Date / Time


 


No Known Allergies Allergy   Verified 02/03/16 18:53














- Medications


Medications: 


 Current Medications





Amlodipine Besylate (Norvasc)  10 mg PO DAILY [facility]


   Last Admin: 06/24/17 09:35 Dose:  Not Given


Aspirin (Ecotrin)  81 mg PO DAILY [facility]


   Last Admin: 06/24/17 09:34 Dose:  81 mg


Atorvastatin Calcium (Lipitor)  80 mg PO DAILY [facility]


   Last Admin: 06/24/17 09:33 Dose:  80 mg


Collagenase (Santyl)  1 applic TOP DAILY [facility]


   Last Admin: 06/23/17 14:49 Dose:  1 unit


Epoetin Edgardo (Procrit)  8,000 unit IV TTS [facility]


Heparin Sodium (Porcine) (Heparin)  5,000 units SC Q12 [facility]


   PRN Reason: Protocol


   Last Admin: 06/24/17 09:34 Dose:  5,000 units


Vancomycin HCl 1 gm/ Sodium (Chloride)  250 mls @ 166.667 mls/hr IVPB DAILY [facility]


   Last Admin: 06/20/17 10:47 Dose:  166.667 mls/hr


Meropenem 1 gm/ Sodium (Chloride)  100 mls @ 100 mls/hr IVPB DAILY [facility]


   Last Admin: 06/24/17 09:36 Dose:  100 mls/hr


Insulin Detemir (Levemir)  30 units SC HS [facility]


   Last Admin: 06/23/17 22:51 Dose:  30 units


Levothyroxine Sodium (Synthroid)  75 mcg PO DAILY@0630 [facility]


   Last Admin: 06/24/17 06:58 Dose:  75 mcg


Metoprolol Tartrate (Lopressor)  100 mg PO Q12 [facility]


   Last Admin: 06/24/17 09:35 Dose:  Not Given


Oxycodone/Acetaminophen (Percocet 5/325 Mg Tab)  2 tab PO Q4 PRN


   PRN Reason: Pain, severe (8-10)


   Stop: 06/24/17 17:01


   Last Admin: 06/24/17 09:50 Dose:  2 tab


Pantoprazole Sodium (Protonix Ec Tab)  40 mg PO DAILY@0600 [facility]


   Last Admin: 06/24/17 08:36 Dose:  40 mg


Pregabalin (Lyrica)  50 mg PO DAILY [facility]


   Last Admin: 06/24/17 09:50 Dose:  50 mg


Sevelamer HCl (Renagel)  800 mg PO TID [facility]


   Last Admin: 06/24/17 09:33 Dose:  800 mg


Sitagliptin Phosphate (Januvia)  25 mg PO DAILY [facility]


   Last Admin: 06/24/17 09:33 Dose:  25 mg


Valsartan (Diovan)  160 mg PO DAILY [facility]


   Last Admin: 06/24/17 09:32 Dose:  160 mg











Physical Exam





- Additional Findings


Additional findings: 





Physical exam:  Pt is alert, awake in no acute distress


neck; supple, no adenopathy


Chest; Clear, no rales or rhonchi


Heart ; RSR, no murmur


Abd; Soft no mass, no h/s megaly


EXT:Foot wound healing well.


On the right chest wall closer to the midline is a lesion 3 cms in size, raised 

edges,biopsy diagnosis of basal cell cancer





Results





- Vital Signs


Recent Vital Signs: 


 Last Vital Signs











Temp  97.7 F   06/24/17 08:03


 


Pulse  62   06/24/17 08:03


 


Resp  20   06/24/17 08:03


 


BP  142/63   06/24/17 08:03


 


Pulse Ox  96   06/24/17 08:03














- Labs


Result Diagrams: 


 06/24/17 06:00





 06/24/17 06:00


Labs: 


 Laboratory Results - last 24 hr











  06/23/17 06/23/17 06/23/17





  11:29 11:30 15:46


 


WBC   


 


RBC   


 


Hgb   


 


Hct   


 


MCV   


 


MCH   


 


MCHC   


 


RDW   


 


Plt Count   


 


MPV   


 


Neut % (Auto)   


 


Lymph % (Auto)   


 


Mono % (Auto)   


 


Eos % (Auto)   


 


Baso % (Auto)   


 


Neut #   


 


Lymph #   


 


Mono #   


 


Eos #   


 


Baso #   


 


Sodium   


 


Potassium   


 


Chloride   


 


Carbon Dioxide   


 


Anion Gap   


 


BUN   


 


Creatinine   


 


Est GFR ( Amer)   


 


Est GFR (Non-Af Amer)   


 


POC Glucose (mg/dL)  103   151 H


 


Random Glucose   


 


Calcium   


 


Total Bilirubin   


 


AST   


 


ALT   


 


Alkaline Phosphatase   


 


Total Protein   


 


Albumin   


 


Globulin   


 


Albumin/Globulin Ratio   


 


Vancomycin Trough   24.6 H 














  06/23/17 06/24/17 06/24/17





  21:44 06:00 06:00


 


WBC   11.8 H 


 


RBC   3.56 L 


 


Hgb   9.9 L 


 


Hct   32.7 L 


 


MCV   91.9  D 


 


MCH   27.9 


 


MCHC   30.4 L 


 


RDW   18.4 H 


 


Plt Count   375 


 


MPV   8.3 


 


Neut % (Auto)   69.0 


 


Lymph % (Auto)   13.7 L 


 


Mono % (Auto)   14.5 H 


 


Eos % (Auto)   2.0 


 


Baso % (Auto)   0.8 


 


Neut #   8.2 H 


 


Lymph #   1.6 


 


Mono #   1.7 H 


 


Eos #   0.2 


 


Baso #   0.1 


 


Sodium    138


 


Potassium    5.0


 


Chloride    98


 


Carbon Dioxide    27


 


Anion Gap    18


 


BUN    27 H


 


Creatinine    4.5 H


 


Est GFR ( Amer)    11


 


Est GFR (Non-Af Amer)    9


 


POC Glucose (mg/dL)  140 H  


 


Random Glucose    47 L


 


Calcium    9.3


 


Total Bilirubin    0.2


 


AST    19


 


ALT    27


 


Alkaline Phosphatase    71


 


Total Protein    7.6


 


Albumin    3.6


 


Globulin    4.1 H


 


Albumin/Globulin Ratio    0.9 L


 


Vancomycin Trough   














  06/24/17 06/24/17





  06:28 08:29


 


WBC  


 


RBC  


 


Hgb  


 


Hct  


 


MCV  


 


MCH  


 


MCHC  


 


RDW  


 


Plt Count  


 


MPV  


 


Neut % (Auto)  


 


Lymph % (Auto)  


 


Mono % (Auto)  


 


Eos % (Auto)  


 


Baso % (Auto)  


 


Neut #  


 


Lymph #  


 


Mono #  


 


Eos #  


 


Baso #  


 


Sodium  


 


Potassium  


 


Chloride  


 


Carbon Dioxide  


 


Anion Gap  


 


BUN  


 


Creatinine  


 


Est GFR ( Amer)  


 


Est GFR (Non-Af Amer)  


 


POC Glucose (mg/dL)  60 L  72


 


Random Glucose  


 


Calcium  


 


Total Bilirubin  


 


AST  


 


ALT  


 


Alkaline Phosphatase  


 


Total Protein  


 


Albumin  


 


Globulin  


 


Albumin/Globulin Ratio  


 


Vancomycin Trough  














Assessment & Plan





- Assessment and Plan (Free Text)


Assessment: 





Impression; In addition to several co morbid problems, pt has a basal cell 

cancer on the right chest wall.


Plan: 





Plan; I feel pt needs a wider and deeper exicion of the lesion.





- Date & Time


Date: 06/24/17


Time: 10:09

## 2017-06-24 NOTE — CP.PCM.PN
Subjective





- Date & Time of Evaluation


Date of Evaluation: 06/24/17


Time of Evaluation: 15:00





- Subjective


Subjective: 





no events overnight





Objective





- Vital Signs/Intake and Output


Vital Signs (last 24 hours): 


 











Temp Pulse Resp BP Pulse Ox


 


 98.5 F   88   20   106/61   95 


 


 06/24/17 16:53  06/24/17 21:56  06/24/17 16:53  06/24/17 21:56  06/24/17 16:53











- Medications


Medications: 


 Current Medications





Amlodipine Besylate (Norvasc)  10 mg PO DAILY [facility]


   Last Admin: 06/24/17 09:35 Dose:  Not Given


Aspirin (Ecotrin)  81 mg PO DAILY [facility]


   Last Admin: 06/24/17 09:34 Dose:  81 mg


Atorvastatin Calcium (Lipitor)  80 mg PO DAILY [facility]


   Last Admin: 06/24/17 09:33 Dose:  80 mg


Collagenase (Santyl)  1 applic TOP DAILY [facility]


   Last Admin: 06/24/17 16:31 Dose:  Not Given


Epoetin Edgardo (Procrit)  8,000 unit IV TTS [facility]


   Last Admin: 06/24/17 16:28 Dose:  8,000 unit


Heparin Sodium (Porcine) (Heparin)  5,000 units SC Q12 [facility]


   PRN Reason: Protocol


   Last Admin: 06/24/17 21:57 Dose:  5,000 units


Vancomycin HCl 1 gm/ Sodium (Chloride)  250 mls @ 166.667 mls/hr IVPB DAILY [facility]


   Last Admin: 06/20/17 10:47 Dose:  166.667 mls/hr


Meropenem 1 gm/ Sodium (Chloride)  100 mls @ 100 mls/hr IVPB DAILY@0900 [facility]


Insulin Detemir (Levemir)  30 units SC HS [facility]


   Last Admin: 06/24/17 22:00 Dose:  30 units


Levothyroxine Sodium (Synthroid)  75 mcg PO DAILY@0630 [facility]


   Last Admin: 06/24/17 06:58 Dose:  75 mcg


Metoprolol Tartrate (Lopressor)  100 mg PO Q12 [facility]


   Last Admin: 06/24/17 21:56 Dose:  100 mg


Oxycodone/Acetaminophen (Percocet 5/325 Mg Tab)  2 tab PO Q4 PRN


   PRN Reason: Pain, severe (8-10)


   Stop: 06/27/17 21:53


   Last Admin: 06/24/17 22:06 Dose:  2 tab


Pantoprazole Sodium (Protonix Ec Tab)  40 mg PO DAILY@0600 [facility]


   Last Admin: 06/24/17 08:36 Dose:  40 mg


Pregabalin (Lyrica)  50 mg PO DAILY [facility]


   Last Admin: 06/24/17 09:50 Dose:  50 mg


Sevelamer HCl (Renagel)  800 mg PO TID [facility]


   Last Admin: 06/24/17 16:42 Dose:  800 mg


Sitagliptin Phosphate (Januvia)  25 mg PO DAILY [facility]


   Last Admin: 06/24/17 09:33 Dose:  25 mg


Valsartan (Diovan)  160 mg PO DAILY [facility]


   Last Admin: 06/24/17 09:32 Dose:  160 mg











- Labs


Labs: 


 





 06/24/17 06:00 





 06/24/17 06:00 





 











PT  15.2 Seconds (9.8-13.1)  H  06/18/17  06:05    


 


INR  1.3  (0.9-1.2)  H  06/18/17  06:05    


 


APTT  30.8 Seconds (25.6-37.1)   06/18/17  06:05    














- Constitutional


Appears: Non-toxic, No Acute Distress





- Head Exam


Head Exam: NORMAL INSPECTION





- Eye Exam


Eye Exam: Normal appearance





- ENT Exam


ENT Exam: Mucous Membranes Moist





- Neck Exam


Neck Exam: Normal Inspection





- Respiratory Exam


Respiratory Exam: NORMAL BREATHING PATTERN





- Cardiovascular Exam


Cardiovascular Exam: +S1, +S2





- GI/Abdominal Exam


GI & Abdominal Exam: Soft





- Neurological Exam


Neurological Exam: Alert, Awake, Oriented x3





Assessment and Plan





- Assessment and Plan (Free Text)


Plan: 





esrd/htn/skin lesion BCC/anemia


hd tts


anemia,stable on epo but with new diagnosis of basal cell cancer,  defer to 

oncology for recommendations if any about roselyn and BCC


zayda reviewed


bp ok

## 2017-06-24 NOTE — CP.PCM.PN
Subjective





- Date & Time of Evaluation


Date of Evaluation: 06/24/17


Time of Evaluation: 15:20





- Subjective


Subjective: 





L foot Osteomyelitis.





Pt c/o of pain in L foot.





Objective





- Vital Signs/Intake and Output


Vital Signs (last 24 hours): 


 











Temp Pulse Resp BP Pulse Ox


 


 98.5 F   64   20   123/66   95 


 


 06/24/17 16:53  06/24/17 16:53  06/24/17 16:53  06/24/17 16:53  06/24/17 16:53











- Medications


Medications: 


 Current Medications





Amlodipine Besylate (Norvasc)  10 mg PO DAILY [facility]


   Last Admin: 06/24/17 09:35 Dose:  Not Given


Aspirin (Ecotrin)  81 mg PO DAILY [facility]


   Last Admin: 06/24/17 09:34 Dose:  81 mg


Atorvastatin Calcium (Lipitor)  80 mg PO DAILY [facility]


   Last Admin: 06/24/17 09:33 Dose:  80 mg


Collagenase (Santyl)  1 applic TOP DAILY [facility]


   Last Admin: 06/24/17 16:31 Dose:  Not Given


Epoetin Edgardo (Procrit)  8,000 unit IV TTS [facility]


   Last Admin: 06/24/17 16:28 Dose:  8,000 unit


Heparin Sodium (Porcine) (Heparin)  5,000 units SC Q12 [facility]


   PRN Reason: Protocol


   Last Admin: 06/24/17 09:34 Dose:  5,000 units


Vancomycin HCl 1 gm/ Sodium (Chloride)  250 mls @ 166.667 mls/hr IVPB DAILY [facility]


   Last Admin: 06/20/17 10:47 Dose:  166.667 mls/hr


Meropenem 1 gm/ Sodium (Chloride)  100 mls @ 100 mls/hr IVPB DAILY [facility]


Insulin Detemir (Levemir)  30 units SC HS [facility]


   Last Admin: 06/23/17 22:51 Dose:  30 units


Levothyroxine Sodium (Synthroid)  75 mcg PO DAILY@0630 [facility]


   Last Admin: 06/24/17 06:58 Dose:  75 mcg


Metoprolol Tartrate (Lopressor)  100 mg PO Q12 [facility]


   Last Admin: 06/24/17 09:35 Dose:  Not Given


Pantoprazole Sodium (Protonix Ec Tab)  40 mg PO DAILY@0600 [facility]


   Last Admin: 06/24/17 08:36 Dose:  40 mg


Pregabalin (Lyrica)  50 mg PO DAILY [facility]


   Last Admin: 06/24/17 09:50 Dose:  50 mg


Sevelamer HCl (Renagel)  800 mg PO TID [facility]


   Last Admin: 06/24/17 16:42 Dose:  800 mg


Sitagliptin Phosphate (Januvia)  25 mg PO DAILY [facility]


   Last Admin: 06/24/17 09:33 Dose:  25 mg


Valsartan (Diovan)  160 mg PO DAILY [facility]


   Last Admin: 06/24/17 09:32 Dose:  160 mg











- Labs


Labs: 


 





 06/24/17 06:00 





 06/24/17 06:00 





 











PT  15.2 Seconds (9.8-13.1)  H  06/18/17  06:05    


 


INR  1.3  (0.9-1.2)  H  06/18/17  06:05    


 


APTT  30.8 Seconds (25.6-37.1)   06/18/17  06:05    














- Constitutional


Appears: No Acute Distress, Chronically Ill





- Head Exam


Head Exam: NORMAL INSPECTION





- Eye Exam


Eye Exam: PERRL





- ENT Exam


ENT Exam: Normal Oropharynx





- Neck Exam


Neck Exam: Normal Inspection





- Respiratory Exam


Respiratory Exam: NORMAL BREATHING PATTERN





- Cardiovascular Exam


Cardiovascular Exam: Irregular Rhythm





- GI/Abdominal Exam


GI & Abdominal Exam: Soft, Normal Bowel Sounds





- Extremities Exam


Extremities Exam: Tenderness (L foot TMA, dressing in place)


Additional comments: 





Swelling, edema RUE, AV shunt R arm, PICC line L arm in place.





- Back Exam


Back Exam: NORMAL INSPECTION





- Neurological Exam


Neurological Exam: Alert, Oriented x3


Additional comments: 





Decreased sensation R-L foot, no focal motor deficit.





- Psychiatric Exam


Psychiatric exam: Normal Mood





- Skin


Skin Exam: Warm (mid upper chest open skin lesion.)





Assessment and Plan


(1) Osteomyelitis of left foot


Status: Acute   





(2) Infection of amputation stump


Status: Acute   





(3) ESRD on hemodialysis


Status: Acute   





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


Status: Acute   





(5) Skin lesion of chest wall


Status: Acute   





(6) PNA (pneumonia)


Status: Acute   





(7) Abdominal pain


Status: Acute   





(8) Edema of extremity of unknown cause


Status: Acute   





(9) Edema of extremity of unknown cause


Status: Acute   





(10) Basal cell carcinoma of chest


Status: Acute   





- Assessment and Plan (Free Text)


Plan: 


Continue Merren, Vanco for Osteomyelitis and for PNA showed in CT Chest of 06/23 /17.  Hematology consult appreciated, f/u Venous U-S RUE, Plastic Surgery 

consult for excision and skin graft of basal cell carcinoma.

## 2017-06-24 NOTE — CP.PCM.PN
Subjective





- Date & Time of Evaluation


Date of Evaluation: 06/24/17


Time of Evaluation: 08:10





- Subjective


Subjective: 





77 year old female seen at bedside today resting comfortably. Patient is AAOx3 

and NAD. Patient is 11 weeks s/p left foot TMA concerning wound dehiscence. 

Patient reports persistent pain to left foot, specifically at the bottom of her 

foot and at the surgical site. Patient denies any pain to either heel. Patient 

denies calf pain. Patient denies N/V/F/D/SOB. No other pedal complaints.





Objective





- Vital Signs/Intake and Output


Vital Signs (last 24 hours): 


 











Temp Pulse Resp BP Pulse Ox


 


 97.7 F   62   20   142/63   96 


 


 06/24/17 08:03  06/24/17 08:03  06/24/17 08:03  06/24/17 08:03  06/24/17 08:03











- Medications


Medications: 


 Current Medications





Amlodipine Besylate (Norvasc)  10 mg PO DAILY [facility]


   Last Admin: 06/24/17 09:35 Dose:  Not Given


Aspirin (Ecotrin)  81 mg PO DAILY [facility]


   Last Admin: 06/24/17 09:34 Dose:  81 mg


Atorvastatin Calcium (Lipitor)  80 mg PO DAILY [facility]


   Last Admin: 06/24/17 09:33 Dose:  80 mg


Collagenase (Santyl)  1 applic TOP DAILY [facility]


   Last Admin: 06/23/17 14:49 Dose:  1 unit


Epoetin Edgardo (Procrit)  8,000 unit IV TTS [facility]


Heparin Sodium (Porcine) (Heparin)  5,000 units SC Q12 [facility]


   PRN Reason: Protocol


   Last Admin: 06/24/17 09:34 Dose:  5,000 units


Vancomycin HCl 1 gm/ Sodium (Chloride)  250 mls @ 166.667 mls/hr IVPB DAILY [facility]


   Last Admin: 06/20/17 10:47 Dose:  166.667 mls/hr


Meropenem 1 gm/ Sodium (Chloride)  100 mls @ 100 mls/hr IVPB DAILY [facility]


   Last Admin: 06/24/17 09:36 Dose:  100 mls/hr


Insulin Detemir (Levemir)  30 units SC HS [facility]


   Last Admin: 06/23/17 22:51 Dose:  30 units


Levothyroxine Sodium (Synthroid)  75 mcg PO DAILY@0630 [facility]


   Last Admin: 06/24/17 06:58 Dose:  75 mcg


Metoprolol Tartrate (Lopressor)  100 mg PO Q12 [facility]


   Last Admin: 06/24/17 09:35 Dose:  Not Given


Oxycodone/Acetaminophen (Percocet 5/325 Mg Tab)  2 tab PO Q4 PRN


   PRN Reason: Pain, severe (8-10)


   Stop: 06/24/17 17:01


   Last Admin: 06/24/17 09:50 Dose:  2 tab


Pantoprazole Sodium (Protonix Ec Tab)  40 mg PO DAILY@0600 [facility]


   Last Admin: 06/24/17 08:36 Dose:  40 mg


Pregabalin (Lyrica)  50 mg PO DAILY [facility]


   Last Admin: 06/24/17 09:50 Dose:  50 mg


Sevelamer HCl (Renagel)  800 mg PO TID [facility]


   Last Admin: 06/24/17 09:33 Dose:  800 mg


Sitagliptin Phosphate (Januvia)  25 mg PO DAILY [facility]


   Last Admin: 06/24/17 09:33 Dose:  25 mg


Valsartan (Diovan)  160 mg PO DAILY [facility]


   Last Admin: 06/24/17 09:32 Dose:  160 mg











- Labs


Labs: 


 





 06/24/17 06:00 





 06/24/17 06:00 





 











PT  15.2 Seconds (9.8-13.1)  H  06/18/17  06:05    


 


INR  1.3  (0.9-1.2)  H  06/18/17  06:05    


 


APTT  30.8 Seconds (25.6-37.1)   06/18/17  06:05    














- Constitutional


Appears: Well, Non-toxic, No Acute Distress





- Extremities Exam


Additional comments: 





Focused left lower extremity physical exam:





Vasc: DP pulse 2/4. PT pulse nonpalpable. No edema noted. SKin temperature warm 

to cool from proximal to distal.


Neuro: Gross sensation diminished.


Derm: Open wound noted to distal aspect of left stump measuring 5 x 2 x 0.2 cm. 

Wound base is necrotic. No purulence, drainage, malodor noted. Wound does not 

probe to bone. Periwound is erythematous.


Ortho: Tenderness to palpation to TMA surgical site and plantar aspect of foot. 

No pain on palpation noted to calf.





- Neurological Exam


Neurological Exam: Alert, Awake, Oriented x3





- Psychiatric Exam


Psychiatric exam: Normal Affect





Assessment and Plan





- Assessment and Plan (Free Text)


Assessment: 





77 year old F with wound dehiscence to L TMA (DOS 3/31/17)


Plan: 





Patient was seen and evaluated at bedside


Discussed plan with Dr. Serna


Chart, labs and vitals reviewed


Open wound dressed with saline wet to dry dressing, DSD, and kerlix. Santyl 

ordered --will dress tomorrow.


Per Dr. Serna pt will require daily dressing changes with santyl x 4-6 weeks 

at nursing home 


C/w IV abx as per ID - Dr. Chris changed to Vanco and Meropenem, 

recommending IV abx for 4-6 weeks


Left foot wound culture positive for E.coli and positive for MRSA


No surgical interventions indicated at this time


Continue with offloading prevalon boots to both feet to be worn at all times 

while in bed


Stable per podiatry


Podiatry will continue to follow

## 2017-06-25 RX ADMIN — OXYCODONE HYDROCHLORIDE AND ACETAMINOPHEN PRN TAB: 5; 325 TABLET ORAL at 06:28

## 2017-06-25 RX ADMIN — PANTOPRAZOLE SODIUM SCH MG: 40 TABLET, DELAYED RELEASE ORAL at 06:29

## 2017-06-25 RX ADMIN — COLLAGENASE SANTYL SCH APPLIC: 250 OINTMENT TOPICAL at 08:58

## 2017-06-25 RX ADMIN — COLLAGENASE SANTYL SCH UNIT: 250 OINTMENT TOPICAL at 08:57

## 2017-06-25 RX ADMIN — INSULIN DETEMIR SCH U: 100 INJECTION, SOLUTION SUBCUTANEOUS at 22:59

## 2017-06-25 RX ADMIN — OXYCODONE HYDROCHLORIDE AND ACETAMINOPHEN PRN TAB: 5; 325 TABLET ORAL at 02:17

## 2017-06-25 NOTE — CP.PCM.PN
Subjective





- Date & Time of Evaluation


Date of Evaluation: 06/25/17


Time of Evaluation: 11:30





- Subjective


Subjective: 





L Foot Osteomyelitis.


Pain  L foot, but  requiring less pain medication as per  nurse 





Objective





- Vital Signs/Intake and Output


Vital Signs (last 24 hours): 


 











Temp Pulse Resp BP Pulse Ox


 


 98 F   63   20   134/69   96 


 


 06/25/17 16:22  06/25/17 16:22  06/25/17 16:22  06/25/17 16:22  06/25/17 16:22











- Medications


Medications: 


 Current Medications





Amlodipine Besylate (Norvasc)  10 mg PO DAILY [facility]


   Last Admin: 06/25/17 08:55 Dose:  10 mg


Aspirin (Ecotrin)  81 mg PO DAILY [facility]


   Last Admin: 06/25/17 08:56 Dose:  81 mg


Atorvastatin Calcium (Lipitor)  80 mg PO DAILY [facility]


   Last Admin: 06/25/17 08:58 Dose:  80 mg


Collagenase (Santyl)  1 applic TOP DAILY [facility]


   Last Admin: 06/25/17 08:58 Dose:  1 applic


Epoetin Edgardo (Procrit)  8,000 unit IV TTS [facility]


   Last Admin: 06/24/17 16:28 Dose:  8,000 unit


Heparin Sodium (Porcine) (Heparin)  5,000 units SC Q12 [facility]


   PRN Reason: Protocol


   Last Admin: 06/25/17 08:56 Dose:  5,000 units


Meropenem 1 gm/ Sodium (Chloride)  100 mls @ 100 mls/hr IVPB DAILY@0900 [facility]


   Last Admin: 06/25/17 09:02 Dose:  100 mls/hr


Insulin Detemir (Levemir)  20 units SC HS [facility]


Levothyroxine Sodium (Synthroid)  75 mcg PO DAILY@0630 [facility]


   Last Admin: 06/25/17 06:28 Dose:  75 mcg


Metoprolol Tartrate (Lopressor)  100 mg PO Q12 [facility]


   Last Admin: 06/25/17 08:54 Dose:  100 mg


Oxycodone/Acetaminophen (Percocet 5/325 Mg Tab)  2 tab PO Q4 PRN


   PRN Reason: Pain, severe (8-10)


   Stop: 06/27/17 21:53


   Last Admin: 06/25/17 06:28 Dose:  2 tab


Pantoprazole Sodium (Protonix Ec Tab)  40 mg PO DAILY@0600 [facility]


   Last Admin: 06/25/17 06:29 Dose:  40 mg


Sevelamer HCl (Renagel)  800 mg PO TID [facility]


   Last Admin: 06/25/17 16:38 Dose:  800 mg


Sitagliptin Phosphate (Januvia)  25 mg PO DAILY [facility]


   Last Admin: 06/25/17 08:54 Dose:  25 mg


Valsartan (Diovan)  160 mg PO DAILY [facility]


   Last Admin: 06/25/17 08:56 Dose:  160 mg











- Labs


Labs: 


 





 06/24/17 06:00 





 06/24/17 06:00 





 











PT  15.2 Seconds (9.8-13.1)  H  06/18/17  06:05    


 


INR  1.3  (0.9-1.2)  H  06/18/17  06:05    


 


APTT  30.8 Seconds (25.6-37.1)   06/18/17  06:05    














- Constitutional


Appears: No Acute Distress, Chronically Ill





- Head Exam


Head Exam: NORMAL INSPECTION





- Eye Exam


Eye Exam: PERRL





- ENT Exam


ENT Exam: Normal Oropharynx





- Neck Exam


Neck Exam: Normal Inspection





- Respiratory Exam


Respiratory Exam: NORMAL BREATHING PATTERN





- Cardiovascular Exam


Cardiovascular Exam: Irregular Rhythm





- GI/Abdominal Exam


GI & Abdominal Exam: Soft, Normal Bowel Sounds





- Extremities Exam


Extremities Exam: Tenderness (L foot TMA, dressing in place)


Additional comments: 





Swelling, edema RUE, AV shunt R arm, PICC line in place , open wound with 

necrotic base  L foot stump





- Back Exam


Back Exam: NORMAL INSPECTION





- Neurological Exam


Neurological Exam: Alert, Oriented x3


Additional comments: 





Decreased sensation R-L foot, no focal motor deficit.





- Psychiatric Exam


Psychiatric exam: Normal Mood





- Skin


Skin Exam: Warm (Oper skin lesion mid upper chest.)





Assessment and Plan


(1) Osteomyelitis of left foot


Status: Acute   





(2) Infection of amputation stump


Status: Acute   





(3) ESRD on hemodialysis


Status: Acute   





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


Status: Acute   





(5) Skin lesion of chest wall


Status: Acute   





(6) PNA (pneumonia)


Status: Acute   





(7) Abdominal pain


Status: Acute   





(8) Edema of extremity of unknown cause


Status: Acute   





(9) Edema of extremity of unknown cause


Status: Acute   





(10) Basal cell carcinoma of chest


Status: Acute   





- Assessment and Plan (Free Text)


Plan: 








continue  Merren , Vanco for  Tx  OM L foot 4-6 weeks, Plastic  Surgery consult 

for  excision and  skin graft Basal cell CA mid upper chest , f/u Thyroid  US 

for  poss. Thyroid nodule , f/u US U/E

## 2017-06-25 NOTE — CP.PCM.PN
Subjective





- Date & Time of Evaluation


Date of Evaluation: 06/25/17


Time of Evaluation: 08:35





- Subjective


Subjective: 





77 year old female seen at bedside for 11 weeks s/p left foot TMA concerning 

wound dehiscence. She is seen at bedside resting comfortably in bed. She is 

seen alert and orientated. Patient admits to pain to left foot in the same 

location at the bottom of her foot and at the surgical site. Patient denies N/V/

F/D/SOB. 





Objective





- Vital Signs/Intake and Output


Vital Signs (last 24 hours): 


 











Temp Pulse Resp BP Pulse Ox


 


 97.8 F   60   17   136/67   95 


 


 06/25/17 08:07  06/25/17 08:55  06/25/17 08:07  06/25/17 08:55  06/25/17 08:07











- Medications


Medications: 


 Current Medications





Amlodipine Besylate (Norvasc)  10 mg PO DAILY [facility]


   Last Admin: 06/25/17 08:55 Dose:  10 mg


Aspirin (Ecotrin)  81 mg PO DAILY [facility]


   Last Admin: 06/25/17 08:56 Dose:  81 mg


Atorvastatin Calcium (Lipitor)  80 mg PO DAILY [facility]


   Last Admin: 06/25/17 08:58 Dose:  80 mg


Collagenase (Santyl)  1 applic TOP DAILY [facility]


   Last Admin: 06/25/17 08:58 Dose:  1 applic


Epoetin Edgardo (Procrit)  8,000 unit IV TTS [facility]


   Last Admin: 06/24/17 16:28 Dose:  8,000 unit


Heparin Sodium (Porcine) (Heparin)  5,000 units SC Q12 [facility]


   PRN Reason: Protocol


   Last Admin: 06/25/17 08:56 Dose:  5,000 units


Vancomycin HCl 1 gm/ Sodium (Chloride)  250 mls @ 166.667 mls/hr IVPB DAILY [facility]


   Last Admin: 06/20/17 10:47 Dose:  166.667 mls/hr


Meropenem 1 gm/ Sodium (Chloride)  100 mls @ 100 mls/hr IVPB DAILY@0900 [facility]


   Last Admin: 06/25/17 09:02 Dose:  100 mls/hr


Insulin Detemir (Levemir)  30 units SC HS [facility]


   Last Admin: 06/24/17 22:00 Dose:  30 units


Levothyroxine Sodium (Synthroid)  75 mcg PO DAILY@0630 [facility]


   Last Admin: 06/25/17 06:28 Dose:  75 mcg


Metoprolol Tartrate (Lopressor)  100 mg PO Q12 [facility]


   Last Admin: 06/25/17 08:54 Dose:  100 mg


Oxycodone/Acetaminophen (Percocet 5/325 Mg Tab)  2 tab PO Q4 [facility]N


   PRN Reason: Pain, severe (8-10)


   Stop: 06/27/17 21:53


   Last Admin: 06/25/17 06:28 Dose:  2 tab


Pantoprazole Sodium (Protonix Ec Tab)  40 mg PO DAILY@0600 [facility]


   Last Admin: 06/25/17 06:29 Dose:  40 mg


Pregabalin (Lyrica)  50 mg PO DAILY [facility]


   Last Admin: 06/25/17 09:10 Dose:  50 mg


Sevelamer HCl (Renagel)  800 mg PO TID [facility]


   Last Admin: 06/25/17 08:53 Dose:  800 mg


Sitagliptin Phosphate (Januvia)  25 mg PO DAILY [facility]


   Last Admin: 06/25/17 08:54 Dose:  25 mg


Valsartan (Diovan)  160 mg PO DAILY [facility]


   Last Admin: 06/25/17 08:56 Dose:  160 mg











- Labs


Labs: 


 





 06/24/17 06:00 





 06/24/17 06:00 





 











PT  15.2 Seconds (9.8-13.1)  H  06/18/17  06:05    


 


INR  1.3  (0.9-1.2)  H  06/18/17  06:05    


 


APTT  30.8 Seconds (25.6-37.1)   06/18/17  06:05    














- Constitutional


Appears: Well, Non-toxic, No Acute Distress





- Extremities Exam


Additional comments: 


Focused left lower extremity physical exam:





Vasc: DP pulse 2/4. PT pulse nonpalpable. No edema noted. SKin temperature warm 

to cool from proximal to distal.





Neuro: Gross sensation diminished.





Derm: Open wound noted to distal aspect of left stump measuring 5 x 2 x 0.2 cm. 

Wound base is necrotic. No purulence, drainage, malodor noted. No tunneling or 

undermining noted. Wound does not probe to bone. 





Ortho: Pain to palpation to TMA surgical site and plantar aspect of foot.





- Neurological Exam


Neurological Exam: Alert, Awake, Oriented x3





- Psychiatric Exam


Psychiatric exam: Normal Affect, Normal Mood





Assessment and Plan





- Assessment and Plan (Free Text)


Assessment: 


77 year old female with wound dehiscence to L TMA (DOS 3/31/17)


Plan: 


Patient was seen and evaluated at bedside


Discussed plan in detail with Dr. Serna


Chart and vitals reviewed


Wound was dressed with saline wet to dry dressing and Santyl, DSD, and kerlix.


Per Dr. Serna pt will require daily dressing changes with santyl x 4-6 weeks 

at nursing home 


C/w IV abx as per ID - Dr. Chris changed to Vanco and Meropenem, 

recommending IV abx for 4-6 weeks


Left foot wound culture positive for E.coli and positive for MRSA


No surgical interventions indicated at this time


Continue with offloading prevalon boots to both feet to be worn at all times 

while in bed


Stable per podiatry


Podiatry will continue to follow

## 2017-06-26 VITALS — RESPIRATION RATE: 20 BRPM

## 2017-06-26 LAB
BUN SERPL-MCNC: 26 MG/DL (ref 7–17)
CALCIUM SERPL-MCNC: 9.2 MG/DL (ref 8.4–10.2)
CHLORIDE SERPL-SCNC: 94 MMOL/L (ref 98–107)
CO2 SERPL-SCNC: 28 MMOL/L (ref 22–30)
ERYTHROCYTE [DISTWIDTH] IN BLOOD BY AUTOMATED COUNT: 18.8 % (ref 11.5–14.5)
GLUCOSE SERPL-MCNC: 75 MG/DL (ref 65–105)
HCT VFR BLD CALC: 30 % (ref 34–47)
MCH RBC QN AUTO: 27.9 PG (ref 27–31)
MCHC RBC AUTO-ENTMCNC: 30.9 G/DL (ref 33–37)
MCV RBC AUTO: 90.2 FL (ref 81–99)
PLATELET # BLD: 282 K/UL (ref 130–400)
POTASSIUM SERPL-SCNC: 4.6 MMOL/L (ref 3.6–5)
SODIUM SERPL-SCNC: 130 MMOL/L (ref 132–148)
WBC # BLD AUTO: 14.2 K/UL (ref 4.8–10.8)

## 2017-06-26 RX ADMIN — PANTOPRAZOLE SODIUM SCH MG: 40 TABLET, DELAYED RELEASE ORAL at 06:48

## 2017-06-26 RX ADMIN — INSULIN DETEMIR SCH U: 100 INJECTION, SOLUTION SUBCUTANEOUS at 21:24

## 2017-06-26 RX ADMIN — COLLAGENASE SANTYL SCH APPLIC: 250 OINTMENT TOPICAL at 09:42

## 2017-06-26 NOTE — CP.PCM.PN
Subjective





- Date & Time of Evaluation


Date of Evaluation: 06/26/17


Time of Evaluation: 10:58





- Subjective


Subjective: 





Patient and bed appears to be comfortable


Vital sign noted to be stable


Physical exam


Chest wall so where she has a biopsy and open wound


Abdomen soft


Extremity no edema


Patient and plan


End stage renal disease on dialysis TTS


Continue diagnosis basal cell carcinoma on the chest wall waiting for the 

plastic surgery evaluation.





Objective





- Vital Signs/Intake and Output


Vital Signs (last 24 hours): 


 











Temp Pulse Resp BP Pulse Ox


 


 98.8 F   62   20   134/53 L  94 L


 


 06/26/17 08:58  06/26/17 09:38  06/26/17 08:58  06/26/17 09:38  06/26/17 08:58











- Medications


Medications: 


 Current Medications





Amlodipine Besylate (Norvasc)  10 mg PO DAILY [facility]


   Last Admin: 06/26/17 09:36 Dose:  10 mg


Aspirin (Ecotrin)  81 mg PO DAILY [facility]


   Last Admin: 06/26/17 09:39 Dose:  81 mg


Atorvastatin Calcium (Lipitor)  80 mg PO DAILY [facility]


   Last Admin: 06/26/17 09:36 Dose:  80 mg


Collagenase (Santyl)  1 applic TOP DAILY [facility]


   Last Admin: 06/26/17 09:42 Dose:  1 applic


Epoetin Edgardo (Procrit)  8,000 unit IV TTS [facility]


   Last Admin: 06/24/17 16:28 Dose:  8,000 unit


Heparin Sodium (Porcine) (Heparin)  5,000 units SC Q12 [facility]


   PRN Reason: Protocol


   Last Admin: 06/26/17 09:41 Dose:  5,000 units


Meropenem 1 gm/ Sodium (Chloride)  100 mls @ 100 mls/hr IVPB DAILY@0900 [facility]


   Last Admin: 06/26/17 09:43 Dose:  100 mls/hr


Insulin Detemir (Levemir)  20 units SC HS [facility]


   Last Admin: 06/25/17 22:59 Dose:  20 u


Levothyroxine Sodium (Synthroid)  75 mcg PO DAILY@0630 [facility]


   Last Admin: 06/26/17 06:48 Dose:  75 mcg


Metoprolol Tartrate (Lopressor)  100 mg PO Q12 [facility]


   Last Admin: 06/26/17 09:38 Dose:  100 mg


Oxycodone/Acetaminophen (Percocet 5/325 Mg Tab)  2 tab PO Q4 [facility]


   PRN Reason: Pain, severe (8-10)


   Stop: 06/27/17 21:53


   Last Admin: 06/25/17 06:28 Dose:  2 tab


Pantoprazole Sodium (Protonix Ec Tab)  40 mg PO DAILY@0600 [facility]


   Last Admin: 06/26/17 06:48 Dose:  40 mg


Sevelamer HCl (Renagel)  800 mg PO TID [facility]


   Last Admin: 06/26/17 09:38 Dose:  800 mg


Sitagliptin Phosphate (Januvia)  25 mg PO DAILY [facility]


   Last Admin: 06/26/17 09:40 Dose:  25 mg


Valsartan (Diovan)  160 mg PO DAILY [facility]


   Last Admin: 06/26/17 09:40 Dose:  160 mg











- Labs


Labs: 


 





 06/24/17 06:00 





 06/24/17 06:00 





 











PT  15.2 Seconds (9.8-13.1)  H  06/18/17  06:05    


 


INR  1.3  (0.9-1.2)  H  06/18/17  06:05    


 


APTT  30.8 Seconds (25.6-37.1)   06/18/17  06:05    














Assessment and Plan


(1) ESRD on hemodialysis


Status: Acute

## 2017-06-26 NOTE — CP.PCM.PN
Subjective





- Date & Time of Evaluation


Date of Evaluation: 06/26/17


Time of Evaluation: 09:43





- Subjective


Subjective: 





76 y/o female seen at bedside s/p 12 weeks of L foot TMA. Pt appears in NAD and 

is AAOx3. Pt denies of any acute overnight events. Pt admits to having mild 

pain at the surgical site. Pt denies of any F/N/V/C/SOB today. Pt's dressing is 

clean, dry and intact.





Objective





- Vital Signs/Intake and Output


Vital Signs (last 24 hours): 


 











Temp Pulse Resp BP Pulse Ox


 


 98.4 F   71   19   140/70   100 


 


 06/26/17 00:14  06/26/17 00:14  06/26/17 00:14  06/26/17 00:14  06/26/17 00:14











- Medications


Medications: 


 Current Medications





Amlodipine Besylate (Norvasc)  10 mg PO DAILY [facility]


   Last Admin: 06/25/17 08:55 Dose:  10 mg


Aspirin (Ecotrin)  81 mg PO DAILY [facility]


   Last Admin: 06/25/17 08:56 Dose:  81 mg


Atorvastatin Calcium (Lipitor)  80 mg PO DAILY [facility]


   Last Admin: 06/25/17 08:58 Dose:  80 mg


Collagenase (Santyl)  1 applic TOP DAILY [facility]


   Last Admin: 06/25/17 08:58 Dose:  1 applic


Epoetin Edgardo (Procrit)  8,000 unit IV TTS [facility]


   Last Admin: 06/24/17 16:28 Dose:  8,000 unit


Heparin Sodium (Porcine) (Heparin)  5,000 units SC Q12 [facility]


   PRN Reason: Protocol


   Last Admin: 06/25/17 21:51 Dose:  5,000 units


Meropenem 1 gm/ Sodium (Chloride)  100 mls @ 100 mls/hr IVPB DAILY@0900 [facility]


   Last Admin: 06/25/17 09:02 Dose:  100 mls/hr


Insulin Detemir (Levemir)  20 units SC HS [facility]


   Last Admin: 06/25/17 22:59 Dose:  20 u


Levothyroxine Sodium (Synthroid)  75 mcg PO DAILY@0630 [facility]


   Last Admin: 06/26/17 06:48 Dose:  75 mcg


Metoprolol Tartrate (Lopressor)  100 mg PO Q12 [facility]


   Last Admin: 06/25/17 21:49 Dose:  100 mg


Oxycodone/Acetaminophen (Percocet 5/325 Mg Tab)  2 tab PO Q4 [facility]


   PRN Reason: Pain, severe (8-10)


   Stop: 06/27/17 21:53


   Last Admin: 06/25/17 06:28 Dose:  2 tab


Pantoprazole Sodium (Protonix Ec Tab)  40 mg PO DAILY@0600 [facility]


   Last Admin: 06/26/17 06:48 Dose:  40 mg


Sevelamer HCl (Renagel)  800 mg PO TID [facility]


   Last Admin: 06/25/17 16:38 Dose:  800 mg


Sitagliptin Phosphate (Januvia)  25 mg PO DAILY [facility]


   Last Admin: 06/25/17 08:54 Dose:  25 mg


Valsartan (Diovan)  160 mg PO DAILY [facility]


   Last Admin: 06/25/17 08:56 Dose:  160 mg











- Labs


Labs: 


 





 06/24/17 06:00 





 06/24/17 06:00 





 











PT  15.2 Seconds (9.8-13.1)  H  06/18/17  06:05    


 


INR  1.3  (0.9-1.2)  H  06/18/17  06:05    


 


APTT  30.8 Seconds (25.6-37.1)   06/18/17  06:05    














- Constitutional


Appears: Well, Non-toxic, No Acute Distress





-  Exam


Additional comments: 





Vasc: DP pulse are 2/4. PT pulse are nonpalpable, Temperature gradient: warm to 

cool, No edema noted. 


Derm: Open wound noted to distal aspect of left stump measuring approximately 5 

x 2 x 0.2 cm. Wound base is necrotic with mild fibrosis noted. No active 

drainage, no purulence, no malodor noted. no tunneling or undermining noted. No 

probe to bone noted


Neuro: Gross sensation diminished.


Ortho: Pain to palpation to TMA surgical site and plantar aspect of foot.





- Neurological Exam


Neurological Exam: Alert, Awake, Oriented x3





Assessment and Plan





- Assessment and Plan (Free Text)


Assessment: 





76 y/o female with 12 weeks s/p L foot TMA (DOS 3/31/17)


Plan: 





Patient was seen and evaluated at bedside


Discussed plan in detail with Dr. Serna


Chart and vitals reviewed


Wound was dressed with Santyl, DSD, and kerlix.


C/w IV abx as per ID


Left foot wound culture positive for E.coli and positive for MRSA


No surgical interventions indicated at this time


Continue with offloading prevalon boots to both feet to be worn at all times 

while in bed


Stable per podiatry


Podiatry will continue to follow

## 2017-06-26 NOTE — US
HISTORY:

MD order



TECHNIQUE:

Sonographic evaluation of the thyroid gland.



COMPARISON:

Not available



FINDINGS:



RIGHT LOBE:

Measures 5.3 x 1.8 x 1.8 cm. Normal echotexture and flow.



Nodules: None



LEFT LOBE:

Measures 4.4 x 1.4 x 1.4 cm. Normal echotexture and flow.



Nodules: None



ISTHMUS:

Measures 0.4 cm. Normal echotexture and flow.



Nodules: None



OTHER FINDINGS:

None . 



IMPRESSION:

Unremarkable thyroid sonographic examination.  No thyroid mass 

identified.

## 2017-06-26 NOTE — CP.PCM.PN
Subjective





- Date & Time of Evaluation


Date of Evaluation: 06/26/17


Time of Evaluation: 13:49





- Subjective


Subjective: 





ID Note-





Pt. seen and examined today.


denies any fever or chills.








Objective





- Vital Signs/Intake and Output


Vital Signs (last 24 hours): 


 











Temp Pulse Resp BP Pulse Ox


 


 98.8 F   62   20   134/53 L  94 L


 


 06/26/17 08:58  06/26/17 09:38  06/26/17 08:58  06/26/17 09:38  06/26/17 08:58











- Medications


Medications: 


 Current Medications





Amlodipine Besylate (Norvasc)  10 mg PO DAILY [facility]


   Last Admin: 06/26/17 09:36 Dose:  10 mg


Aspirin (Ecotrin)  81 mg PO DAILY [facility]


   Last Admin: 06/26/17 09:39 Dose:  81 mg


Atorvastatin Calcium (Lipitor)  80 mg PO DAILY [facility]


   Last Admin: 06/26/17 09:36 Dose:  80 mg


Collagenase (Santyl)  1 applic TOP DAILY [facility]


   Last Admin: 06/26/17 09:42 Dose:  1 applic


Epoetin Degardo (Procrit)  8,000 unit IV TTS [facility]


   Last Admin: 06/24/17 16:28 Dose:  8,000 unit


Heparin Sodium (Porcine) (Heparin)  5,000 units SC Q12 [facility]


   PRN Reason: Protocol


   Last Admin: 06/26/17 09:41 Dose:  5,000 units


Meropenem 1 gm/ Sodium (Chloride)  100 mls @ 100 mls/hr IVPB DAILY@0900 [facility]


   Last Admin: 06/26/17 09:43 Dose:  100 mls/hr


Insulin Detemir (Levemir)  20 units SC HS [facility]


   Last Admin: 06/25/17 22:59 Dose:  20 u


Levothyroxine Sodium (Synthroid)  75 mcg PO DAILY@0630 [facility]


   Last Admin: 06/26/17 06:48 Dose:  75 mcg


Metoprolol Tartrate (Lopressor)  100 mg PO Q12 [facility]


   Last Admin: 06/26/17 09:38 Dose:  100 mg


Oxycodone/Acetaminophen (Percocet 5/325 Mg Tab)  2 tab PO Q4 PRN


   PRN Reason: Pain, severe (8-10)


   Stop: 06/27/17 21:53


   Last Admin: 06/25/17 06:28 Dose:  2 tab


Pantoprazole Sodium (Protonix Ec Tab)  40 mg PO DAILY@0600 [facility]


   Last Admin: 06/26/17 06:48 Dose:  40 mg


Sevelamer HCl (Renagel)  800 mg PO TID [facility]


   Last Admin: 06/26/17 12:36 Dose:  800 mg


Sitagliptin Phosphate (Januvia)  25 mg PO DAILY [facility]


   Last Admin: 06/26/17 09:40 Dose:  25 mg


Valsartan (Diovan)  160 mg PO DAILY [facility]


   Last Admin: 06/26/17 09:40 Dose:  160 mg











- Labs


Labs: 


 





 





- Additional Findings


Additional findings: 








- Constitutional


Appears: Non-toxic, No Acute Distress





- Head Exam


Head Exam: ATRAUMATIC





- Eye Exam


Eye Exam: EOMI


Pupil Exam: PERRL





- ENT Exam


ENT Exam: Normal Oropharynx





- Neck Exam


Neck exam: Positive for: Full Rom





- Respiratory Exam


Respiratory Exam: Clear to Auscultation Bilateral, NORMAL BREATHING PATTERN





- Cardiovascular Exam


Cardiovascular Exam: RRR, +S1, +S2


Additional comments: 





midchest superficial area of denuded skin 2 x 4 cm with superficial skin lesion 

a, no pus today


 no malodor








 Laboratory Results - last 72 hr











  06/23/17 06/23/17 06/23/17





  11:30 15:46 21:44


 


WBC   


 


RBC   


 


Hgb   


 


Hct   


 


MCV   


 


MCH   


 


MCHC   


 


RDW   


 


Plt Count   


 


MPV   


 


Neut % (Auto)   


 


Lymph % (Auto)   


 


Mono % (Auto)   


 


Eos % (Auto)   


 


Baso % (Auto)   


 


Neut #   


 


Lymph #   


 


Mono #   


 


Eos #   


 


Baso #   


 


Sodium   


 


Potassium   


 


Chloride   


 


Carbon Dioxide   


 


Anion Gap   


 


BUN   


 


Creatinine   


 


Est GFR ( Amer)   


 


Est GFR (Non-Af Amer)   


 


POC Glucose (mg/dL)   151 H  140 H


 


Random Glucose   


 


Calcium   


 


Total Bilirubin   


 


AST   


 


ALT   


 


Alkaline Phosphatase   


 


Total Protein   


 


Albumin   


 


Globulin   


 


Albumin/Globulin Ratio   


 


Vancomycin Trough  24.6 H  














  06/24/17 06/24/17 06/24/17





  06:00 06:00 06:28


 


WBC  11.8 H  


 


RBC  3.56 L  


 


Hgb  9.9 L  


 


Hct  32.7 L  


 


MCV  91.9  D  


 


MCH  27.9  


 


MCHC  30.4 L  


 


RDW  18.4 H  


 


Plt Count  375  


 


MPV  8.3  


 


Neut % (Auto)  69.0  


 


Lymph % (Auto)  13.7 L  


 


Mono % (Auto)  14.5 H  


 


Eos % (Auto)  2.0  


 


Baso % (Auto)  0.8  


 


Neut #  8.2 H  


 


Lymph #  1.6  


 


Mono #  1.7 H  


 


Eos #  0.2  


 


Baso #  0.1  


 


Sodium   138 


 


Potassium   5.0 


 


Chloride   98 


 


Carbon Dioxide   27 


 


Anion Gap   18 


 


BUN   27 H 


 


Creatinine   4.5 H 


 


Est GFR ( Amer)   11 


 


Est GFR (Non-Af Amer)   9 


 


POC Glucose (mg/dL)    60 L


 


Random Glucose   47 L 


 


Calcium   9.3 


 


Total Bilirubin   0.2 


 


AST   19 


 


ALT   27 


 


Alkaline Phosphatase   71 


 


Total Protein   7.6 


 


Albumin   3.6 


 


Globulin   4.1 H 


 


Albumin/Globulin Ratio   0.9 L 


 


Vancomycin Trough   














  06/24/17 06/24/17 06/24/17





  08:29 10:57 16:03


 


WBC   


 


RBC   


 


Hgb   


 


Hct   


 


MCV   


 


MCH   


 


MCHC   


 


RDW   


 


Plt Count   


 


MPV   


 


Neut % (Auto)   


 


Lymph % (Auto)   


 


Mono % (Auto)   


 


Eos % (Auto)   


 


Baso % (Auto)   


 


Neut #   


 


Lymph #   


 


Mono #   


 


Eos #   


 


Baso #   


 


Sodium   


 


Potassium   


 


Chloride   


 


Carbon Dioxide   


 


Anion Gap   


 


BUN   


 


Creatinine   


 


Est GFR ( Amer)   


 


Est GFR (Non-Af Amer)   


 


POC Glucose (mg/dL)  72  92  125 H


 


Random Glucose   


 


Calcium   


 


Total Bilirubin   


 


AST   


 


ALT   


 


Alkaline Phosphatase   


 


Total Protein   


 


Albumin   


 


Globulin   


 


Albumin/Globulin Ratio   


 


Vancomycin Trough   














  06/24/17 06/25/17 06/25/17





  21:09 02:23 05:17


 


WBC   


 


RBC   


 


Hgb   


 


Hct   


 


MCV   


 


MCH   


 


MCHC   


 


RDW   


 


Plt Count   


 


MPV   


 


Neut % (Auto)   


 


Lymph % (Auto)   


 


Mono % (Auto)   


 


Eos % (Auto)   


 


Baso % (Auto)   


 


Neut #   


 


Lymph #   


 


Mono #   


 


Eos #   


 


Baso #   


 


Sodium   


 


Potassium   


 


Chloride   


 


Carbon Dioxide   


 


Anion Gap   


 


BUN   


 


Creatinine   


 


Est GFR ( Amer)   


 


Est GFR (Non-Af Amer)   


 


POC Glucose (mg/dL)  110  95  67


 


Random Glucose   


 


Calcium   


 


Total Bilirubin   


 


AST   


 


ALT   


 


Alkaline Phosphatase   


 


Total Protein   


 


Albumin   


 


Globulin   


 


Albumin/Globulin Ratio   


 


Vancomycin Trough   














  06/25/17 06/25/17 06/25/17





  06:36 10:47 12:22


 


WBC   


 


RBC   


 


Hgb   


 


Hct   


 


MCV   


 


MCH   


 


MCHC   


 


RDW   


 


Plt Count   


 


MPV   


 


Neut % (Auto)   


 


Lymph % (Auto)   


 


Mono % (Auto)   


 


Eos % (Auto)   


 


Baso % (Auto)   


 


Neut #   


 


Lymph #   


 


Mono #   


 


Eos #   


 


Baso #   


 


Sodium   


 


Potassium   


 


Chloride   


 


Carbon Dioxide   


 


Anion Gap   


 


BUN   


 


Creatinine   


 


Est GFR ( Amer)   


 


Est GFR (Non-Af Amer)   


 


POC Glucose (mg/dL)  64 L  70  65


 


Random Glucose   


 


Calcium   


 


Total Bilirubin   


 


AST   


 


ALT   


 


Alkaline Phosphatase   


 


Total Protein   


 


Albumin   


 


Globulin   


 


Albumin/Globulin Ratio   


 


Vancomycin Trough   














  06/25/17 06/25/17 06/25/17





  15:57 21:04 22:54


 


WBC   


 


RBC   


 


Hgb   


 


Hct   


 


MCV   


 


MCH   


 


MCHC   


 


RDW   


 


Plt Count   


 


MPV   


 


Neut % (Auto)   


 


Lymph % (Auto)   


 


Mono % (Auto)   


 


Eos % (Auto)   


 


Baso % (Auto)   


 


Neut #   


 


Lymph #   


 


Mono #   


 


Eos #   


 


Baso #   


 


Sodium   


 


Potassium   


 


Chloride   


 


Carbon Dioxide   


 


Anion Gap   


 


BUN   


 


Creatinine   


 


Est GFR ( Amer)   


 


Est GFR (Non-Af Amer)   


 


POC Glucose (mg/dL)  96  80  93


 


Random Glucose   


 


Calcium   


 


Total Bilirubin   


 


AST   


 


ALT   


 


Alkaline Phosphatase   


 


Total Protein   


 


Albumin   


 


Globulin   


 


Albumin/Globulin Ratio   


 


Vancomycin Trough   














  06/26/17 06/26/17 06/26/17





  03:05 05:25 06:59


 


WBC   


 


RBC   


 


Hgb   


 


Hct   


 


MCV   


 


MCH   


 


MCHC   


 


RDW   


 


Plt Count   


 


MPV   


 


Neut % (Auto)   


 


Lymph % (Auto)   


 


Mono % (Auto)   


 


Eos % (Auto)   


 


Baso % (Auto)   


 


Neut #   


 


Lymph #   


 


Mono #   


 


Eos #   


 


Baso #   


 


Sodium   


 


Potassium   


 


Chloride   


 


Carbon Dioxide   


 


Anion Gap   


 


BUN   


 


Creatinine   


 


Est GFR ( Amer)   


 


Est GFR (Non-Af Amer)   


 


POC Glucose (mg/dL)  100  145 H 


 


Random Glucose   


 


Calcium   


 


Total Bilirubin   


 


AST   


 


ALT   


 


Alkaline Phosphatase   


 


Total Protein   


 


Albumin   


 


Globulin   


 


Albumin/Globulin Ratio   


 


Vancomycin Trough    17.6 H














  06/26/17 06/26/17 06/26/17





  10:53 10:53 11:49


 


WBC   14.2 H 


 


RBC   3.32 L 


 


Hgb   9.3 L 


 


Hct   30.0 L 


 


MCV   90.2 


 


MCH   27.9 


 


MCHC   30.9 L 


 


RDW   18.8 H 


 


Plt Count   282 


 


MPV   


 


Neut % (Auto)   


 


Lymph % (Auto)   


 


Mono % (Auto)   


 


Eos % (Auto)   


 


Baso % (Auto)   


 


Neut #   


 


Lymph #   


 


Mono #   


 


Eos #   


 


Baso #   


 


Sodium  130 L  


 


Potassium  4.6  


 


Chloride  94 L  


 


Carbon Dioxide  28  


 


Anion Gap  13  


 


BUN  26 H  


 


Creatinine  3.7 H  


 


Est GFR ( Amer)  14  


 


Est GFR (Non-Af Amer)  12  


 


POC Glucose (mg/dL)    122 H


 


Random Glucose  75  


 


Calcium  9.2  


 


Total Bilirubin   


 


AST   


 


ALT   


 


Alkaline Phosphatase   


 


Total Protein   


 


Albumin   


 


Globulin   


 


Albumin/Globulin Ratio   


 


Vancomycin Trough   








 Microbiology





06/19/17 18:06   Chest   Gram Stain - Final


06/19/17 18:06   Chest   Wound Culture - Final


                            Coagulase Neg Staphylococcus


06/17/17 18:00   Blood   Blood Culture - Final


                            NO GROWTH AFTER 5 DAYS


06/17/17 18:00   Blood   Gram Stain - Final


                            TEST NOT PERFORMED


06/17/17 18:00   Blood   Blood Culture - Final


                            NO GROWTH AFTER 5 DAYS


06/17/17 18:00   Blood   Gram Stain - Final


                            TEST NOT PERFORMED


06/17/17 17:40   Foot - Left   Gram Stain - Final


06/17/17 17:40   Foot - Left   Wound Culture - Final


                            Escherichia Coli


                            Methicillin Resistant S Aureus





 Accession No. : [id number]


Patient Name / ID : ASA FELICIANO  / [id number]


Exam Date : 06/23/2017 14:33:41 ( Approved )


Study Comment : 


Sex / Age : F  / 77Y





Creator : Bree Pal MD


Dictator : Bree Pal MD


 : 


Approver : Bree Pal MD


Approver2 : 





Report Date : 06/23/2017 15:36:09


My Comment : 


********************************************************************************

***





CT chest without IV contrast 





Indication: Leukocytosis 





Technique: 





Contiguous axial images were obtained through the chest without intravenous 

contrast enhancement. Sagittal and coronal reconstructions were generated and 

reviewed. 





This CT exam was performed using 1 or more of the falling dose reduction 

techniques: Automated exposure control, adjustment of the MAA and/or kV 

according to patient size, and/or use of iterative reconstruction technique. 





  





Radiation dose (DLP):  650.74 MGy-cm. 





Comparison: Chest x-ray performed 6/20/17, CT chest without contrast performed 1

/15/17 





Findings: 





The inferior thyroid gland is not well visualized due to streak artifact from 

left-sided pacer. Question calcified nodule within the right lobe. Consider 

thyroid ultrasound follow-up if indicated. 





Endovascular stent within the right subclavian vein extending to the SVC. Dual 

lead left-sided pacemaker. Cardiomegaly. Dense coronary artery and valvular 

calcifications. Dense atherosclerotic calcifications of the aorta. 





Moderate size left lower lobe and lingular consolidation. Small left pleural 

effusion. Right basilar atelectasis. No pneumothorax. 





Small hiatal hernia/distal esophageal wall thickening. 





Limited visualization of the noncontrast upper abdomen: Cholecystectomy.  Dense 

calcifications in the right hepatic lobe of unclear significance. Dense 

vascular calcifications. 





  





Degenerative changes. Chronic T11 and L1 vertebral body compression fracture 

deformities. Minor fish mouth end-plate deformities involving several upper 

thoracic vertebral bodies. 





Impression: 





Moderate size left lower lobe and lingular consolidation. Small left pleural 

effusion. Right basilar atelectasis. 





Additional findings as above.





 











Assessment and Plan


(1) Infection of amputation stump


Status: Acute   





(2) ESRD on hemodialysis


Status: Acute   





(3) Basal cell carcinoma of chest


Status: Acute   





(4) Osteomyelitis of left foot


Status: Acute   





- Assessment and Plan (Free Text)


Assessment: 








A/P-


77 year old female with DM II, ESRD on HD, s/p left foot TMA admitted with left 

foot TMA infected stump.





s/p  excisional debridement  of hyperkeratotic borders of the TMA ulcer last 

week by podiatry


s/p punch bx of the chest lesion last week - Path report basal cell carcinoma


afebrile


rise in  leukocytosis  today


blood cx- neg x 2


foot wound cx- MRSA, ESBL e.coli


chest wound ccx- coag neg staph


foot xray- OM as per report.


chest wound cx- negative


chest CT report- ? infiltarte in left lower lung region and pleural effusion as 

per report.





plan-


as per podiatry no further surgical intervention needed at this time.


since OM on plain xray advise to treat with IV abx for at least 4-6 weeks as 

outpatient


based on the Id and sensitivity of the foot cx result advise  to  continue 

meropenem (renal dose ) and to continue with vancomycin .


 advise to get vancomycin 1 gram post HD days for total of 6 weeks and 

Meropenem 1 gram IV daily for total of 6 weeks.


today is day #9 of meropenem,


keep vanco trough <15.


check wbc in am.


advise incenstive spirometry tp help avoid more consolidation and atelectasis.


current abx should treat the left sided pneumonias as well.


await oncology rec regarding new finding of basal cell carcinoma of the chest 

wall lesion.








All above d/w patient and the NP shaunna at length.


.

## 2017-06-26 NOTE — CP.PCM.PN
Subjective





- Date & Time of Evaluation


Date of Evaluation: 06/26/17


Time of Evaluation: 08:08





- Subjective


Subjective: 





General Surgery - Dr. Palacios





Pt S&E. ELIZABETH.  Pt denies any complaints.  Chest wound dressing changed, area is 

clean and dry.





Objective





- Vital Signs/Intake and Output


Vital Signs (last 24 hours): 


 











Temp Pulse Resp BP Pulse Ox


 


 98.4 F   71   19   140/70   100 


 


 06/26/17 00:14  06/26/17 00:14  06/26/17 00:14  06/26/17 00:14  06/26/17 00:14











- Medications


Medications: 


 Current Medications





Amlodipine Besylate (Norvasc)  10 mg PO DAILY [facility]


   Last Admin: 06/25/17 08:55 Dose:  10 mg


Aspirin (Ecotrin)  81 mg PO DAILY [facility]


   Last Admin: 06/25/17 08:56 Dose:  81 mg


Atorvastatin Calcium (Lipitor)  80 mg PO DAILY [facility]


   Last Admin: 06/25/17 08:58 Dose:  80 mg


Collagenase (Santyl)  1 applic TOP DAILY [facility]


   Last Admin: 06/25/17 08:58 Dose:  1 applic


Epoetin Edgardo (Procrit)  8,000 unit IV TTS [facility]


   Last Admin: 06/24/17 16:28 Dose:  8,000 unit


Heparin Sodium (Porcine) (Heparin)  5,000 units SC Q12 [facility]


   PRN Reason: Protocol


   Last Admin: 06/25/17 21:51 Dose:  5,000 units


Meropenem 1 gm/ Sodium (Chloride)  100 mls @ 100 mls/hr IVPB DAILY@0900 [facility]


   Last Admin: 06/25/17 09:02 Dose:  100 mls/hr


Insulin Detemir (Levemir)  20 units SC HS [facility]


   Last Admin: 06/25/17 22:59 Dose:  20 u


Levothyroxine Sodium (Synthroid)  75 mcg PO DAILY@0630 [facility]


   Last Admin: 06/26/17 06:48 Dose:  75 mcg


Metoprolol Tartrate (Lopressor)  100 mg PO Q12 [facility]


   Last Admin: 06/25/17 21:49 Dose:  100 mg


Oxycodone/Acetaminophen (Percocet 5/325 Mg Tab)  2 tab PO Q4 [facility]


   PRN Reason: Pain, severe (8-10)


   Stop: 06/27/17 21:53


   Last Admin: 06/25/17 06:28 Dose:  2 tab


Pantoprazole Sodium (Protonix Ec Tab)  40 mg PO DAILY@0600 [facility]


   Last Admin: 06/26/17 06:48 Dose:  40 mg


Sevelamer HCl (Renagel)  800 mg PO TID [facility]


   Last Admin: 06/25/17 16:38 Dose:  800 mg


Sitagliptin Phosphate (Januvia)  25 mg PO DAILY [facility]


   Last Admin: 06/25/17 08:54 Dose:  25 mg


Valsartan (Diovan)  160 mg PO DAILY [facility]


   Last Admin: 06/25/17 08:56 Dose:  160 mg











- Labs


Labs: 


 





 06/24/17 06:00 





 06/24/17 06:00 





 











PT  15.2 Seconds (9.8-13.1)  H  06/18/17  06:05    


 


INR  1.3  (0.9-1.2)  H  06/18/17  06:05    


 


APTT  30.8 Seconds (25.6-37.1)   06/18/17  06:05    














- Constitutional


Appears: No Acute Distress





- Head Exam


Head Exam: ATRAUMATIC, NORMAL INSPECTION, NORMOCEPHALIC





- Eye Exam


Eye Exam: Normal appearance





- Respiratory Exam


Respiratory Exam: NORMAL BREATHING PATTERN.  absent: Respiratory Distress





- Cardiovascular Exam


Cardiovascular Exam: REGULAR RHYTHM


Additional comments: 





midline chest wound w/ clean 4x4 dressing in place s/p skin bx





- Neurological Exam


Neurological Exam: Alert, Awake





- Skin


Skin Exam: Dry, Intact





Assessment and Plan





- Assessment and Plan (Free Text)


Assessment: 





76 yo F w/ non-healing chest wound s/p punch bx w/ path confirmed basal cell ca





-Recc. Excision w/ skin graft


-Plastic surgery consulted for management


-Will defer to plastics team


-No further general surgery intervention, we will sign off, reconsult PRN





DW Dr Philip Calix PGy2

## 2017-06-26 NOTE — CP.PCM.PN
Subjective





- Date & Time of Evaluation


Date of Evaluation: 06/26/17


Time of Evaluation: 10:40





- Subjective


Subjective: 





F/U  Osteomyelitis L foot.








S/P skin bx today, Pt with less pain in  L foot.





Objective





- Vital Signs/Intake and Output


Vital Signs (last 24 hours): 


 











Temp Pulse Resp BP Pulse Ox


 


 98.2 F   70   20   152/69 H  93 L


 


 06/26/17 16:06  06/26/17 16:06  06/26/17 16:06  06/26/17 16:06  06/26/17 16:06











- Medications


Medications: 


 Current Medications





Amlodipine Besylate (Norvasc)  10 mg PO DAILY [facility]


   Last Admin: 06/26/17 09:36 Dose:  10 mg


Aspirin (Ecotrin)  81 mg PO DAILY [facility]


   Last Admin: 06/26/17 09:39 Dose:  81 mg


Atorvastatin Calcium (Lipitor)  80 mg PO DAILY [facility]


   Last Admin: 06/26/17 09:36 Dose:  80 mg


Collagenase (Santyl)  1 applic TOP DAILY [facility]


   Last Admin: 06/26/17 09:42 Dose:  1 applic


Epoetin Edgardo (Procrit)  8,000 unit IV TTS [facility]


   Last Admin: 06/24/17 16:28 Dose:  8,000 unit


Heparin Sodium (Porcine) (Heparin)  5,000 units SC Q12 [facility]


   PRN Reason: Protocol


   Last Admin: 06/26/17 09:41 Dose:  5,000 units


Meropenem 1 gm/ Sodium (Chloride)  100 mls @ 100 mls/hr IVPB DAILY@0900 [facility]


   Last Admin: 06/26/17 09:43 Dose:  100 mls/hr


Insulin Detemir (Levemir)  20 units SC HS [facility]


   Last Admin: 06/25/17 22:59 Dose:  20 u


Levothyroxine Sodium (Synthroid)  75 mcg PO DAILY@0630 [facility]


   Last Admin: 06/26/17 06:48 Dose:  75 mcg


Metoprolol Tartrate (Lopressor)  100 mg PO Q12 [facility]


   Last Admin: 06/26/17 09:38 Dose:  100 mg


Oxycodone/Acetaminophen (Percocet 5/325 Mg Tab)  2 tab PO Q4 [facility]


   PRN Reason: Pain, severe (8-10)


   Stop: 06/27/17 21:53


   Last Admin: 06/25/17 06:28 Dose:  2 tab


Pantoprazole Sodium (Protonix Ec Tab)  40 mg PO DAILY@0600 [facility]


   Last Admin: 06/26/17 06:48 Dose:  40 mg


Sevelamer HCl (Renagel)  800 mg PO TID [facility]


   Last Admin: 06/26/17 17:25 Dose:  800 mg


Sitagliptin Phosphate (Januvia)  25 mg PO DAILY [facility]


   Last Admin: 06/26/17 09:40 Dose:  25 mg


Valsartan (Diovan)  160 mg PO DAILY [facility]


   Last Admin: 06/26/17 09:40 Dose:  160 mg











- Labs


Labs: 


 





 06/26/17 10:53 





 06/26/17 10:53 





 











PT  15.2 Seconds (9.8-13.1)  H  06/18/17  06:05    


 


INR  1.3  (0.9-1.2)  H  06/18/17  06:05    


 


APTT  30.8 Seconds (25.6-37.1)   06/18/17  06:05    














- Constitutional


Appears: No Acute Distress, Chronically Ill





- Head Exam


Head Exam: NORMAL INSPECTION





- Eye Exam


Eye Exam: PERRL





- ENT Exam


ENT Exam: Normal Oropharynx





- Neck Exam


Neck Exam: Normal Inspection





- Respiratory Exam


Respiratory Exam: NORMAL BREATHING PATTERN





- Cardiovascular Exam


Cardiovascular Exam: Irregular Rhythm


Additional comments: 





Midline chest wound dressing in place, s/p skin bx.





- GI/Abdominal Exam


GI & Abdominal Exam: Soft, Normal Bowel Sounds





- Extremities Exam


Extremities Exam: Tenderness (L foot TMA, dressing in place.)


Additional comments: 





Swelling, edema RUE, AV shunt R arm, PICC line L arm in place, open wound with 

necrotic base L foot stump.





- Back Exam


Back Exam: NORMAL INSPECTION





- Neurological Exam


Neurological Exam: Alert, Oriented x3


Additional comments: 





Decreased sensation R-L foot, no focal motor deficit.





- Psychiatric Exam


Psychiatric exam: Normal Mood





- Skin


Skin Exam: Warm (Open skin lesion mid upper chest)





Assessment and Plan


(1) Osteomyelitis of left foot


Status: Acute   





(2) Infection of amputation stump


Status: Acute   





(3) ESRD on hemodialysis


Status: Acute   





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


Status: Acute   





(5) Skin lesion of chest wall


Status: Acute   





(6) PNA (pneumonia)


Status: Acute   





(7) Abdominal pain


Status: Acute   





(8) Edema of extremity of unknown cause


Status: Acute   





(9) Edema of extremity of unknown cause


Status: Acute   





(10) Basal cell carcinoma of chest


Status: Acute   





- Assessment and Plan (Free Text)


Plan: 


Thyroid U-S (-), f/u Doppler U-S RUE.

## 2017-06-27 VITALS — TEMPERATURE: 97.5 F | OXYGEN SATURATION: 98 %

## 2017-06-27 VITALS — SYSTOLIC BLOOD PRESSURE: 144 MMHG | DIASTOLIC BLOOD PRESSURE: 57 MMHG | HEART RATE: 62 BPM

## 2017-06-27 LAB
BASOPHILS # BLD AUTO: 0.1 K/UL (ref 0–0.2)
BASOPHILS NFR BLD: 1 % (ref 0–2)
BASOPHILS NFR BLD: 1.1 % (ref 0–2)
BUN SERPL-MCNC: 31 MG/DL (ref 7–17)
CALCIUM SERPL-MCNC: 9 MG/DL (ref 8.4–10.2)
CHLORIDE SERPL-SCNC: 96 MMOL/L (ref 98–107)
CO2 SERPL-SCNC: 28 MMOL/L (ref 22–30)
EOSINOPHIL # BLD AUTO: 0.2 K/UL (ref 0–0.7)
EOSINOPHIL NFR BLD: 1 % (ref 0–7)
EOSINOPHIL NFR BLD: 1.5 % (ref 0–4)
ERYTHROCYTE [DISTWIDTH] IN BLOOD BY AUTOMATED COUNT: 17.8 % (ref 11.5–14.5)
GLUCOSE SERPL-MCNC: 66 MG/DL (ref 65–105)
HCT VFR BLD CALC: 29.1 % (ref 34–47)
LYMPHOCYTES # BLD AUTO: 0.8 K/UL (ref 1–4.3)
LYMPHOCYTES NFR BLD AUTO: 7.5 % (ref 20–40)
MCH RBC QN AUTO: 28.3 PG (ref 27–31)
MCHC RBC AUTO-ENTMCNC: 31.2 G/DL (ref 33–37)
MCV RBC AUTO: 90.6 FL (ref 81–99)
MONOCYTES # BLD: 1.2 K/UL (ref 0–0.8)
MONOCYTES NFR BLD: 11.2 % (ref 0–10)
NEUTROPHILS # BLD: 8.6 K/UL (ref 1.8–7)
NEUTROPHILS NFR BLD AUTO: 78.7 % (ref 50–75)
NEUTROPHILS NFR BLD AUTO: 84 % (ref 42–75)
NRBC BLD AUTO-RTO: 0 % (ref 0–0)
PLATELET # BLD: 279 K/UL (ref 130–400)
PMV BLD AUTO: 8.9 FL (ref 7.2–11.7)
POTASSIUM SERPL-SCNC: 4.8 MMOL/L (ref 3.6–5)
SODIUM SERPL-SCNC: 132 MMOL/L (ref 132–148)
TOTAL CELLS COUNTED BLD: 100
WBC # BLD AUTO: 10.9 K/UL (ref 4.8–10.8)

## 2017-06-27 RX ADMIN — COLLAGENASE SANTYL SCH APPLIC: 250 OINTMENT TOPICAL at 09:12

## 2017-06-27 RX ADMIN — ERYTHROPOIETIN SCH UNIT: 20000 INJECTION, SOLUTION INTRAVENOUS; SUBCUTANEOUS at 12:19

## 2017-06-27 RX ADMIN — OXYCODONE HYDROCHLORIDE AND ACETAMINOPHEN PRN TAB: 5; 325 TABLET ORAL at 09:10

## 2017-06-27 RX ADMIN — OXYCODONE HYDROCHLORIDE AND ACETAMINOPHEN PRN TAB: 5; 325 TABLET ORAL at 03:46

## 2017-06-27 RX ADMIN — PANTOPRAZOLE SODIUM SCH MG: 40 TABLET, DELAYED RELEASE ORAL at 06:30

## 2017-06-27 NOTE — CP.PCM.PN
Subjective





- Date & Time of Evaluation


Date of Evaluation: 06/27/17


Time of Evaluation: 06:22





- Subjective


Subjective: 





78 y/o female seen at bedside 12 weeks s/p L foot TMA. Pt appears to be resting 

comfortably and is in NAD. Pt is AAOx3. Pt states that she has pain in her foot 

every now and then. Pt denies of any acute overnight events. Pt denies of any F/

N/V/C/SOB. Pt's dressing is intact, clean and dry. 





Objective





- Vital Signs/Intake and Output


Vital Signs (last 24 hours): 


 











Temp Pulse Resp BP Pulse Ox


 


 98.2 F   66   20   143/70   95 


 


 06/27/17 00:05  06/27/17 00:05  06/27/17 00:05  06/27/17 00:05  06/27/17 00:05











- Medications


Medications: 


 Current Medications





Amlodipine Besylate (Norvasc)  10 mg PO DAILY [facility]


   Last Admin: 06/26/17 09:36 Dose:  10 mg


Aspirin (Ecotrin)  81 mg PO DAILY [facility]


   Last Admin: 06/26/17 09:39 Dose:  81 mg


Atorvastatin Calcium (Lipitor)  80 mg PO DAILY [facility]


   Last Admin: 06/26/17 09:36 Dose:  80 mg


Collagenase (Santyl)  1 applic TOP DAILY [facility]


   Last Admin: 06/26/17 09:42 Dose:  1 applic


Epoetin Edgardo (Procrit)  8,000 unit IV TTS [facility]


   Last Admin: 06/24/17 16:28 Dose:  8,000 unit


Heparin Sodium (Porcine) (Heparin)  5,000 units SC Q12 [facility]


   PRN Reason: Protocol


   Last Admin: 06/26/17 21:24 Dose:  5,000 units


Meropenem 1 gm/ Sodium (Chloride)  100 mls @ 100 mls/hr IVPB DAILY@0900 [facility]


   Last Admin: 06/26/17 09:43 Dose:  100 mls/hr


Insulin Detemir (Levemir)  20 units SC HS [facility]


   Last Admin: 06/26/17 21:24 Dose:  113 u


Levothyroxine Sodium (Synthroid)  75 mcg PO DAILY@0630 [facility]


   Last Admin: 06/26/17 06:48 Dose:  75 mcg


Metoprolol Tartrate (Lopressor)  100 mg PO Q12 [facility]


   Last Admin: 06/26/17 21:23 Dose:  100 mg


Oxycodone/Acetaminophen (Percocet 5/325 Mg Tab)  2 tab PO Q4 PRN


   PRN Reason: Pain, severe (8-10)


   Stop: 06/27/17 21:53


   Last Admin: 06/27/17 03:46 Dose:  2 tab


Pantoprazole Sodium (Protonix Ec Tab)  40 mg PO DAILY@0600 [facility]


   Last Admin: 06/26/17 06:48 Dose:  40 mg


Sevelamer HCl (Renagel)  800 mg PO TID [facility]


   Last Admin: 06/26/17 17:25 Dose:  800 mg


Sitagliptin Phosphate (Januvia)  25 mg PO DAILY [facility]


   Last Admin: 06/26/17 09:40 Dose:  25 mg


Valsartan (Diovan)  160 mg PO DAILY [facility]


   Last Admin: 06/26/17 09:40 Dose:  160 mg











- Labs


Labs: 


 





 06/27/17 06:04 





 06/26/17 10:53 





 











PT  15.2 Seconds (9.8-13.1)  H  06/18/17  06:05    


 


INR  1.3  (0.9-1.2)  H  06/18/17  06:05    


 


APTT  30.8 Seconds (25.6-37.1)   06/18/17  06:05    














- Constitutional


Appears: Well, Non-toxic, No Acute Distress





- Extremities Exam


Additional comments: 





Left foot focused:





Vasc: DP pulse are 2/4. PT pulse are nonpalpable, Temperature gradient: warm to 

cool, No edema noted. 


Derm: Open wound noted to distal aspect of left stump measuring approximately 5 

x 2 x 0.2 cm. Wound base is necrotic with 70% fibrosis noted. No active drainage

, no purulence, no malodor noted. no tunneling or undermining noted. No probe 

to bone noted


Neuro: Gross sensation diminished.


Ortho: Pain to palpation to TMA surgical site and plantar aspect of foot.








- Neurological Exam


Neurological Exam: Alert, Awake, Oriented x3





Assessment and Plan





- Assessment and Plan (Free Text)


Assessment: 





78 y/o female 12 weeks s/p L foot TMA (DOS: 03/31/2017)


Plan: 





Patient was seen and evaluated at bedside


Discussed plan in detail with Dr. Serna


Charts, labs and vitals reviewed (WBC: 10.9)


Wound was dressed with Santyl, DSD, and kerlix.


C/w IV abx as per ID


Left foot wound culture positive for E.coli and positive for MRSA


No surgical interventions indicated at this time


Continue with offloading prevalon boots to both feet to be worn at all times 

while in bed


Stable per podiatry


Podiatry will continue to follow

## 2017-06-27 NOTE — US
PROCEDURE:  Right upper extremity venous duplex exam. . 



PRIORS:

No prior study available comparison. 



TECHNIQUE:

Duplex interrogation of the deep veins right upper extremity 

performed. 



Findings: The current study reveals in situ right-sided PICC line. 



The right internal jugular vein, subclavian, axillary and brachial, 

cephalic at and basilic veins are patent with no definite evidence of 

DVT.  Veins are compressible and exhibit normal augmentation. 



Incidental note made of a av dialysis shunt. 



Impression: No evidence of DVT.  In situ PICC line as above.

## 2017-06-27 NOTE — CP.PCM.PN
Subjective





- Date & Time of Evaluation


Date of Evaluation: 06/27/17


Time of Evaluation: 08:00





- Subjective


Subjective: 





F/U Osteomyelitis L foot.





Objective





- Vital Signs/Intake and Output


Vital Signs (last 24 hours): 


 











Temp Pulse Resp BP Pulse Ox


 


 97.5 F L  62   20   144/57 L  98 


 


 06/27/17 09:00  06/27/17 12:45  06/27/17 08:24  06/27/17 12:45  06/27/17 08:24











- Labs


Labs: 


 





 06/27/17 06:04 





 06/27/17 06:04 





 











PT  15.2 Seconds (9.8-13.1)  H  06/18/17  06:05    


 


INR  1.3  (0.9-1.2)  H  06/18/17  06:05    


 


APTT  30.8 Seconds (25.6-37.1)   06/18/17  06:05    














- Constitutional


Appears: No Acute Distress, Chronically Ill





- Head Exam


Head Exam: NORMAL INSPECTION





- Eye Exam


Eye Exam: PERRL





- ENT Exam


ENT Exam: Normal Oropharynx





- Neck Exam


Neck Exam: Normal Inspection





- Respiratory Exam


Respiratory Exam: NORMAL BREATHING PATTERN





- Cardiovascular Exam


Cardiovascular Exam: Irregular Rhythm


Additional comments: 





Midline chest wound dressing in place, s/p skin Bx.





- GI/Abdominal Exam


GI & Abdominal Exam: Soft, Normal Bowel Sounds





- Extremities Exam


Extremities Exam: Tenderness (L foot TMA, dressing in place.)


Additional comments: 





Swelling,edema RUE, AV shunt R arm, PICC line L arm in place, open wound with 

necrotic base L foot stump.





- Back Exam


Back Exam: NORMAL INSPECTION





- Neurological Exam


Neurological Exam: Alert, Oriented x3


Additional comments: 





Decreased sensation R-L foot, no focal motor deficit.





- Psychiatric Exam


Psychiatric exam: Normal Mood





- Skin


Skin Exam: Warm (open lesion mid upper chest.)





Assessment and Plan


(1) Osteomyelitis of left foot


Status: Acute   





(2) Infection of amputation stump


Status: Acute   





(3) ESRD on hemodialysis


Status: Acute   





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


Status: Acute   





(5) Skin lesion of chest wall


Status: Acute   





(6) PNA (pneumonia)


Status: Acute   





(7) Abdominal pain


Status: Acute   





(8) Edema of extremity of unknown cause


Status: Acute   





(9) Edema of extremity of unknown cause


Status: Acute   





(10) Basal cell carcinoma of chest


Status: Acute

## 2017-06-27 NOTE — PQF GENQUE
***** This form is a permanent part of the medical record*****



6/27/17  ,



Please clarify if the Pneumonia was Present on Admission or not and the 
possible type of pneumonia if known



Admitted on 6/17 with left foot stump pain, Abdominal x-ray with LLL 
atelectasis and or infiltrate done on 6/18/17. CT Chest performed on 6/23/17 
with an impression of left lower lobe and lingular consolidation, small left 
pleural effusion, right basilar atelectasis.  Documentation of Pneumonia. 
Treated with Merrem

          

Clarification of your documentation is requested to better reflect the severity 
of illness and intensity of treatment of your patient.  



Indicators present      



[]  Specify: []

         



[]  Specify: []         

 



[]  Specify: []         





[]  Specify: []         





Location in the medical record that reflects the above clinical findings:  []

 



Treatment Provided:  []

  

PHYSICIAN'S RESPONSE



1) Possible type of Pneumonia: __________



2) []Yes, the condition is present on admission at the time of the order to 
admit patient to inpatient status

    [] No, the condition is not present on admission and developed during the 
inpatient stay

    [] Clinically, unable to determine if the condition was present on admission

    [] Other (please specify) ____________________

    [] Unable to determine

    []Unknown





Based on your medical judgment of the clinical indicators outlined above please 
clarify the following:



[]  Practitioner response 



[]  If unable to determine, please check the box, sign and date.  





Present On Admission (POA) Indicator:





[] Present at the time of admission 



[] Not present at the time of admission



[] Clinically Undetermined









In responding to this query, please exercise your independent professional 
judgment.  The fact that a question is asked does not imply that any particular 
answer is desired or expected.  Thank you for your clarification on this 
documentation.



If you have any questions please call:ext [phone]

* Thank you,

   Jasmina Early RN

            [facility]

## 2017-06-27 NOTE — CP.PCM.PN
Subjective





- Date & Time of Evaluation


Date of Evaluation: 06/27/17


Time of Evaluation: 13:09





- Subjective


Subjective: 





No new events reported


Patient to receive dialysis now


Vital sign noted to be okay


Dialysis ordered given


Sodium bath 138


Potassium bath 2 mEq


Bicarbonate 34


Impression and plan


Left transmetatarsal amputation was apparently infected 1 to receiving IV 

antibiotics


Continue only diagnosed basal cell carcinoma on the chest wall


We have to discuss with the hematologist whether we can give EPO or not in this 

case





Objective





- Vital Signs/Intake and Output


Vital Signs (last 24 hours): 


 











Temp Pulse Resp BP Pulse Ox


 


 97.5 F L  62   20   144/57 L  98 


 


 06/27/17 09:00  06/27/17 12:45  06/27/17 08:24  06/27/17 12:45  06/27/17 08:24











- Medications


Medications: 


 Current Medications





Amlodipine Besylate (Norvasc)  10 mg PO DAILY [facility]


   Last Admin: 06/27/17 12:45 Dose:  10 mg


Aspirin (Ecotrin)  81 mg PO DAILY [facility]


   Last Admin: 06/27/17 09:11 Dose:  81 mg


Atorvastatin Calcium (Lipitor)  80 mg PO DAILY [facility]


   Last Admin: 06/27/17 09:11 Dose:  80 mg


Collagenase (Santyl)  1 applic TOP DAILY [facility]


   Last Admin: 06/27/17 09:12 Dose:  1 applic


Epoetin Edgardo (Procrit)  8,000 unit IV TTS [facility]


   Last Admin: 06/27/17 12:19 Dose:  8,000 unit


Heparin Sodium (Porcine) (Heparin)  5,000 units SC Q12 [facility]


   PRN Reason: Protocol


   Last Admin: 06/27/17 09:11 Dose:  5,000 units


Meropenem 1 gm/ Sodium (Chloride)  100 mls @ 100 mls/hr IVPB DAILY@0900 [facility]


   Last Admin: 06/27/17 09:17 Dose:  100 mls/hr


Insulin Detemir (Levemir)  20 units SC HS [facility]


   Last Admin: 06/26/17 21:24 Dose:  113 u


Levothyroxine Sodium (Synthroid)  75 mcg PO DAILY@0630 [facility]


   Last Admin: 06/27/17 06:30 Dose:  75 mcg


Metoprolol Tartrate (Lopressor)  100 mg PO Q12 [facility]


   Last Admin: 06/27/17 09:00 Dose:  Not Given


Oxycodone/Acetaminophen (Percocet 5/325 Mg Tab)  2 tab PO Q4 PRN


   PRN Reason: Pain, severe (8-10)


   Stop: 06/27/17 21:53


   Last Admin: 06/27/17 09:10 Dose:  2 tab


Pantoprazole Sodium (Protonix Ec Tab)  40 mg PO DAILY@0600 [facility]


   Last Admin: 06/27/17 06:30 Dose:  40 mg


Sevelamer HCl (Renagel)  800 mg PO TID [facility]


   Last Admin: 06/27/17 12:46 Dose:  800 mg


Sitagliptin Phosphate (Januvia)  25 mg PO DAILY [facility]


   Last Admin: 06/27/17 09:11 Dose:  25 mg


Valsartan (Diovan)  160 mg PO DAILY [facility]


   Last Admin: 06/27/17 12:46 Dose:  160 mg











- Labs


Labs: 


 





 06/27/17 06:04 





 06/27/17 06:04 





 











PT  15.2 Seconds (9.8-13.1)  H  06/18/17  06:05    


 


INR  1.3  (0.9-1.2)  H  06/18/17  06:05    


 


APTT  30.8 Seconds (25.6-37.1)   06/18/17  06:05    














Assessment and Plan


(1) ESRD on hemodialysis


Status: Acute

## 2017-07-19 NOTE — CP.PCM.DIS
Provider





- Provider


Date of Admission: 


06/17/17 18:14





Attending physician: 


Fermín Guillen MD





Consults: 





Gastroenterology, General Surgery, Hematology, ID, Nephrology and Plastic 

Surgery.


Time Spent in preparation of Discharge (in minutes): 25





Diagnosis





- Discharge Diagnosis


(1) Osteomyelitis of left foot


Status: Acute   





(2) Infection of amputation stump


Status: Acute   





(3) ESRD on hemodialysis


Status: Acute   Priority: High   





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


Status: Acute   





(5) Skin lesion of chest wall


Status: Acute   





(6) PNA (pneumonia)


Status: Acute   





(7) Abdominal pain


Status: Acute   Priority: High   





(8) Edema of extremity of unknown cause


Status: Acute   





(9) Edema of extremity of unknown cause


Status: Acute   





(10) Basal cell carcinoma of chest


Status: Acute   





Hospital Course





- Lab Results


Lab Results: 


 Micro Results





06/19/17 18:06   Chest   Gram Stain - Final


06/19/17 18:06   Chest   Wound Culture - Final


                            Coagulase Neg Staphylococcus





 Most Recent Lab Values











WBC  10.9 K/uL (4.8-10.8)  H  06/27/17  06:04    


 


RBC  3.21 Mil/uL (3.80-5.20)  L  06/27/17  06:04    


 


Hgb  9.1 g/dL (12.0-16.0)  L  06/27/17  06:04    


 


Hct  29.1 % (34.0-47.0)  L  06/27/17  06:04    


 


MCV  90.6 fl (81.0-99.0)   06/27/17  06:04    


 


MCH  28.3 pg (27.0-31.0)   06/27/17  06:04    


 


MCHC  31.2 g/dL (33.0-37.0)  L  06/27/17  06:04    


 


RDW  17.8 % (11.5-14.5)  H  06/27/17  06:04    


 


Plt Count  279 K/uL (130-400)   06/27/17  06:04    


 


MPV  8.9 fl (7.2-11.7)   06/27/17  06:04    


 


Neut % (Auto)  78.7 % (50.0-75.0)  H  06/27/17  06:04    


 


Lymph % (Auto)  7.5 % (20.0-40.0)  L  06/27/17  06:04    


 


Mono % (Auto)  11.2 % (0.0-10.0)  H  06/27/17  06:04    


 


Eos % (Auto)  1.5 % (0.0-4.0)   06/27/17  06:04    


 


Baso % (Auto)  1.1 % (0.0-2.0)   06/27/17  06:04    


 


Neut #  8.6 K/uL (1.8-7.0)  H  06/27/17  06:04    


 


Lymph #  0.8 K/uL (1.0-4.3)  L  06/27/17  06:04    


 


Mono #  1.2 K/uL (0.0-0.8)  H  06/27/17  06:04    


 


Eos #  0.2 K/uL (0.0-0.7)   06/27/17  06:04    


 


Baso #  0.1 K/uL (0.0-0.2)   06/27/17  06:04    


 


Neutrophils % (Manual)  84 % (42-75)  H  06/27/17  06:04    


 


Band Neutrophils %  1 % (0-2)   06/27/17  06:04    


 


Lymphocytes % (Manual)  8 % (20-50)  L  06/27/17  06:04    


 


Monocytes % (Manual)  5 % (0-10)   06/27/17  06:04    


 


Eosinophils % (Manual)  1 % (0-7)   06/27/17  06:04    


 


Basophils % (Manual)  1 % (0-2)   06/27/17  06:04    


 


Myelocytes %  1 % (0-0)  H  06/17/17  18:00    


 


Platelet Estimate  Normal  (NORMAL)   06/27/17  06:04    


 


Large Platelets  Present   06/17/17  18:00    


 


Hypochromasia (manual)  Slight   06/27/17  06:04    


 


Anisocytosis (manual)  Slight   06/27/17  06:04    


 


ESR  105 mm/hr (0-30)  H  06/19/17  18:06    


 


PT  15.2 Seconds (9.8-13.1)  H  06/18/17  06:05    


 


INR  1.3  (0.9-1.2)  H  06/18/17  06:05    


 


APTT  30.8 Seconds (25.6-37.1)   06/18/17  06:05    


 


pO2  21 mm/Hg (30-55)  L  06/17/17  17:18    


 


VBG pH  7.41  (7.32-7.43)   06/17/17  17:18    


 


VBG pCO2  51 mmHg (40-60)   06/17/17  17:18    


 


VBG HCO3  28.8 mmol/L  06/17/17  17:18    


 


VBG Total CO2  33.9 mmol/L (22-28)  H  06/17/17  17:18    


 


VBG O2 Sat (Calc)  45.1 % (40-65)   06/17/17  17:18    


 


VBG Base Excess  6.5 mmol/L (0.0-2.0)  H  06/17/17  17:18    


 


VBG Potassium  3.9 mmol/L (3.6-5.2)   06/17/17  17:18    


 


Sodium  130.0 mmol/L (132-148)  L  06/17/17  17:18    


 


Chloride  95.0 mmol/L ()  L  06/17/17  17:18    


 


Glucose  140 mg/dL ()  H  06/17/17  17:18    


 


Lactate  1.6 mmol/L (0.7-2.1)   06/17/17  17:18    


 


FiO2  21.0 %  06/17/17  17:18    


 


Sodium  132 mmol/l (132-148)   06/27/17  06:04    


 


Potassium  4.8 MMOL/L (3.6-5.0)   06/27/17  06:04    


 


Chloride  96 mmol/L ()  L  06/27/17  06:04    


 


Carbon Dioxide  28 mmol/L (22-30)   06/27/17  06:04    


 


Anion Gap  13  (10-20)   06/27/17  06:04    


 


BUN  31 mg/dl (7-17)  H  06/27/17  06:04    


 


Creatinine  4.0 mg/dL (0.7-1.2)  H  06/27/17  06:04    


 


Est GFR ( Amer)  13   06/27/17  06:04    


 


Est GFR (Non-Af Amer)  11   06/27/17  06:04    


 


POC Glucose (mg/dL)  128 mg/dL ()  H  06/27/17  10:26    


 


Random Glucose  66 mg/dL ()   06/27/17  06:04    


 


Hemoglobin A1c  6.2 % (4.2-6.5)   06/18/17  06:05    


 


Calcium  9.0 mg/dL (8.4-10.2)   06/27/17  06:04    


 


Total Bilirubin  0.2 mg/dl (0.2-1.3)   06/24/17  06:00    


 


AST  19 U/L (14-36)   06/24/17  06:00    


 


ALT  27 U/L (9-52)   06/24/17  06:00    


 


Alkaline Phosphatase  71 U/L ()   06/24/17  06:00    


 


Total Protein  7.6 G/DL (6.3-8.2)   06/24/17  06:00    


 


Albumin  3.6 g/dL (3.5-5.0)   06/24/17  06:00    


 


Globulin  4.1 gm/dL (2.2-3.9)  H  06/24/17  06:00    


 


Albumin/Globulin Ratio  0.9  (1.0-2.1)  L  06/24/17  06:00    


 


Triglycerides  121 mg/DL (0-149)   06/18/17  06:05    


 


Cholesterol  89 mg/dL (0-199)   06/18/17  06:05    


 


LDL Cholesterol Direct  < 30 mg/dL (0-129)   06/18/17  06:05    


 


HDL Cholesterol  30 MG/DL (30-70)   06/18/17  06:05    


 


Thyroxine (T4)  6.29 ug/dl (5.5-11.0)   06/18/17  06:05    


 


TSH 3rd Generation  3.72 mIU/ML (0.46-4.68)   06/18/17  06:05    


 


Venous Blood Potassium  3.9 mmol/L (3.6-5.2)   06/17/17  17:18    


 


Vancomycin Trough  15.9 ug/mL (5.0-10.0)  H  06/27/17  06:04    


 


Hepatitis A IgM Ab  Negative  (NEGATIVE)   06/20/17  10:59    


 


Hep Bs Antigen  Negative  (NEGATIVE)   06/20/17  10:59    


 


Hep B Core IgM Ab  Negative  (NEGATIVE)   06/20/17  10:59    


 


Hepatitis C Antibody  Reactive  (NEGATIVE)  H  06/20/17  10:59    














- Date & Time of H&P


Date of H&P: 06/18/17


Time of H&P: 14:00





Discharge Exam





- Head Exam


Head Exam: NORMAL INSPECTION





Discharge Plan





- Discharge Medications


Prescriptions: 


Meropenem [Merrem IV] 1 gm IVPB DAILY #42 vial


Vancomycin 1 GM [Vancomycin 1GM in Normal Saline Addvantage] 1 gm IVPB TTS #18 

bag





- Follow Up Plan


Condition: FAIR


Disposition: TRANSF TO SNF


Instructions:  Osteomyelitis (DC), End Stage Kidney Disease (DC)


Referrals: 


Haja Palacios MD [Staff Provider] - 


Hilton Oswald MD [Staff Provider] -

## 2017-09-30 ENCOUNTER — HOSPITAL ENCOUNTER (INPATIENT)
Dept: HOSPITAL 14 - H.ER | Age: 77
LOS: 9 days | Discharge: NURSING, REHAB OR LONG-TERM CARE | DRG: 64 | End: 2017-10-09
Attending: INTERNAL MEDICINE | Admitting: INTERNAL MEDICINE
Payer: MEDICARE

## 2017-09-30 VITALS — BODY MASS INDEX: 22.6 KG/M2

## 2017-09-30 DIAGNOSIS — E11.22: ICD-10-CM

## 2017-09-30 DIAGNOSIS — B95.7: ICD-10-CM

## 2017-09-30 DIAGNOSIS — H26.9: ICD-10-CM

## 2017-09-30 DIAGNOSIS — L97.529: ICD-10-CM

## 2017-09-30 DIAGNOSIS — N18.6: ICD-10-CM

## 2017-09-30 DIAGNOSIS — E03.9: ICD-10-CM

## 2017-09-30 DIAGNOSIS — I63.9: Primary | ICD-10-CM

## 2017-09-30 DIAGNOSIS — Z95.5: ICD-10-CM

## 2017-09-30 DIAGNOSIS — Z23: ICD-10-CM

## 2017-09-30 DIAGNOSIS — Z74.01: ICD-10-CM

## 2017-09-30 DIAGNOSIS — Z95.1: ICD-10-CM

## 2017-09-30 DIAGNOSIS — I50.22: ICD-10-CM

## 2017-09-30 DIAGNOSIS — I48.91: ICD-10-CM

## 2017-09-30 DIAGNOSIS — Z87.442: ICD-10-CM

## 2017-09-30 DIAGNOSIS — I13.2: ICD-10-CM

## 2017-09-30 DIAGNOSIS — Z79.899: ICD-10-CM

## 2017-09-30 DIAGNOSIS — L89.153: ICD-10-CM

## 2017-09-30 DIAGNOSIS — J98.11: ICD-10-CM

## 2017-09-30 DIAGNOSIS — R78.81: ICD-10-CM

## 2017-09-30 DIAGNOSIS — D72.829: ICD-10-CM

## 2017-09-30 DIAGNOSIS — C44.91: ICD-10-CM

## 2017-09-30 DIAGNOSIS — E78.00: ICD-10-CM

## 2017-09-30 DIAGNOSIS — T87.89: ICD-10-CM

## 2017-09-30 DIAGNOSIS — N25.81: ICD-10-CM

## 2017-09-30 DIAGNOSIS — Z90.49: ICD-10-CM

## 2017-09-30 DIAGNOSIS — D63.8: ICD-10-CM

## 2017-09-30 DIAGNOSIS — I16.1: ICD-10-CM

## 2017-09-30 DIAGNOSIS — E11.65: ICD-10-CM

## 2017-09-30 DIAGNOSIS — Z95.0: ICD-10-CM

## 2017-09-30 DIAGNOSIS — M19.90: ICD-10-CM

## 2017-09-30 DIAGNOSIS — E05.90: ICD-10-CM

## 2017-09-30 DIAGNOSIS — Z79.82: ICD-10-CM

## 2017-09-30 DIAGNOSIS — D68.9: ICD-10-CM

## 2017-09-30 DIAGNOSIS — Z79.02: ICD-10-CM

## 2017-09-30 DIAGNOSIS — F03.90: ICD-10-CM

## 2017-09-30 DIAGNOSIS — Z87.01: ICD-10-CM

## 2017-09-30 DIAGNOSIS — Z99.2: ICD-10-CM

## 2017-09-30 DIAGNOSIS — I25.10: ICD-10-CM

## 2017-09-30 DIAGNOSIS — Z66: ICD-10-CM

## 2017-09-30 DIAGNOSIS — Z86.73: ICD-10-CM

## 2017-09-30 LAB
ALBUMIN/GLOB SERPL: 0.9 {RATIO} (ref 1–2.1)
ALP SERPL-CCNC: 107 U/L (ref 38–126)
ALT SERPL-CCNC: 29 U/L (ref 9–52)
APTT BLD: 27.9 SECONDS (ref 25.6–37.1)
AST SERPL-CCNC: 41 U/L (ref 14–36)
BASOPHILS # BLD AUTO: 0.1 K/UL (ref 0–0.2)
BASOPHILS NFR BLD: 0.9 % (ref 0–2)
BILIRUB SERPL-MCNC: 0.7 MG/DL (ref 0.2–1.3)
BUN SERPL-MCNC: 16 MG/DL (ref 7–17)
CALCIUM SERPL-MCNC: 9.6 MG/DL (ref 8.4–10.2)
CHLORIDE SERPL-SCNC: 93 MMOL/L (ref 98–107)
CO2 SERPL-SCNC: 28 MMOL/L (ref 22–30)
EOSINOPHIL # BLD AUTO: 0 K/UL (ref 0–0.7)
EOSINOPHIL NFR BLD: 0.2 % (ref 0–4)
ERYTHROCYTE [DISTWIDTH] IN BLOOD BY AUTOMATED COUNT: 21.2 % (ref 11.5–14.5)
GLOBULIN SER-MCNC: 4.2 GM/DL (ref 2.2–3.9)
GLUCOSE SERPL-MCNC: 198 MG/DL (ref 65–105)
HCT VFR BLD CALC: 43 % (ref 34–47)
LIPASE SERPL-CCNC: 61 U/L (ref 23–300)
LYMPHOCYTES # BLD AUTO: 0.5 K/UL (ref 1–4.3)
LYMPHOCYTES NFR BLD AUTO: 3.9 % (ref 20–40)
MAGNESIUM SERPL-MCNC: 2.1 MG/DL (ref 1.6–2.3)
MCH RBC QN AUTO: 25.8 PG (ref 27–31)
MCHC RBC AUTO-ENTMCNC: 29.8 G/DL (ref 33–37)
MCV RBC AUTO: 86.5 FL (ref 81–99)
MONOCYTES # BLD: 0.9 K/UL (ref 0–0.8)
MONOCYTES NFR BLD: 7.4 % (ref 0–10)
NEUTROPHILS # BLD: 10.1 K/UL (ref 1.8–7)
NEUTROPHILS NFR BLD AUTO: 87.6 % (ref 50–75)
NEUTROPHILS NFR BLD AUTO: 90 % (ref 42–75)
NRBC BLD AUTO-RTO: 0.3 % (ref 0–0)
PHOSPHATE SERPL-MCNC: 2.4 MG/DL (ref 2.5–4.5)
PLATELET # BLD: 305 K/UL (ref 130–400)
PMV BLD AUTO: 8.6 FL (ref 7.2–11.7)
POTASSIUM SERPL-SCNC: 3.7 MMOL/L (ref 3.6–5)
PROT SERPL-MCNC: 8 G/DL (ref 6.3–8.2)
SODIUM SERPL-SCNC: 140 MMOL/L (ref 132–148)
TOTAL CELLS COUNTED BLD: 100
TROPONIN I SERPL-MCNC: 0.75 NG/ML (ref 0–0.12)
WBC # BLD AUTO: 11.6 K/UL (ref 4.8–10.8)

## 2017-09-30 NOTE — CP.PCM.CON
History of Present Illness





- History of Present Illness


History of Present Illness: 





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 





HPI:








Past Patient History





- Infectious Disease


Hx of Infectious Diseases: None





- Past Medical History & Family History


Past Medical History?: Yes





- Past Social History


Smoking Status: Never Smoked





- CARDIAC


Hx Atrial Fibrillation: Yes


Hx Congestive Heart Failure: Yes


Hx Hypercholesterolemia: Yes


Hx Hypertension: Yes


Hx Pacemaker: Yes





- PULMONARY


Hx Pneumonia: Yes





- NEUROLOGICAL


Hx Neurological Disorder: Yes





- HEENT


Hx Cataracts: Yes





- RENAL


Hx Chronic Kidney Disease: Yes


Hx Kidney Stones: Yes





- ENDOCRINE/METABOLIC


Hx Hyperthyroidism: Yes


Hx Hypothyroidism: Yes





- HEMATOLOGICAL/ONCOLOGICAL


Hx Anemia: Yes


Hx Human Immunodeficiency Virus (HIV): No





- INTEGUMENTARY


Hx Dermatological Problems: No





- MUSCULOSKELETAL/RHEUMATOLOGICAL


Hx Arthritis: Yes





- GASTROINTESTINAL


Hx Gall Bladder Disease: Yes





- GENITOURINARY/GYNECOLOGICAL


Hx Genitourinary Disorders: No





- PSYCHIATRIC


Hx Substance Use: No





- SURGICAL HISTORY


Hx Cholecystectomy: Yes


Hx Coronary Artery Bypass Graft: Yes


Hx Coronary Stent: Yes





- ANESTHESIA


Hx Anesthesia: Yes


Hx Anesthesia Reactions: No


Hx Malignant Hyperthermia: No





Meds


Allergies/Adverse Reactions: 


 Allergies











Allergy/AdvReac Type Severity Reaction Status Date / Time


 


No Known Allergies Allergy   Verified 02/03/16 18:53














- Medications


Medications: 


 Current Medications





Acetaminophen (Tylenol 650mg/20.3ml Solution Ud)  650 mg PO Q6 PRN


   PRN Reason: Headache


Aspirin (Ecotrin)  81 mg PO DAILY Pending sale to [facility]


Home Med (Rosuvastatin Calcium [Crestor])  40 mg PO HS [facility]


Sodium Chloride (Hypertonic Saline 3%)  500 mls @ 30 mls/hr IV .[id number] Pending sale to [facility]


   Stop: 10/01/17 18:12


   Last Admin: 09/30/17 18:31 Dose:  30 mls/hr


Influenza Virus Vaccine (Afluria (Pf)(18yr & Older))  0.5 ml IM .ONCE ONE


   Stop: 10/01/17 09:01


Insulin Human Regular (Humulin R)  0 units SC ACCU-CHECK Pending sale to [facility]


   PRN Reason: Protocol


Pantoprazole Sodium (Protonix Inj)  40 mg IVP DAILY Pending sale to [facility]


Vitamin B Complex/Vit C/Folic Acid (Nephro-Brianna)  1 tab PO DAILY Pending sale to [facility]











Results





- Vital Signs


Recent Vital Signs: 


 Last Vital Signs











Temp  98 F   09/30/17 20:00


 


Pulse  77   09/30/17 22:00


 


Resp  16   09/30/17 22:00


 


BP  166/65 H  09/30/17 22:00


 


Pulse Ox  100   09/30/17 22:00














- Labs


Result Diagrams: 


 09/30/17 16:44





 09/30/17 16:44


Labs: 


 Laboratory Results - last 24 hr











  09/30/17 09/30/17 09/30/17





  16:44 16:44 16:44


 


WBC  11.6 H  


 


RBC  4.97  


 


Hgb  12.8  D  


 


Hct  43.0  


 


MCV  86.5  D  


 


MCH  25.8 L  


 


MCHC  29.8 L  


 


RDW  21.2 H  


 


Plt Count  305  


 


MPV  8.6  


 


Neut % (Auto)  87.6 H  


 


Lymph % (Auto)  3.9 L  


 


Mono % (Auto)  7.4  


 


Eos % (Auto)  0.2  


 


Baso % (Auto)  0.9  


 


Neut #  10.1 H  


 


Lymph #  0.5 L  


 


Mono #  0.9 H  


 


Eos #  0.0  


 


Baso #  0.1  


 


Neutrophils % (Manual)  90 H  


 


Lymphocytes % (Manual)  6 L  


 


Monocytes % (Manual)  4  


 


Platelet Estimate  Normal  


 


Polychromasia  Slight  


 


Hypochromasia (manual)  Slight  


 


Anisocytosis (manual)  Slight  


 


Target Cells  Slight  


 


PT    13.7 H


 


INR    1.3 H


 


APTT    27.9


 


Sodium   140 


 


Potassium   3.7 


 


Chloride   93 L 


 


Carbon Dioxide   28 


 


Anion Gap   23 H 


 


BUN   16 


 


Creatinine   2.0 H 


 


Est GFR ( Amer)   29 


 


Est GFR (Non-Af Amer)   24 


 


POC Glucose (mg/dL)   


 


Random Glucose   198 H 


 


Calcium   9.6 


 


Phosphorus   2.4 L 


 


Magnesium   2.1 


 


Total Bilirubin   0.7 


 


AST   41 H 


 


ALT   29 


 


Alkaline Phosphatase   107 


 


Troponin I   0.7490 H* 


 


Total Protein   8.0 


 


Albumin   3.8 


 


Globulin   4.2 H 


 


Albumin/Globulin Ratio   0.9 L 


 


Lipase   61 














  09/30/17





  22:38


 


WBC 


 


RBC 


 


Hgb 


 


Hct 


 


MCV 


 


MCH 


 


MCHC 


 


RDW 


 


Plt Count 


 


MPV 


 


Neut % (Auto) 


 


Lymph % (Auto) 


 


Mono % (Auto) 


 


Eos % (Auto) 


 


Baso % (Auto) 


 


Neut # 


 


Lymph # 


 


Mono # 


 


Eos # 


 


Baso # 


 


Neutrophils % (Manual) 


 


Lymphocytes % (Manual) 


 


Monocytes % (Manual) 


 


Platelet Estimate 


 


Polychromasia 


 


Hypochromasia (manual) 


 


Anisocytosis (manual) 


 


Target Cells 


 


PT 


 


INR 


 


APTT 


 


Sodium 


 


Potassium 


 


Chloride 


 


Carbon Dioxide 


 


Anion Gap 


 


BUN 


 


Creatinine 


 


Est GFR ( Amer) 


 


Est GFR (Non-Af Amer) 


 


POC Glucose (mg/dL)  190 H


 


Random Glucose 


 


Calcium 


 


Phosphorus 


 


Magnesium 


 


Total Bilirubin 


 


AST 


 


ALT 


 


Alkaline Phosphatase 


 


Troponin I 


 


Total Protein 


 


Albumin 


 


Globulin 


 


Albumin/Globulin Ratio 


 


Lipase

## 2017-09-30 NOTE — CP.CCUPN
CCU Subjective





- Physician Review


Subjective (Free Text): 


ICU admission and consultation:





77F presents with multiple episodes of nausea and bilious fluid emesis during 

routine usual HD today. HD course was completed and sent to ER. Initial eval 

notable for acute / subacute R cerebellar infarct with mass effect, CTAP 

unremarkable for any other pathology attributable to emesis. She is awake and 

alert, no CP, SOB, fevers, chills diaphoresis, palpitations, and denies focal 

weakness. ER contacted Neurology / NeuroSurg and deemed not a candidate for any 

invasive mgmt. at this time. Given multiple doses of Zofran for nausea and 

started on 3% Saline infusion as per Neurology. 





Allergies: NKDA 





Other vitals and I/O's reviewed. No fever spikes noted since admission.





Allergies: Penicillin





Home Meds: Norvasc, ASA, Plavix, Lasix, Imdur, Lopressor, Catapres,  Lyrica, 

Protonix, Crestor, Renagel, Renvela, Vits B, C, Folate complex. 





ROS:  No other pertinent negs or positives on 10+  system review. 





PMSFH:  Anemia, Arthritis, Atrial Fibrillation, CAD, CHF, DM II,  Gall Bladder 

Disease with Breanna, HTN, Hypercholesterolemia, Kidney Stones, Pneumonia, End 

Stage Renal Disease (TTF)


All Nursing and physician documentation reviewed to date; no new pertinent info 

noted relevant to current medical problems.





MAJOR PROBLEMS:


1.   Right Cerebellar Acute / SubAcute Infarct with localized edema and shift 

to the Left


2.   Accelerated HTN


3.   ESRD


4.   Mild Coagulopathy


5.   Mild Troponin Elevation





PLAN:


1.   Emergent Hypertonic saline therapy ordered and started in ED. 


2.   Serial Serum Osm.


3.   Repeat CT Brain imaging in 12-24 H.


4.   Close Neurochecks, Seizure precautions, keep HOB elevation.


5.   Watch BP response during this interim observational post-op period.  Would 

only treat if MAP is sustained above 120. 


6.   Watch for s/sx obstructive hydrocephalus.


7.   Deemed not a Neurosurgical candidate due to multiple severe co-morbidities.


8.   Does not need any hemodynamic support nor any assisted breathing support 

at this time. 


9.   Check if any Advance Directives. 





CCU Objective





- Vital Signs / Intake & Output


Vital Signs (Last 4 hours): 


Vital Signs











  Temp Pulse Resp BP Pulse Ox


 


 09/30/17 19:02      98


 


 09/30/17 18:50  97.6 F  85  19  158/66 H 


 


 09/30/17 18:05   85  19  158/66 H 


 


 09/30/17 17:57   86  19  170/70 H  98


 


 09/30/17 16:06  98.8 F  94 H  20  199/89 H  98











Intake and Output (Last 8hrs): 


 Intake & Output











 09/30/17 09/30/17 09/30/17





 06:59 14:59 22:59


 


Weight   170 lb














- Physical Exam


Physical Exam Limitations: Positive for: Altered Mental Status


Pupils: Positive for: Other


Extroacular Muscles: Positive for: EOMI (L pupil opacified, R pupil 3 mm and 

reactive.)


Conjunctiva: Positive for: Normal


Mouth: Positive for: Moist Mucous Membranes


Neck: Positive for: Normal Range of Motion.  Negative for: JVD, Lymphadenopathy


Respiratory/Chest: Positive for: Decreased Breath Sounds.  Negative for: 

Accessory Muscle Use, Rales, Rhonchi


Cardiovascular: Positive for: Regular Rate and Rhythm.  Negative for: Murmurs, 

Rub


Abdomen: Positive for: Normal Bowel Sounds.  Negative for: Tenderness, 

Distention


Upper Extremity: Positive for: Other (RUE AV Shunt with good bruit and thrill)


Lower Extremity: Positive for: Edema (trace), Deformity (Left foot TMA).  

Negative for: CALF TENDERNESS


Neurological: Positive for: GCS=15, Motor Func Grossly Intact, Normal Sensory 

Function


Skin: Positive for: Warm, Normal Color.  Negative for: Rashes, Abscess


Psychiatric: Positive for: Alert, Oriented x 3





- Medications


Active Medications: 


Active Medications











Generic Name Dose Route Start Last Admin





  Trade Name Freq  PRN Reason Stop Dose Admin


 


Sodium Chloride  500 mls @ 30 mls/hr  09/30/17 18:15  09/30/17 18:31





  Hypertonic Saline 3%  IV  10/01/17 18:12  30 mls/hr





  .[id number] BROCK   Administration


 


Insulin Human Regular  0 units  09/30/17 23:00  





  Humulin R  SC   





  ACCU-CHECK BROCK   





  Protocol   


 


Pantoprazole Sodium  40 mg  10/01/17 09:00  





  Protonix Inj  IVP   





  DAILY BROCK   














- Patient Studies


Lab Studies: 


 Lab Studies











  09/30/17 09/30/17 09/30/17 Range/Units





  16:44 16:44 16:44 


 


WBC    11.6 H  (4.8-10.8)  K/uL


 


RBC    4.97  (3.80-5.20)  Mil/uL


 


Hgb    12.8  D  (12.0-16.0)  g/dL


 


Hct    43.0  (34.0-47.0)  %


 


MCV    86.5  D  (81.0-99.0)  fl


 


MCH    25.8 L  (27.0-31.0)  pg


 


MCHC    29.8 L  (33.0-37.0)  g/dL


 


RDW    21.2 H  (11.5-14.5)  %


 


Plt Count    305  (130-400)  K/uL


 


MPV    8.6  (7.2-11.7)  fl


 


Neut % (Auto)    87.6 H  (50.0-75.0)  %


 


Lymph % (Auto)    3.9 L  (20.0-40.0)  %


 


Mono % (Auto)    7.4  (0.0-10.0)  %


 


Eos % (Auto)    0.2  (0.0-4.0)  %


 


Baso % (Auto)    0.9  (0.0-2.0)  %


 


Neut #    10.1 H  (1.8-7.0)  K/uL


 


Lymph #    0.5 L  (1.0-4.3)  K/uL


 


Mono #    0.9 H  (0.0-0.8)  K/uL


 


Eos #    0.0  (0.0-0.7)  K/uL


 


Baso #    0.1  (0.0-0.2)  K/uL


 


Neutrophils % (Manual)    90 H  (42-75)  %


 


Lymphocytes % (Manual)    6 L  (20-50)  %


 


Monocytes % (Manual)    4  (0-10)  %


 


Platelet Estimate    Normal  (NORMAL)  


 


Polychromasia    Slight  


 


Hypochromasia (manual)    Slight  


 


Anisocytosis (manual)    Slight  


 


Target Cells    Slight  


 


PT  13.7 H    (9.8-13.1)  Seconds


 


INR  1.3 H    (0.9-1.2)  


 


APTT  27.9    (25.6-37.1)  Seconds


 


Sodium   140   (132-148)  mmol/l


 


Potassium   3.7   (3.6-5.0)  MMOL/L


 


Chloride   93 L   ()  mmol/L


 


Carbon Dioxide   28   (22-30)  mmol/L


 


Anion Gap   23 H   (10-20)  


 


BUN   16   (7-17)  mg/dl


 


Creatinine   2.0 H   (0.7-1.2)  mg/dL


 


Est GFR ( Amer)   29   


 


Est GFR (Non-Af Amer)   24   


 


Random Glucose   198 H   ()  mg/dL


 


Calcium   9.6   (8.4-10.2)  mg/dL


 


Phosphorus   2.4 L   (2.5-4.5)  mg/dl


 


Magnesium   2.1   (1.6-2.3)  MG/DL


 


Total Bilirubin   0.7   (0.2-1.3)  mg/dl


 


AST   41 H   (14-36)  U/L


 


ALT   29   (9-52)  U/L


 


Alkaline Phosphatase   107   ()  U/L


 


Troponin I   0.7490 H*   (0.00-0.120)  ng/mL


 


Total Protein   8.0   (6.3-8.2)  G/DL


 


Albumin   3.8   (3.5-5.0)  g/dL


 


Globulin   4.2 H   (2.2-3.9)  gm/dL


 


Albumin/Globulin Ratio   0.9 L   (1.0-2.1)  


 


Lipase   61   ()  U/L








 Laboratory Results - last 24 hr











  09/30/17 09/30/17 09/30/17





  16:44 16:44 16:44


 


WBC  11.6 H  


 


RBC  4.97  


 


Hgb  12.8  D  


 


Hct  43.0  


 


MCV  86.5  D  


 


MCH  25.8 L  


 


MCHC  29.8 L  


 


RDW  21.2 H  


 


Plt Count  305  


 


MPV  8.6  


 


Neut % (Auto)  87.6 H  


 


Lymph % (Auto)  3.9 L  


 


Mono % (Auto)  7.4  


 


Eos % (Auto)  0.2  


 


Baso % (Auto)  0.9  


 


Neut #  10.1 H  


 


Lymph #  0.5 L  


 


Mono #  0.9 H  


 


Eos #  0.0  


 


Baso #  0.1  


 


Neutrophils % (Manual)  90 H  


 


Lymphocytes % (Manual)  6 L  


 


Monocytes % (Manual)  4  


 


Platelet Estimate  Normal  


 


Polychromasia  Slight  


 


Hypochromasia (manual)  Slight  


 


Anisocytosis (manual)  Slight  


 


Target Cells  Slight  


 


PT    13.7 H


 


INR    1.3 H


 


APTT    27.9


 


Sodium   140 


 


Potassium   3.7 


 


Chloride   93 L 


 


Carbon Dioxide   28 


 


Anion Gap   23 H 


 


BUN   16 


 


Creatinine   2.0 H 


 


Est GFR ( Amer)   29 


 


Est GFR (Non-Af Amer)   24 


 


Random Glucose   198 H 


 


Calcium   9.6 


 


Phosphorus   2.4 L 


 


Magnesium   2.1 


 


Total Bilirubin   0.7 


 


AST   41 H 


 


ALT   29 


 


Alkaline Phosphatase   107 


 


Troponin I   0.7490 H* 


 


Total Protein   8.0 


 


Albumin   3.8 


 


Globulin   4.2 H 


 


Albumin/Globulin Ratio   0.9 L 


 


Lipase   61 











Fingerstick Blood Sugar Results: 177





Review of Systems





- Review of Systems


All systems: reviewed and no additional remarkable complaints except (as above)





Critical Care Progress Note





- Extremities/Vascular


Does the Patient have a Central Venous Catheter?: No


Does the Patient need a Central Venous Catheter?: No


Does the Patient have a Cedeno Catheter?: No


Does the Patient need a Cedeno Catheter?: No





- Prophylaxis GI


Prophylaxis GI: PPI





- Prophylaxis DVT


Prophylaxis DVT: SCDs

## 2017-09-30 NOTE — ED PDOC
HPI: General Adult


Time Seen by Provider: 09/30/17 16:16


Chief Complaint (Nursing): GI Problem


Chief Complaint (Provider): Vomiting


History Per: Patient


History/Exam Limitations: no limitations


Onset/Duration Of Symptoms: Days


Current Symptoms Are (Timing): Still Present


Additional Complaint(s): 





76 y/o female presents to the emergency department via ambulance, as per EMS 

report patient was vomiting during hemodialysis today and sent to the emergency 

department for further evaluation. Patient unable to give exact time of onset, 

but she proceeded to have full dialysis at the center, despite the vomiting. 

Denies abdominal pain or headache. Denies weakness or numbness.





PMD Dr Calix





NIHSS Stroke Scale





- Date/Time Evaluation Performed


Date Performed: 09/30/17


Time Performed: 16:20


When Was NIHSS Performed: Baseline





- How Severe is the Stroke


Level of Consciousness: 0=Alert


LOC to Questions: 1=One correct


LOC to commands: 0=Obeys both correctly


Best Gaze: 0=Normal


Visual: 0=No visual loss


Facial: 1=Minor asymmetry


Motor Arm - Left: 0=No drift


Motor Arm - Right: 0=No drift


Motor Leg - Left: 2=Falls before 5 sec


Motor Leg - Right: 2=Falls before 5 sec


Limb Ataxia: 0=Absent


Sensory: 0=Normal


Best Language: 1=Mild to moderate aphasia


Dysarthia: 1=Mild to moderate slurring


Extinction & Inattention (Neglect): 0=Normal, no object


Score: 8





rTPA Inclusion/Exclusion





- Refusal of Treatment


Patient Refused Treatment: No





- Inclusion Criteria for Altepase


Patient is 18 years or Older: Yes


The Clinical Diagnosis of Ischemic Stroke That is Causing a Potentially 

Disabling Neurological Deficit: Yes


Time of Onset is Well Established to be Less Than 270 Minute Before Treatment 

Would Begin: No


Risk/Benefit Discussed With Patient/Family Member Present: Yes





- Exclusion Criteria for Altepase


Uncontrolled Hypertension at Time of Treatment (Systolic BP above 185 or 

Diastolic BP above 110 mmHg): Yes





- Warning to TPA With Conditions


Following Conditions Weighed Against Anticipated Benefit: Yes


Condition: Age Greater Than 75 years





Past Medical History


Reviewed: Historical Data, Nursing Documentation, Vital Signs


Vital Signs: 


 Last Vital Signs











Temp  98 F   09/30/17 20:00


 


Pulse  80   09/30/17 20:00


 


Resp  18   09/30/17 20:00


 


BP  179/74 H  09/30/17 20:00


 


Pulse Ox  98   09/30/17 20:00














- Medical History


PMH: Anemia, Arthritis, Atrial Fibrillation, CAD, CHF, Diabetes, Gall Bladder 

Disease, HTN, Hypercholesterolemia, Hyperthyroidism, Hypothyroidism, Kidney 

Stones, Pneumonia, End Stage Renal Disease (TTF), Chronic Kidney Disease


   Denies: HIV





- Surgical History


Surgical History: CABG, Cholecystectomy, Coronary Stent, Pacemaker





- Family History


Family History: States: Unknown Family Hx





- Immunization History


Hx Tetanus Toxoid Vaccination: Yes


Hx Influenza Vaccination: Yes


Hx Pneumococcal Vaccination: Yes





- Home Medications


Home Medications: 


 Ambulatory Orders











 Medication  Instructions  Recorded


 


Aspirin [Aspirin EC] 81 mg PO DAILY #30 ect 06/16/15


 


Rosuvastatin Calcium [Crestor] 40 mg PO HS 07/09/15


 


Pregabalin [Lyrica] 50 mg PO QPM 07/01/16


 


amLODIPine [Norvasc] 10 mg PO MWF 01/11/17


 


Sevelamer Carbonate [Renvela] 800 mg PO TID 06/17/17


 


Clopidogrel [Plavix] 75 mg PO DAILY 09/30/17


 


Furosemide [Lasix] 20 mg PO DAILY 09/30/17


 


Heparin [Heparin (RENAL)] 5,000 unit SC Q12 09/30/17


 


Insulin Detemir [Levemir] 10 unit SC HS 09/30/17


 


Isosorbide Mononitrate [Isosorbide 30 mg PO DAILY 09/30/17





Mononitrate ER]  


 


Levothyroxine [Synthroid] 75 mcg PO DAILY 09/30/17


 


Metoprolol Tartrate [Lopressor] 50 mg PO DAILY 09/30/17


 


Pantoprazole Sodium [Protonix] 40 mg pe PO DAILY 09/30/17


 


Pregabalin [Lyrica] 50 mg PO HS 09/30/17


 


Sevelamer [Renagel] 800 mg PO MWF 09/30/17


 


Vitamin B Complex/Vit C/Folic 1 mg PO DAILY 09/30/17





[Nephro-Brianna]  


 


cloNIDine [clonidine HCl] 0.2 mg PO DAILY 09/30/17














- Allergies


Allergies/Adverse Reactions: 


 Allergies











Allergy/AdvReac Type Severity Reaction Status Date / Time


 


No Known Allergies Allergy   Verified 02/03/16 18:53














Review of Systems


ROS Statement: Except As Marked, All Systems Reviewed And Found Negative (As 

per HPI otherwise negative)


Gastrointestinal: Positive for: Vomiting.  Negative for: Abdominal Pain


Neurological: Negative for: Headache





Physical Exam





- Reviewed


Nursing Documentation Reviewed: Yes


Vital Signs Reviewed: Yes





- Physical Exam


Appears: Positive for: Non-toxic, In Acute Distress (gastrointestinal distress)


Head Exam: Positive for: ATRAUMATIC, NORMOCEPHALIC


Skin: Positive for: Warm, Dry (ulcerated lesion to midline upper anterior chest 

wall with well demarcated borders,)


Eye Exam: Positive for: EOMI, PERRL, Other (hazi bilateral conjunctivae)


ENT: Positive for: Other (tacky muc membranes)


Neck: Positive for: Painless ROM, Supple


Cardiovascular/Chest: Positive for: Regular Rate, Rhythm.  Negative for: Murmur


Respiratory: Positive for: Normal Breath Sounds.  Negative for: Accessory 

Muscle Use, Respiratory Distress


Gastrointestinal/Abdominal: Positive for: Soft.  Negative for: Tenderness, Mass

, Distended, Guarding, Rebound


Back: Positive for: Normal Inspection


Extremity: Positive for: Other (LEFT foot toe amputation)


Neurologic/Psych: Positive for: Alert, Oriented (x2), Mood/Affect (flat), 

Cerebellar Tests (normal nose touch), Aphasia (slightly slurred speech), Facial 

Droop (subtle LEFT droop involving brow).  Negative for: Motor/Sensory Deficits





- Laboratory Results


Result Diagrams: 


 09/30/17 16:44





 09/30/17 16:44





- ECG


ECG: Positive for: Interpreted By Me


ECG Rhythm: Positive for: Sinus Rhythm, Nonspecific Changes


O2 Sat by Pulse Oximetry: 98 (RA)


Pulse Ox Interpretation: Normal





- Progress


Condition: Improving,but remains with symptoms (no longer vomiting)





- Critical Care


Total Time (In Min): 45


Documented Critical Care: Time excludes all time spent performint seperately 

billable procedures





Medical Decision Making


Medical Decision Making: 





Time: 16:29


Initial Impression: Bilious vomiting and hypertension diff include but not 

limited to obstruction, gastroenteritis, hypertensive encephalopathy, 

intracranial hemorrhage, gastritis, dehydration, sepsis 


Initial Plan:


--ABG


--Abd & Pelvis w/o PO & IV Contrast CT 


--Head CT


--EKG


--Labs


--Famotidine 20 mg IV


--Zofran 8 mg IV


--Reevaluation








Troponin: 0.7490 H


PTT: 13.7 H


INR: 1.3 H





Time: 17:40


--Head CT


FINDINGS:





HEMORRHAGE:


No intracranial hemorrhage. 





BRAIN:


Chronic microangiopathy and diffuse cerebral large atrophy are reiterated this 

examination slightly increased in the interval. Cytotoxic edema is appreciate 

the right cerebellum superiorly suspicious for an acute or subacute brain 

infarction. No definite intracranial hemorrhage. Local mass effect is 

encouraged on the posterior superior hari as well as the right lateral side of 

the 4th ventricle. 





No suspicious extra-axial fluid collection.  No midline shift.  Basilar 

cisterns remain widely patent.  Midline brain anatomy appears unremarkable. 





VENTRICLES:


Unremarkable. No hydrocephalus. 





CALVARIUM:


Unremarkable.





PARANASAL SINUSES:


Unremarkable as visualized. No significant inflammatory changes.





MASTOID AIR CELLS:


Unremarkable as visualized. No inflammatory changes.





OTHER FINDINGS:


None.





IMPRESSION:


1. Acute separate brain infarction right cerebellum with limited local mass-

effect occurring.  No intra hemorrhage.  Follow-up CT or MRI is advised. 





2. Reiterated neuro degenerate changes are appreciated slightly increased 

appear tube unenhanced head CT 02/27/2014.





Time: 17:55


--Abdomen & Pelvis CT


FINDINGS:





LOWER THORAX:


Cardiomegaly is noted with pacemaker leads in the right heart. Trace 

pericardial thickening or fluid is present. Cardiomegaly is increased in the 

interval. No right pleural effusion is identified.  Trace of pleural effusion 

is evident. 





LIVER:


Unremarkable. No gross lesion or ductal dilatation.  





GALLBLADDER AND BILE DUCTS:


Prior cholecystectomy. 





PANCREAS:


Unremarkable. No gross lesion or ductal dilatation.





SPLEEN:


Unremarkable. 





ADRENALS:


Unremarkable. No mass. 





KIDNEYS AND URETERS:


Unremarkable. No hydronephrosis. No solid mass. 





VASCULATURE:


Extensive arterial calcifications seen throughout the abdomen and pelvis. 





BOWEL:


Unremarkable. No obstruction. No gross mural thickening. Mild rectal fecal 

impaction noted





APPENDIX:


Not identified. 





PERITONEUM:


Unremarkable. No free fluid. No free air. 





LYMPH NODES:


Unremarkable. No enlarged lymph nodes. 





BLADDER:


Unremarkable. 





REPRODUCTIVE:


Peripheral uterine calcifications are seen related to the uterus which is 

otherwise unremarkable. 





BONES:


Moderate compression fractures of L2 and L3 are identified of indeterminate 

age. 





OTHER FINDINGS:


None.





IMPRESSION:


1. Prior cholecystectomy. 





2. No definite acute abdominal or pelvic findings. 





3. Gross arterial atherosclerosis throughout the abdomen pelvis.





Time: 18:11


Chest x-ray


FINDINGS:





LUNGS:


History CTs disease is identified the left base and minimally at the right base 

in the interval.





PLEURA:


Small pleural effusions not excluded. None is seen the right. No pneumothorax 

bilaterally





CARDIOVASCULAR:


Cardiomegaly appears stable. No definite pulmonary vascular derangement.





OSSEOUS STRUCTURES:


No significant abnormalities.





VISUALIZED UPPER ABDOMEN:


Normal.





OTHER FINDINGS:


None.





IMPRESSION:


Left basilar airspace disease is suggested may have increased with trace of 

pleural effusion not excluded. The right basilar atelectasis or infiltrate is 

questioned.  Cardiomegaly appears stable.


 DW Dr Calix PMD, Dr Jones Neuro, Dr Ortiz Neurosurg, Dr Mcbride Cardiology and 

Dr Parham Intensivist.


Per Dr oJnes, 3% saline at 30ml/h, continue ASA 81mg daily, repeat 12h CT, 

repeat stat CT if signs of herniation. MRI routine.


Per Dr Ortiz conservative management as advised by neuro, pt is a poor 

surgical candidate.


Per Dr Mcbride serial troponins and echo





DW pt findings. DW Mishel Osuna pt's daughter. Does not want CPR performed on 

pt and reports that this paperwork was provided to nursing home.











--Admit to hospital routine: Inpatient in intensive care for cerebellar infarct

, ON hemodialysis, and NO contact isolation under the care of Dr. Victor M Calix MD 








Scribe Attestation:


Documented by Leana Ruth, acting as a scribe for Janette Cho MD.





Provider Scribe Attestation:


All medical record entries made by the Scribe were at my direction and 

personally dictated by me. I have reviewed the chart and agree that the record 

accurately reflects my personal performance of the history, physical exam, 

medical decision making, and the department course for this patient. I have 

also personally directed, reviewed, and agree with the discharge instructions 

and disposition.





Disposition





- Clinical Impression


Clinical Impression: 


 Cerebellar infarct, Elevated troponin








- Patient ED Disposition


Is Patient to be Admitted: Yes (18:11 as inpatient at intensive care unit under 

Dr. Calix)


Counseled Patient/Family Regarding: Studies Performed, Diagnosis





- Disposition


Disposition Time: 17:00


Condition: CRITICAL





- Pt Status Changed To:


Hospital Disposition Of: Inpatient





- Admit Certification


Admit to Inpatient:: After my assessment, the patient will require 

hospitalization for at least two midnights.  This is because of the severity of 

symptoms shown, intensity of services needed, and/or the medical risk in this 

patient being treated as an outpatient.





- POA


Present On Arrival: Poor Glycemic Control

## 2017-09-30 NOTE — CT
PROCEDURE:  CT Abdomen and Pelvis without intravenous contrast



HISTORY:

bilious vomiting



COMPARISON:

Abdomen pelvis CT without contrast 07/10/2015.



TECHNIQUE:

Helical CT of the abdomen and pelvis was performed without oral or 

intravenous contrast as per referring physician request .



Contrast Dose: None



Radiation dose:



Total exam DLP = 729.37 mGy-cm.



This CT exam was performed using one or more of the following dose 

reduction techniques: Automated exposure control, adjustment of the 

mA and/or kV according to patient size, and/or use of iterative 

reconstruction technique.



FINDINGS:



LOWER THORAX:

Cardiomegaly is noted with pacemaker leads in the right heart. Trace 

pericardial thickening or fluid is present. Cardiomegaly is increased 

in the interval. No right pleural effusion is identified.  Trace of 

pleural effusion is evident. 



LIVER:

Unremarkable. No gross lesion or ductal dilatation.  



GALLBLADDER AND BILE DUCTS:

Prior cholecystectomy. 



PANCREAS:

Unremarkable. No gross lesion or ductal dilatation.



SPLEEN:

Unremarkable. 



ADRENALS:

Unremarkable. No mass. 



KIDNEYS AND URETERS:

Unremarkable. No hydronephrosis. No solid mass. 



VASCULATURE:

Extensive arterial calcifications seen throughout the abdomen and 

pelvis. 



BOWEL:

Unremarkable. No obstruction. No gross mural thickening. Mild rectal 

fecal impaction noted



APPENDIX:

Not identified. 



PERITONEUM:

Unremarkable. No free fluid. No free air. 



LYMPH NODES:

Unremarkable. No enlarged lymph nodes. 



BLADDER:

Unremarkable. 



REPRODUCTIVE:

Peripheral uterine calcifications are seen related to the uterus 

which is otherwise unremarkable. 



BONES:

Moderate compression fractures of L2 and L3 are identified of 

indeterminate age. 



OTHER FINDINGS:

None.



IMPRESSION:

1. Prior cholecystectomy. 



2. No definite acute abdominal or pelvic findings. 



3. Gross arterial atherosclerosis throughout the abdomen pelvis.

## 2017-09-30 NOTE — CT
PROCEDURE:  CT HEAD WITHOUT CONTRAST.



HISTORY:

vomiting hypertension



COMPARISON:

Unenhanced head CT 02/27/2014. 



TECHNIQUE:

Axial computed tomography images were obtained through the head/brain 

without intravenous contrast.  



Radiation dose:



Total exam DLP = 1243.1 mGy-cm.



This CT exam was performed using one or more of the following dose 

reduction techniques: Automated exposure control, adjustment of the 

mA and/or kV according to patient size, and/or use of iterative 

reconstruction technique.



FINDINGS:



HEMORRHAGE:

No intracranial hemorrhage. 



BRAIN:

Chronic microangiopathy and diffuse cerebral large atrophy are 

reiterated this examination slightly increased in the interval. 

Cytotoxic edema is appreciate the right cerebellum superiorly 

suspicious for an acute or subacute brain infarction. No definite 

intracranial hemorrhage. Local mass effect is encouraged on the 

posterior superior hari as well as the right lateral side of the 4th 

ventricle. 



No suspicious extra-axial fluid collection.  No midline shift.  

Basilar cisterns remain widely patent.  Midline brain anatomy appears 

unremarkable. 



VENTRICLES:

Unremarkable. No hydrocephalus. 



CALVARIUM:

Unremarkable.



PARANASAL SINUSES:

Unremarkable as visualized. No significant inflammatory changes.



MASTOID AIR CELLS:

Unremarkable as visualized. No inflammatory changes.



OTHER FINDINGS:

None.



IMPRESSION:

1. Acute separate brain infarction right cerebellum with limited 

local mass-effect occurring.  No intra hemorrhage.  Follow-up CT or 

MRI is advised. 



2. Reiterated neuro degenerate changes are appreciated slightly 

increased appear tube unenhanced head CT 02/27/2014.

## 2017-10-01 LAB
ALBUMIN/GLOB SERPL: 0.9 {RATIO} (ref 1–2.1)
ALP SERPL-CCNC: 94 U/L (ref 38–126)
ALT SERPL-CCNC: 27 U/L (ref 9–52)
AST SERPL-CCNC: 36 U/L (ref 14–36)
BASOPHILS # BLD AUTO: 0.1 K/UL (ref 0–0.2)
BASOPHILS NFR BLD: 0.8 % (ref 0–2)
BILIRUB SERPL-MCNC: 0.5 MG/DL (ref 0.2–1.3)
BUN SERPL-MCNC: 21 MG/DL (ref 7–17)
BUN SERPL-MCNC: 25 MG/DL (ref 7–17)
CALCIUM SERPL-MCNC: 9.2 MG/DL (ref 8.4–10.2)
CALCIUM SERPL-MCNC: 9.5 MG/DL (ref 8.4–10.2)
CHLORIDE SERPL-SCNC: 97 MMOL/L (ref 98–107)
CHLORIDE SERPL-SCNC: 99 MMOL/L (ref 98–107)
CHOLEST SERPL-MCNC: 106 MG/DL (ref 0–199)
CO2 SERPL-SCNC: 31 MMOL/L (ref 22–30)
CO2 SERPL-SCNC: 33 MMOL/L (ref 22–30)
EOSINOPHIL # BLD AUTO: 0 K/UL (ref 0–0.7)
EOSINOPHIL NFR BLD: 0.3 % (ref 0–4)
ERYTHROCYTE [DISTWIDTH] IN BLOOD BY AUTOMATED COUNT: 21 % (ref 11.5–14.5)
GLOBULIN SER-MCNC: 4 GM/DL (ref 2.2–3.9)
GLUCOSE SERPL-MCNC: 135 MG/DL (ref 65–105)
GLUCOSE SERPL-MCNC: 226 MG/DL (ref 65–105)
HCT VFR BLD CALC: 44 % (ref 34–47)
LYMPHOCYTES # BLD AUTO: 0.7 K/UL (ref 1–4.3)
LYMPHOCYTES NFR BLD AUTO: 9.2 % (ref 20–40)
MCH RBC QN AUTO: 26 PG (ref 27–31)
MCHC RBC AUTO-ENTMCNC: 29.6 G/DL (ref 33–37)
MCV RBC AUTO: 87.9 FL (ref 81–99)
MONOCYTES # BLD: 0.9 K/UL (ref 0–0.8)
MONOCYTES NFR BLD: 12.2 % (ref 0–10)
NEUTROPHILS # BLD: 5.8 K/UL (ref 1.8–7)
NEUTROPHILS NFR BLD AUTO: 77.5 % (ref 50–75)
NRBC BLD AUTO-RTO: 0.2 % (ref 0–0)
PLATELET # BLD: 288 K/UL (ref 130–400)
PMV BLD AUTO: 8.6 FL (ref 7.2–11.7)
POTASSIUM SERPL-SCNC: 3.7 MMOL/L (ref 3.6–5)
POTASSIUM SERPL-SCNC: 4.9 MMOL/L (ref 3.6–5)
PROT SERPL-MCNC: 7.6 G/DL (ref 6.3–8.2)
SODIUM SERPL-SCNC: 143 MMOL/L (ref 132–148)
SODIUM SERPL-SCNC: 145 MMOL/L (ref 132–148)
TROPONIN I SERPL-MCNC: 0.61 NG/ML (ref 0–0.12)
TROPONIN I SERPL-MCNC: 0.72 NG/ML (ref 0–0.12)
WBC # BLD AUTO: 7.5 K/UL (ref 4.8–10.8)

## 2017-10-01 PROCEDURE — 3E0234Z INTRODUCTION OF SERUM, TOXOID AND VACCINE INTO MUSCLE, PERCUTANEOUS APPROACH: ICD-10-PCS | Performed by: INTERNAL MEDICINE

## 2017-10-01 RX ADMIN — POLYVINYL ALCOHOL PRN DROP: 14 SOLUTION/ DROPS OPHTHALMIC at 17:00

## 2017-10-01 RX ADMIN — POLYVINYL ALCOHOL PRN DROP: 14 SOLUTION/ DROPS OPHTHALMIC at 22:38

## 2017-10-01 RX ADMIN — ACETAMINOPHEN PRN MG: 160 SOLUTION ORAL at 17:02

## 2017-10-01 RX ADMIN — METOPROLOL SUCCINATE SCH MG: 50 TABLET, EXTENDED RELEASE ORAL at 11:30

## 2017-10-01 NOTE — CP.PCM.CON
History of Present Illness





- History of Present Illness


History of Present Illness: 





Initial Nephrology Consultation:





Assessment: critical


acute cerebellar stroke


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 


Hypertensive Chronic Kidney Disease (I12.0)


End stage renal disease (N18.6) dependence on hemodialysis (Z99.2) (TTS) via AVF


Hyperphosphatemia (E83.39), Secondary Hyperparathyroidism (E21.1), HTN (I12.0)





Plan:


No acute need for dialysis today. Will plan for dialysis tuesday. Continue with 

Nephrovite 1 tab/day. 


not on WILFREDO as Hb >10


Continue with phos binders home dose


BP control with meds as ordered. Patient not on RAAS blocker, consider to add 

losartan 50 mg/day. BP goal as per neurology


Glycemic control, Dialysis consistent diet


Further work up/management as per primary team


Dose meds/antibiotics (if needed) for ESRD status. Avoid fleets enema/magnesium 

based laxatives.





Thanks for allowing me to participate in care of your patient. Will follow 

patient with you. Please call if any Qs. d/w team


Dr Fuad Sherman


Office: [phone] Cell: [phone] Fax: [phone]





Chief Complaint; nausea/vomitting


HPI: Pt is a 78 y/o F with hx of ESRD on hemodialysis (TTS) via Rt AVF, last 

dialysis saturday, hyperphosphatemia, secondary hyperparathyroidism, Diabetes 

[place]ell[place], hypertension, CHF presented with complaints of nausea/vomitting after 

dialysis. she was found to have Rt cerebellar stroke. 


Denies chest pain, palpitation, shortness of breath, leg swelling


daughter bedside and helped in getting ROS. but has RUE Weakness





ROS: pt unable to provide much ROS due to her acute CVA, ? slurred speech


rest as per HPI





Physical Examination:       


General Appearance: Comfortable, in no acute respiratory distress, co-operative 

. 


Vitals reviewed and noted as below


Head; Atraumatic, normocephalic


ENT: no ulcers no thrush. Tongue is midline. Oropharynx: no rash or ulcers.


EYES: Pupils are equal, Eye muscles and extraocular movement intact. Sclera is 

anicteric. has conjunctival erythema. hx of cataract


Neck; supple no lymphadenopathy, no thyromegaly or bruit


Lungs: Normal respiratory rate/effort. Breath sounds bilateral equal and clear


Heart: Normal rate. s1s2 normal. No rub or gallop. 


Extremities: no edema. No varicose veins


Neurological: Patient is alert, awake and oriented to person, place and time. 

RUE weakness


Skin: Warm and dry. Normal turgor. No rash. Palpitation: Normal elasticity for 

age


Abdomen: Abdomen is soft. Bowel sounds +. There is no abdominal tenderness, no 

guarding/rigidity or organomegaly


Psych: normal insight and normal affect/mood


MSK: no joint tenderness or swelling. Digits and nails normal, no deformity


: kidney or bladder not palpable


Access: AVF





Labs/imaging reviewed.


Past medical history, past surgical history, family history, social history, 

allergy reviewed and noted as below


Family Hx: no hx of CKD. Non contributory 





Past Patient History





- Infectious Disease


Hx of Infectious Diseases: None





- Past Medical History & Family History


Past Medical History?: Yes





- Past Social History


Smoking Status: Never Smoked





- CARDIAC


Hx Atrial Fibrillation: Yes


Hx Congestive Heart Failure: Yes


Hx Hypercholesterolemia: Yes


Hx Hypertension: Yes


Hx Pacemaker: Yes





- PULMONARY


Hx Pneumonia: Yes





- NEUROLOGICAL


Hx Neurological Disorder: Yes





- HEENT


Hx Cataracts: Yes





- RENAL


Hx Chronic Kidney Disease: Yes


Hx Kidney Stones: Yes





- ENDOCRINE/METABOLIC


Hx Hyperthyroidism: Yes


Hx Hypothyroidism: Yes





- HEMATOLOGICAL/ONCOLOGICAL


Hx Anemia: Yes


Hx Human Immunodeficiency Virus (HIV): No





- INTEGUMENTARY


Hx Dermatological Problems: No





- MUSCULOSKELETAL/RHEUMATOLOGICAL


Hx Arthritis: Yes





- GASTROINTESTINAL


Hx Gall Bladder Disease: Yes





- GENITOURINARY/GYNECOLOGICAL


Hx Genitourinary Disorders: No





- PSYCHIATRIC


Hx Substance Use: No





- SURGICAL HISTORY


Hx Cholecystectomy: Yes


Hx Coronary Artery Bypass Graft: Yes


Hx Coronary Stent: Yes





- ANESTHESIA


Hx Anesthesia: Yes


Hx Anesthesia Reactions: No


Hx Malignant Hyperthermia: No





Meds


Allergies/Adverse Reactions: 


 Allergies











Allergy/AdvReac Type Severity Reaction Status Date / Time


 


No Known Allergies Allergy   Verified 02/03/16 18:53














- Medications


Medications: 


 Current Medications





Acetaminophen (Tylenol 650mg/20.3ml Solution Ud)  650 mg PO Q6 PRN


   PRN Reason: Headache


Artificial Tears (Artificial Tears)  2 drop OU Q4 PRN


   PRN Reason: Dry eyes


Aspirin (Ecotrin)  81 mg PO DAILY [facility]


   Last Admin: 10/01/17 08:34 Dose:  81 mg


Home Med (Rosuvastatin Calcium [Crestor])  40 mg PO HS [facility]


Insulin Human Regular (Humulin R)  0 units SC ACCU-CHECK [facility]


   PRN Reason: Protocol


   Last Admin: 10/01/17 11:29 Dose:  2 units


Levothyroxine Sodium (Synthroid)  75 mcg PO DAILY@0630 [facility]


Metoprolol Succinate (Toprol Xl)  50 mg PO DAILY [facility]


   Last Admin: 10/01/17 11:30 Dose:  50 mg


Pantoprazole Sodium (Protonix Inj)  40 mg IVP DAILY [facility]


   Last Admin: 10/01/17 08:34 Dose:  40 mg


Vitamin B Complex/Vit C/Folic Acid (Nephro-Brianna)  1 tab PO DAILY [facility]











Results





- Vital Signs


Recent Vital Signs: 


 Last Vital Signs











Temp  99.9 F H  10/01/17 12:00


 


Pulse  82   10/01/17 14:00


 


Resp  15   10/01/17 14:00


 


BP  182/70 H  10/01/17 14:00


 


Pulse Ox  100   10/01/17 14:00














- Labs


Result Diagrams: 


 10/01/17 05:30





 10/01/17 05:30


Labs: 


 Laboratory Results - last 24 hr











  09/30/17 09/30/17 09/30/17





  16:36 16:44 16:44


 


WBC   11.6 H 


 


RBC   4.97 


 


Hgb   12.8  D 


 


Hct   43.0 


 


MCV   86.5  D 


 


MCH   25.8 L 


 


MCHC   29.8 L 


 


RDW   21.2 H 


 


Plt Count   305 


 


MPV   8.6 


 


Neut % (Auto)   87.6 H 


 


Lymph % (Auto)   3.9 L 


 


Mono % (Auto)   7.4 


 


Eos % (Auto)   0.2 


 


Baso % (Auto)   0.9 


 


Neut #   10.1 H 


 


Lymph #   0.5 L 


 


Mono #   0.9 H 


 


Eos #   0.0 


 


Baso #   0.1 


 


Neutrophils % (Manual)   90 H 


 


Lymphocytes % (Manual)   6 L 


 


Monocytes % (Manual)   4 


 


Platelet Estimate   Normal 


 


Polychromasia   Slight 


 


Hypochromasia (manual)   Slight 


 


Anisocytosis (manual)   Slight 


 


Target Cells   Slight 


 


PT   


 


INR   


 


APTT   


 


Sodium    140


 


Potassium    3.7


 


Chloride    93 L


 


Carbon Dioxide    28


 


Anion Gap    23 H


 


BUN    16


 


Creatinine    2.0 H


 


Est GFR ( Amer)    29


 


Est GFR (Non-Af Amer)    24


 


POC Glucose (mg/dL)  177 H  


 


Random Glucose    198 H


 


Serum Osmolality   


 


Calcium    9.6


 


Phosphorus    2.4 L


 


Magnesium    2.1


 


Total Bilirubin    0.7


 


AST    41 H


 


ALT    29


 


Alkaline Phosphatase    107


 


Troponin I    0.7490 H*


 


Total Protein    8.0


 


Albumin    3.8


 


Globulin    4.2 H


 


Albumin/Globulin Ratio    0.9 L


 


Triglycerides   


 


Cholesterol   


 


LDL Cholesterol Direct   


 


HDL Cholesterol   


 


Lipase    61














  09/30/17 09/30/17 10/01/17





  16:44 22:38 05:30


 


WBC    7.5


 


RBC    5.01


 


Hgb    13.0


 


Hct    44.0


 


MCV    87.9


 


MCH    26.0 L


 


MCHC    29.6 L


 


RDW    21.0 H


 


Plt Count    288


 


MPV    8.6


 


Neut % (Auto)    77.5 H


 


Lymph % (Auto)    9.2 L


 


Mono % (Auto)    12.2 H


 


Eos % (Auto)    0.3


 


Baso % (Auto)    0.8


 


Neut #    5.8


 


Lymph #    0.7 L


 


Mono #    0.9 H


 


Eos #    0.0


 


Baso #    0.1


 


Neutrophils % (Manual)   


 


Lymphocytes % (Manual)   


 


Monocytes % (Manual)   


 


Platelet Estimate   


 


Polychromasia   


 


Hypochromasia (manual)   


 


Anisocytosis (manual)   


 


Target Cells   


 


PT  13.7 H  


 


INR  1.3 H  


 


APTT  27.9  


 


Sodium   


 


Potassium   


 


Chloride   


 


Carbon Dioxide   


 


Anion Gap   


 


BUN   


 


Creatinine   


 


Est GFR ( Amer)   


 


Est GFR (Non-Af Amer)   


 


POC Glucose (mg/dL)   190 H 


 


Random Glucose   


 


Serum Osmolality   


 


Calcium   


 


Phosphorus   


 


Magnesium   


 


Total Bilirubin   


 


AST   


 


ALT   


 


Alkaline Phosphatase   


 


Troponin I   


 


Total Protein   


 


Albumin   


 


Globulin   


 


Albumin/Globulin Ratio   


 


Triglycerides   


 


Cholesterol   


 


LDL Cholesterol Direct   


 


HDL Cholesterol   


 


Lipase   














  10/01/17 10/01/17 10/01/17





  05:30 05:30 05:53


 


WBC   


 


RBC   


 


Hgb   


 


Hct   


 


MCV   


 


MCH   


 


MCHC   


 


RDW   


 


Plt Count   


 


MPV   


 


Neut % (Auto)   


 


Lymph % (Auto)   


 


Mono % (Auto)   


 


Eos % (Auto)   


 


Baso % (Auto)   


 


Neut #   


 


Lymph #   


 


Mono #   


 


Eos #   


 


Baso #   


 


Neutrophils % (Manual)   


 


Lymphocytes % (Manual)   


 


Monocytes % (Manual)   


 


Platelet Estimate   


 


Polychromasia   


 


Hypochromasia (manual)   


 


Anisocytosis (manual)   


 


Target Cells   


 


PT   


 


INR   


 


APTT   


 


Sodium  145  


 


Potassium  3.7  


 


Chloride  97 L  


 


Carbon Dioxide  33 H  


 


Anion Gap  19  


 


BUN  21 H  


 


Creatinine  2.8 H  


 


Est GFR ( Amer)  20  


 


Est GFR (Non-Af Amer)  16  


 


POC Glucose (mg/dL)    121 H


 


Random Glucose  135 H  


 


Serum Osmolality   308 H 


 


Calcium  9.5  


 


Phosphorus   


 


Magnesium   


 


Total Bilirubin  0.5  


 


AST  36  


 


ALT  27  


 


Alkaline Phosphatase  94  


 


Troponin I  0.7220 H*  


 


Total Protein  7.6  


 


Albumin  3.7  


 


Globulin  4.0 H  


 


Albumin/Globulin Ratio  0.9 L  


 


Triglycerides   


 


Cholesterol   


 


LDL Cholesterol Direct   


 


HDL Cholesterol   


 


Lipase   














  10/01/17 10/01/17





  07:22 11:16


 


WBC  


 


RBC  


 


Hgb  


 


Hct  


 


MCV  


 


MCH  


 


MCHC  


 


RDW  


 


Plt Count  


 


MPV  


 


Neut % (Auto)  


 


Lymph % (Auto)  


 


Mono % (Auto)  


 


Eos % (Auto)  


 


Baso % (Auto)  


 


Neut #  


 


Lymph #  


 


Mono #  


 


Eos #  


 


Baso #  


 


Neutrophils % (Manual)  


 


Lymphocytes % (Manual)  


 


Monocytes % (Manual)  


 


Platelet Estimate  


 


Polychromasia  


 


Hypochromasia (manual)  


 


Anisocytosis (manual)  


 


Target Cells  


 


PT  


 


INR  


 


APTT  


 


Sodium  


 


Potassium  


 


Chloride  


 


Carbon Dioxide  


 


Anion Gap  


 


BUN  


 


Creatinine  


 


Est GFR ( Amer)  


 


Est GFR (Non-Af Amer)  


 


POC Glucose (mg/dL)   206 H


 


Random Glucose  


 


Serum Osmolality  


 


Calcium  


 


Phosphorus  


 


Magnesium  


 


Total Bilirubin  


 


AST  


 


ALT  


 


Alkaline Phosphatase  


 


Troponin I  


 


Total Protein  


 


Albumin  


 


Globulin  


 


Albumin/Globulin Ratio  


 


Triglycerides  113 


 


Cholesterol  106 


 


LDL Cholesterol Direct  < 30 


 


HDL Cholesterol  43 


 


Lipase

## 2017-10-01 NOTE — CP.PCM.CON
History of Present Illness





- History of Present Illness


History of Present Illness: 





dictated


not surgical candidate - med mgmt





Past Patient History





- Infectious Disease


Hx of Infectious Diseases: None





- Past Medical History & Family History


Past Medical History?: Yes





- Past Social History


Smoking Status: Never Smoked





- CARDIAC


Hx Atrial Fibrillation: Yes


Hx Congestive Heart Failure: Yes


Hx Hypercholesterolemia: Yes


Hx Hypertension: Yes


Hx Pacemaker: Yes





- PULMONARY


Hx Pneumonia: Yes





- NEUROLOGICAL


Hx Neurological Disorder: Yes





- HEENT


Hx Cataracts: Yes





- RENAL


Hx Chronic Kidney Disease: Yes


Hx Kidney Stones: Yes





- ENDOCRINE/METABOLIC


Hx Hyperthyroidism: Yes


Hx Hypothyroidism: Yes





- HEMATOLOGICAL/ONCOLOGICAL


Hx Anemia: Yes


Hx Human Immunodeficiency Virus (HIV): No





- INTEGUMENTARY


Hx Dermatological Problems: No





- MUSCULOSKELETAL/RHEUMATOLOGICAL


Hx Arthritis: Yes





- GASTROINTESTINAL


Hx Gall Bladder Disease: Yes





- GENITOURINARY/GYNECOLOGICAL


Hx Genitourinary Disorders: No





- PSYCHIATRIC


Hx Substance Use: No





- SURGICAL HISTORY


Hx Cholecystectomy: Yes


Hx Coronary Artery Bypass Graft: Yes


Hx Coronary Stent: Yes





- ANESTHESIA


Hx Anesthesia: Yes


Hx Anesthesia Reactions: No


Hx Malignant Hyperthermia: No





Meds


Allergies/Adverse Reactions: 


 Allergies











Allergy/AdvReac Type Severity Reaction Status Date / Time


 


No Known Allergies Allergy   Verified 02/03/16 18:53














- Medications


Medications: 


 Current Medications





Acetaminophen (Tylenol 650mg/20.3ml Solution Ud)  650 mg PO Q6 PRN


   PRN Reason: Headache


Artificial Tears (Artificial Tears)  2 drop OU Q4 PRN


   PRN Reason: Dry eyes


Aspirin (Ecotrin)  81 mg PO DAILY [facility]


   Last Admin: 10/01/17 08:34 Dose:  81 mg


Home Med (Rosuvastatin Calcium [Crestor])  40 mg PO [facility]


Insulin Human Regular (Humulin R)  0 units SC ACCU-CHECK [facility]


   PRN Reason: Protocol


   Last Admin: 10/01/17 11:29 Dose:  2 units


Levothyroxine Sodium (Synthroid)  75 mcg PO DAILY@0630 [facility]


Metoprolol Succinate (Toprol Xl)  50 mg PO DAILY [facility]


   Last Admin: 10/01/17 11:30 Dose:  50 mg


Pantoprazole Sodium (Protonix Inj)  40 mg IVP DAILY [facility]


   Last Admin: 10/01/17 08:34 Dose:  40 mg


Vitamin B Complex/Vit C/Folic Acid (Nephro-Brianna)  1 tab PO DAILY [facility]











Results





- Vital Signs


Recent Vital Signs: 


 Last Vital Signs











Temp  99.9 F H  10/01/17 12:00


 


Pulse  86   10/01/17 12:00


 


Resp  13   10/01/17 12:00


 


BP  188/78 H  10/01/17 12:00


 


Pulse Ox  100   10/01/17 12:00














- Labs


Result Diagrams: 


 10/01/17 05:30





 10/01/17 05:30


Labs: 


 Laboratory Results - last 24 hr











  09/30/17 09/30/17 09/30/17





  16:36 16:44 16:44


 


WBC   11.6 H 


 


RBC   4.97 


 


Hgb   12.8  D 


 


Hct   43.0 


 


MCV   86.5  D 


 


MCH   25.8 L 


 


MCHC   29.8 L 


 


RDW   21.2 H 


 


Plt Count   305 


 


MPV   8.6 


 


Neut % (Auto)   87.6 H 


 


Lymph % (Auto)   3.9 L 


 


Mono % (Auto)   7.4 


 


Eos % (Auto)   0.2 


 


Baso % (Auto)   0.9 


 


Neut #   10.1 H 


 


Lymph #   0.5 L 


 


Mono #   0.9 H 


 


Eos #   0.0 


 


Baso #   0.1 


 


Neutrophils % (Manual)   90 H 


 


Lymphocytes % (Manual)   6 L 


 


Monocytes % (Manual)   4 


 


Platelet Estimate   Normal 


 


Polychromasia   Slight 


 


Hypochromasia (manual)   Slight 


 


Anisocytosis (manual)   Slight 


 


Target Cells   Slight 


 


PT   


 


INR   


 


APTT   


 


Sodium    140


 


Potassium    3.7


 


Chloride    93 L


 


Carbon Dioxide    28


 


Anion Gap    23 H


 


BUN    16


 


Creatinine    2.0 H


 


Est GFR ( Amer)    29


 


Est GFR (Non-Af Amer)    24


 


POC Glucose (mg/dL)  177 H  


 


Random Glucose    198 H


 


Serum Osmolality   


 


Calcium    9.6


 


Phosphorus    2.4 L


 


Magnesium    2.1


 


Total Bilirubin    0.7


 


AST    41 H


 


ALT    29


 


Alkaline Phosphatase    107


 


Troponin I    0.7490 H*


 


Total Protein    8.0


 


Albumin    3.8


 


Globulin    4.2 H


 


Albumin/Globulin Ratio    0.9 L


 


Triglycerides   


 


Cholesterol   


 


LDL Cholesterol Direct   


 


HDL Cholesterol   


 


Lipase    61














  09/30/17 09/30/17 10/01/17





  16:44 22:38 05:30


 


WBC    7.5


 


RBC    5.01


 


Hgb    13.0


 


Hct    44.0


 


MCV    87.9


 


MCH    26.0 L


 


MCHC    29.6 L


 


RDW    21.0 H


 


Plt Count    288


 


MPV    8.6


 


Neut % (Auto)    77.5 H


 


Lymph % (Auto)    9.2 L


 


Mono % (Auto)    12.2 H


 


Eos % (Auto)    0.3


 


Baso % (Auto)    0.8


 


Neut #    5.8


 


Lymph #    0.7 L


 


Mono #    0.9 H


 


Eos #    0.0


 


Baso #    0.1


 


Neutrophils % (Manual)   


 


Lymphocytes % (Manual)   


 


Monocytes % (Manual)   


 


Platelet Estimate   


 


Polychromasia   


 


Hypochromasia (manual)   


 


Anisocytosis (manual)   


 


Target Cells   


 


PT  13.7 H  


 


INR  1.3 H  


 


APTT  27.9  


 


Sodium   


 


Potassium   


 


Chloride   


 


Carbon Dioxide   


 


Anion Gap   


 


BUN   


 


Creatinine   


 


Est GFR ( Amer)   


 


Est GFR (Non-Af Amer)   


 


POC Glucose (mg/dL)   190 H 


 


Random Glucose   


 


Serum Osmolality   


 


Calcium   


 


Phosphorus   


 


Magnesium   


 


Total Bilirubin   


 


AST   


 


ALT   


 


Alkaline Phosphatase   


 


Troponin I   


 


Total Protein   


 


Albumin   


 


Globulin   


 


Albumin/Globulin Ratio   


 


Triglycerides   


 


Cholesterol   


 


LDL Cholesterol Direct   


 


HDL Cholesterol   


 


Lipase   














  10/01/17 10/01/17 10/01/17





  05:30 05:30 05:53


 


WBC   


 


RBC   


 


Hgb   


 


Hct   


 


MCV   


 


MCH   


 


MCHC   


 


RDW   


 


Plt Count   


 


MPV   


 


Neut % (Auto)   


 


Lymph % (Auto)   


 


Mono % (Auto)   


 


Eos % (Auto)   


 


Baso % (Auto)   


 


Neut #   


 


Lymph #   


 


Mono #   


 


Eos #   


 


Baso #   


 


Neutrophils % (Manual)   


 


Lymphocytes % (Manual)   


 


Monocytes % (Manual)   


 


Platelet Estimate   


 


Polychromasia   


 


Hypochromasia (manual)   


 


Anisocytosis (manual)   


 


Target Cells   


 


PT   


 


INR   


 


APTT   


 


Sodium  145  


 


Potassium  3.7  


 


Chloride  97 L  


 


Carbon Dioxide  33 H  


 


Anion Gap  19  


 


BUN  21 H  


 


Creatinine  2.8 H  


 


Est GFR ( Amer)  20  


 


Est GFR (Non-Af Amer)  16  


 


POC Glucose (mg/dL)    121 H


 


Random Glucose  135 H  


 


Serum Osmolality   308 H 


 


Calcium  9.5  


 


Phosphorus   


 


Magnesium   


 


Total Bilirubin  0.5  


 


AST  36  


 


ALT  27  


 


Alkaline Phosphatase  94  


 


Troponin I  0.7220 H*  


 


Total Protein  7.6  


 


Albumin  3.7  


 


Globulin  4.0 H  


 


Albumin/Globulin Ratio  0.9 L  


 


Triglycerides   


 


Cholesterol   


 


LDL Cholesterol Direct   


 


HDL Cholesterol   


 


Lipase   














  10/01/17 10/01/17





  07:22 11:16


 


WBC  


 


RBC  


 


Hgb  


 


Hct  


 


MCV  


 


MCH  


 


MCHC  


 


RDW  


 


Plt Count  


 


MPV  


 


Neut % (Auto)  


 


Lymph % (Auto)  


 


Mono % (Auto)  


 


Eos % (Auto)  


 


Baso % (Auto)  


 


Neut #  


 


Lymph #  


 


Mono #  


 


Eos #  


 


Baso #  


 


Neutrophils % (Manual)  


 


Lymphocytes % (Manual)  


 


Monocytes % (Manual)  


 


Platelet Estimate  


 


Polychromasia  


 


Hypochromasia (manual)  


 


Anisocytosis (manual)  


 


Target Cells  


 


PT  


 


INR  


 


APTT  


 


Sodium  


 


Potassium  


 


Chloride  


 


Carbon Dioxide  


 


Anion Gap  


 


BUN  


 


Creatinine  


 


Est GFR ( Amer)  


 


Est GFR (Non-Af Amer)  


 


POC Glucose (mg/dL)   206 H


 


Random Glucose  


 


Serum Osmolality  


 


Calcium  


 


Phosphorus  


 


Magnesium  


 


Total Bilirubin  


 


AST  


 


ALT  


 


Alkaline Phosphatase  


 


Troponin I  


 


Total Protein  


 


Albumin  


 


Globulin  


 


Albumin/Globulin Ratio  


 


Triglycerides  113 


 


Cholesterol  106 


 


LDL Cholesterol Direct  < 30 


 


HDL Cholesterol  43 


 


Lipase

## 2017-10-01 NOTE — CP.PCM.CON
History of Present Illness





- History of Present Illness


History of Present Illness: 





Mrs. Osuna is a 77-year-old woman with a past medical history of Anemia, 

Arthritis, Atrial Fibrillation, CAD, CHF, Diabetes, Gall Bladder Disease, HTN, 

Hypercholesterolemia, Hyperthyroidism, Hypothyroidism, Kidney Stones, Pneumonia

, End Stage Renal Disease (TTF), Chronic Kidney Disease, who developed nausea/

vomiting at dialysis yesterday.  She was brought to the ED and was confused.  A 

CT scan of the head showed a large right cerebellar acute to subacute ischemic 

stroke.  She was transferred to the ICU for close observation.  Neurosurgery 

was consulted for decompression, but she was not deemed a good surgical 

candidate.  She was started on 3% hypertonic saline after it was noted she had 

increasing edema and possible impending herniation.  She was stable overnight.





Review of Systems





- Review of Systems


All systems: reviewed and no additional remarkable complaints except





Past Patient History





- Infectious Disease


Hx of Infectious Diseases: None





- Past Medical History & Family History


Past Medical History?: Yes





- Past Social History


Smoking Status: Never Smoked





- CARDIAC


Hx Atrial Fibrillation: Yes


Hx Congestive Heart Failure: Yes


Hx Hypercholesterolemia: Yes


Hx Hypertension: Yes


Hx Pacemaker: Yes





- PULMONARY


Hx Pneumonia: Yes





- NEUROLOGICAL


Hx Neurological Disorder: Yes





- HEENT


Hx Cataracts: Yes





- RENAL


Hx Chronic Kidney Disease: Yes


Hx Kidney Stones: Yes





- ENDOCRINE/METABOLIC


Hx Hyperthyroidism: Yes


Hx Hypothyroidism: Yes





- HEMATOLOGICAL/ONCOLOGICAL


Hx Anemia: Yes


Hx Human Immunodeficiency Virus (HIV): No





- INTEGUMENTARY


Hx Dermatological Problems: No





- MUSCULOSKELETAL/RHEUMATOLOGICAL


Hx Arthritis: Yes





- GASTROINTESTINAL


Hx Gall Bladder Disease: Yes





- GENITOURINARY/GYNECOLOGICAL


Hx Genitourinary Disorders: No





- PSYCHIATRIC


Hx Substance Use: No





- SURGICAL HISTORY


Hx Cholecystectomy: Yes


Hx Coronary Artery Bypass Graft: Yes


Hx Coronary Stent: Yes





- ANESTHESIA


Hx Anesthesia: Yes


Hx Anesthesia Reactions: No


Hx Malignant Hyperthermia: No





Meds


Allergies/Adverse Reactions: 


 Allergies











Allergy/AdvReac Type Severity Reaction Status Date / Time


 


No Known Allergies Allergy   Verified 16 18:53














- Medications


Medications: 


 Current Medications





Acetaminophen (Tylenol 650mg/20.3ml Solution Ud)  650 mg PO Q6 PRN


   PRN Reason: Headache


Aspirin (Ecotrin)  81 mg PO DAILY [facility]


   Last Admin: 10/01/17 08:34 Dose:  81 mg


Home Med (Rosuvastatin Calcium [Crestor])  40 mg PO HS [facility]


Sodium Chloride (Hypertonic Saline 3%)  500 mls @ 30 mls/hr IV .[id number] [facility]


   Stop: 10/01/17 18:12


   Last Admin: 17 18:31 Dose:  30 mls/hr


Insulin Human Regular (Humulin R)  0 units SC ACCU-CHECK [facility]


   PRN Reason: Protocol


   Last Admin: 10/01/17 06:44 Dose:  Not Given


Levothyroxine Sodium (Synthroid)  75 mcg PO DAILY@0630 [facility]


Metoprolol Succinate (Toprol Xl)  50 mg PO DAILY [facility]


Pantoprazole Sodium (Protonix Inj)  40 mg IVP DAILY [facility]


   Last Admin: 10/01/17 08:34 Dose:  40 mg


Vitamin B Complex/Vit C/Folic Acid (Nephro-Brianna)  1 tab PO DAILY [facility]











Physical Exam





- Constitutional


Appears: Chronically Ill





- Head Exam


Head Exam: ATRAUMATIC, NORMAL INSPECTION





- Eye Exam


Eye Exam: EOMI


Pupil Exam: PERRL





- ENT Exam


Additional comments: 





Dry mucous membranes





- Neck Exam


Neck exam: Positive for: Normal Inspection





- Respiratory Exam


Respiratory Exam: NORMAL BREATHING PATTERN





- Cardiovascular Exam


Cardiovascular Exam: REGULAR RHYTHM, +S1, +S2





- GI/Abdominal Exam


GI & Abdominal Exam: Normal Bowel Sounds, Soft.  absent: Tenderness





- Rectal Exam


Rectal Exam: Deferred





- Extremities Exam


Additional comments: 





Left foot amputation





- Back Exam


Back exam: NORMAL INSPECTION





- Neurological Exam


Neurological exam: Altered, CN II-XII Intact


Additional comments: 





Somnolent, moves all extremities with bilateral lower extremity weakness and 

ataxia on the right side.  Reflexes are brisk on the right. Cannot ambulate.  

Cannot sit up without assistance. Sensation is intact. NIHSS=8





- Psychiatric Exam


Psychiatric exam: Depressed





- Skin


Skin Exam: Dry, Intact, Normal Color





Results





- Vital Signs


Recent Vital Signs: 


 Last Vital Signs











Temp  98.0 F   10/01/17 08:00


 


Pulse  83   10/01/17 10:00


 


Resp  14   10/01/17 10:00


 


BP  186/93 H  10/01/17 10:00


 


Pulse Ox  93 L  10/01/17 10:00














- Labs


Result Diagrams: 


 10/01/17 05:30





 10/01/17 05:30


Labs: 


 Laboratory Results - last 24 hr











  17





  16:36 16:44 16:44


 


WBC   11.6 H 


 


RBC   4.97 


 


Hgb   12.8  D 


 


Hct   43.0 


 


MCV   86.5  D 


 


MCH   25.8 L 


 


MCHC   29.8 L 


 


RDW   21.2 H 


 


Plt Count   305 


 


MPV   8.6 


 


Neut % (Auto)   87.6 H 


 


Lymph % (Auto)   3.9 L 


 


Mono % (Auto)   7.4 


 


Eos % (Auto)   0.2 


 


Baso % (Auto)   0.9 


 


Neut #   10.1 H 


 


Lymph #   0.5 L 


 


Mono #   0.9 H 


 


Eos #   0.0 


 


Baso #   0.1 


 


Neutrophils % (Manual)   90 H 


 


Lymphocytes % (Manual)   6 L 


 


Monocytes % (Manual)   4 


 


Platelet Estimate   Normal 


 


Polychromasia   Slight 


 


Hypochromasia (manual)   Slight 


 


Anisocytosis (manual)   Slight 


 


Target Cells   Slight 


 


PT   


 


INR   


 


APTT   


 


Sodium    140


 


Potassium    3.7


 


Chloride    93 L


 


Carbon Dioxide    28


 


Anion Gap    23 H


 


BUN    16


 


Creatinine    2.0 H


 


Est GFR ( Amer)    29


 


Est GFR (Non-Af Amer)    24


 


POC Glucose (mg/dL)  177 H  


 


Random Glucose    198 H


 


Serum Osmolality   


 


Calcium    9.6


 


Phosphorus    2.4 L


 


Magnesium    2.1


 


Total Bilirubin    0.7


 


AST    41 H


 


ALT    29


 


Alkaline Phosphatase    107


 


Troponin I    0.7490 H*


 


Total Protein    8.0


 


Albumin    3.8


 


Globulin    4.2 H


 


Albumin/Globulin Ratio    0.9 L


 


Triglycerides   


 


Cholesterol   


 


LDL Cholesterol Direct   


 


HDL Cholesterol   


 


Lipase    61














  09/30/17 09/30/17 10/01/17





  16:44 22:38 05:30


 


WBC    7.5


 


RBC    5.01


 


Hgb    13.0


 


Hct    44.0


 


MCV    87.9


 


MCH    26.0 L


 


MCHC    29.6 L


 


RDW    21.0 H


 


Plt Count    288


 


MPV    8.6


 


Neut % (Auto)    77.5 H


 


Lymph % (Auto)    9.2 L


 


Mono % (Auto)    12.2 H


 


Eos % (Auto)    0.3


 


Baso % (Auto)    0.8


 


Neut #    5.8


 


Lymph #    0.7 L


 


Mono #    0.9 H


 


Eos #    0.0


 


Baso #    0.1


 


Neutrophils % (Manual)   


 


Lymphocytes % (Manual)   


 


Monocytes % (Manual)   


 


Platelet Estimate   


 


Polychromasia   


 


Hypochromasia (manual)   


 


Anisocytosis (manual)   


 


Target Cells   


 


PT  13.7 H  


 


INR  1.3 H  


 


APTT  27.9  


 


Sodium   


 


Potassium   


 


Chloride   


 


Carbon Dioxide   


 


Anion Gap   


 


BUN   


 


Creatinine   


 


Est GFR ( Amer)   


 


Est GFR (Non-Af Amer)   


 


POC Glucose (mg/dL)   190 H 


 


Random Glucose   


 


Serum Osmolality   


 


Calcium   


 


Phosphorus   


 


Magnesium   


 


Total Bilirubin   


 


AST   


 


ALT   


 


Alkaline Phosphatase   


 


Troponin I   


 


Total Protein   


 


Albumin   


 


Globulin   


 


Albumin/Globulin Ratio   


 


Triglycerides   


 


Cholesterol   


 


LDL Cholesterol Direct   


 


HDL Cholesterol   


 


Lipase   














  10/01/17 10/01/17 10/01/17





  05:30 05:30 05:53


 


WBC   


 


RBC   


 


Hgb   


 


Hct   


 


MCV   


 


MCH   


 


MCHC   


 


RDW   


 


Plt Count   


 


MPV   


 


Neut % (Auto)   


 


Lymph % (Auto)   


 


Mono % (Auto)   


 


Eos % (Auto)   


 


Baso % (Auto)   


 


Neut #   


 


Lymph #   


 


Mono #   


 


Eos #   


 


Baso #   


 


Neutrophils % (Manual)   


 


Lymphocytes % (Manual)   


 


Monocytes % (Manual)   


 


Platelet Estimate   


 


Polychromasia   


 


Hypochromasia (manual)   


 


Anisocytosis (manual)   


 


Target Cells   


 


PT   


 


INR   


 


APTT   


 


Sodium  145  


 


Potassium  3.7  


 


Chloride  97 L  


 


Carbon Dioxide  33 H  


 


Anion Gap  19  


 


BUN  21 H  


 


Creatinine  2.8 H  


 


Est GFR ( Amer)  20  


 


Est GFR (Non-Af Amer)  16  


 


POC Glucose (mg/dL)    121 H


 


Random Glucose  135 H  


 


Serum Osmolality   308 H 


 


Calcium  9.5  


 


Phosphorus   


 


Magnesium   


 


Total Bilirubin  0.5  


 


AST  36  


 


ALT  27  


 


Alkaline Phosphatase  94  


 


Troponin I  0.7220 H*  


 


Total Protein  7.6  


 


Albumin  3.7  


 


Globulin  4.0 H  


 


Albumin/Globulin Ratio  0.9 L  


 


Triglycerides   


 


Cholesterol   


 


LDL Cholesterol Direct   


 


HDL Cholesterol   


 


Lipase   














  10/01/17





  07:22


 


WBC 


 


RBC 


 


Hgb 


 


Hct 


 


MCV 


 


MCH 


 


MCHC 


 


RDW 


 


Plt Count 


 


MPV 


 


Neut % (Auto) 


 


Lymph % (Auto) 


 


Mono % (Auto) 


 


Eos % (Auto) 


 


Baso % (Auto) 


 


Neut # 


 


Lymph # 


 


Mono # 


 


Eos # 


 


Baso # 


 


Neutrophils % (Manual) 


 


Lymphocytes % (Manual) 


 


Monocytes % (Manual) 


 


Platelet Estimate 


 


Polychromasia 


 


Hypochromasia (manual) 


 


Anisocytosis (manual) 


 


Target Cells 


 


PT 


 


INR 


 


APTT 


 


Sodium 


 


Potassium 


 


Chloride 


 


Carbon Dioxide 


 


Anion Gap 


 


BUN 


 


Creatinine 


 


Est GFR ( Amer) 


 


Est GFR (Non-Af Amer) 


 


POC Glucose (mg/dL) 


 


Random Glucose 


 


Serum Osmolality 


 


Calcium 


 


Phosphorus 


 


Magnesium 


 


Total Bilirubin 


 


AST 


 


ALT 


 


Alkaline Phosphatase 


 


Troponin I 


 


Total Protein 


 


Albumin 


 


Globulin 


 


Albumin/Globulin Ratio 


 


Triglycerides  113


 


Cholesterol  106


 


LDL Cholesterol Direct  < 30


 


HDL Cholesterol  43


 


Lipase 














Assessment & Plan


(1) Cerebellar infarct


Assessment and Plan: 


The patient is bedbound at baseline and was at rehab for previous amputations 

and deconditioning.  She is DNR/DNI and is not a good surgical candidate.  Her 

renal failure makes it difficult to use hypertonic fluids continuously.  I 

recommend the followin. Repeat CT scan of the head today


2. Stop 3% and check serum sodium/osmolarity Q 12


3. MRI of the brain without contrast when logistically possible


4. Echocardiogram 


5. Telemetry


6. Continue aspirin 81 mg daily


7. DVT Px


8. PT/OT eval and treat


9. Case management consult


10. Check lipids, HbA1c, B12, folate


11. Statin to maintain LDL below 70





Thank you. 


Status: Acute   Priority: High

## 2017-10-01 NOTE — CP.CCUPN
CCU Subjective





- Physician Review


Events Since Last Encounter (Free Text): 





10/01/17 07:44


Patient awake, lethargic, no fever, no vomiting, no pressors, events reviewed





CCU Objective





- Vital Signs / Intake & Output


Vital Signs (Last 4 hours): 


Vital Signs











  Temp Pulse Resp BP Pulse Ox


 


 10/01/17 06:00  98.5 F  76  17  169/67 H  100


 


 10/01/17 04:00   68  13  135/54 L  97











Intake and Output (Last 8hrs): 


 Intake & Output











 09/30/17 10/01/17 10/01/17





 22:59 06:59 14:59


 


Intake Total 150 240 


 


Balance 150 240 


 


Weight 170 lb 136 lb 


 


Intake:   


 


  IV 90 240 


 


  Oral 60  


 


Other:   


 


  # Bowel Movements 1 1 














- Physical Exam


Head: Positive for: Atraumatic


Extroacular Muscles: Positive for: EOMI (L pupil opacified, R pupil 3 mm and 

reactive.)


Conjunctiva: Positive for: Normal


Mouth: Positive for: Moist Mucous Membranes


Nose (External): Positive for: Atraumatic


Neck: Positive for: Normal Range of Motion.  Negative for: JVD, Lymphadenopathy


Respiratory/Chest: Positive for: Decreased Breath Sounds.  Negative for: 

Accessory Muscle Use, Rales, Rhonchi


Cardiovascular: Positive for: Regular Rate and Rhythm.  Negative for: Murmurs, 

Rub


Abdomen: Positive for: Normal Bowel Sounds.  Negative for: Tenderness, 

Distention


Upper Extremity: Positive for: Other (RUE AV Shunt with good bruit and thrill)


Lower Extremity: Positive for: Edema (trace), Deformity (Left foot TMA).  

Negative for: CALF TENDERNESS


Neurological: Positive for: Motor Func Grossly Intact


Skin: Positive for: Warm, Normal Color.  Negative for: Rashes, Abscess


Psychiatric: Positive for: Alert





- Medications


Active Medications: 


Active Medications











Generic Name Dose Route Start Last Admin





  Trade Name Freq  PRN Reason Stop Dose Admin


 


Acetaminophen  650 mg  09/30/17 21:44  





  Tylenol 650mg/20.3ml Solution Ud  PO   





  Q6 PRN   





  Headache   


 


Aspirin  81 mg  10/01/17 09:00  





  Ecotrin  PO   





  DAILY [facility]   


 


Home Med  40 mg  10/01/17 22:00  





  Rosuvastatin Calcium [Crestor]  PO   





  HS [facility]   


 


Sodium Chloride  500 mls @ 30 mls/hr  09/30/17 18:15  09/30/17 18:31





  Hypertonic Saline 3%  IV  10/01/17 18:12  30 mls/hr





  .[id number] [facility]OCK   Administration


 


Influenza Virus Vaccine  0.5 ml  10/01/17 09:00  





  Afluria (Pf)(18yr & Older)  IM  10/01/17 09:01  





  .ONCE ONE   


 


Insulin Human Regular  0 units  09/30/17 23:00  10/01/17 06:44





  Humulin R  SC   Not Given





  ACCU-CHECK [facility]   





  Protocol   


 


Pantoprazole Sodium  40 mg  10/01/17 09:00  





  Protonix Inj  IVP   





  DAILY [facility]   


 


Vitamin B Complex/Vit C/Folic Acid  1 tab  10/01/17 09:00  





  Nephro-Brianna  PO   





  DAILY [facility]   














- Patient Studies


Lab Studies: 


 Lab Studies











  10/01/17 10/01/17 10/01/17 Range/Units





  05:53 05:30 05:30 


 


WBC     (4.8-10.8)  K/uL


 


RBC     (3.80-5.20)  Mil/uL


 


Hgb     (12.0-16.0)  g/dL


 


Hct     (34.0-47.0)  %


 


MCV     (81.0-99.0)  fl


 


MCH     (27.0-31.0)  pg


 


MCHC     (33.0-37.0)  g/dL


 


RDW     (11.5-14.5)  %


 


Plt Count     (130-400)  K/uL


 


MPV     (7.2-11.7)  fl


 


Neut % (Auto)     (50.0-75.0)  %


 


Lymph % (Auto)     (20.0-40.0)  %


 


Mono % (Auto)     (0.0-10.0)  %


 


Eos % (Auto)     (0.0-4.0)  %


 


Baso % (Auto)     (0.0-2.0)  %


 


Neut #     (1.8-7.0)  K/uL


 


Lymph #     (1.0-4.3)  K/uL


 


Mono #     (0.0-0.8)  K/uL


 


Eos #     (0.0-0.7)  K/uL


 


Baso #     (0.0-0.2)  K/uL


 


Neutrophils % (Manual)     (42-75)  %


 


Lymphocytes % (Manual)     (20-50)  %


 


Monocytes % (Manual)     (0-10)  %


 


Platelet Estimate     (NORMAL)  


 


Polychromasia     


 


Hypochromasia (manual)     


 


Anisocytosis (manual)     


 


Target Cells     


 


PT     (9.8-13.1)  Seconds


 


INR     (0.9-1.2)  


 


APTT     (25.6-37.1)  Seconds


 


Sodium    145  (132-148)  mmol/l


 


Potassium    3.7  (3.6-5.0)  MMOL/L


 


Chloride    97 L  ()  mmol/L


 


Carbon Dioxide    33 H  (22-30)  mmol/L


 


Anion Gap    19  (10-20)  


 


BUN    21 H  (7-17)  mg/dl


 


Creatinine    2.8 H  (0.7-1.2)  mg/dL


 


Est GFR ( Amer)    20  


 


Est GFR (Non-Af Amer)    16  


 


POC Glucose (mg/dL)  121 H    ()  mg/dL


 


Random Glucose    135 H  ()  mg/dL


 


Serum Osmolality   308 H   (272-300)  mosm/kg


 


Calcium    9.5  (8.4-10.2)  mg/dL


 


Phosphorus     (2.5-4.5)  mg/dl


 


Magnesium     (1.6-2.3)  MG/DL


 


Total Bilirubin    0.5  (0.2-1.3)  mg/dl


 


AST    36  (14-36)  U/L


 


ALT    27  (9-52)  U/L


 


Alkaline Phosphatase    94  ()  U/L


 


Troponin I    0.7220 H*  (0.00-0.120)  ng/mL


 


Total Protein    7.6  (6.3-8.2)  G/DL


 


Albumin    3.7  (3.5-5.0)  g/dL


 


Globulin    4.0 H  (2.2-3.9)  gm/dL


 


Albumin/Globulin Ratio    0.9 L  (1.0-2.1)  


 


Lipase     ()  U/L














  09/30/17 09/30/17 09/30/17 Range/Units





  22:38 16:44 16:44 


 


WBC     (4.8-10.8)  K/uL


 


RBC     (3.80-5.20)  Mil/uL


 


Hgb     (12.0-16.0)  g/dL


 


Hct     (34.0-47.0)  %


 


MCV     (81.0-99.0)  fl


 


MCH     (27.0-31.0)  pg


 


MCHC     (33.0-37.0)  g/dL


 


RDW     (11.5-14.5)  %


 


Plt Count     (130-400)  K/uL


 


MPV     (7.2-11.7)  fl


 


Neut % (Auto)     (50.0-75.0)  %


 


Lymph % (Auto)     (20.0-40.0)  %


 


Mono % (Auto)     (0.0-10.0)  %


 


Eos % (Auto)     (0.0-4.0)  %


 


Baso % (Auto)     (0.0-2.0)  %


 


Neut #     (1.8-7.0)  K/uL


 


Lymph #     (1.0-4.3)  K/uL


 


Mono #     (0.0-0.8)  K/uL


 


Eos #     (0.0-0.7)  K/uL


 


Baso #     (0.0-0.2)  K/uL


 


Neutrophils % (Manual)     (42-75)  %


 


Lymphocytes % (Manual)     (20-50)  %


 


Monocytes % (Manual)     (0-10)  %


 


Platelet Estimate     (NORMAL)  


 


Polychromasia     


 


Hypochromasia (manual)     


 


Anisocytosis (manual)     


 


Target Cells     


 


PT   13.7 H   (9.8-13.1)  Seconds


 


INR   1.3 H   (0.9-1.2)  


 


APTT   27.9   (25.6-37.1)  Seconds


 


Sodium    140  (132-148)  mmol/l


 


Potassium    3.7  (3.6-5.0)  MMOL/L


 


Chloride    93 L  ()  mmol/L


 


Carbon Dioxide    28  (22-30)  mmol/L


 


Anion Gap    23 H  (10-20)  


 


BUN    16  (7-17)  mg/dl


 


Creatinine    2.0 H  (0.7-1.2)  mg/dL


 


Est GFR ( Amer)    29  


 


Est GFR (Non-Af Amer)    24  


 


POC Glucose (mg/dL)  190 H    ()  mg/dL


 


Random Glucose    198 H  ()  mg/dL


 


Serum Osmolality     (272-300)  mosm/kg


 


Calcium    9.6  (8.4-10.2)  mg/dL


 


Phosphorus    2.4 L  (2.5-4.5)  mg/dl


 


Magnesium    2.1  (1.6-2.3)  MG/DL


 


Total Bilirubin    0.7  (0.2-1.3)  mg/dl


 


AST    41 H  (14-36)  U/L


 


ALT    29  (9-52)  U/L


 


Alkaline Phosphatase    107  ()  U/L


 


Troponin I    0.7490 H*  (0.00-0.120)  ng/mL


 


Total Protein    8.0  (6.3-8.2)  G/DL


 


Albumin    3.8  (3.5-5.0)  g/dL


 


Globulin    4.2 H  (2.2-3.9)  gm/dL


 


Albumin/Globulin Ratio    0.9 L  (1.0-2.1)  


 


Lipase    61  ()  U/L














  09/30/17 09/30/17 Range/Units





  16:44 16:36 


 


WBC  11.6 H   (4.8-10.8)  K/uL


 


RBC  4.97   (3.80-5.20)  Mil/uL


 


Hgb  12.8  D   (12.0-16.0)  g/dL


 


Hct  43.0   (34.0-47.0)  %


 


MCV  86.5  D   (81.0-99.0)  fl


 


MCH  25.8 L   (27.0-31.0)  pg


 


MCHC  29.8 L   (33.0-37.0)  g/dL


 


RDW  21.2 H   (11.5-14.5)  %


 


Plt Count  305   (130-400)  K/uL


 


MPV  8.6   (7.2-11.7)  fl


 


Neut % (Auto)  87.6 H   (50.0-75.0)  %


 


Lymph % (Auto)  3.9 L   (20.0-40.0)  %


 


Mono % (Auto)  7.4   (0.0-10.0)  %


 


Eos % (Auto)  0.2   (0.0-4.0)  %


 


Baso % (Auto)  0.9   (0.0-2.0)  %


 


Neut #  10.1 H   (1.8-7.0)  K/uL


 


Lymph #  0.5 L   (1.0-4.3)  K/uL


 


Mono #  0.9 H   (0.0-0.8)  K/uL


 


Eos #  0.0   (0.0-0.7)  K/uL


 


Baso #  0.1   (0.0-0.2)  K/uL


 


Neutrophils % (Manual)  90 H   (42-75)  %


 


Lymphocytes % (Manual)  6 L   (20-50)  %


 


Monocytes % (Manual)  4   (0-10)  %


 


Platelet Estimate  Normal   (NORMAL)  


 


Polychromasia  Slight   


 


Hypochromasia (manual)  Slight   


 


Anisocytosis (manual)  Slight   


 


Target Cells  Slight   


 


PT    (9.8-13.1)  Seconds


 


INR    (0.9-1.2)  


 


APTT    (25.6-37.1)  Seconds


 


Sodium    (132-148)  mmol/l


 


Potassium    (3.6-5.0)  MMOL/L


 


Chloride    ()  mmol/L


 


Carbon Dioxide    (22-30)  mmol/L


 


Anion Gap    (10-20)  


 


BUN    (7-17)  mg/dl


 


Creatinine    (0.7-1.2)  mg/dL


 


Est GFR (African Amer)    


 


Est GFR (Non-Af Amer)    


 


POC Glucose (mg/dL)   177 H  ()  mg/dL


 


Random Glucose    ()  mg/dL


 


Serum Osmolality    (272-300)  mosm/kg


 


Calcium    (8.4-10.2)  mg/dL


 


Phosphorus    (2.5-4.5)  mg/dl


 


Magnesium    (1.6-2.3)  MG/DL


 


Total Bilirubin    (0.2-1.3)  mg/dl


 


AST    (14-36)  U/L


 


ALT    (9-52)  U/L


 


Alkaline Phosphatase    ()  U/L


 


Troponin I    (0.00-0.120)  ng/mL


 


Total Protein    (6.3-8.2)  G/DL


 


Albumin    (3.5-5.0)  g/dL


 


Globulin    (2.2-3.9)  gm/dL


 


Albumin/Globulin Ratio    (1.0-2.1)  


 


Lipase    ()  U/L








 Laboratory Results - last 24 hr











  09/30/17 09/30/17 09/30/17





  16:36 16:44 16:44


 


WBC   11.6 H 


 


RBC   4.97 


 


Hgb   12.8  D 


 


Hct   43.0 


 


MCV   86.5  D 


 


MCH   25.8 L 


 


MCHC   29.8 L 


 


RDW   21.2 H 


 


Plt Count   305 


 


MPV   8.6 


 


Neut % (Auto)   87.6 H 


 


Lymph % (Auto)   3.9 L 


 


Mono % (Auto)   7.4 


 


Eos % (Auto)   0.2 


 


Baso % (Auto)   0.9 


 


Neut #   10.1 H 


 


Lymph #   0.5 L 


 


Mono #   0.9 H 


 


Eos #   0.0 


 


Baso #   0.1 


 


Neutrophils % (Manual)   90 H 


 


Lymphocytes % (Manual)   6 L 


 


Monocytes % (Manual)   4 


 


Platelet Estimate   Normal 


 


Polychromasia   Slight 


 


Hypochromasia (manual)   Slight 


 


Anisocytosis (manual)   Slight 


 


Target Cells   Slight 


 


PT   


 


INR   


 


APTT   


 


Sodium    140


 


Potassium    3.7


 


Chloride    93 L


 


Carbon Dioxide    28


 


Anion Gap    23 H


 


BUN    16


 


Creatinine    2.0 H


 


Est GFR ( Amer)    29


 


Est GFR (Non-Af Amer)    24


 


POC Glucose (mg/dL)  177 H  


 


Random Glucose    198 H


 


Serum Osmolality   


 


Calcium    9.6


 


Phosphorus    2.4 L


 


Magnesium    2.1


 


Total Bilirubin    0.7


 


AST    41 H


 


ALT    29


 


Alkaline Phosphatase    107


 


Troponin I    0.7490 H*


 


Total Protein    8.0


 


Albumin    3.8


 


Globulin    4.2 H


 


Albumin/Globulin Ratio    0.9 L


 


Lipase    61














  09/30/17 09/30/17 10/01/17





  16:44 22:38 05:30


 


WBC   


 


RBC   


 


Hgb   


 


Hct   


 


MCV   


 


MCH   


 


MCHC   


 


RDW   


 


Plt Count   


 


MPV   


 


Neut % (Auto)   


 


Lymph % (Auto)   


 


Mono % (Auto)   


 


Eos % (Auto)   


 


Baso % (Auto)   


 


Neut #   


 


Lymph #   


 


Mono #   


 


Eos #   


 


Baso #   


 


Neutrophils % (Manual)   


 


Lymphocytes % (Manual)   


 


Monocytes % (Manual)   


 


Platelet Estimate   


 


Polychromasia   


 


Hypochromasia (manual)   


 


Anisocytosis (manual)   


 


Target Cells   


 


PT  13.7 H  


 


INR  1.3 H  


 


APTT  27.9  


 


Sodium    145


 


Potassium    3.7


 


Chloride    97 L


 


Carbon Dioxide    33 H


 


Anion Gap    19


 


BUN    21 H


 


Creatinine    2.8 H


 


Est GFR ( Amer)    20


 


Est GFR (Non-Af Amer)    16


 


POC Glucose (mg/dL)   190 H 


 


Random Glucose    135 H


 


Serum Osmolality   


 


Calcium    9.5


 


Phosphorus   


 


Magnesium   


 


Total Bilirubin    0.5


 


AST    36


 


ALT    27


 


Alkaline Phosphatase    94


 


Troponin I    0.7220 H*


 


Total Protein    7.6


 


Albumin    3.7


 


Globulin    4.0 H


 


Albumin/Globulin Ratio    0.9 L


 


Lipase   














  10/01/17 10/01/17





  05:30 05:53


 


WBC  


 


RBC  


 


Hgb  


 


Hct  


 


MCV  


 


MCH  


 


MCHC  


 


RDW  


 


Plt Count  


 


MPV  


 


Neut % (Auto)  


 


Lymph % (Auto)  


 


Mono % (Auto)  


 


Eos % (Auto)  


 


Baso % (Auto)  


 


Neut #  


 


Lymph #  


 


Mono #  


 


Eos #  


 


Baso #  


 


Neutrophils % (Manual)  


 


Lymphocytes % (Manual)  


 


Monocytes % (Manual)  


 


Platelet Estimate  


 


Polychromasia  


 


Hypochromasia (manual)  


 


Anisocytosis (manual)  


 


Target Cells  


 


PT  


 


INR  


 


APTT  


 


Sodium  


 


Potassium  


 


Chloride  


 


Carbon Dioxide  


 


Anion Gap  


 


BUN  


 


Creatinine  


 


Est GFR ( Amer)  


 


Est GFR (Non-Af Amer)  


 


POC Glucose (mg/dL)   121 H


 


Random Glucose  


 


Serum Osmolality  308 H 


 


Calcium  


 


Phosphorus  


 


Magnesium  


 


Total Bilirubin  


 


AST  


 


ALT  


 


Alkaline Phosphatase  


 


Troponin I  


 


Total Protein  


 


Albumin  


 


Globulin  


 


Albumin/Globulin Ratio  


 


Lipase  











EKG/Cardiology Studies: 


Cardiology / EKG Studies





09/30/17 16:29


ELECTROCARDIOGRAM Stat 


   Comment: 


   Mode Of Transportation: 


   Reason For Exam: vomiting


   Isolation: Contact











Fingerstick Blood Sugar Results: 121





Critical Care Progress Note





- Nutrition


Nutrition: 


 Nutrition











 Category Date Time Status


 


 Heart Healthy Diet [DIET] Diets  10/01/17 Breakfast Active














Assessment/Plan





- Assessment and Plan (Free Text)


Assessment: 





A/P





CVA, Rt cerebellar infarct, DM, HTN, ESRD, hypothyroid, R/O MI





- Neurology follow up


- Neuro check


- Continue meds


- Pulmonary toilets

## 2017-10-01 NOTE — [email or web address]
Physician Communication Note





- Physician Communication Note


Physician Communication Note: Blood Culture done 9/30/17 results +ve for Gm+ve 

cocci in clusters.

## 2017-10-02 LAB
ALBUMIN/GLOB SERPL: 0.9 {RATIO} (ref 1–2.1)
ALP SERPL-CCNC: 87 U/L (ref 38–126)
ALT SERPL-CCNC: 29 U/L (ref 9–52)
AST SERPL-CCNC: 43 U/L (ref 14–36)
BILIRUB SERPL-MCNC: 0.5 MG/DL (ref 0.2–1.3)
BUN SERPL-MCNC: 29 MG/DL (ref 7–17)
BUN SERPL-MCNC: 32 MG/DL (ref 7–17)
BUN SERPL-MCNC: 34 MG/DL (ref 7–17)
CALCIUM SERPL-MCNC: 9.2 MG/DL (ref 8.4–10.2)
CALCIUM SERPL-MCNC: 9.3 MG/DL (ref 8.4–10.2)
CALCIUM SERPL-MCNC: 9.4 MG/DL (ref 8.4–10.2)
CHLORIDE SERPL-SCNC: 100 MMOL/L (ref 98–107)
CHLORIDE SERPL-SCNC: 104 MMOL/L (ref 98–107)
CHLORIDE SERPL-SCNC: 106 MMOL/L (ref 98–107)
CO2 SERPL-SCNC: 27 MMOL/L (ref 22–30)
CO2 SERPL-SCNC: 28 MMOL/L (ref 22–30)
CO2 SERPL-SCNC: 29 MMOL/L (ref 22–30)
ERYTHROCYTE [DISTWIDTH] IN BLOOD BY AUTOMATED COUNT: 21.1 % (ref 11.5–14.5)
GLOBULIN SER-MCNC: 3.9 GM/DL (ref 2.2–3.9)
GLUCOSE SERPL-MCNC: 154 MG/DL (ref 65–105)
GLUCOSE SERPL-MCNC: 183 MG/DL (ref 65–105)
GLUCOSE SERPL-MCNC: 203 MG/DL (ref 65–105)
HCT VFR BLD CALC: 43.7 % (ref 34–47)
MCH RBC QN AUTO: 25.8 PG (ref 27–31)
MCHC RBC AUTO-ENTMCNC: 29.2 G/DL (ref 33–37)
MCV RBC AUTO: 88.1 FL (ref 81–99)
PLATELET # BLD: 262 K/UL (ref 130–400)
POTASSIUM SERPL-SCNC: 4.5 MMOL/L (ref 3.6–5)
POTASSIUM SERPL-SCNC: 4.5 MMOL/L (ref 3.6–5)
POTASSIUM SERPL-SCNC: 4.6 MMOL/L (ref 3.6–5)
PROT SERPL-MCNC: 7.4 G/DL (ref 6.3–8.2)
SODIUM SERPL-SCNC: 146 MMOL/L (ref 132–148)
SODIUM SERPL-SCNC: 148 MMOL/L (ref 132–148)
SODIUM SERPL-SCNC: 148 MMOL/L (ref 132–148)
WBC # BLD AUTO: 9.9 K/UL (ref 4.8–10.8)

## 2017-10-02 PROCEDURE — B517ZZA FLUOROSCOPY OF LEFT SUBCLAVIAN VEIN, GUIDANCE: ICD-10-PCS

## 2017-10-02 PROCEDURE — B547ZZA ULTRASONOGRAPHY OF LEFT SUBCLAVIAN VEIN, GUIDANCE: ICD-10-PCS

## 2017-10-02 PROCEDURE — 05H633Z INSERTION OF INFUSION DEVICE INTO LEFT SUBCLAVIAN VEIN, PERCUTANEOUS APPROACH: ICD-10-PCS

## 2017-10-02 RX ADMIN — METOPROLOL SUCCINATE SCH MG: 50 TABLET, EXTENDED RELEASE ORAL at 08:38

## 2017-10-02 RX ADMIN — ACETAMINOPHEN PRN MG: 160 SOLUTION ORAL at 20:06

## 2017-10-02 NOTE — CP.PCM.PN
Subjective





- Date & Time of Evaluation


Date of Evaluation: 10/02/17


Time of Evaluation: 10:47





- Subjective


Subjective: 





Ms. Osuna was seen and examined at the bedside. She is responsive to all stimuli 

and able to follow simple commands. She even request for a cup of coffee today. 

There was no untoward events overnight.





Objective





- Vital Signs/Intake and Output


Vital Signs (last 24 hours): 


 











Temp Pulse Resp BP Pulse Ox


 


 97.8 F   97 H  19   201/91 H  95 


 


 10/02/17 08:00  10/02/17 08:38  10/02/17 08:00  10/02/17 08:38  10/02/17 08:00








Intake and Output: 


 











 10/02/17 10/02/17





 06:59 18:59


 


Intake Total 850 


 


Balance 850 














- Medications


Medications: 


 Current Medications





Acetaminophen (Tylenol 650mg/20.3ml Solution Ud)  650 mg PO Q6 PRN


   PRN Reason: Headache


   Last Admin: 10/01/17 17:02 Dose:  650 mg


Amlodipine Besylate (Norvasc)  5 mg PO DAILY [facility]


   Last Admin: 10/02/17 08:36 Dose:  5 mg


Artificial Tears (Artificial Tears)  2 drop OU Q4 PRN


   PRN Reason: Dry eyes


   Last Admin: 10/01/17 22:38 Dose:  2 drop


Aspirin (Ecotrin)  81 mg PO DAILY [facility]


   Last Admin: 10/02/17 08:36 Dose:  81 mg


Home Med (Rosuvastatin Calcium [Crestor])  40 mg PO HS [facility]


   Last Admin: 10/01/17 22:38 Dose:  40 mg


Sodium Chloride (Hypertonic Saline 3%)  500 mls @ 30 mls/hr IV .[id number] [facility]


   Stop: 10/02/17 20:21


   Last Admin: 10/01/17 21:54 Dose:  30 mls/hr


Vancomycin HCl 1 gm/ Sodium (Chloride)  250 mls @ 166.667 mls/hr IVPB Q12H [facility]


   Last Admin: 10/01/17 23:48 Dose:  166.667 mls/hr


Insulin Human Regular (Humulin R)  0 units SC ACCU-CHECK [facility]


   PRN Reason: Protocol


   Last Admin: 10/02/17 06:27 Dose:  1 units


Levothyroxine Sodium (Synthroid)  75 mcg PO DAILY@0630 [facility]


   Last Admin: 10/02/17 06:27 Dose:  75 mcg


Metoprolol Succinate (Toprol Xl)  50 mg PO DAILY [facility]


   Last Admin: 10/02/17 08:38 Dose:  50 mg


Pantoprazole Sodium (Protonix Inj)  40 mg IVP DAILY [facility]


   Last Admin: 10/01/17 08:34 Dose:  40 mg


Vitamin B Complex/Vit C/Folic Acid (Nephro-Brianna)  1 tab PO DAILY [facility]











- Labs


Labs: 


 





 10/02/17 04:25 





 10/02/17 04:25 





 











PT  13.7 Seconds (9.8-13.1)  H  09/30/17  16:44    


 


INR  1.3  (0.9-1.2)  H  09/30/17  16:44    


 


APTT  27.9 Seconds (25.6-37.1)   09/30/17  16:44    














- Constitutional


Appears: No Acute Distress





- Head Exam


Head Exam: ATRAUMATIC, NORMAL INSPECTION, NORMOCEPHALIC





- Neurological Exam


Neurological Exam: Alert, Awake


Neuro motor strength exam: Left Upper Extremity: 3, Right Upper Extremity: 2/1, 

Left Lower Extremity: 3, Right Lower Extremity: 2/1


Additional comments: 





Neurological assessments shows  movement of all extremities with bilateral 

lower extremity weakness and ataxia on the right side.  Reflexes are brisk on 

the right. Cannot ambulate.  Cannot sit up without assistance. Sensation is 

intact.





Assessment and Plan


(1) Cerebellar infarct


Assessment & Plan: 


Case discussed with Dr. Jones, continue hypertonic solution of NaCL 3% at 30 ml/

hr and continue to close monitor sodium and serum osmolality levels. Continue 

current medical, physical, and occupational therapies. Awaiting echocardiogram 

results.


Status: Acute

## 2017-10-02 NOTE — CP.PCM.PN
Subjective





- Date & Time of Evaluation


Date of Evaluation: 10/02/17


Time of Evaluation: 08:39





- Subjective


Subjective: 





Patient awake and verbalizing but it was very difficult to see whether she is 

comprehensive in have told because of the language barrier





Objective





- Vital Signs/Intake and Output


Vital Signs (last 24 hours): 


 











Temp Pulse Resp BP Pulse Ox


 


 97.8 F   97 H  19   201/91 H  95 


 


 10/02/17 08:00  10/02/17 08:38  10/02/17 08:00  10/02/17 08:38  10/02/17 08:00








Intake and Output: 


 











 10/02/17 10/02/17





 06:59 18:59


 


Intake Total 850 


 


Balance 850 














- Medications


Medications: 


 Current Medications





Acetaminophen (Tylenol 650mg/20.3ml Solution Ud)  650 mg PO Q6 PRN


   PRN Reason: Headache


   Last Admin: 10/01/17 17:02 Dose:  650 mg


Amlodipine Besylate (Norvasc)  5 mg PO DAILY [facility]


   Last Admin: 10/02/17 08:36 Dose:  5 mg


Artificial Tears (Artificial Tears)  2 drop OU Q4 PRN


   PRN Reason: Dry eyes


   Last Admin: 10/01/17 22:38 Dose:  2 drop


Aspirin (Ecotrin)  81 mg PO DAILY [facility]


   Last Admin: 10/02/17 08:36 Dose:  81 mg


Home Med (Rosuvastatin Calcium [Crestor])  40 mg PO HS [facility]


   Last Admin: 10/01/17 22:38 Dose:  40 mg


Sodium Chloride (Hypertonic Saline 3%)  500 mls @ 30 mls/hr IV .[id number] [facility]


   Stop: 10/02/17 20:21


   Last Admin: 10/01/17 21:54 Dose:  30 mls/hr


Vancomycin HCl 1 gm/ Sodium (Chloride)  250 mls @ 166.667 mls/hr IVPB Q12H [facility]


   Last Admin: 10/01/17 23:48 Dose:  166.667 mls/hr


Insulin Human Regular (Humulin R)  0 units SC ACCU-CHECK [facility]


   PRN Reason: Protocol


   Last Admin: 10/02/17 06:27 Dose:  1 units


Levothyroxine Sodium (Synthroid)  75 mcg PO DAILY@0630 [facility]


   Last Admin: 10/02/17 06:27 Dose:  75 mcg


Metoprolol Succinate (Toprol Xl)  50 mg PO DAILY [facility]


   Last Admin: 10/02/17 08:38 Dose:  50 mg


Pantoprazole Sodium (Protonix Inj)  40 mg IVP DAILY [facility]


   Last Admin: 10/01/17 08:34 Dose:  40 mg


Vitamin B Complex/Vit C/Folic Acid (Nephro-Brianna)  1 tab PO DAILY [facility]











- Labs


Labs: 


 





 10/02/17 04:25 





 10/02/17 04:25 





 











PT  13.7 Seconds (9.8-13.1)  H  09/30/17  16:44    


 


INR  1.3  (0.9-1.2)  H  09/30/17  16:44    


 


APTT  27.9 Seconds (25.6-37.1)   09/30/17  16:44    














- Constitutional


Appears: No Acute Distress





- ENT Exam


ENT Exam: absent: Mucous Membranes Moist





- Respiratory Exam


Respiratory Exam: NORMAL BREATHING PATTERN.  absent: Chest Wall Tenderness





- GI/Abdominal Exam


GI & Abdominal Exam: Soft, Normal Bowel Sounds





- Extremities Exam


Extremities Exam: absent: Calf Tenderness





- Back Exam


Back Exam: absent: CVA tenderness (L), CVA tenderness (R)





- Neurological Exam


Neurological Exam: Altered





Assessment and Plan


(1) Cerebellar infarct


Assessment & Plan: 


acute cerebellar stroke


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 


Hypertensive Chronic Kidney Disease (I12.0)


End stage renal disease (N18.6) dependence on hemodialysis (Z99.2) (TTS) via AVF


Hyperphosphatemia (E83.39), Secondary Hyperparathyroidism (E21.1), HTN (I12.0)





Scheduled for hemodialysis for tomorrow. continue monitoring


Status: Acute

## 2017-10-02 NOTE — CON
DATE:  10/01/2017



HISTORY OF PRESENT ILLNESS:  This is a 77-year-old markedly sick patient

with severe diabetes and end-stage renal disease.  Apparently today in

dialysis, she was noted to be nauseous.  She had a CT of the brain, which

documented a right cerebellar infarct.  _____.



I initially discussed the case with the ER physician last night.  We both

agree the patient is not a candidate for surgery.  She is extremely

debilitated and ill and on top of that, was basically neurologically

stable.  The plan was for her to be admitted to the ICU for medical

management which is underway.



Interviewing her today, she really only complains of a mild headache, she

is not currently having any nausea or vomiting, otherwise is basically

asymptomatic.



Past medical history, medications, allergies, social history, all reviewed

in the chart, obviously quite extensive.



PHYSICAL EXAMINATION

GENERAL:  She is bright, awake, and alert.  She follows all commands in

[place].  She is properly oriented.

HEENT:  She has significant cataract, but her extraocular movements seemed

to be intact.  Her face is symmetric.

NEUROLOGIC:  There is no evidence of any cranial nerve dysfunction.  She is

moving all extremities with at least antigravity.



The CT of the brain does show rather impressive right cerebellar infarct. 

There is some compression of the posterior hari.  There is no

hydrocephalus.



IMPRESSION AND PLAN:  We all agree including the other physicians involved

in her case as well as the patient's daughter who is quite adamant about

not having any surgery.  I would fully agree, certainly would advocate

medical management in this particular lady.





__________________________________________

Aiden Ortiz MD



DD:  10/01/2017 13:59:36

DT:  10/01/2017 15:13:34

Job # [id number]

## 2017-10-02 NOTE — CP.PCM.CON
History of Present Illness





- History of Present Illness


History of Present Illness: 


Podiatry Consult Note - Dr. Serna





77 year old female patient with extensive PMHx seen at bedside in ICU at the 

request for podiatry consultation. HPI obtained with the assistance of [place] 

 #[id number]. Patient in NAD. Patient seen wearing offloading boots at 

time of visit. Patient is a poor historian. Per nursing, podiatry was consulted 

for a wound on left foot TMA stump. Patient complains of generalized pain 

throughout her body. No other pedal complaints at this time. 





PMHx: Anemia, arthritis, atrial fibrillation, CAD, CHF, DM II, gall bladder 

disease with [place], HTN, hypercholesterolemia, kidney stones, pneumonia, ESRD 

on HD


PSH: CABG, cholecystectomy, coronary stent, pacemaker


FH: unable to obtain


SH: unable to obtain


Meds: see meds list


All: NKDA











Review of Systems





- Review of Systems


All systems: reviewed and no additional remarkable complaints except (as per HPI

)





Past Patient History





- Infectious Disease


Hx of Infectious Diseases: None





- Past Medical History & Family History


Past Medical History?: Yes





- Past Social History


Smoking Status: Never Smoked





- CARDIAC


Hx Atrial Fibrillation: Yes


Hx Congestive Heart Failure: Yes


Hx Hypercholesterolemia: Yes


Hx Hypertension: Yes


Hx Pacemaker: Yes





- PULMONARY


Hx Pneumonia: Yes





- NEUROLOGICAL


Hx Neurological Disorder: Yes





- HEENT


Hx Cataracts: Yes





- RENAL


Hx Chronic Kidney Disease: Yes


Hx Kidney Stones: Yes





- ENDOCRINE/METABOLIC


Hx Hyperthyroidism: Yes


Hx Hypothyroidism: Yes





- HEMATOLOGICAL/ONCOLOGICAL


Hx Anemia: Yes


Hx Human Immunodeficiency Virus (HIV): No





- INTEGUMENTARY


Hx Dermatological Problems: No





- MUSCULOSKELETAL/RHEUMATOLOGICAL


Hx Arthritis: Yes





- GASTROINTESTINAL


Hx Gall Bladder Disease: Yes





- GENITOURINARY/GYNECOLOGICAL


Hx Genitourinary Disorders: No





- PSYCHIATRIC


Hx Substance Use: No





- SURGICAL HISTORY


Hx Cholecystectomy: Yes


Hx Coronary Artery Bypass Graft: Yes


Hx Coronary Stent: Yes





- ANESTHESIA


Hx Anesthesia: Yes


Hx Anesthesia Reactions: No


Hx Malignant Hyperthermia: No





Meds


Allergies/Adverse Reactions: 


 Allergies











Allergy/AdvReac Type Severity Reaction Status Date / Time


 


No Known Allergies Allergy   Verified 02/03/16 18:53














- Medications


Medications: 


 Current Medications





Acetaminophen (Tylenol 650mg/20.3ml Solution Ud)  650 mg PO Q6 PRN


   PRN Reason: Headache


   Last Admin: 10/01/17 17:02 Dose:  650 mg


Amlodipine Besylate (Norvasc)  5 mg PO DAILY [facility]


   Last Admin: 10/02/17 08:36 Dose:  5 mg


Artificial Tears (Artificial Tears)  2 drop OU Q4 PRN


   PRN Reason: Dry eyes


   Last Admin: 10/01/17 22:38 Dose:  2 drop


Aspirin (Ecotrin)  81 mg PO DAILY [facility]


   Last Admin: 10/02/17 08:36 Dose:  81 mg


Home Med (Rosuvastatin Calcium [Crestor])  40 mg PO HS [facility]


   Last Admin: 10/01/17 22:38 Dose:  40 mg


Sodium Chloride (Hypertonic Saline 3%)  500 mls @ 30 mls/hr IV .[id number] [facility]


   Stop: 10/02/17 20:21


   Last Admin: 10/01/17 21:54 Dose:  30 mls/hr


Vancomycin HCl 1 gm/ Sodium (Chloride)  250 mls @ 166.667 mls/hr IVPB Q12H [facility]


   Last Admin: 10/02/17 13:18 Dose:  166.667 mls/hr


Insulin Human Regular (Humulin R)  0 units SC ACCU-CHECK [facility]


   PRN Reason: Protocol


   Last Admin: 10/02/17 13:16 Dose:  2 units


Levothyroxine Sodium (Synthroid)  75 mcg PO DAILY@0630 [facility]


   Last Admin: 10/02/17 06:27 Dose:  75 mcg


Metoprolol Succinate (Toprol Xl)  50 mg PO DAILY [facility]


   Last Admin: 10/02/17 08:38 Dose:  50 mg


Pantoprazole Sodium (Protonix Inj)  40 mg IVP DAILY [facility]


   Last Admin: 10/02/17 13:17 Dose:  40 mg


Vitamin B Complex/Vit C/Folic Acid (Nephro-Brianna)  1 tab PO DAILY [facility]











Physical Exam





- Constitutional


Appears: Well, Non-toxic, No Acute Distress





- Extremities Exam


Additional comments: 





Patient wearing offloading boots at time of visit. Dressing to LLE appears clean

/dry/intact with no strikethrough noted.





VASC: DP pulses palpable 2/4 b/l. Right PT pulse palpable 2/4, left PT pulse non

-palpable. TG cool to cool. No edema noted


NEURO: Gross sensation diminished bilaterally.


DERM: Ulceration noted to distolateral aspect of left TMA stump measuring 

approximately 5 x 2 x 0.2 cm with overlying eschar. Mild erythema noted to 

proximal aspect of wound. No active drainage, purulence, malodor, fluctuance, 

or ascending cellulitis noted. (-)probe to bone


ORTHO: Generalized tenderness to palpation bilateral lower extremity.





- Neurological Exam


Neurological exam: Alert





- Psychiatric Exam


Psychiatric exam: Normal Mood





Results





- Vital Signs


Recent Vital Signs: 


 Last Vital Signs











Temp  98 F   10/02/17 16:00


 


Pulse  88   10/02/17 16:00


 


Resp  16   10/02/17 16:00


 


BP  156/70 H  10/02/17 16:00


 


Pulse Ox  99   10/02/17 16:00














- Labs


Result Diagrams: 


 10/02/17 04:25





 10/02/17 15:26


Labs: 


 Laboratory Results - last 24 hr











  10/01/17 10/02/17 10/02/17





  21:55 04:25 04:25


 


WBC   9.9 


 


RBC   4.96 


 


Hgb   12.8 


 


Hct   43.7 


 


MCV   88.1 


 


MCH   25.8 L 


 


MCHC   29.2 L 


 


RDW   21.1 H 


 


Plt Count   262 


 


Sodium    146


 


Potassium    4.6


 


Chloride    100


 


Carbon Dioxide    29


 


Anion Gap    21 H


 


BUN    29 H


 


Creatinine    3.4 H


 


Est GFR ( Amer)    16


 


Est GFR (Non-Af Amer)    13


 


POC Glucose (mg/dL)  171 H  


 


Random Glucose    154 H


 


Serum Osmolality   


 


Calcium    9.3


 


Total Bilirubin    0.5


 


AST    43 H


 


ALT    29


 


Alkaline Phosphatase    87


 


Total Protein    7.4


 


Albumin    3.5


 


Globulin    3.9


 


Albumin/Globulin Ratio    0.9 L














  10/02/17 10/02/17 10/02/17





  05:41 07:30 11:05


 


WBC   


 


RBC   


 


Hgb   


 


Hct   


 


MCV   


 


MCH   


 


MCHC   


 


RDW   


 


Plt Count   


 


Sodium   


 


Potassium   


 


Chloride   


 


Carbon Dioxide   


 


Anion Gap   


 


BUN   


 


Creatinine   


 


Est GFR ( Amer)   


 


Est GFR (Non-Af Amer)   


 


POC Glucose (mg/dL)  170 H   241 H


 


Random Glucose   


 


Serum Osmolality   313 H 


 


Calcium   


 


Total Bilirubin   


 


AST   


 


ALT   


 


Alkaline Phosphatase   


 


Total Protein   


 


Albumin   


 


Globulin   


 


Albumin/Globulin Ratio   














  10/02/17 10/02/17 10/02/17





  15:26 15:26 16:27


 


WBC   


 


RBC   


 


Hgb   


 


Hct   


 


MCV   


 


MCH   


 


MCHC   


 


RDW   


 


Plt Count   


 


Sodium  148  


 


Potassium  4.5  


 


Chloride  104  


 


Carbon Dioxide  28  


 


Anion Gap  20  


 


BUN  32 H  


 


Creatinine  3.7 H  


 


Est GFR ( Amer)  14  


 


Est GFR (Non-Af Amer)  12  


 


POC Glucose (mg/dL)    192 H


 


Random Glucose  203 H  


 


Serum Osmolality   312 H 


 


Calcium  9.2  


 


Total Bilirubin   


 


AST   


 


ALT   


 


Alkaline Phosphatase   


 


Total Protein   


 


Albumin   


 


Globulin   


 


Albumin/Globulin Ratio   














Assessment & Plan





- Assessment and Plan (Free Text)


Assessment: 





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


Plan: 





Patient seen and evaluated at bedside


Discussed with attending, Dr. Serna


Chart, vitals, labs reviewed = afebrile, WBC 9.9


Wound was dressed with DSD


C/w pain mgmt per medicine


Continue with offloading prevalon boots to both feet; to be worn at all times 

while in bed


Stable per podiatry


Podiatry will continue to follow patient while in house

## 2017-10-02 NOTE — CARD
--------------- APPROVED REPORT --------------





EKG Measurement

Heart [id number]

MA [id number]

[id number]

[id number]

[id number]



<Conclusion>

Normal sinus rhythm with sinus arrhythmia

Inferior infarct, age undetermined

Abnormal ECG

## 2017-10-02 NOTE — CP.CCUPN
CCU Subjective





- Physician Review


Events Since Last Encounter (Free Text): 





10/02/17 14:30


The patient was Seen/interviewed and examined by me at the bedside during ICU 

round, 


Medical records reviewed and Management issues were discussed and formulated 

with the house staff.


Events reviewed


Patient awake, oriented, follow commands, no new neuro deficits.


Comfortable, no distress.


BP still amara home medications being resumed





CCU Objective





- Vital Signs / Intake & Output


Vital Signs (Last 4 hours): 


Vital Signs











  Pulse Resp BP Pulse Ox


 


 10/02/17 10:00  81  18  198/79 H  93 L


 


 10/02/17 08:38  97 H   201/91 H 


 


 10/02/17 08:36  97 H   201/91 H 











Intake and Output (Last 8hrs): 


 Intake & Output











 10/01/17 10/02/17 10/02/17





 22:59 06:59 14:59


 


Intake Total 250 600 100


 


Balance 250 600 100


 


Weight  141 lb 8 oz 


 


Intake:   


 


  IV 10 240 


 


  Intake, Piggyback  250 


 


  Oral 240 110 100


 


Other:   


 


  # Voids   


 


    Urine, Voided  1 


 


  # Bowel Movements  1 














- Physical Exam


Head: Positive for: Atraumatic


Pupils: Positive for: Other


Extroacular Muscles: Positive for: EOMI (L pupil opacified, R pupil 3 mm and 

reactive.)


Conjunctiva: Positive for: Normal


Mouth: Positive for: Moist Mucous Membranes


Nose (External): Positive for: Atraumatic


Neck: Positive for: Normal Range of Motion.  Negative for: JVD, Lymphadenopathy


Respiratory/Chest: Positive for: Decreased Breath Sounds.  Negative for: 

Accessory Muscle Use, Rales, Rhonchi


Cardiovascular: Positive for: Regular Rate and Rhythm.  Negative for: Murmurs, 

Rub


Abdomen: Positive for: Normal Bowel Sounds.  Negative for: Tenderness, 

Distention


Upper Extremity: Positive for: Other (RUE AV Shunt with good bruit and thrill)


Lower Extremity: Positive for: Edema (trace), Deformity (Left foot TMA).  

Negative for: CALF TENDERNESS


Neurological: Positive for: Motor Func Grossly Intact


Skin: Positive for: Warm, Normal Color.  Negative for: Rashes, Abscess


Psychiatric: Positive for: Alert





- Medications


Active Medications: 


Active Medications











Generic Name Dose Route Start Last Admin





  Trade Name Freq  PRN Reason Stop Dose Admin


 


Acetaminophen  650 mg  09/30/17 21:44  10/01/17 17:02





  Tylenol 650mg/20.3ml Solution Ud  PO   650 mg





  Q6 PRN   Administration





  Headache   


 


Amlodipine Besylate  5 mg  10/02/17 09:00  10/02/17 08:36





  Norvasc  PO   5 mg





  DAILY BROCK   Administration


 


Artificial Tears  2 drop  10/01/17 12:52  10/01/17 22:38





  Artificial Tears  OU   2 drop





  Q4 PRN   Administration





  Dry eyes   


 


Aspirin  81 mg  10/01/17 09:00  10/02/17 08:36





  Ecotrin  PO   81 mg





  DAILY BROCK   Administration


 


Home Med  40 mg  10/01/17 22:00  10/01/17 22:38





  Rosuvastatin Calcium [Crestor]  PO   40 mg





  HS BROCK   Administration


 


Sodium Chloride  500 mls @ 30 mls/hr  10/01/17 20:30  10/01/17 21:54





  Hypertonic Saline 3%  IV  10/02/17 20:21  30 mls/hr





  [id number] BROCK   Administration


 


Vancomycin HCl 1 gm/ Sodium  250 mls @ 166.667 mls/hr  10/01/17 23:15  10/01/17 

23:48





  Chloride  IVPB   166.667 mls/hr





  Q12H BROCK   Administration


 


Insulin Human Regular  0 units  09/30/17 23:00  10/02/17 06:27





  Humulin R  SC   1 units





  ACCU-CHECK BROCK   Administration





  Protocol   


 


Levothyroxine Sodium  75 mcg  10/02/17 06:30  10/02/17 06:27





  Synthroid  PO   75 mcg





  DAILY@0630 BROCK   Administration


 


Metoprolol Succinate  50 mg  10/01/17 09:00  10/02/17 08:38





  Toprol Xl  PO   50 mg





  DAILY BROCK   Administration


 


Pantoprazole Sodium  40 mg  10/01/17 09:00  10/01/17 08:34





  Protonix Inj  IVP   40 mg





  DAILY BROCK   Administration


 


Vitamin B Complex/Vit C/Folic Acid  1 tab  10/01/17 09:00  





  Nephro-Brianna  PO   





  DAILY BROCK   














- Patient Studies


Lab Studies: 


 Microbiology Studies











 09/30/17 16:00 S.aureus & Coag-Neg Staph PNA FISH - Final





 Blood-Venous Blood Culture - Preliminary





    Coagulase Neg Staphylococcus





 Gram Stain - Final


 


 09/30/17 18:00 Blood Culture - Preliminary





 Blood-Venous    NO GROWTH AFTER 24 HOURS








 Lab Studies











  10/02/17 10/02/17 10/02/17 Range/Units





  11:05 07:30 05:41 


 


WBC     (4.8-10.8)  K/uL


 


RBC     (3.80-5.20)  Mil/uL


 


Hgb     (12.0-16.0)  g/dL


 


Hct     (34.0-47.0)  %


 


MCV     (81.0-99.0)  fl


 


MCH     (27.0-31.0)  pg


 


MCHC     (33.0-37.0)  g/dL


 


RDW     (11.5-14.5)  %


 


Plt Count     (130-400)  K/uL


 


Sodium     (132-148)  mmol/l


 


Potassium     (3.6-5.0)  MMOL/L


 


Chloride     ()  mmol/L


 


Carbon Dioxide     (22-30)  mmol/L


 


Anion Gap     (10-20)  


 


BUN     (7-17)  mg/dl


 


Creatinine     (0.7-1.2)  mg/dL


 


Est GFR (African Amer)     


 


Est GFR (Non-Af Amer)     


 


POC Glucose (mg/dL)  241 H   170 H  ()  mg/dL


 


Random Glucose     ()  mg/dL


 


Serum Osmolality   313 H   (272-300)  mosm/kg


 


Calcium     (8.4-10.2)  mg/dL


 


Total Bilirubin     (0.2-1.3)  mg/dl


 


AST     (14-36)  U/L


 


ALT     (9-52)  U/L


 


Alkaline Phosphatase     ()  U/L


 


Troponin I     (0.00-0.120)  ng/mL


 


Total Protein     (6.3-8.2)  G/DL


 


Albumin     (3.5-5.0)  g/dL


 


Globulin     (2.2-3.9)  gm/dL


 


Albumin/Globulin Ratio     (1.0-2.1)  














  10/02/17 10/02/17 10/01/17 Range/Units





  04:25 04:25 21:55 


 


WBC   9.9   (4.8-10.8)  K/uL


 


RBC   4.96   (3.80-5.20)  Mil/uL


 


Hgb   12.8   (12.0-16.0)  g/dL


 


Hct   43.7   (34.0-47.0)  %


 


MCV   88.1   (81.0-99.0)  fl


 


MCH   25.8 L   (27.0-31.0)  pg


 


MCHC   29.2 L   (33.0-37.0)  g/dL


 


RDW   21.1 H   (11.5-14.5)  %


 


Plt Count   262   (130-400)  K/uL


 


Sodium  146    (132-148)  mmol/l


 


Potassium  4.6    (3.6-5.0)  MMOL/L


 


Chloride  100    ()  mmol/L


 


Carbon Dioxide  29    (22-30)  mmol/L


 


Anion Gap  21 H    (10-20)  


 


BUN  29 H    (7-17)  mg/dl


 


Creatinine  3.4 H    (0.7-1.2)  mg/dL


 


Est GFR ( Amer)  16    


 


Est GFR (Non-Af Amer)  13    


 


POC Glucose (mg/dL)    171 H  ()  mg/dL


 


Random Glucose  154 H    ()  mg/dL


 


Serum Osmolality     (272-300)  mosm/kg


 


Calcium  9.3    (8.4-10.2)  mg/dL


 


Total Bilirubin  0.5    (0.2-1.3)  mg/dl


 


AST  43 H    (14-36)  U/L


 


ALT  29    (9-52)  U/L


 


Alkaline Phosphatase  87    ()  U/L


 


Troponin I     (0.00-0.120)  ng/mL


 


Total Protein  7.4    (6.3-8.2)  G/DL


 


Albumin  3.5    (3.5-5.0)  g/dL


 


Globulin  3.9    (2.2-3.9)  gm/dL


 


Albumin/Globulin Ratio  0.9 L    (1.0-2.1)  














  10/01/17 10/01/17 Range/Units





  16:55 15:00 


 


WBC    (4.8-10.8)  K/uL


 


RBC    (3.80-5.20)  Mil/uL


 


Hgb    (12.0-16.0)  g/dL


 


Hct    (34.0-47.0)  %


 


MCV    (81.0-99.0)  fl


 


MCH    (27.0-31.0)  pg


 


MCHC    (33.0-37.0)  g/dL


 


RDW    (11.5-14.5)  %


 


Plt Count    (130-400)  K/uL


 


Sodium   143  (132-148)  mmol/l


 


Potassium   4.9  (3.6-5.0)  MMOL/L


 


Chloride   99  ()  mmol/L


 


Carbon Dioxide   31 H  (22-30)  mmol/L


 


Anion Gap   18  (10-20)  


 


BUN   25 H  (7-17)  mg/dl


 


Creatinine   3.0 H  (0.7-1.2)  mg/dL


 


Est GFR ( Amer)   18  


 


Est GFR (Non-Af Amer)   15  


 


POC Glucose (mg/dL)  187 H   ()  mg/dL


 


Random Glucose   226 H  ()  mg/dL


 


Serum Osmolality    (272-300)  mosm/kg


 


Calcium   9.2  (8.4-10.2)  mg/dL


 


Total Bilirubin    (0.2-1.3)  mg/dl


 


AST    (14-36)  U/L


 


ALT    (9-52)  U/L


 


Alkaline Phosphatase    ()  U/L


 


Troponin I   0.6080 H*  (0.00-0.120)  ng/mL


 


Total Protein    (6.3-8.2)  G/DL


 


Albumin    (3.5-5.0)  g/dL


 


Globulin    (2.2-3.9)  gm/dL


 


Albumin/Globulin Ratio    (1.0-2.1)  








 Laboratory Results - last 24 hr











  10/01/17 10/01/17 10/01/17





  15:00 16:55 21:55


 


WBC   


 


RBC   


 


Hgb   


 


Hct   


 


MCV   


 


MCH   


 


MCHC   


 


RDW   


 


Plt Count   


 


Sodium  143  


 


Potassium  4.9  


 


Chloride  99  


 


Carbon Dioxide  31 H  


 


Anion Gap  18  


 


BUN  25 H  


 


Creatinine  3.0 H  


 


Est GFR ( Amer)  18  


 


Est GFR (Non-Af Amer)  15  


 


POC Glucose (mg/dL)   187 H  171 H


 


Random Glucose  226 H  


 


Serum Osmolality   


 


Calcium  9.2  


 


Total Bilirubin   


 


AST   


 


ALT   


 


Alkaline Phosphatase   


 


Troponin I  0.6080 H*  


 


Total Protein   


 


Albumin   


 


Globulin   


 


Albumin/Globulin Ratio   














  10/02/17 10/02/17 10/02/17





  04:25 04:25 05:41


 


WBC  9.9  


 


RBC  4.96  


 


Hgb  12.8  


 


Hct  43.7  


 


MCV  88.1  


 


MCH  25.8 L  


 


MCHC  29.2 L  


 


RDW  21.1 H  


 


Plt Count  262  


 


Sodium   146 


 


Potassium   4.6 


 


Chloride   100 


 


Carbon Dioxide   29 


 


Anion Gap   21 H 


 


BUN   29 H 


 


Creatinine   3.4 H 


 


Est GFR ( Amer)   16 


 


Est GFR (Non-Af Amer)   13 


 


POC Glucose (mg/dL)    170 H


 


Random Glucose   154 H 


 


Serum Osmolality   


 


Calcium   9.3 


 


Total Bilirubin   0.5 


 


AST   43 H 


 


ALT   29 


 


Alkaline Phosphatase   87 


 


Troponin I   


 


Total Protein   7.4 


 


Albumin   3.5 


 


Globulin   3.9 


 


Albumin/Globulin Ratio   0.9 L 














  10/02/17 10/02/17





  07:30 11:05


 


WBC  


 


RBC  


 


Hgb  


 


Hct  


 


MCV  


 


MCH  


 


MCHC  


 


RDW  


 


Plt Count  


 


Sodium  


 


Potassium  


 


Chloride  


 


Carbon Dioxide  


 


Anion Gap  


 


BUN  


 


Creatinine  


 


Est GFR ( Amer)  


 


Est GFR (Non-Af Amer)  


 


POC Glucose (mg/dL)   241 H


 


Random Glucose  


 


Serum Osmolality  313 H 


 


Calcium  


 


Total Bilirubin  


 


AST  


 


ALT  


 


Alkaline Phosphatase  


 


Troponin I  


 


Total Protein  


 


Albumin  


 


Globulin  


 


Albumin/Globulin Ratio  











Fingerstick Blood Sugar Results: 170





Critical Care Progress Note





- Nutrition


Nutrition: 


 Nutrition











 Category Date Time Status


 


 Dysphagia/Modified Consistency Diet [DIET] Diets  10/01/17 Dinner Active














Assessment/Plan


(1) Hypertensive emergency


Current Visit: Yes   Status: Acute   





(2) Cerebellar infarct


Current Visit: Yes   Status: Acute   Priority: High   





(3) Elevated troponin


Current Visit: Yes   Status: Acute   





(4) ESRD on hemodialysis


Current Visit: No   Status: Acute   Priority: High   





(5) Fluid overload


Current Visit: No   Status: Acute   





(6) Prophylactic measure


Current Visit: No   Status: Acute   Comment: SCDs/ plavix


will dose coumadin pending INR


Pepcid 20mg po daily   





- Assessment and Plan (Free Text)


Assessment: 








Continue current meds, reviewed


Continue Neuro check


3% hypertonic solution @30 ml/H


IV Vanco, follow up final Blood C/S results


Monitor serum osmolality and sodium level 


BP and tight sugar control


BD Nebs


Neurology and Renal evaluation appreciated


HOB Elevation


ECHO


Supplemental Oxygen


Pulmonary toilets


GI/DVT PPX


Code status: DNR/DNI

## 2017-10-02 NOTE — CP.PCM.CON
History of Present Illness





- History of Present Illness


History of Present Illness: 





Infectious Disease Consult Note-





ID consult request for positive blood cx.





HPI-


History obtained mostly from the medical chart and the nurse.


Pt. is a 77 year old female with pmh of A.fib, ESRD on HD, CAD, CHF, DM II, s/p 

TMA who was sent from the HD center to ED for evaluation of nausea and was 

found to have acute/subacute R cerebellar Infarct with mass effect and was seen 

by neurosurgery adn was deemed not a surgical candidate .


I'm asked to see the patient for coag neg staph bacteremia.


pt. known to me from one of her previous admission s in June 2017 and at that 

time she had left TMA site infection and OM and she was treated with IV vanco 

and meropenem for 6 weeks fort he OM and also she has had chest wall skin 

lesion with bleeding which as per her family was chronic and Bx of this was 

done on the admission from 6/2017 which showed basal cell carcinoma as per path 

report.


currently pt. is awake and in NAD in ICU but does not answer questions.





Allergies: NKDA 





PMSFH:  Anemia, Arthritis, Atrial Fibrillation, CAD, CHF, DM II,  Gall Bladder 

Disease with Breanna, HTN, Hypercholesterolemia, Kidney Stones, Pneumonia, End 

Stage Renal Disease (TTF)


All Nursing and physician documentation reviewed to date; no new pertinent info 

noted relevant to current medical problems.





Review of Systems





- Review of Systems


Review of Systems: 





ROS-


pt. does not really answer any questions, she is awake end occasionally moans 

but unable to obtain ROS as she does not answer the questions.





Past Patient History





- Infectious Disease


Hx of Infectious Diseases: None





- Past Medical History & Family History


Past Medical History?: Yes





- Past Social History


Smoking Status: Never Smoked





- CARDIAC


Hx Atrial Fibrillation: Yes


Hx Congestive Heart Failure: Yes


Hx Hypercholesterolemia: Yes


Hx Hypertension: Yes


Hx Pacemaker: Yes





- PULMONARY


Hx Pneumonia: Yes





- NEUROLOGICAL


Hx Neurological Disorder: Yes





- HEENT


Hx Cataracts: Yes





- RENAL


Hx Chronic Kidney Disease: Yes


Hx Kidney Stones: Yes





- ENDOCRINE/METABOLIC


Hx Hyperthyroidism: Yes


Hx Hypothyroidism: Yes





- INTEGUMENTARY


Hx Dermatological Problems: No





- MUSCULOSKELETAL/RHEUMATOLOGICAL


Hx Arthritis: Yes





- GASTROINTESTINAL


Hx Gall Bladder Disease: Yes





- GENITOURINARY/GYNECOLOGICAL


Hx Genitourinary Disorders: No





- PSYCHIATRIC


Hx Substance Use: No





- SURGICAL HISTORY


Hx Cholecystectomy: Yes


Hx Coronary Artery Bypass Graft: Yes


Hx Coronary Stent: Yes





- ANESTHESIA


Hx Anesthesia: Yes


Hx Anesthesia Reactions: No


Hx Malignant Hyperthermia: No





Meds


Allergies/Adverse Reactions: 


 Allergies











Allergy/AdvReac Type Severity Reaction Status Date / Time


 


No Known Allergies Allergy   Verified 02/03/16 18:53














- Medications


Medications: 


 Current Medications





Acetaminophen (Tylenol 650mg/20.3ml Solution Ud)  650 mg PO Q6 PRN


   PRN Reason: Headache


   Last Admin: 10/01/17 17:02 Dose:  650 mg


Amlodipine Besylate (Norvasc)  5 mg PO DAILY [facility]


   Last Admin: 10/02/17 08:36 Dose:  5 mg


Artificial Tears (Artificial Tears)  2 drop OU Q4 PRN


   PRN Reason: Dry eyes


   Last Admin: 10/01/17 22:38 Dose:  2 drop


Aspirin (Ecotrin)  81 mg PO DAILY [facility]


   Last Admin: 10/02/17 08:36 Dose:  81 mg


Home Med (Rosuvastatin Calcium [Crestor])  40 mg PO HS [facility]


   Last Admin: 10/01/17 22:38 Dose:  40 mg


Sodium Chloride (Hypertonic Saline 3%)  500 mls @ 30 mls/hr IV .[id number] [facility]


   Stop: 10/02/17 20:21


   Last Admin: 10/02/17 18:16 Dose:  30 mls/hr


Vancomycin HCl 1 gm/ Sodium (Chloride)  250 mls @ 166.667 mls/hr IVPB Q12H [facility]


   Last Admin: 10/02/17 13:18 Dose:  166.667 mls/hr


Insulin Human Regular (Humulin R)  0 units SC ACCU-CHECK [facility]


   PRN Reason: Protocol


   Last Admin: 10/02/17 18:15 Dose:  2 units


Levothyroxine Sodium (Synthroid)  75 mcg PO DAILY@0630 [facility]


   Last Admin: 10/02/17 06:27 Dose:  75 mcg


Metoprolol Succinate (Toprol Xl)  50 mg PO DAILY [facility]


   Last Admin: 10/02/17 08:38 Dose:  50 mg


Pantoprazole Sodium (Protonix Inj)  40 mg IVP DAILY [facility]


   Last Admin: 10/02/17 13:17 Dose:  40 mg


Vitamin B Complex/Vit C/Folic Acid (Nephro-Brianna)  1 tab PO DAILY [facility]











Physical Exam





- Constitutional


Appears: No Acute Distress, Chronically Ill





- Eye Exam


Eye Exam: EOMI





- ENT Exam


ENT Exam: Normal Oropharynx





- Neck Exam


Neck exam: Positive for: Full Rom





- Respiratory Exam


Respiratory Exam: NORMAL BREATHING PATTERN


Additional comments: 





slightly decreased breaths ounds bibasilar


no wheezing





- Cardiovascular Exam


Cardiovascular Exam: RRR, +S1, +S2


Additional comments: 





mid-sternal round lesion with scant sanguinous discharge and raised borders


no pus





- GI/Abdominal Exam


GI & Abdominal Exam: Normal Bowel Sounds, Soft


Additional comments: 





NT, ND





- Extremities Exam


Additional comments: 





left TMA site with round dry ulcer at the lateral site, no discharge, dry, no 

malodor, no erythema





- Neurological Exam


Additional comments: 





awake 





- Skin


Additional comments: 





sacral decub stage 2, no pus, no malodor





Results





- Vital Signs


Recent Vital Signs: 


 Last Vital Signs











Temp  98 F   10/02/17 16:00


 


Pulse  98 H  10/02/17 18:00


 


Resp  18   10/02/17 18:00


 


BP  181/55 H  10/02/17 18:00


 


Pulse Ox  99   10/02/17 18:00














- Labs


Result Diagrams: 


 10/02/17 04:25





 10/02/17 15:26


Labs: 


 Laboratory Results - last 24 hr











  10/01/17 10/02/17 10/02/17





  21:55 04:25 04:25


 


WBC   9.9 


 


RBC   4.96 


 


Hgb   12.8 


 


Hct   43.7 


 


MCV   88.1 


 


MCH   25.8 L 


 


MCHC   29.2 L 


 


RDW   21.1 H 


 


Plt Count   262 


 


Sodium    146


 


Potassium    4.6


 


Chloride    100


 


Carbon Dioxide    29


 


Anion Gap    21 H


 


BUN    29 H


 


Creatinine    3.4 H


 


Est GFR ( Amer)    16


 


Est GFR (Non-Af Amer)    13


 


POC Glucose (mg/dL)  171 H  


 


Random Glucose    154 H


 


Serum Osmolality   


 


Calcium    9.3


 


Total Bilirubin    0.5


 


AST    43 H


 


ALT    29


 


Alkaline Phosphatase    87


 


Total Protein    7.4


 


Albumin    3.5


 


Globulin    3.9


 


Albumin/Globulin Ratio    0.9 L














  10/02/17 10/02/17 10/02/17





  05:41 07:30 11:05


 


WBC   


 


RBC   


 


Hgb   


 


Hct   


 


MCV   


 


MCH   


 


MCHC   


 


RDW   


 


Plt Count   


 


Sodium   


 


Potassium   


 


Chloride   


 


Carbon Dioxide   


 


Anion Gap   


 


BUN   


 


Creatinine   


 


Est GFR ( Amer)   


 


Est GFR (Non-Af Amer)   


 


POC Glucose (mg/dL)  170 H   241 H


 


Random Glucose   


 


Serum Osmolality   313 H 


 


Calcium   


 


Total Bilirubin   


 


AST   


 


ALT   


 


Alkaline Phosphatase   


 


Total Protein   


 


Albumin   


 


Globulin   


 


Albumin/Globulin Ratio   














  10/02/17 10/02/17 10/02/17





  15:26 15:26 16:27


 


WBC   


 


RBC   


 


Hgb   


 


Hct   


 


MCV   


 


MCH   


 


MCHC   


 


RDW   


 


Plt Count   


 


Sodium  148  


 


Potassium  4.5  


 


Chloride  104  


 


Carbon Dioxide  28  


 


Anion Gap  20  


 


BUN  32 H  


 


Creatinine  3.7 H  


 


Est GFR ( Amer)  14  


 


Est GFR (Non-Af Amer)  12  


 


POC Glucose (mg/dL)    192 H


 


Random Glucose  203 H  


 


Serum Osmolality   312 H 


 


Calcium  9.2  


 


Total Bilirubin   


 


AST   


 


ALT   


 


Alkaline Phosphatase   


 


Total Protein   


 


Albumin   


 


Globulin   


 


Albumin/Globulin Ratio   








 Laboratory Results - last 72 hr











  09/30/17 09/30/17 09/30/17





  16:36 16:44 16:44


 


WBC   11.6 H 


 


RBC   4.97 


 


Hgb   12.8  D 


 


Hct   43.0 


 


MCV   86.5  D 


 


MCH   25.8 L 


 


MCHC   29.8 L 


 


RDW   21.2 H 


 


Plt Count   305 


 


MPV   8.6 


 


Neut % (Auto)   87.6 H 


 


Lymph % (Auto)   3.9 L 


 


Mono % (Auto)   7.4 


 


Eos % (Auto)   0.2 


 


Baso % (Auto)   0.9 


 


Neut #   10.1 H 


 


Lymph #   0.5 L 


 


Mono #   0.9 H 


 


Eos #   0.0 


 


Baso #   0.1 


 


Neutrophils % (Manual)   90 H 


 


Lymphocytes % (Manual)   6 L 


 


Monocytes % (Manual)   4 


 


Platelet Estimate   Normal 


 


Polychromasia   Slight 


 


Hypochromasia (manual)   Slight 


 


Anisocytosis (manual)   Slight 


 


Target Cells   Slight 


 


PT   


 


INR   


 


APTT   


 


Sodium    140


 


Potassium    3.7


 


Chloride    93 L


 


Carbon Dioxide    28


 


Anion Gap    23 H


 


BUN    16


 


Creatinine    2.0 H


 


Est GFR ( Amer)    29


 


Est GFR (Non-Af Amer)    24


 


POC Glucose (mg/dL)  177 H  


 


Random Glucose    198 H


 


Serum Osmolality   


 


Calcium    9.6


 


Phosphorus    2.4 L


 


Magnesium    2.1


 


Total Bilirubin    0.7


 


AST    41 H


 


ALT    29


 


Alkaline Phosphatase    107


 


Troponin I    0.7490 H*


 


Total Protein    8.0


 


Albumin    3.8


 


Globulin    4.2 H


 


Albumin/Globulin Ratio    0.9 L


 


Triglycerides   


 


Cholesterol   


 


LDL Cholesterol Direct   


 


HDL Cholesterol   


 


Lipase    61














  09/30/17 09/30/17 10/01/17





  16:44 22:38 05:30


 


WBC    7.5


 


RBC    5.01


 


Hgb    13.0


 


Hct    44.0


 


MCV    87.9


 


MCH    26.0 L


 


MCHC    29.6 L


 


RDW    21.0 H


 


Plt Count    288


 


MPV    8.6


 


Neut % (Auto)    77.5 H


 


Lymph % (Auto)    9.2 L


 


Mono % (Auto)    12.2 H


 


Eos % (Auto)    0.3


 


Baso % (Auto)    0.8


 


Neut #    5.8


 


Lymph #    0.7 L


 


Mono #    0.9 H


 


Eos #    0.0


 


Baso #    0.1


 


Neutrophils % (Manual)   


 


Lymphocytes % (Manual)   


 


Monocytes % (Manual)   


 


Platelet Estimate   


 


Polychromasia   


 


Hypochromasia (manual)   


 


Anisocytosis (manual)   


 


Target Cells   


 


PT  13.7 H  


 


INR  1.3 H  


 


APTT  27.9  


 


Sodium   


 


Potassium   


 


Chloride   


 


Carbon Dioxide   


 


Anion Gap   


 


BUN   


 


Creatinine   


 


Est GFR ( Amer)   


 


Est GFR (Non-Af Amer)   


 


POC Glucose (mg/dL)   190 H 


 


Random Glucose   


 


Serum Osmolality   


 


Calcium   


 


Phosphorus   


 


Magnesium   


 


Total Bilirubin   


 


AST   


 


ALT   


 


Alkaline Phosphatase   


 


Troponin I   


 


Total Protein   


 


Albumin   


 


Globulin   


 


Albumin/Globulin Ratio   


 


Triglycerides   


 


Cholesterol   


 


LDL Cholesterol Direct   


 


HDL Cholesterol   


 


Lipase   














  10/01/17 10/01/17 10/01/17





  05:30 05:30 05:53


 


WBC   


 


RBC   


 


Hgb   


 


Hct   


 


MCV   


 


MCH   


 


MCHC   


 


RDW   


 


Plt Count   


 


MPV   


 


Neut % (Auto)   


 


Lymph % (Auto)   


 


Mono % (Auto)   


 


Eos % (Auto)   


 


Baso % (Auto)   


 


Neut #   


 


Lymph #   


 


Mono #   


 


Eos #   


 


Baso #   


 


Neutrophils % (Manual)   


 


Lymphocytes % (Manual)   


 


Monocytes % (Manual)   


 


Platelet Estimate   


 


Polychromasia   


 


Hypochromasia (manual)   


 


Anisocytosis (manual)   


 


Target Cells   


 


PT   


 


INR   


 


APTT   


 


Sodium  145  


 


Potassium  3.7  


 


Chloride  97 L  


 


Carbon Dioxide  33 H  


 


Anion Gap  19  


 


BUN  21 H  


 


Creatinine  2.8 H  


 


Est GFR ( Amer)  20  


 


Est GFR (Non-Af Amer)  16  


 


POC Glucose (mg/dL)    121 H


 


Random Glucose  135 H  


 


Serum Osmolality   308 H 


 


Calcium  9.5  


 


Phosphorus   


 


Magnesium   


 


Total Bilirubin  0.5  


 


AST  36  


 


ALT  27  


 


Alkaline Phosphatase  94  


 


Troponin I  0.7220 H*  


 


Total Protein  7.6  


 


Albumin  3.7  


 


Globulin  4.0 H  


 


Albumin/Globulin Ratio  0.9 L  


 


Triglycerides   


 


Cholesterol   


 


LDL Cholesterol Direct   


 


HDL Cholesterol   


 


Lipase   














  10/01/17 10/01/17 10/01/17





  07:22 11:16 15:00


 


WBC   


 


RBC   


 


Hgb   


 


Hct   


 


MCV   


 


MCH   


 


MCHC   


 


RDW   


 


Plt Count   


 


MPV   


 


Neut % (Auto)   


 


Lymph % (Auto)   


 


Mono % (Auto)   


 


Eos % (Auto)   


 


Baso % (Auto)   


 


Neut #   


 


Lymph #   


 


Mono #   


 


Eos #   


 


Baso #   


 


Neutrophils % (Manual)   


 


Lymphocytes % (Manual)   


 


Monocytes % (Manual)   


 


Platelet Estimate   


 


Polychromasia   


 


Hypochromasia (manual)   


 


Anisocytosis (manual)   


 


Target Cells   


 


PT   


 


INR   


 


APTT   


 


Sodium    143


 


Potassium    4.9


 


Chloride    99


 


Carbon Dioxide    31 H


 


Anion Gap    18


 


BUN    25 H


 


Creatinine    3.0 H


 


Est GFR ( Amer)    18


 


Est GFR (Non-Af Amer)    15


 


POC Glucose (mg/dL)   206 H 


 


Random Glucose    226 H


 


Serum Osmolality   


 


Calcium    9.2


 


Phosphorus   


 


Magnesium   


 


Total Bilirubin   


 


AST   


 


ALT   


 


Alkaline Phosphatase   


 


Troponin I    0.6080 H*


 


Total Protein   


 


Albumin   


 


Globulin   


 


Albumin/Globulin Ratio   


 


Triglycerides  113  


 


Cholesterol  106  


 


LDL Cholesterol Direct  < 30  


 


HDL Cholesterol  43  


 


Lipase   














  10/01/17 10/01/17 10/02/17





  16:55 21:55 04:25


 


WBC    9.9


 


RBC    4.96


 


Hgb    12.8


 


Hct    43.7


 


MCV    88.1


 


MCH    25.8 L


 


MCHC    29.2 L


 


RDW    21.1 H


 


Plt Count    262


 


MPV   


 


Neut % (Auto)   


 


Lymph % (Auto)   


 


Mono % (Auto)   


 


Eos % (Auto)   


 


Baso % (Auto)   


 


Neut #   


 


Lymph #   


 


Mono #   


 


Eos #   


 


Baso #   


 


Neutrophils % (Manual)   


 


Lymphocytes % (Manual)   


 


Monocytes % (Manual)   


 


Platelet Estimate   


 


Polychromasia   


 


Hypochromasia (manual)   


 


Anisocytosis (manual)   


 


Target Cells   


 


PT   


 


INR   


 


APTT   


 


Sodium   


 


Potassium   


 


Chloride   


 


Carbon Dioxide   


 


Anion Gap   


 


BUN   


 


Creatinine   


 


Est GFR ( Amer)   


 


Est GFR (Non-Af Amer)   


 


POC Glucose (mg/dL)  187 H  171 H 


 


Random Glucose   


 


Serum Osmolality   


 


Calcium   


 


Phosphorus   


 


Magnesium   


 


Total Bilirubin   


 


AST   


 


ALT   


 


Alkaline Phosphatase   


 


Troponin I   


 


Total Protein   


 


Albumin   


 


Globulin   


 


Albumin/Globulin Ratio   


 


Triglycerides   


 


Cholesterol   


 


LDL Cholesterol Direct   


 


HDL Cholesterol   


 


Lipase   














  10/02/17 10/02/17 10/02/17





  04:25 05:41 07:30


 


WBC   


 


RBC   


 


Hgb   


 


Hct   


 


MCV   


 


MCH   


 


MCHC   


 


RDW   


 


Plt Count   


 


MPV   


 


Neut % (Auto)   


 


Lymph % (Auto)   


 


Mono % (Auto)   


 


Eos % (Auto)   


 


Baso % (Auto)   


 


Neut #   


 


Lymph #   


 


Mono #   


 


Eos #   


 


Baso #   


 


Neutrophils % (Manual)   


 


Lymphocytes % (Manual)   


 


Monocytes % (Manual)   


 


Platelet Estimate   


 


Polychromasia   


 


Hypochromasia (manual)   


 


Anisocytosis (manual)   


 


Target Cells   


 


PT   


 


INR   


 


APTT   


 


Sodium  146  


 


Potassium  4.6  


 


Chloride  100  


 


Carbon Dioxide  29  


 


Anion Gap  21 H  


 


BUN  29 H  


 


Creatinine  3.4 H  


 


Est GFR ( Amer)  16  


 


Est GFR (Non-Af Amer)  13  


 


POC Glucose (mg/dL)   170 H 


 


Random Glucose  154 H  


 


Serum Osmolality    313 H


 


Calcium  9.3  


 


Phosphorus   


 


Magnesium   


 


Total Bilirubin  0.5  


 


AST  43 H  


 


ALT  29  


 


Alkaline Phosphatase  87  


 


Troponin I   


 


Total Protein  7.4  


 


Albumin  3.5  


 


Globulin  3.9  


 


Albumin/Globulin Ratio  0.9 L  


 


Triglycerides   


 


Cholesterol   


 


LDL Cholesterol Direct   


 


HDL Cholesterol   


 


Lipase   














  10/02/17 10/02/17 10/02/17





  11:05 15:26 15:26


 


WBC   


 


RBC   


 


Hgb   


 


Hct   


 


MCV   


 


MCH   


 


MCHC   


 


RDW   


 


Plt Count   


 


MPV   


 


Neut % (Auto)   


 


Lymph % (Auto)   


 


Mono % (Auto)   


 


Eos % (Auto)   


 


Baso % (Auto)   


 


Neut #   


 


Lymph #   


 


Mono #   


 


Eos #   


 


Baso #   


 


Neutrophils % (Manual)   


 


Lymphocytes % (Manual)   


 


Monocytes % (Manual)   


 


Platelet Estimate   


 


Polychromasia   


 


Hypochromasia (manual)   


 


Anisocytosis (manual)   


 


Target Cells   


 


PT   


 


INR   


 


APTT   


 


Sodium   148 


 


Potassium   4.5 


 


Chloride   104 


 


Carbon Dioxide   28 


 


Anion Gap   20 


 


BUN   32 H 


 


Creatinine   3.7 H 


 


Est GFR ( Amer)   14 


 


Est GFR (Non-Af Amer)   12 


 


POC Glucose (mg/dL)  241 H  


 


Random Glucose   203 H 


 


Serum Osmolality    312 H


 


Calcium   9.2 


 


Phosphorus   


 


Magnesium   


 


Total Bilirubin   


 


AST   


 


ALT   


 


Alkaline Phosphatase   


 


Troponin I   


 


Total Protein   


 


Albumin   


 


Globulin   


 


Albumin/Globulin Ratio   


 


Triglycerides   


 


Cholesterol   


 


LDL Cholesterol Direct   


 


HDL Cholesterol   


 


Lipase   














  10/02/17





  16:27


 


WBC 


 


RBC 


 


Hgb 


 


Hct 


 


MCV 


 


MCH 


 


MCHC 


 


RDW 


 


Plt Count 


 


MPV 


 


Neut % (Auto) 


 


Lymph % (Auto) 


 


Mono % (Auto) 


 


Eos % (Auto) 


 


Baso % (Auto) 


 


Neut # 


 


Lymph # 


 


Mono # 


 


Eos # 


 


Baso # 


 


Neutrophils % (Manual) 


 


Lymphocytes % (Manual) 


 


Monocytes % (Manual) 


 


Platelet Estimate 


 


Polychromasia 


 


Hypochromasia (manual) 


 


Anisocytosis (manual) 


 


Target Cells 


 


PT 


 


INR 


 


APTT 


 


Sodium 


 


Potassium 


 


Chloride 


 


Carbon Dioxide 


 


Anion Gap 


 


BUN 


 


Creatinine 


 


Est GFR ( Amer) 


 


Est GFR (Non-Af Amer) 


 


POC Glucose (mg/dL)  192 H


 


Random Glucose 


 


Serum Osmolality 


 


Calcium 


 


Phosphorus 


 


Magnesium 


 


Total Bilirubin 


 


AST 


 


ALT 


 


Alkaline Phosphatase 


 


Troponin I 


 


Total Protein 


 


Albumin 


 


Globulin 


 


Albumin/Globulin Ratio 


 


Triglycerides 


 


Cholesterol 


 


LDL Cholesterol Direct 


 


HDL Cholesterol 


 


Lipase 








 Microbiology





09/30/17 18:00   Blood-Venous   Blood Culture - Preliminary


                            NO GROWTH AFTER 48 HOURS


09/30/17 20:00   Naris   MRSA Culture (Admit) - Final


                            MRSA DETECTED


09/30/17 16:00   Blood-Venous   S.aureus & Coag-Neg Staph PNA FISH - Final


09/30/17 16:00   Blood-Venous   Blood Culture - Preliminary


                            Coagulase Neg Staphylococcus


09/30/17 16:00   Blood-Venous   Gram Stain - Final





 





 





Microbiology





06/19/17 18:06   Chest   Gram Stain - Final


06/19/17 18:06   Chest   Wound Culture - Final


                              Coagulase Neg Staphylococcus


06/17/17 18:00   Blood   Blood Culture - Final


06/17/17 18:00   Blood   Gram Stain - Final


                              NO GROWTH AFTER 5 DAYS


                              TEST NOT PERFORMED


06/17/17 18:00   Blood   Blood Culture - Final


06/17/17 18:00   Blood   Gram Stain - Final


                              NO GROWTH AFTER 5 DAYS


                              TEST NOT PERFORMED


06/17/17 17:40   Foot - Left   Gram Stain - Final


06/17/17 17:40   Foot - Left   Wound Culture - Final


                              Escherichia Coli


                              Methicillin Resistant S Aureus





Accession No. : [id number]


Patient Name / ID : ASA GUERRA  / [id number]


Exam Date : 09/30/2017 17:02:19 ( Approved )


Study Comment : 


Sex / Age : F  / 77Y





Creator : Devon Christensen MD


Dictator : Devon Christensen MD


 : 


Approver : Devon Christensen MD


Approver2 : 





Report Date : 09/30/2017 17:40:25


My Comment : 


********************************************************************************

***





PROCEDURE:  CT HEAD WITHOUT CONTRAST.





HISTORY:


vomiting hypertension





COMPARISON:


Unenhanced head CT 02/27/2014. 





TECHNIQUE:


Axial computed tomography images were obtained through the head/brain without 

intravenous contrast.  





Radiation dose:





Total exam DLP = 1243.1 mGy-cm.





This CT exam was performed using one or more of the following dose reduction 

techniques: Automated exposure control, adjustment of the mA and/or kV 

according to patient size, and/or use of iterative reconstruction technique.





FINDINGS:





HEMORRHAGE:


No intracranial hemorrhage. 





BRAIN:


Chronic microangiopathy and diffuse cerebral large atrophy are reiterated this 

examination slightly increased in the interval. Cytotoxic edema is appreciate 

the right cerebellum superiorly suspicious for an acute or subacute brain 

infarction. No definite intracranial hemorrhage. Local mass effect is 

encouraged on the posterior superior hari as well as the right lateral side of 

the 4th ventricle. 





No suspicious extra-axial fluid collection.  No midline shift.  Basilar 

cisterns remain widely patent.  Midline brain anatomy appears unremarkable. 





VENTRICLES:


Unremarkable. No hydrocephalus. 





CALVARIUM:


Unremarkable.





PARANASAL SINUSES:


Unremarkable as visualized. No significant inflammatory changes.





MASTOID AIR CELLS:


Unremarkable as visualized. No inflammatory changes.





OTHER FINDINGS:


None.





IMPRESSION:


1. Acute separate brain infarction right cerebellum with limited local mass-

effect occurring.  No intra hemorrhage.  Follow-up CT or MRI is advised. 





2. Reiterated neuro degenerate changes are appreciated slightly increased 

appear tube unenhanced head CT 02/27/2014.








Accession No. : [id number]


Patient Name / ID : ASA GUERRA  / [id number]


Exam Date : 09/30/2017 16:35:06 ( Approved )


Study Comment : 


Sex / Age : F  / 77Y





Creator : Devon Christensen MD


Dictator : Devon Christensen MD


 : 


Approver : Devon Christensen MD


Approver2 : 





Report Date : 09/30/2017 18:15:27


My Comment : 


********************************************************************************

***





HISTORY:


vomiting  





COMPARISON:


Portable chest 06/20/2017. 





FINDINGS:





LUNGS:


History CTs disease is identified the left base and minimally at the right base 

in the interval.





PLEURA:


Small pleural effusions not excluded. None is seen the right. No pneumothorax 

bilaterally





CARDIOVASCULAR:


Cardiomegaly appears stable. No definite pulmonary vascular derangement.





OSSEOUS STRUCTURES:


No significant abnormalities.





VISUALIZED UPPER ABDOMEN:


Normal.





OTHER FINDINGS:


None.





IMPRESSION:


Left basilar airspace disease is suggested may have increased with trace of 

pleural effusion not excluded. The right basilar atelectasis or infiltrate is 

questioned.  Cardiomegaly appears stable.





Assessment & Plan


(1) Cerebellar infarct


Status: Acute   Priority: High   





(2) Basal cell carcinoma of chest


Status: Acute   





(3) ESRD on hemodialysis


Status: Acute   Priority: High   





(4) Bacteremia due to Staphylococcus epidermidis


Status: Acute   





(5) DM2 (diabetes mellitus, type 2)


Status: Chronic   Priority: Medium   





- Assessment and Plan (Free Text)


Assessment: 





A/P-


Patient is a 77 year old female with multiple medical conditions including DM II

, CAD, ESRD on HD, left foot TMA , A.fib admitted with nausea found to have 

cerebellar infarct as per Neurosurgery not a surgical candiidate.


found to have coag neg staoh in one blood cx on this admission.











pt. is afebrile.


she had OM of her left TMA site and was treated with 6 weeks of IV abx in 6/ 2017.


the source of the coag neg staph bacteremia could be the open chest wall wound 

vs contamination.


pt. is afebrile and has normal wbc count.


her previous admission did show coag neg staph from chest wall wound cx and she 

also found to have basal cell carcinoma of the chest  wall wound( not sure if 

she was ever treated for the basal cell ).








Plan-


check 2 more blood cx.


check TTE r/o any vegetations.


continue with IV vancomyicn post HD days pending further results.


keep trough <20.


podiatry to evaluate the nonhealing left TMA site ulcer.


check left foot xray .


close monitoring of BP as per ICU team.


cerebellar infarct management as per neuro.


primary doc to determine if the basal cell CA of the chest was treated and if 

not advise  to get oncology evaluation.








all labs and imaging and medical notes reviewed in detail..





Thank you for allowing me to take part in the care of this patient.








ICU time spent 70 minutes.

## 2017-10-02 NOTE — PN
DATE:  10/02/2017



SUBJECTIVE:  The patient is seen and examined.  Interim events noted. 

Consults noted and appreciated.  Neurology, Neurosurgery, Nephrology,

intensivist interventions noted and appreciated.  The patient remains in

the intensive care unit.  The patient denies any specific complaint except

urinary discomfort.  No chest pain.  No shortness of breath.



PHYSICAL EXAMINATION

GENERAL:  The patient is in no acute distress.

VITAL SIGNS:  Stable.

HEART:  S1 and S2, normal and regular.

LUNGS:  Good bilateral air exchange.

ABDOMEN:  Soft, nontender.

EXTREMITIES:  No edema.  No calf swelling.  No tenderness.  No acute

ischemia.

CENTRAL NERVOUS SYSTEM:  Essentially unchanged.

SKIN:  The patient has multiple skin ulcers including the chest and the

sacrum.



DIAGNOSTIC DATA:  Available diagnostic data reviewed.  Telemetry monitoring

does not reveal significant arrhythmias.



ASSESSMENT AND PLAN:  Overall, the patient's general medical condition

seems to be improving.  The patient is on home 3% saline.  Sodium is 146. 

Osmolarity is pending.  The patient is hemodynamically stable.  Plan as

ordered.



__________________________________________

Victor M Calix MD



DD:  10/02/2017 7:31:16

DT:  10/02/2017 9:47:03

Job # [id number]

## 2017-10-02 NOTE — HP
CHIEF COMPLAINT:  Vomiting at dialysis center.



HISTORY OF PRESENT ILLNESS:  This is a 77-year-old female, known case of

diabetes, hypertension, end-stage renal disease, on dialysis, coronary

artery disease, congestive heart failure, atrial fibrillation, arthritis,

dementia and anemia, who was having intractable vomiting in dialysis.  The

patient was sent to the hospital and was admitted for further management

after finding acute cerebellar stroke.



REVIEW OF SYSTEMS:  The patient is not able to provide informative history

on review of systems.



PAST MEDICAL HISTORY:  Significant for diabetes, hypertension, coronary

artery disease, congestive heart failure, atrial fibrillation, arthritis,

anemia, hypercholesterolemia and end-stage renal disease.



PAST SURGICAL HISTORY:  Remarkable for dialysis-related procedures.



PERSONAL HISTORY:  The patient is currently nonsmoker, nondrinker.  No

substance abuse.



MEDICATIONS:  The patient is on multiple medications, which is as per

reconciliation sheet, which was reviewed and ordered.



ALLERGIES:  The patient is not allergic to any medications.



FAMILY HISTORY:  Noncontributory.



PHYSICAL EXAMINATION:

GENERAL:  Well-built, well-nourished, overweight 77-year-old female, in no

acute distress.

VITAL SIGNS:  Temperature 98.5, pulse 76, respirations 17, blood pressure

170/67.

HEENT:  Pupils are reacting to light.  Normocephalic and atraumatic skull.

NECK:  No JVD.  No thyromegaly.  No lymphadenopathy.  No nystagmus.

HEART:  S1 and S2, normal, regular.  No significant murmur, gallop or rub

is heard.

LUNGS:  Shows good bilateral air exchange.  No rales or rhonchi.

ABDOMEN:  Soft, nontender.  No organomegaly.  No fluid.  Bowel sounds are

plus.

EXTREMITIES:  No edema.  No calf swelling.  No tenderness.  No acute

ischemia.

CENTRAL NERVOUS SYSTEM:  The patient is alert, awake, and oriented x2,

communicative, but not conversant.  Thorough CNS exam is not feasible due

to the patient's general condition.  There does not seem to be any acute

gross focal motor or sensory neurological deficit.



DIAGNOSTIC DATA:  Available diagnostic data reviewed.  CAT scan of the

abdomen is unremarkable.  Chest x-ray is clear.  CAT scan of head is

unremarkable except the patient has cerebellar stroke.  Telemetry

monitoring does not reveal significant arrhythmias.  WBC 11.6, hemoglobin

12.8, hematocrit 43, platelets 305.  PT 13.7, PTT 27.9.  Sodium 140,

potassium 3.7, chloride 93, bicarb 28, BUN 16, creatinine 2.0.  Accu-Chek

shows glucose 198, 190 and 121.  Troponin level is 0.7490.  SMA-12 is

unremarkable.  Cardiology and intensivist intervention and consult noted

and appreciated.



ADMITTING IMPRESSION:  Acute cerebrovascular accident, hypertension, type 2

diabetes with hyperglycemia, congestive heart failure, coronary artery

disease, atrial fibrillation and dementia.



PLAN:  As ordered.  Case and plan discussed with the patient's family at

bedside.





__________________________________________

Victor M Calix MD





DD:  10/01/2017 7:02:46

DT:  10/01/2017 9:36:53

Job # [id number]

## 2017-10-02 NOTE — PCM.SURG1
Surgeon's Initial Post Op Note





- Surgeon's Notes


Surgeon: Donnie Puri MD


Assistant: NONE


Pre-Operative Diagnosis: Poor venous access


Operative Findings: CXR showed right subclavian vein stent.  Patent left 

basilic vein. Left pacemaker.


Post-Operative Diagnosis: Poor venous access


Operation Performed: DUal lumen picc placement via the left basilic vein, 37 

cm. Tip is in the innominate vein.


Specimen/Specimens Removed: NONE


Estimated Blood Loss: EBL {In ML}: 2


Blood Products Given: N/A


Drains Used: No Drains


Post-Op Condition: Fair


Date of Surgery/Procedure: 10/02/17


Time of Surgery/Procedure: 12:50

## 2017-10-03 LAB
ALBUMIN/GLOB SERPL: 1 {RATIO} (ref 1–2.1)
ALP SERPL-CCNC: 99 U/L (ref 38–126)
ALT SERPL-CCNC: 31 U/L (ref 9–52)
AST SERPL-CCNC: 36 U/L (ref 14–36)
BACTERIA #/AREA URNS HPF: (no result) /[HPF]
BILIRUB SERPL-MCNC: 0.5 MG/DL (ref 0.2–1.3)
BILIRUB UR-MCNC: (no result) MG/DL
BUN SERPL-MCNC: 13 MG/DL (ref 7–17)
BUN SERPL-MCNC: 34 MG/DL (ref 7–17)
CALCIUM SERPL-MCNC: 9.1 MG/DL (ref 8.4–10.2)
CALCIUM SERPL-MCNC: 9.5 MG/DL (ref 8.4–10.2)
CHLORIDE SERPL-SCNC: 108 MMOL/L (ref 98–107)
CHLORIDE SERPL-SCNC: 98 MMOL/L (ref 98–107)
CO2 SERPL-SCNC: 26 MMOL/L (ref 22–30)
CO2 SERPL-SCNC: 29 MMOL/L (ref 22–30)
COLOR UR: (no result)
ERYTHROCYTE [DISTWIDTH] IN BLOOD BY AUTOMATED COUNT: 21.4 % (ref 11.5–14.5)
GLOBULIN SER-MCNC: 3.8 GM/DL (ref 2.2–3.9)
GLUCOSE SERPL-MCNC: 172 MG/DL (ref 65–105)
GLUCOSE SERPL-MCNC: 213 MG/DL (ref 65–105)
GLUCOSE UR STRIP-MCNC: 100 MG/DL
HCT VFR BLD CALC: 44.3 % (ref 34–47)
KETONES UR STRIP-MCNC: (no result) MG/DL
LEUKOCYTE ESTERASE UR-ACNC: (no result) LEU/UL
MCH RBC QN AUTO: 25.7 PG (ref 27–31)
MCHC RBC AUTO-ENTMCNC: 29.2 G/DL (ref 33–37)
MCV RBC AUTO: 88.1 FL (ref 81–99)
PH UR STRIP: 5.5 [PH] (ref 5–8)
PLATELET # BLD: 297 K/UL (ref 130–400)
POTASSIUM SERPL-SCNC: 3.3 MMOL/L (ref 3.6–5)
POTASSIUM SERPL-SCNC: 4.7 MMOL/L (ref 3.6–5)
PROT SERPL-MCNC: 7.3 G/DL (ref 6.3–8.2)
PROT UR STRIP-MCNC: > 300 MG/DL
RBC # UR STRIP: (no result) /UL
RBC #/AREA URNS HPF: 1095 /HPF (ref 0–3)
SODIUM SERPL-SCNC: 141 MMOL/L (ref 132–148)
SODIUM SERPL-SCNC: 151 MMOL/L (ref 132–148)
SP GR UR STRIP: 1.02 (ref 1–1.03)
UROBILINOGEN UR-MCNC: 0.2 MG/DL (ref 0.2–1)
WBC # BLD AUTO: 14.5 K/UL (ref 4.8–10.8)
WBC #/AREA URNS HPF: 800 /HPF (ref 0–5)
WBC CLUMPS # UR AUTO: (no result) /HPF

## 2017-10-03 RX ADMIN — POLYVINYL ALCOHOL PRN DROP: 14 SOLUTION/ DROPS OPHTHALMIC at 01:24

## 2017-10-03 RX ADMIN — METOPROLOL SUCCINATE SCH MG: 50 TABLET, EXTENDED RELEASE ORAL at 09:41

## 2017-10-03 RX ADMIN — POLYVINYL ALCOHOL PRN DROP: 14 SOLUTION/ DROPS OPHTHALMIC at 09:37

## 2017-10-03 NOTE — PQF GENQUE
Dr. Calix,



Etiology of mild trop elevation? : if known



OR: Unable to determine 



9/30: Cardiology note in draft: +ve TnI in the setting of acute cerebellar 
infarct  



9/30:  Intensivist note; Major Problems:  1. Right Cerebellar Acute / SubAcute 
Infarct with localized edema and shift to the Left  2. Accelerated HTN  3. ESRD
  4. Mild Coagulopathy  5. Mild Troponin Elevation  



trops:0.7490-> 0.7220->0.6080









***** This form is a permanent part of the medical record*****





          

Clarification of your documentation is requested to better reflect the severity 
of illness and intensity of treatment of your patient.  



Indicators present      



[]  Specify: []

         



[]  Specify: []         

 



[]  Specify: []         





[]  Specify: []         





Location in the medical record that reflects the above clinical findings:  []

 



Treatment Provided:  []

  

PHYSICIAN'S RESPONSE





Based on your medical judgment of the clinical indicators outlined above please 
clarify the following:



[]  Practitioner response 



[]  If unable to determine, please check the box, sign and date.  





Present On Admission (POA) Indicator:





[] Present at the time of admission 



[] Not present at the time of admission



[] Clinically Undetermined









In responding to this query, please exercise your independent professional 
judgment.  The fact that a question is asked does not imply that any particular 
answer is desired or expected.  Thank you for your clarification on this 
documentation.



If you have any questions please call.

* Thank you,

   Ruthann Roy RN 

   ext. #[phone]: Jasmina MORRIS

## 2017-10-03 NOTE — CARD
--------------- APPROVED REPORT --------------





EXAM: Two-dimensional and M-mode echocardiogram with Doppler and 

color Doppler.



Other Information 

Quality : FairRhythm : NSR



INDICATION

Congestive Heart Failure 



2D DIMENSIONS 

Left Atrium (2D)4.70   (1.6-4.0cm)IVSd1.87   (0.7-1.1cm)

LVDd4.88   (3.9-5.9cm)LVOT Diameter1.71   (1.8-2.4cm)

PWd0.84   (0.7-1.1cm)IVSs1.83   (0.8-1.2cm)

LVDs3.68   (2.5-4.0cm)FS (%) 24.7   %

PWs1.32   (0.8-1.2cm)LVEF (%)50.0   (>50%)



M-Mode DIMENSIONS 

Left Atrium (MM)4.38   (2.5-4.0cm)IVSd0.82   (0.7-1.1cm)

Aortic Root3.50   (2.2-3.7cm)LVDd5.09   (4.0-5.6cm)

Aortic Cusp Exc.1.71   (1.5-2.0cm)PWd1.06   (0.7-1.1cm)

IVSs0.85   cmFS (%) 21   %

LVDs4.00   (2.0-3.8cm)PWs1.32   cm



Mitral Valve

MV E Loteappe340.6cm/sMV E Peak Gr.102mmHgMV DECEL [id number]

MV A Qnulllda522.7cm/sMV EWC24umJ/A ratio1.3

MVA (PHT)4.82cm2



TDI

Lateral E' Peak V6.60cm/sMedial E' Peak V6.11cm/sE/Lateral E'19.2

E/Medial E'20.7



Pulmonary Valve

PV Peak Rggmqvcb810.2cm/s



Tricuspid Valve

TR Peak Yvdqemyy212pl/sRAP HNZOGFWF90vwJzYE Peak Gr.40mmHg

[id number]



 LEFT VENTRICLE 

The left ventricle is normal size.

There is moderate concentric left ventricular hypertrophy.

Left ventricle systolic function is borderline.

There is normal LV segmental wall motion.

Transmitral Doppler flow pattern is Grade II-pseudonormal filling 

dynamics.



 RIGHT VENTRICLE 

The right ventricle is normal size.

There is normal right ventricular wall thickness.

The right ventricular systolic function is normal.



 ATRIA 

The left atrium is mildly dilated.

The right atrium is mildly dilated.



 AORTIC VALVE 

The aortic valve is mildly thickened.

No aortic regurgitation is present.

There is no aortic valvular stenosis.



 MITRAL VALVE 

The mitral valve is mildly thickened.

There is no mitral valve stenosis.

Mitral regurgitation is mild to moderate.



 TRICUSPID VALVE 

The tricuspid valve is normal in structure

There is mild to moderate pulmonary hypertension.



 PULMONIC VALVE 

The pulmonary valve is normal in structure

There is trace pulmonic valvular regurgitation.



 GREAT VESSELS 

The aortic root is normal in size.

The IVC was not visualized.



 PERICARDIAL EFFUSION 

The pericardium appears normal.



<Conclusion>

The left ventricle is normal size.

There is moderate concentric left ventricular hypertrophy.

Left ventricle systolic function is borderline.

There is normal LV segmental wall motion.

Transmitral Doppler flow pattern is Grade II-pseudonormal filling 

dynamics.

Mitral regurgitation is mild to moderate.

There is mild to moderate pulmonary hypertension.

## 2017-10-03 NOTE — PN
DATE OF SERVICE:  10/03/2017



LOCATION:  The patient in ICU, bed #423.



TIME SPENT:  35 minutes.



SUBJECTIVE:  The patient is seen and examined at the bedside.  Events since

admission reviewed.  Past medical, surgical, and social history noted. 

Case was discussed in detail in morning rounds.  The management guidelines

were formulated.  A 77-year-old female with complex medical history

including diabetes mellitus type 2, coronary artery disease, congestive

heart failure, atrial fibrillation, anemia of chronic disease, end-stage

renal disease, on hemodialysis, admitted with an acute/subacute right

cerebellar infarct with mass effect, seen by Neurosurgery consult, not a

candidate for surgical intervention, seen by Neurology, has been on 3%

saline to reduce some cerebral edema.  The patient is currently DNR and

DNI.  Overnight telemetry, sinus rhythm, blood pressure in the range of 140

to 175, alert, awake, slow to respond, but follows commands, no distress

noted.  Denies headache, chest pain, or palpitation.  No abdominal pain.



PHYSICAL EXAMINATION:

VITAL SIGNS:  Temperature 98.7, heart rate 100 to 107 and regular, blood

pressure 111/75 to 152/72, respiratory rate 23 to 27, thoracoabdominal,

saturating over 94% on oxygen 2 L nasal cannula, intake 1460, output 100,

positive balance 1360, hemodialysis due today, weight 141 pounds.

HEENT:  Pupils reactive.  Extraocular muscles intact.  Left pupil

opacified, right pupil 3 to 4 mm, reactive.  Conjunctivae pink.

NECK:  Supple.

CHEST:  Bilateral breath sounds, fine crepitations at the base.

HEART:  Rhythm regular, S1, S2 normal in intensity.  No audible murmur.

ABDOMEN:  Bowel sounds present, soft.  Liver and spleen are palpable.

EXTREMITIES:  Right upper extremity AV shunt with good bruit and thrill. 

Lower extremity, trace edema.  Left foot TMA.  No calf tenderness.  No

palpable cord.  Dorsalis pedis present, but reduced in intensity.

NEUROLOGIC:  Yane coma scale 15.  Motor function grossly intact.  Normal

sensory function.



MEDICATIONS:  Current medications include vitam B complex with C and folic

acid, Nephro-Brianna 1 tablet daily, vancomycin 1 g IV 3 times a week,

Protonix 40 IV daily, metoprolol XL 50 mg p.o. daily, levothyroxine 75 mcg

p.o. daily, Accu-Chek with regular insulin coverage, aspirin 81 mg p.o.

daily, artificial tears 2 drops both eyes q.4, amlodipine 5 mg p.o. daily,

Tylenol 650 q. 6 p.r.n.



IMPRESSION AND PLAN:

1.  Neurology:  Status post right acute/subacute cerebellar infarct with

localized edema and shift to the left, status post hypertonic saline being

weaned off.

2.  Cardiac:  Accelerated hypertension, now remains in the acceptable

range.  History of coronary artery disease, stable.

3.  Pulmonary:  Saturating over 94% on oxygen 2 L nasal cannula.  Mild

pulmonary vascular congestion on chest x-ray.

4.  Renal:  End-stage renal disease, on hemodialysis, due today, seen by

Renal consult.  Continue medications as per recommendation.

5.  Endocrine:  Diabetes mellitus type 2.  Maintain blood sugar less than

180 mg.  History of hypothyroidism, on levothyroxine.  Maintain supplement.

6.  Gastrointestinal:  Continue feeding as tolerated.  Renal diet ordered.

7.  History of arthritis, remains stable.

8.  Infectious disease:  Bacteremia due to Staphylococcus epidermidis,

acute.  Currently on vancomycin post hemodialysis 3 times a week.  Keep

trough level less than 20.  Nonhealing ulcer, left transmetatarsal

amputation site, followed by Infectious Disease consult.  Prognosis remains

guarded.  Maintain do not resuscitate and do not intubate.







__________________________________________

Krishna Gentile MD



DD:  10/03/2017 14:54:29

DT:  10/03/2017 19:56:40

Job # [id number]

## 2017-10-03 NOTE — CP.PCM.PN
<Garland Fowler - Last Filed: 10/03/17 17:15>





Subjective





- Date & Time of Evaluation


Date of Evaluation: 10/03/17


Time of Evaluation: 08:00





- Subjective


Subjective: 





Patient was seen and examined at bedside with Dr. Calix.


Patient seen at bedside. No acute overnight changes or events. Patient is able 

to verbalize.


Patient is DNR/DNI








Objective





- Vital Signs/Intake and Output


Vital Signs (last 24 hours): 


 











Temp Pulse Resp BP Pulse Ox


 


 97.7 F   103 H  16   186/151 H  98 


 


 10/03/17 12:00  10/03/17 12:00  10/03/17 12:00  10/03/17 12:00  10/03/17 12:00








Intake and Output: 


 











 10/03/17 10/03/17





 06:59 18:59


 


Intake Total 750 


 


Output Total 100 


 


Balance 650 














- Medications


Medications: 


 Current Medications





Acetaminophen (Tylenol 650mg/20.3ml Solution Ud)  650 mg PO Q6 PRN


   PRN Reason: Headache


   Last Admin: 10/02/17 20:06 Dose:  650 mg


Amlodipine Besylate (Norvasc)  5 mg PO DAILY [facility]


   Last Admin: 10/03/17 09:38 Dose:  5 mg


Artificial Tears (Artificial Tears)  2 drop OU Q4 PRN


   PRN Reason: Dry eyes


   Last Admin: 10/03/17 09:37 Dose:  2 drop


Aspirin (Ecotrin)  81 mg PO DAILY [facility]


   Last Admin: 10/03/17 09:38 Dose:  81 mg


Home Med (Rosuvastatin Calcium [Crestor])  40 mg PO HS [facility]


   Last Admin: 10/02/17 21:02 Dose:  40 mg


Vancomycin HCl 1 gm/ Sodium (Chloride)  250 mls @ 166.667 mls/hr IVPB MWF [facility]


   Last Admin: 10/02/17 22:30 Dose:  166.667 mls/hr


Insulin Human Regular (Humulin R)  0 units SC ACCU-CHECK [facility]


   PRN Reason: Protocol


   Last Admin: 10/03/17 06:07 Dose:  3 units


Levothyroxine Sodium (Synthroid)  75 mcg PO DAILY@0630 [facility]


   Last Admin: 10/03/17 05:34 Dose:  75 mcg


Metoprolol Succinate (Toprol Xl)  50 mg PO DAILY [facility]


   Last Admin: 10/03/17 09:41 Dose:  50 mg


Pantoprazole Sodium (Protonix Inj)  40 mg IVP DAILY [facility]


   Last Admin: 10/03/17 09:46 Dose:  40 mg


Vitamin B Complex/Vit C/Folic Acid (Nephro-Brianna)  1 tab PO DAILY [facility]











- Labs


Labs: 


 





 10/03/17 04:15 





 10/03/17 04:15 





 











PT  13.7 Seconds (9.8-13.1)  H  09/30/17  16:44    


 


INR  1.3  (0.9-1.2)  H  09/30/17  16:44    


 


APTT  27.9 Seconds (25.6-37.1)   09/30/17  16:44    














- Constitutional


Appears: No Acute Distress





- Eye Exam


Eye Exam: EOMI


Additional comments: 





Left pupil opacified





- Respiratory Exam


Respiratory Exam: Clear to Ausculation Bilateral, NORMAL BREATHING PATTERN





- Cardiovascular Exam


Cardiovascular Exam: REGULAR RHYTHM, +S1, +S2





- GI/Abdominal Exam


GI & Abdominal Exam: Soft, Normal Bowel Sounds





- Extremities Exam


Additional comments: 





LEft foot TMA





- Neurological Exam


Neurological Exam: Alert, Awake


Additional comments: 





movement of all extremities with b/l lower extremity weakness, more weakness on 

right side.


- sensation intact





Assessment and Plan





- Assessment and Plan (Free Text)


Assessment: 





1) Right cerebellar Acute/ Sub acute infarct


- Head elevation of bed


- monitor osmolarity


- Continue with ICU and neuro recommendation





2) Systolic heart failure





3) End stage renal disease


- Nephro on board


- Dialysis [facility]





4) Non healing left TMA site ulcer


-IV vanco


- ID on board





4) Code status DNR/DNI





<Calix,Victor M K - Last Filed: 10/05/17 16:38>





Objective





- Vital Signs/Intake and Output


Vital Signs (last 24 hours): 


 











Temp Pulse Resp BP Pulse Ox


 


 98.6 F   80   23   157/76 H  100 


 


 10/05/17 12:00  10/05/17 12:00  10/05/17 12:00  10/05/17 12:00  10/05/17 12:00








Intake and Output: 


 











 10/05/17 10/05/17





 06:59 18:59


 


Intake Total 50 


 


Output Total 100 


 


Balance -50 














- Medications


Medications: 


 Current Medications





Acetaminophen (Tylenol 650mg/20.3ml Solution Ud)  650 mg PO Q6 PRN


   PRN Reason: Headache


   Last Admin: 10/04/17 19:17 Dose:  650 mg


Amlodipine Besylate (Norvasc)  5 mg PO DAILY [facility]


   Last Admin: 10/05/17 10:12 Dose:  5 mg


Artificial Tears (Artificial Tears)  2 drop OU Q4 PRN


   PRN Reason: Dry eyes


   Last Admin: 10/05/17 10:11 Dose:  2 drop


Aspirin (Ecotrin)  81 mg PO DAILY [facility]


   Last Admin: 10/05/17 10:12 Dose:  81 mg


Home Med (Rosuvastatin Calcium [Crestor])  40 mg PO HS [facility]


   Last Admin: 10/04/17 21:14 Dose:  40 mg


Vancomycin HCl 1 gm/ Sodium (Chloride)  250 mls @ 166.667 mls/hr IVPB MWF [facility]


   Last Admin: 10/04/17 09:55 Dose:  166.667 mls/hr


Insulin Human Regular (Humulin R)  0 units SC ACCU-CHECK [facility]


   PRN Reason: Protocol


   Last Admin: 10/05/17 06:24 Dose:  Not Given


Levothyroxine Sodium (Synthroid)  75 mcg PO DAILY@0630 [facility]


   Last Admin: 10/05/17 06:21 Dose:  75 mcg


Metoprolol Succinate (Toprol Xl)  50 mg PO DAILY [facility]


   Last Admin: 10/05/17 10:13 Dose:  50 mg


Pantoprazole Sodium (Protonix Ec Tab)  40 mg PO DAILY [facility]


   Last Admin: 10/05/17 10:12 Dose:  40 mg


Tobramycin/Dexamethasone (Tobradex 0.3%-0.1% Opht Oint)  1 appl OU TID [facility]


Vitamin B Complex/Vit C/Folic Acid (Nephro-Brianna)  1 tab PO DAILY [facility]











- Labs


Labs: 


 





 10/05/17 04:15 





 10/05/17 04:15 





 











PT  13.7 Seconds (9.8-13.1)  H  09/30/17  16:44    


 


INR  1.3  (0.9-1.2)  H  09/30/17  16:44    


 


APTT  27.9 Seconds (25.6-37.1)   09/30/17  16:44    














Assessment and Plan





- Assessment and Plan (Free Text)


Assessment: 


Patient was personally seen and examined by me in rounds with residents.


Available labs and diagnostic data reviewed.


Case, Patient's condition and management plan Discussed with residents in 

rounds.


Agree with resident's progress note.


Plan: As ordered.

## 2017-10-03 NOTE — PQF GENQUE
Dr. Calix,



2 (two) queries as follows: 



1. History of CHF listed in multiple notes: is this a current chronic condition 
or hx. only:

2. Iif chronic CHF: Type if known?: i.e. Systolic or Diastolic or both



OR: Unable to determine 



10/3: Lasix IV once ; Metoprolol OD 

Echo report pending 

















***** This form is a permanent part of the medical record*****





          

Clarification of your documentation is requested to better reflect the severity 
of illness and intensity of treatment of your patient.  



Indicators present      



[]  Specify: []

         



[]  Specify: []         

 



[]  Specify: []         





[]  Specify: []         





Location in the medical record that reflects the above clinical findings:  []

 



Treatment Provided:  []

  

PHYSICIAN'S RESPONSE





Based on your medical judgment of the clinical indicators outlined above please 
clarify the following:



[]  Practitioner response 



[]  If unable to determine, please check the box, sign and date.  





Present On Admission (POA) Indicator:





[] Present at the time of admission 



[] Not present at the time of admission



[] Clinically Undetermined









In responding to this query, please exercise your independent professional 
judgment.  The fact that a question is asked does not imply that any particular 
answer is desired or expected.  Thank you for your clarification on this 
documentation.



If you have any questions please call.

* Thank you,

   Ruthann Roy RN 

   ext. #[phone]: Jasmina MORRIS

## 2017-10-03 NOTE — PQF GENQUE
Dr. Calix,



Please document cause or type of skin ulcer: Pressure Ulcer versus:

     Non-pressure ulcer associated with: 

         Atherosclerosis of lower extremities 

         Chronic venous hypertension 

         Diabetes 

         Postphlebitic syndrome 

         Postthrombotic syndrome 

         Varicose veins 

         Other (please specify)_____________________ 

         Unknown 

     Other (please specify)____________________ 

     Clinically unable to determine 

     Unknown 

 2. Please document severity of non-pressure ulcer: 

     Limited to breakdown of skin 

     With fat layer exposure 

     With necrosis of muscle 

     With necrosis of bone 

     Unspecified

     Clinically unable to determine 

     Unknown 

 3. Please document specific site of skin ulcer, including laterality 

 4. Please document any associated infectious process associated with ulcer 
including infectious    organism if known









Nursing Admission Assessment: Right Sacrum: moisture associated reddened opened 
skin with surrounding excoriation 

Medial Chest wound:opened skin covered with black eschar 



10/1: Nurses Pressure Ulcer Assessment: lt foot: full thickness tissue loss in 
which the base of the ulcer is covered 

Right Sacrum: Partial thickness loss of dermis presenting as a shallow ulcer 



10/2: Wound RN: Sacrum and buttocks have chronic IAD and peeling skin. There is 
a partial thickness pressure injury on the right buttock measuring 4.5 cm x 3 cm
, no odor or drainage. 



9/30: ER: Skin: Positive for: Warm, Dry (ulcerated lesion to midline upper 
anterior chest wall with well demarcated borders,) 





10/2: Attending; Skin: The patient has multiple skin ulcers including the chest 
and the sacrum. 



10/2: Podiatry note: with non-healing ulceration to L TMA 











***** This form is a permanent part of the medical record*****





          

Clarification of your documentation is requested to better reflect the severity 
of illness and intensity of treatment of your patient.  



Indicators present      



[]  Specify: []

         



[]  Specify: []         

 



[]  Specify: []         





[]  Specify: []         





Location in the medical record that reflects the above clinical findings:  []

 



Treatment Provided:  []

  

PHYSICIAN'S RESPONSE





Based on your medical judgment of the clinical indicators outlined above please 
clarify the following:



[]  Practitioner response 



[]  If unable to determine, please check the box, sign and date.  





Present On Admission (POA) Indicator:





[] Present at the time of admission 



[] Not present at the time of admission



[] Clinically Undetermined









In responding to this query, please exercise your independent professional 
judgment.  The fact that a question is asked does not imply that any particular 
answer is desired or expected.  Thank you for your clarification on this 
documentation.



If you have any questions please call.

* Thank you,

   Ruthann Roy RN 

   ext. #[phone]: Jasmina Early RN
MTDD

## 2017-10-03 NOTE — CP.PCM.PN
Subjective





- Date & Time of Evaluation


Date of Evaluation: 10/03/17


Time of Evaluation: 05:52





- Subjective


Subjective: 





Podiatry Progress Note - Dr. Serna





77 year old female patient with extensive PMHx seen at bedside in ICU for non-

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 Patient seen sleeping, NAD. Patient reports 

continued pain throughout her body, however unable to obtain HPI and ROS as 

patient does not answer questions. Patient not wearing multipodus boots at time 

of visit.





Objective





- Vital Signs/Intake and Output


Vital Signs (last 24 hours): 


 











Temp Pulse Resp BP Pulse Ox


 


 98.1 F   101 H  23   148/64   95 


 


 10/03/17 00:00  10/03/17 04:00  10/03/17 04:00  10/03/17 04:00  10/03/17 04:00








Intake and Output: 


 











 10/02/17 10/03/17





 18:59 06:59


 


Intake Total 710 665


 


Balance 710 665














- Medications


Medications: 


 Current Medications





Acetaminophen (Tylenol 650mg/20.3ml Solution Ud)  650 mg PO Q6 PRN


   PRN Reason: Headache


   Last Admin: 10/02/17 20:06 Dose:  650 mg


Amlodipine Besylate (Norvasc)  5 mg PO DAILY Replaced by [facility]


   Last Admin: 10/02/17 08:36 Dose:  5 mg


Artificial Tears (Artificial Tears)  2 drop OU Q4 PRN


   PRN Reason: Dry eyes


   Last Admin: 10/03/17 01:24 Dose:  2 drop


Aspirin (Ecotrin)  81 mg PO DAILY Replaced by [facility]


   Last Admin: 10/02/17 08:36 Dose:  81 mg


Home Med (Rosuvastatin Calcium [Crestor])  40 mg PO HS Replaced by [facility]


   Last Admin: 10/02/17 21:02 Dose:  40 mg


Vancomycin HCl 1 gm/ Sodium (Chloride)  250 mls @ 166.667 mls/hr IVPB MWF Replaced by [facility]


   Last Admin: 10/02/17 22:30 Dose:  166.667 mls/hr


Insulin Human Regular (Humulin R)  0 units SC ACCU-CHECK Replaced by [facility]


   PRN Reason: Protocol


   Last Admin: 10/02/17 23:47 Dose:  Not Given


Levothyroxine Sodium (Synthroid)  75 mcg PO DAILY@0630 Replaced by [facility]


   Last Admin: 10/03/17 05:34 Dose:  75 mcg


Metoprolol Succinate (Toprol Xl)  50 mg PO DAILY Replaced by [facility]


   Last Admin: 10/02/17 08:38 Dose:  50 mg


Pantoprazole Sodium (Protonix Inj)  40 mg IVP DAILY [facility]


   Last Admin: 10/02/17 13:17 Dose:  40 mg


Vitamin B Complex/Vit C/Folic Acid (Nephro-Brianna)  1 tab PO DAILY [facility]











- Labs


Labs: 


 





 10/03/17 04:15 





 10/03/17 04:15 





 











PT  13.7 Seconds (9.8-13.1)  H  09/30/17  16:44    


 


INR  1.3  (0.9-1.2)  H  09/30/17  16:44    


 


APTT  27.9 Seconds (25.6-37.1)   09/30/17  16:44    














- Constitutional


Appears: Well, Non-toxic, No Acute Distress





- Extremities Exam


Additional comments: 





Patient not wearing offloading boots at time of visit. Dressing to LLE appears 

clean/dry/intact with no strikethrough noted.





VASC: DP pulses palpable 2/4 b/l. Right PT pulse palpable 2/4, left PT pulse non

-palpable. TG cool to cool. No edema noted.


NEURO: Gross sensation diminished bilaterally.


DERM: Ulceration noted to distolateral aspect of left TMA stump measuring 

approximately 5 x 2 x 0.2 cm with overlying eschar. Mild erythema noted to 

proximal aspect of wound, decreasing since last visit. No active drainage, 

purulence, malodor, fluctuance, or ascending cellulitis noted. (-)probe to bone


ORTHO: Generalized tenderness to palpation bilateral lower extremity.





- Neurological Exam


Neurological Exam: Awake





Assessment and Plan





- Assessment and Plan (Free Text)


Assessment: 





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


Plan: 





Patient seen and evaluated at bedside


Discussed with attending, Dr. Serna


Chart, vitals, labs reviewed = afebrile, WBC increased @ 14.5 (trending upwards

, yesterday 10/2 @ 9.9)


Wound was dressed with DSD


C/w pain mgmt per medicine


Patient to wear offloading boots to both feet at all times while in bed; 

nursing made aware


Stable per podiatry


Podiatry will continue to follow patient while in house

## 2017-10-03 NOTE — CP.PCM.PN
Subjective





- Date & Time of Evaluation


Date of Evaluation: 10/03/17


Time of Evaluation: 09:30





- Subjective


Subjective: 





Patient and bed


Responding


Weakness of the right side


Lab reviewed


Vital signs stable





Objective





- Vital Signs/Intake and Output


Vital Signs (last 24 hours): 


 











Temp Pulse Resp BP Pulse Ox


 


 98.7 F   107 H  32 H  111/75   88 L


 


 10/03/17 08:00  10/03/17 08:00  10/03/17 08:00  10/03/17 08:00  10/03/17 08:00








Intake and Output: 


 











 10/03/17 10/03/17





 06:59 18:59


 


Intake Total 750 


 


Output Total 100 


 


Balance 650 














- Medications


Medications: 


 Current Medications





Acetaminophen (Tylenol 650mg/20.3ml Solution Ud)  650 mg PO Q6 PRN


   PRN Reason: Headache


   Last Admin: 10/02/17 20:06 Dose:  650 mg


Amlodipine Besylate (Norvasc)  5 mg PO DAILY [facility]


   Last Admin: 10/02/17 08:36 Dose:  5 mg


Artificial Tears (Artificial Tears)  2 drop OU Q4 PRN


   PRN Reason: Dry eyes


   Last Admin: 10/03/17 01:24 Dose:  2 drop


Aspirin (Ecotrin)  81 mg PO DAILY [facility]


   Last Admin: 10/02/17 08:36 Dose:  81 mg


Home Med (Rosuvastatin Calcium [Crestor])  40 mg PO HS [facility]


   Last Admin: 10/02/17 21:02 Dose:  40 mg


Vancomycin HCl 1 gm/ Sodium (Chloride)  250 mls @ 166.667 mls/hr IVPB MWF [facility]


   Last Admin: 10/02/17 22:30 Dose:  166.667 mls/hr


Insulin Human Regular (Humulin R)  0 units SC ACCU-CHECK [facility]


   PRN Reason: Protocol


   Last Admin: 10/03/17 06:07 Dose:  3 units


Levothyroxine Sodium (Synthroid)  75 mcg PO DAILY@0630 [facility]


   Last Admin: 10/03/17 05:34 Dose:  75 mcg


Metoprolol Succinate (Toprol Xl)  50 mg PO DAILY [facility]


   Last Admin: 10/02/17 08:38 Dose:  50 mg


Pantoprazole Sodium (Protonix Inj)  40 mg IVP DAILY [facility]


   Last Admin: 10/02/17 13:17 Dose:  40 mg


Vitamin B Complex/Vit C/Folic Acid (Nephro-Brianna)  1 tab PO DAILY [facility]











- Labs


Labs: 


 





 10/03/17 04:15 





 10/03/17 04:15 





 











PT  13.7 Seconds (9.8-13.1)  H  09/30/17  16:44    


 


INR  1.3  (0.9-1.2)  H  09/30/17  16:44    


 


APTT  27.9 Seconds (25.6-37.1)   09/30/17  16:44    














- Constitutional


Appears: No Acute Distress





- ENT Exam


ENT Exam: Mucous Membranes Moist





- Respiratory Exam


Respiratory Exam: NORMAL BREATHING PATTERN.  absent: Chest Wall Tenderness





- GI/Abdominal Exam


GI & Abdominal Exam: Normal Bowel Sounds.  absent: Guarding





- Extremities Exam


Extremities Exam: absent: Calf Tenderness





- Back Exam


Back Exam: absent: CVA tenderness (L), CVA tenderness (R)





- Neurological Exam


Neurological Exam: Alert





Assessment and Plan


(1) Cerebellar infarct


Assessment & Plan: 


End stage renal disease with CVA and infarction


Patient scheduled to have dialysis now


Sodium 140


Infiltration about 2000 mL


Potassium 2 mEq


Continue management as per neurology





Status: Acute

## 2017-10-03 NOTE — CP.PCM.PN
Subjective





- Date & Time of Evaluation


Date of Evaluation: 10/03/17


Time of Evaluation: 14:14





- Subjective


Subjective: 





Mrs. Osuna was seen and examined today at bedside in the ICU.  She was found in 

NAD.  There were no acute events overnight.  She is scheduled for dialysis 

today and was made DNR/DNI. 





Objective





- Vital Signs/Intake and Output


Vital Signs (last 24 hours): 


 











Temp Pulse Resp BP Pulse Ox


 


 97.7 F   103 H  16   186/151 H  98 


 


 10/03/17 12:00  10/03/17 12:00  10/03/17 12:00  10/03/17 12:00  10/03/17 12:00








Intake and Output: 


 











 10/03/17 10/03/17





 06:59 18:59


 


Intake Total 750 


 


Output Total 100 


 


Balance 650 














- Medications


Medications: 


 Current Medications





Acetaminophen (Tylenol 650mg/20.3ml Solution Ud)  650 mg PO Q6 PRN


   PRN Reason: Headache


   Last Admin: 10/02/17 20:06 Dose:  650 mg


Amlodipine Besylate (Norvasc)  5 mg PO DAILY [facility]


   Last Admin: 10/03/17 09:38 Dose:  5 mg


Artificial Tears (Artificial Tears)  2 drop OU Q4 PRN


   PRN Reason: Dry eyes


   Last Admin: 10/03/17 09:37 Dose:  2 drop


Aspirin (Ecotrin)  81 mg PO DAILY [facility]


   Last Admin: 10/03/17 09:38 Dose:  81 mg


Home Med (Rosuvastatin Calcium [Crestor])  40 mg PO HS [facility]


   Last Admin: 10/02/17 21:02 Dose:  40 mg


Vancomycin HCl 1 gm/ Sodium (Chloride)  250 mls @ 166.667 mls/hr IVPB MWF [facility]


   Last Admin: 10/02/17 22:30 Dose:  166.667 mls/hr


Insulin Human Regular (Humulin R)  0 units SC ACCU-CHECK [facility]


   PRN Reason: Protocol


   Last Admin: 10/03/17 06:07 Dose:  3 units


Levothyroxine Sodium (Synthroid)  75 mcg PO DAILY@0630 [facility]


   Last Admin: 10/03/17 05:34 Dose:  75 mcg


Metoprolol Succinate (Toprol Xl)  50 mg PO DAILY [facility]


   Last Admin: 10/03/17 09:41 Dose:  50 mg


Pantoprazole Sodium (Protonix Inj)  40 mg IVP DAILY [facility]


   Last Admin: 10/03/17 09:46 Dose:  40 mg


Vitamin B Complex/Vit C/Folic Acid (Nephro-Brianna)  1 tab PO DAILY [facility]











- Labs


Labs: 


 





 10/03/17 04:15 





 10/03/17 04:15 





 











PT  13.7 Seconds (9.8-13.1)  H  09/30/17  16:44    


 


INR  1.3  (0.9-1.2)  H  09/30/17  16:44    


 


APTT  27.9 Seconds (25.6-37.1)   09/30/17  16:44    














- Neurological Exam


Additional comments: 





Neurologically unchanged compared with previous examination .





Assessment and Plan


(1) Cerebellar infarct


Assessment & Plan: 


Continue conservative management.  Manage electrolyte disturbances, maintain 

HOB elevated to 30 degrees, avoid fever.  DVT Px, PT/OT eval and treat. 


Status: Acute

## 2017-10-04 LAB
BUN SERPL-MCNC: 18 MG/DL (ref 7–17)
BUN SERPL-MCNC: 24 MG/DL (ref 7–17)
CALCIUM SERPL-MCNC: 9.1 MG/DL (ref 8.4–10.2)
CALCIUM SERPL-MCNC: 9.2 MG/DL (ref 8.4–10.2)
CHLORIDE SERPL-SCNC: 100 MMOL/L (ref 98–107)
CHLORIDE SERPL-SCNC: 99 MMOL/L (ref 98–107)
CO2 SERPL-SCNC: 25 MMOL/L (ref 22–30)
CO2 SERPL-SCNC: 27 MMOL/L (ref 22–30)
ERYTHROCYTE [DISTWIDTH] IN BLOOD BY AUTOMATED COUNT: 21.3 % (ref 11.5–14.5)
GLUCOSE SERPL-MCNC: 196 MG/DL (ref 65–105)
GLUCOSE SERPL-MCNC: 221 MG/DL (ref 65–105)
HCT VFR BLD CALC: 39.5 % (ref 34–47)
MCH RBC QN AUTO: 26 PG (ref 27–31)
MCHC RBC AUTO-ENTMCNC: 30.1 G/DL (ref 33–37)
MCV RBC AUTO: 86.5 FL (ref 81–99)
PLATELET # BLD: 214 K/UL (ref 130–400)
POTASSIUM SERPL-SCNC: 3.7 MMOL/L (ref 3.6–5)
POTASSIUM SERPL-SCNC: 4 MMOL/L (ref 3.6–5)
SODIUM SERPL-SCNC: 141 MMOL/L (ref 132–148)
SODIUM SERPL-SCNC: 142 MMOL/L (ref 132–148)
WBC # BLD AUTO: 11 K/UL (ref 4.8–10.8)

## 2017-10-04 RX ADMIN — METOPROLOL SUCCINATE SCH MG: 50 TABLET, EXTENDED RELEASE ORAL at 09:12

## 2017-10-04 RX ADMIN — ACETAMINOPHEN PRN MG: 160 SOLUTION ORAL at 19:17

## 2017-10-04 RX ADMIN — POLYVINYL ALCOHOL PRN DROP: 14 SOLUTION/ DROPS OPHTHALMIC at 21:15

## 2017-10-04 RX ADMIN — POLYVINYL ALCOHOL PRN DROP: 14 SOLUTION/ DROPS OPHTHALMIC at 09:09

## 2017-10-04 NOTE — CP.PCM.PN
Subjective





- Date & Time of Evaluation


Date of Evaluation: 10/04/17


Time of Evaluation: 12:30





- Subjective


Subjective: 





ID Note-





Pt. seen and examined today in ICU.


pt. is awake and is being fed her lunch by her daughter.


No acute events overnight.


afebrile.





Objective





- Vital Signs/Intake and Output


Vital Signs (last 24 hours): 


 











Temp Pulse Resp BP Pulse Ox


 


 98.5 F   112 H  21   144/71   96 


 


 10/04/17 08:00  10/04/17 10:00  10/04/17 10:00  10/04/17 10:00  10/04/17 10:00








Intake and Output: 


 











 10/04/17 10/04/17





 06:59 18:59


 


Intake Total 52 370


 


Output Total 2060 


 


Balance -2008 370














- Medications


Medications: 


 Current Medications





Acetaminophen (Tylenol 650mg/20.3ml Solution Ud)  650 mg PO Q6 PRN


   PRN Reason: Headache


   Last Admin: 10/02/17 20:06 Dose:  650 mg


Amlodipine Besylate (Norvasc)  5 mg PO DAILY [facility]


   Last Admin: 10/04/17 09:11 Dose:  5 mg


Artificial Tears (Artificial Tears)  2 drop OU Q4 PRN


   PRN Reason: Dry eyes


   Last Admin: 10/04/17 09:09 Dose:  2 drop


Aspirin (Ecotrin)  81 mg PO DAILY [facility]


   Last Admin: 10/04/17 09:10 Dose:  81 mg


Home Med (Rosuvastatin Calcium [Crestor])  40 mg PO HS [facility]


   Last Admin: 10/03/17 21:30 Dose:  40 mg


Vancomycin HCl 1 gm/ Sodium (Chloride)  250 mls @ 166.667 mls/hr IVPB MWF [facility]


   Last Admin: 10/04/17 09:55 Dose:  166.667 mls/hr


Insulin Human Regular (Humulin R)  0 units SC ACCU-CHECK [facility]


   PRN Reason: Protocol


   Last Admin: 10/04/17 11:25 Dose:  2 units


Levothyroxine Sodium (Synthroid)  75 mcg PO DAILY@0630 [facility]


   Last Admin: 10/04/17 05:51 Dose:  75 mcg


Metoprolol Succinate (Toprol Xl)  50 mg PO DAILY [facility]


   Last Admin: 10/04/17 09:12 Dose:  50 mg


Pantoprazole Sodium (Protonix Inj)  40 mg IVP DAILY [facility]


   Last Admin: 10/04/17 09:10 Dose:  40 mg


Vitamin B Complex/Vit C/Folic Acid (Nephro-Brianna)  1 tab PO DAILY [facility]











- Labs


Labs: 


 








- Additional Findings


Additional findings: 








- Constitutional


Appears: No Acute Distress, Chronically Ill





- Eye Exam


Eye Exam: EOMI





- ENT Exam


ENT Exam: Normal Oropharynx





- Neck Exam


Neck exam: Positive for: Full Rom





- Respiratory Exam


Respiratory Exam: NORMAL BREATHING PATTERN


Additional comments: 





slightly decreased breaths ounds bibasilar


no wheezing





- Cardiovascular Exam


Cardiovascular Exam: RRR, +S1, +S2


Additional comments: 





mid-sternal round lesion with scant sanguinous discharge and raised borders (

chronic)


no pus





- GI/Abdominal Exam


GI & Abdominal Exam: Normal Bowel Sounds, Soft


Additional comments: 





NT, ND





- Extremities Exam


Additional comments: 





left TMA site with round dry ulcer at the lateral site, no discharge, dry, no 

malodor, no erythema





- Neurological Exam


Additional comments: 





awake 


 Laboratory Results - last 72 hr











  10/01/17 10/01/17 10/01/17





  15:00 16:55 21:55


 


WBC   


 


RBC   


 


Hgb   


 


Hct   


 


MCV   


 


MCH   


 


MCHC   


 


RDW   


 


Plt Count   


 


Sodium  143  


 


Potassium  4.9  


 


Chloride  99  


 


Carbon Dioxide  31 H  


 


Anion Gap  18  


 


BUN  25 H  


 


Creatinine  3.0 H  


 


Est GFR ( Amer)  18  


 


Est GFR (Non-Af Amer)  15  


 


POC Glucose (mg/dL)   187 H  171 H


 


Random Glucose  226 H  


 


Serum Osmolality   


 


Calcium  9.2  


 


Total Bilirubin   


 


AST   


 


ALT   


 


Alkaline Phosphatase   


 


Troponin I  0.6080 H*  


 


Total Protein   


 


Albumin   


 


Globulin   


 


Albumin/Globulin Ratio   


 


Urine Color   


 


Urine Clarity   


 


Urine pH   


 


Ur Specific Gravity   


 


Urine Protein   


 


Urine Glucose (UA)   


 


Urine Ketones   


 


Urine Blood   


 


Urine Nitrate   


 


Urine Bilirubin   


 


Urine Urobilinogen   


 


Ur Leukocyte Esterase   


 


Urine RBC (Auto)   


 


Urine WBC Clumps (Auto)   


 


Urine Microscopic WBC   


 


Ur Squamous Epith Cells   


 


Amorphous Sediment   


 


Urine Bacteria   


 


Urine Yeast (Budding)   


 


Vancomycin Trough   














  10/02/17 10/02/17 10/02/17





  04:25 04:25 05:41


 


WBC  9.9  


 


RBC  4.96  


 


Hgb  12.8  


 


Hct  43.7  


 


MCV  88.1  


 


MCH  25.8 L  


 


MCHC  29.2 L  


 


RDW  21.1 H  


 


Plt Count  262  


 


Sodium   146 


 


Potassium   4.6 


 


Chloride   100 


 


Carbon Dioxide   29 


 


Anion Gap   21 H 


 


BUN   29 H 


 


Creatinine   3.4 H 


 


Est GFR ( Amer)   16 


 


Est GFR (Non-Af Amer)   13 


 


POC Glucose (mg/dL)    170 H


 


Random Glucose   154 H 


 


Serum Osmolality   


 


Calcium   9.3 


 


Total Bilirubin   0.5 


 


AST   43 H 


 


ALT   29 


 


Alkaline Phosphatase   87 


 


Troponin I   


 


Total Protein   7.4 


 


Albumin   3.5 


 


Globulin   3.9 


 


Albumin/Globulin Ratio   0.9 L 


 


Urine Color   


 


Urine Clarity   


 


Urine pH   


 


Ur Specific Gravity   


 


Urine Protein   


 


Urine Glucose (UA)   


 


Urine Ketones   


 


Urine Blood   


 


Urine Nitrate   


 


Urine Bilirubin   


 


Urine Urobilinogen   


 


Ur Leukocyte Esterase   


 


Urine RBC (Auto)   


 


Urine WBC Clumps (Auto)   


 


Urine Microscopic WBC   


 


Ur Squamous Epith Cells   


 


Amorphous Sediment   


 


Urine Bacteria   


 


Urine Yeast (Budding)   


 


Vancomycin Trough   














  10/02/17 10/02/17 10/02/17





  07:30 11:05 15:26


 


WBC   


 


RBC   


 


Hgb   


 


Hct   


 


MCV   


 


MCH   


 


MCHC   


 


RDW   


 


Plt Count   


 


Sodium    148


 


Potassium    4.5


 


Chloride    104


 


Carbon Dioxide    28


 


Anion Gap    20


 


BUN    32 H


 


Creatinine    3.7 H


 


Est GFR ( Amer)    14


 


Est GFR (Non-Af Amer)    12


 


POC Glucose (mg/dL)   241 H 


 


Random Glucose    203 H


 


Serum Osmolality  313 H  


 


Calcium    9.2


 


Total Bilirubin   


 


AST   


 


ALT   


 


Alkaline Phosphatase   


 


Troponin I   


 


Total Protein   


 


Albumin   


 


Globulin   


 


Albumin/Globulin Ratio   


 


Urine Color   


 


Urine Clarity   


 


Urine pH   


 


Ur Specific Gravity   


 


Urine Protein   


 


Urine Glucose (UA)   


 


Urine Ketones   


 


Urine Blood   


 


Urine Nitrate   


 


Urine Bilirubin   


 


Urine Urobilinogen   


 


Ur Leukocyte Esterase   


 


Urine RBC (Auto)   


 


Urine WBC Clumps (Auto)   


 


Urine Microscopic WBC   


 


Ur Squamous Epith Cells   


 


Amorphous Sediment   


 


Urine Bacteria   


 


Urine Yeast (Budding)   


 


Vancomycin Trough   














  10/02/17 10/02/17 10/02/17





  15:26 16:27 20:09


 


WBC   


 


RBC   


 


Hgb   


 


Hct   


 


MCV   


 


MCH   


 


MCHC   


 


RDW   


 


Plt Count   


 


Sodium   


 


Potassium   


 


Chloride   


 


Carbon Dioxide   


 


Anion Gap   


 


BUN   


 


Creatinine   


 


Est GFR ( Amer)   


 


Est GFR (Non-Af Amer)   


 


POC Glucose (mg/dL)   192 H  195 H


 


Random Glucose   


 


Serum Osmolality  312 H  


 


Calcium   


 


Total Bilirubin   


 


AST   


 


ALT   


 


Alkaline Phosphatase   


 


Troponin I   


 


Total Protein   


 


Albumin   


 


Globulin   


 


Albumin/Globulin Ratio   


 


Urine Color   


 


Urine Clarity   


 


Urine pH   


 


Ur Specific Gravity   


 


Urine Protein   


 


Urine Glucose (UA)   


 


Urine Ketones   


 


Urine Blood   


 


Urine Nitrate   


 


Urine Bilirubin   


 


Urine Urobilinogen   


 


Ur Leukocyte Esterase   


 


Urine RBC (Auto)   


 


Urine WBC Clumps (Auto)   


 


Urine Microscopic WBC   


 


Ur Squamous Epith Cells   


 


Amorphous Sediment   


 


Urine Bacteria   


 


Urine Yeast (Budding)   


 


Vancomycin Trough   














  10/02/17 10/02/17 10/03/17





  23:07 23:07 03:29


 


WBC   


 


RBC   


 


Hgb   


 


Hct   


 


MCV   


 


MCH   


 


MCHC   


 


RDW   


 


Plt Count   


 


Sodium  148  


 


Potassium  4.5  


 


Chloride  106  


 


Carbon Dioxide  27  


 


Anion Gap  19  


 


BUN  34 H  


 


Creatinine  3.9 H  


 


Est GFR ( Amer)  13  


 


Est GFR (Non-Af Amer)  11  


 


POC Glucose (mg/dL)   


 


Random Glucose  183 H  


 


Serum Osmolality   320 H 


 


Calcium  9.4  


 


Total Bilirubin   


 


AST   


 


ALT   


 


Alkaline Phosphatase   


 


Troponin I   


 


Total Protein   


 


Albumin   


 


Globulin   


 


Albumin/Globulin Ratio   


 


Urine Color    Light brown


 


Urine Clarity    Turbid


 


Urine pH    5.5


 


Ur Specific Gravity    1.025


 


Urine Protein    > 300


 


Urine Glucose (UA)    100


 


Urine Ketones    Trace


 


Urine Blood    Large


 


Urine Nitrate    Positive H


 


Urine Bilirubin    Small


 


Urine Urobilinogen    0.2


 


Ur Leukocyte Esterase    Large


 


Urine RBC (Auto)    1095 H


 


Urine WBC Clumps (Auto)    Mod H


 


Urine Microscopic WBC    800 H


 


Ur Squamous Epith Cells    3


 


Amorphous Sediment    Small


 


Urine Bacteria    Mod H


 


Urine Yeast (Budding)    Mod H


 


Vancomycin Trough   














  10/03/17 10/03/17 10/03/17





  04:15 04:15 04:15


 


WBC   14.5 H 


 


RBC   5.03 


 


Hgb   12.9 


 


Hct   44.3 


 


MCV   88.1 


 


MCH   25.7 L 


 


MCHC   29.2 L 


 


RDW   21.4 H 


 


Plt Count   297 


 


Sodium    151 H


 


Potassium    4.7


 


Chloride    108 H


 


Carbon Dioxide    26


 


Anion Gap    22 H


 


BUN    34 H


 


Creatinine    4.0 H


 


Est GFR ( Amer)    13


 


Est GFR (Non-Af Amer)    11


 


POC Glucose (mg/dL)   


 


Random Glucose    213 H


 


Serum Osmolality   


 


Calcium    9.5


 


Total Bilirubin    0.5


 


AST    36


 


ALT    31


 


Alkaline Phosphatase    99


 


Troponin I   


 


Total Protein    7.3


 


Albumin    3.6


 


Globulin    3.8


 


Albumin/Globulin Ratio    1.0


 


Urine Color   


 


Urine Clarity   


 


Urine pH   


 


Ur Specific Gravity   


 


Urine Protein   


 


Urine Glucose (UA)   


 


Urine Ketones   


 


Urine Blood   


 


Urine Nitrate   


 


Urine Bilirubin   


 


Urine Urobilinogen   


 


Ur Leukocyte Esterase   


 


Urine RBC (Auto)   


 


Urine WBC Clumps (Auto)   


 


Urine Microscopic WBC   


 


Ur Squamous Epith Cells   


 


Amorphous Sediment   


 


Urine Bacteria   


 


Urine Yeast (Budding)   


 


Vancomycin Trough  42.4 H  














  10/03/17 10/03/17 10/03/17





  04:35 11:33 15:52


 


WBC   


 


RBC   


 


Hgb   


 


Hct   


 


MCV   


 


MCH   


 


MCHC   


 


RDW   


 


Plt Count   


 


Sodium   


 


Potassium   


 


Chloride   


 


Carbon Dioxide   


 


Anion Gap   


 


BUN   


 


Creatinine   


 


Est GFR ( Amer)   


 


Est GFR (Non-Af Amer)   


 


POC Glucose (mg/dL)  254 H  291 H  221 H


 


Random Glucose   


 


Serum Osmolality   


 


Calcium   


 


Total Bilirubin   


 


AST   


 


ALT   


 


Alkaline Phosphatase   


 


Troponin I   


 


Total Protein   


 


Albumin   


 


Globulin   


 


Albumin/Globulin Ratio   


 


Urine Color   


 


Urine Clarity   


 


Urine pH   


 


Ur Specific Gravity   


 


Urine Protein   


 


Urine Glucose (UA)   


 


Urine Ketones   


 


Urine Blood   


 


Urine Nitrate   


 


Urine Bilirubin   


 


Urine Urobilinogen   


 


Ur Leukocyte Esterase   


 


Urine RBC (Auto)   


 


Urine WBC Clumps (Auto)   


 


Urine Microscopic WBC   


 


Ur Squamous Epith Cells   


 


Amorphous Sediment   


 


Urine Bacteria   


 


Urine Yeast (Budding)   


 


Vancomycin Trough   














  10/03/17 10/03/17 10/04/17





  20:30 20:56 04:54


 


WBC   


 


RBC   


 


Hgb   


 


Hct   


 


MCV   


 


MCH   


 


MCHC   


 


RDW   


 


Plt Count   


 


Sodium  141  


 


Potassium  3.3 L  


 


Chloride  98  


 


Carbon Dioxide  29  


 


Anion Gap  17  


 


BUN  13  


 


Creatinine  2.0 H  


 


Est GFR ( Amer)  29  


 


Est GFR (Non-Af Amer)  24  


 


POC Glucose (mg/dL)   165 H  200 H


 


Random Glucose  172 H  


 


Serum Osmolality   


 


Calcium  9.1  


 


Total Bilirubin   


 


AST   


 


ALT   


 


Alkaline Phosphatase   


 


Troponin I   


 


Total Protein   


 


Albumin   


 


Globulin   


 


Albumin/Globulin Ratio   


 


Urine Color   


 


Urine Clarity   


 


Urine pH   


 


Ur Specific Gravity   


 


Urine Protein   


 


Urine Glucose (UA)   


 


Urine Ketones   


 


Urine Blood   


 


Urine Nitrate   


 


Urine Bilirubin   


 


Urine Urobilinogen   


 


Ur Leukocyte Esterase   


 


Urine RBC (Auto)   


 


Urine WBC Clumps (Auto)   


 


Urine Microscopic WBC   


 


Ur Squamous Epith Cells   


 


Amorphous Sediment   


 


Urine Bacteria   


 


Urine Yeast (Budding)   


 


Vancomycin Trough   














  10/04/17 10/04/17 10/04/17





  05:00 05:00 11:17


 


WBC  11.0 H  


 


RBC  4.57  


 


Hgb  11.9 L  


 


Hct  39.5  


 


MCV  86.5  


 


MCH  26.0 L  


 


MCHC  30.1 L  


 


RDW  21.3 H  


 


Plt Count  214  


 


Sodium   141 


 


Potassium   4.0 


 


Chloride   100 


 


Carbon Dioxide   25 


 


Anion Gap   20 


 


BUN   18 H 


 


Creatinine   2.4 H 


 


Est GFR ( Amer)   24 


 


Est GFR (Non-Af Amer)   20 


 


POC Glucose (mg/dL)    216 H


 


Random Glucose   196 H 


 


Serum Osmolality   


 


Calcium   9.2 


 


Total Bilirubin   


 


AST   


 


ALT   


 


Alkaline Phosphatase   


 


Troponin I   


 


Total Protein   


 


Albumin   


 


Globulin   


 


Albumin/Globulin Ratio   


 


Urine Color   


 


Urine Clarity   


 


Urine pH   


 


Ur Specific Gravity   


 


Urine Protein   


 


Urine Glucose (UA)   


 


Urine Ketones   


 


Urine Blood   


 


Urine Nitrate   


 


Urine Bilirubin   


 


Urine Urobilinogen   


 


Ur Leukocyte Esterase   


 


Urine RBC (Auto)   


 


Urine WBC Clumps (Auto)   


 


Urine Microscopic WBC   


 


Ur Squamous Epith Cells   


 


Amorphous Sediment   


 


Urine Bacteria   


 


Urine Yeast (Budding)   


 


Vancomycin Trough   








 Microbiology





10/02/17 05:00   Blood-Venous   Blood Culture - Preliminary


                            NO GROWTH AFTER 48 HOURS


09/30/17 18:00   Blood-Venous   Blood Culture - Preliminary


                            NO GROWTH AFTER 3 DAYS


09/30/17 16:00   Blood-Venous   S.aureus & Coag-Neg Staph PNA FISH - Final


09/30/17 16:00   Blood-Venous   Blood Culture - Final


                            Coagulase Neg Staphylococcus


09/30/17 16:00   Blood-Venous   Gram Stain - Final


09/30/17 20:00   Naris   MRSA Culture (Admit) - Final


                            MRSA DETECTED





 





 














Assessment and Plan


(1) Cerebellar infarct


Status: Acute   





(2) Basal cell carcinoma of chest


Status: Acute   





(3) ESRD on hemodialysis


Status: Acute   





(4) Bacteremia due to Staphylococcus epidermidis


Status: Acute   





(5) DM2 (diabetes mellitus, type 2)


Status: Chronic   





- Assessment and Plan (Free Text)


Assessment: 





A/P-


Patient is a 77 year old female with multiple medical conditions including DM II

, CAD, ESRD on HD, left foot TMA , A.fib admitted with nausea found to have 

cerebellar infarct as per Neurosurgery not a surgical candiidate.


found to have coag neg staph in one blood cx on this admission.








she had OM of her left TMA site and was treated with 6 weeks of IV abx in 6/ 2017.


the source of the coag neg staph bacteremia could be the open chest wall wound 

vs contamination.


pt. is afebrile and has normal wbc count.


her previous admission did show coag neg staph from chest wall wound cx and she 

also found to have basal cell carcinoma of the chest  wall wound( not sure if 

she was ever treated for the basal cell ).





afebrile


one blood cx- coag neg staph


repeat blood cx- neg x 2


leukocytosis- almost resolved


TTE- no mention of any vegetations on report as read by cardiologist.











Plan-


check 2 more blood cx.


continue with IV vancomyicn post HD days pending further results.


keep trough <20.


podiatry to evaluate the nonhealing left TMA site ulcer.


check left foot xray .


cerebellar infarct management as per neuro.


primary doc to determine if the basal cell CA of the chest was treated and if 

not advise  to get oncology evaluation.





 all above d/w ICu team





ICu time 45 min.

## 2017-10-04 NOTE — CP.PCM.PN
Subjective





- Date & Time of Evaluation


Date of Evaluation: 10/04/17


Time of Evaluation: 14:41





- Subjective


Subjective: 





Patient and bed more awake


Grandson at the bedside


Patient appeared to be talking but confused according to the grandson





Objective





- Vital Signs/Intake and Output


Vital Signs (last 24 hours): 


 











Temp Pulse Resp BP Pulse Ox


 


 97.7 F   88   19   186/91 H  97 


 


 10/04/17 12:00  10/04/17 14:00  10/04/17 14:00  10/04/17 14:00  10/04/17 14:00








Intake and Output: 


 











 10/04/17 10/04/17





 06:59 18:59


 


Intake Total 52 610


 


Output Total 2060 


 


Balance -2008 610














- Medications


Medications: 


 Current Medications





Acetaminophen (Tylenol 650mg/20.3ml Solution Ud)  650 mg PO Q6 PRN


   PRN Reason: Headache


   Last Admin: 10/02/17 20:06 Dose:  650 mg


Amlodipine Besylate (Norvasc)  5 mg PO DAILY [facility]


   Last Admin: 10/04/17 09:11 Dose:  5 mg


Artificial Tears (Artificial Tears)  2 drop OU Q4 PRN


   PRN Reason: Dry eyes


   Last Admin: 10/04/17 09:09 Dose:  2 drop


Aspirin (Ecotrin)  81 mg PO DAILY [facility]


   Last Admin: 10/04/17 09:10 Dose:  81 mg


Home Med (Rosuvastatin Calcium [Crestor])  40 mg PO HS [facility]


   Last Admin: 10/03/17 21:30 Dose:  40 mg


Vancomycin HCl 1 gm/ Sodium (Chloride)  250 mls @ 166.667 mls/hr IVPB MWF [facility]


   Last Admin: 10/04/17 09:55 Dose:  166.667 mls/hr


Insulin Human Regular (Humulin R)  0 units SC ACCU-CHECK [facility]


   PRN Reason: Protocol


   Last Admin: 10/04/17 11:25 Dose:  2 units


Levothyroxine Sodium (Synthroid)  75 mcg PO DAILY@0630 [facility]


   Last Admin: 10/04/17 05:51 Dose:  75 mcg


Metoprolol Succinate (Toprol Xl)  50 mg PO DAILY [facility]


   Last Admin: 10/04/17 09:12 Dose:  50 mg


Pantoprazole Sodium (Protonix Ec Tab)  40 mg PO DAILY [facility]


Vitamin B Complex/Vit C/Folic Acid (Nephro-Brianna)  1 tab PO DAILY [facility]











- Labs


Labs: 


 





 10/04/17 05:00 





 10/04/17 05:00 





 











PT  13.7 Seconds (9.8-13.1)  H  09/30/17  16:44    


 


INR  1.3  (0.9-1.2)  H  09/30/17  16:44    


 


APTT  27.9 Seconds (25.6-37.1)   09/30/17  16:44    














- Constitutional


Appears: No Acute Distress





- ENT Exam


ENT Exam: Mucous Membranes Moist





- Respiratory Exam


Respiratory Exam: absent: Chest Wall Tenderness





- Cardiovascular Exam


Cardiovascular Exam: absent: JVD, Rubs





- GI/Abdominal Exam


GI & Abdominal Exam: absent: Guarding





- Extremities Exam


Extremities Exam: absent: Calf Tenderness





- Back Exam


Back Exam: absent: CVA tenderness (L), CVA tenderness (R)





- Neurological Exam


Neurological Exam: Altered





Assessment and Plan


(1) Cerebellar infarct


Status: Acute   





(2) CKD (chronic kidney disease) stage V requiring chronic dialysis


Assessment & Plan: 


Patient with end stage renal disease who was admitted with cerebral infarction


Continue to improve somewhat better


Dialysis to be continued as is scheduled TTS


Vital sign noted the latest blood pressure somewhat elevated to be rechecked 

again


Status: Acute

## 2017-10-04 NOTE — CP.CCUPN
CCU Subjective





- Physician Review


Subjective (Free Text): 








Awake and alert, had usual HD yesterday, follow commands, no new deficits 

observed. Denies any headaches, dizziness, palpitations, nausea, vomiting, SOB, 

CP. 








ROS:  No other pertinent negs or positives on 10+  system review. 





PMSFH:  Anemia, Arthritis, Atrial Fibrillation, CAD, CHF, DM II,  Gall Bladder 

Disease with Breanna, HTN, Hypercholesterolemia, Kidney Stones, Pneumonia, End 

Stage Renal Disease (TTS)


All Nursing and physician documentation reviewed to date; no new pertinent info 

noted relevant to current medical problems.





MAJOR PROBLEMS:


1.   Right Cerebellar Acute / SubAcute Infarct with localized edema and shift 

to the Left


2.   Accelerated HTN


3.   ESRD


4.   Mild Coagulopathy


5.   Mild Troponin Elevation





PLAN:


1.   Has been under neuro-observation in ICU over the past 5 days including 

today. No new neurologic deterioration anticipated, will discuss with Neuro 

regarding stability for transfer to StepDown bed, and whether or not there is 

any need for further CT Brain imaging. last CT Brain done on admission 5 days 

ago. 


2.   Conservative Neuro mgmt noted.














CCU Objective





- Vital Signs / Intake & Output


Vital Signs (Last 4 hours): 


Vital Signs











  Temp Pulse Resp BP Pulse Ox


 


 10/04/17 12:00  97.7 F  103 H  16  155/81 H  98


 


 10/04/17 10:00   112 H  21  144/71  96











Intake and Output (Last 8hrs): 


 Intake & Output











 10/03/17 10/04/17 10/04/17





 22:59 06:59 14:59


 


Intake Total 30 22 370


 


Output Total 2060 50 


 


Balance -2030 -28 370


 


Weight   150 lb


 


Intake:   


 


  IV 30 22 


 


  Intake, Piggyback   250


 


  Oral   120


 


Output:   


 


  Urine 60 50 


 


    Urine, Voided 60 50 


 


  Emesis 2000  














- Physical Exam


Head: Positive for: Atraumatic


Pupils: Positive for: Other


Extroacular Muscles: Positive for: EOMI (L pupil opacified, R pupil 3 mm and 

reactive.)


Conjunctiva: Positive for: Normal


Mouth: Positive for: Moist Mucous Membranes


Nose (External): Positive for: Atraumatic


Neck: Positive for: Normal Range of Motion.  Negative for: JVD, Lymphadenopathy


Respiratory/Chest: Positive for: Decreased Breath Sounds.  Negative for: 

Accessory Muscle Use, Rales, Rhonchi


Cardiovascular: Positive for: Regular Rate and Rhythm.  Negative for: Murmurs, 

Rub


Abdomen: Positive for: Normal Bowel Sounds.  Negative for: Tenderness, 

Distention


Upper Extremity: Positive for: Other (RUE AV Shunt with good bruit and thrill)


Lower Extremity: Positive for: Edema (trace), Deformity (Left foot TMA).  

Negative for: CALF TENDERNESS


Neurological: Positive for: Motor Func Grossly Intact


Skin: Positive for: Warm, Normal Color.  Negative for: Rashes, Abscess


Psychiatric: Positive for: Alert





- Medications


Active Medications: 


Active Medications











Generic Name Dose Route Start Last Admin





  Trade Name Freq  PRN Reason Stop Dose Admin


 


Acetaminophen  650 mg  09/30/17 21:44  10/02/17 20:06





  Tylenol 650mg/20.3ml Solution Ud  PO   650 mg





  Q6 PRN   Administration





  Headache   


 


Amlodipine Besylate  5 mg  10/02/17 09:00  10/04/17 09:11





  Norvasc  PO   5 mg





  DAILY BROCK   Administration


 


Artificial Tears  2 drop  10/01/17 12:52  10/04/17 09:09





  Artificial Tears  OU   2 drop





  Q4 PRN   Administration





  Dry eyes   


 


Aspirin  81 mg  10/01/17 09:00  10/04/17 09:10





  Ecotrin  PO   81 mg





  DAILY BROCK   Administration


 


Home Med  40 mg  10/01/17 22:00  10/03/17 21:30





  Rosuvastatin Calcium [Crestor]  PO   40 mg





  HS BROCK   Administration


 


Vancomycin HCl 1 gm/ Sodium  250 mls @ 166.667 mls/hr  10/02/17 20:29  10/04/17 

09:55





  Chloride  IVPB   166.667 mls/hr





  MWF BROCK   Administration


 


Insulin Human Regular  0 units  09/30/17 23:00  10/04/17 11:25





  Humulin R  SC   2 units





  ACCU-CHECK BROCK   Administration





  Protocol   


 


Levothyroxine Sodium  75 mcg  10/02/17 06:30  10/04/17 05:51





  Synthroid  PO   75 mcg





  DAILY@0630 BROCK   Administration


 


Metoprolol Succinate  50 mg  10/01/17 09:00  10/04/17 09:12





  Toprol Xl  PO   50 mg





  DAILY BROCK   Administration


 


Pantoprazole Sodium  40 mg  10/05/17 09:00  





  Protonix Ec Tab  PO   





  DAILY BROCK   


 


Vitamin B Complex/Vit C/Folic Acid  1 tab  10/01/17 09:00  





  Nephro-Brianna  PO   





  DAILY BROCK   














- Patient Studies


Lab Studies: 


 Microbiology Studies











 10/02/17 05:00 Blood Culture - Preliminary





 Blood-Venous    NO GROWTH AFTER 48 HOURS


 


 09/30/17 18:00 Blood Culture - Preliminary





 Blood-Venous    NO GROWTH AFTER 3 DAYS








 Lab Studies











  10/04/17 10/04/17 10/04/17 Range/Units





  11:17 05:00 05:00 


 


WBC    11.0 H  (4.8-10.8)  K/uL


 


RBC    4.57  (3.80-5.20)  Mil/uL


 


Hgb    11.9 L  (12.0-16.0)  g/dL


 


Hct    39.5  (34.0-47.0)  %


 


MCV    86.5  (81.0-99.0)  fl


 


MCH    26.0 L  (27.0-31.0)  pg


 


MCHC    30.1 L  (33.0-37.0)  g/dL


 


RDW    21.3 H  (11.5-14.5)  %


 


Plt Count    214  (130-400)  K/uL


 


Sodium   141   (132-148)  mmol/l


 


Potassium   4.0   (3.6-5.0)  MMOL/L


 


Chloride   100   ()  mmol/L


 


Carbon Dioxide   25   (22-30)  mmol/L


 


Anion Gap   20   (10-20)  


 


BUN   18 H   (7-17)  mg/dl


 


Creatinine   2.4 H   (0.7-1.2)  mg/dL


 


Est GFR ( Amer)   24   


 


Est GFR (Non-Af Amer)   20   


 


POC Glucose (mg/dL)  216 H    ()  mg/dL


 


Random Glucose   196 H   ()  mg/dL


 


Calcium   9.2   (8.4-10.2)  mg/dL














  10/04/17 10/03/17 10/03/17 Range/Units





  04:54 20:56 20:30 


 


WBC     (4.8-10.8)  K/uL


 


RBC     (3.80-5.20)  Mil/uL


 


Hgb     (12.0-16.0)  g/dL


 


Hct     (34.0-47.0)  %


 


MCV     (81.0-99.0)  fl


 


MCH     (27.0-31.0)  pg


 


MCHC     (33.0-37.0)  g/dL


 


RDW     (11.5-14.5)  %


 


Plt Count     (130-400)  K/uL


 


Sodium    141  (132-148)  mmol/l


 


Potassium    3.3 L  (3.6-5.0)  MMOL/L


 


Chloride    98  ()  mmol/L


 


Carbon Dioxide    29  (22-30)  mmol/L


 


Anion Gap    17  (10-20)  


 


BUN    13  (7-17)  mg/dl


 


Creatinine    2.0 H  (0.7-1.2)  mg/dL


 


Est GFR ( Amer)    29  


 


Est GFR (Non-Af Amer)    24  


 


POC Glucose (mg/dL)  200 H  165 H   ()  mg/dL


 


Random Glucose    172 H  ()  mg/dL


 


Calcium    9.1  (8.4-10.2)  mg/dL














  10/03/17 Range/Units





  15:52 


 


WBC   (4.8-10.8)  K/uL


 


RBC   (3.80-5.20)  Mil/uL


 


Hgb   (12.0-16.0)  g/dL


 


Hct   (34.0-47.0)  %


 


MCV   (81.0-99.0)  fl


 


MCH   (27.0-31.0)  pg


 


MCHC   (33.0-37.0)  g/dL


 


RDW   (11.5-14.5)  %


 


Plt Count   (130-400)  K/uL


 


Sodium   (132-148)  mmol/l


 


Potassium   (3.6-5.0)  MMOL/L


 


Chloride   ()  mmol/L


 


Carbon Dioxide   (22-30)  mmol/L


 


Anion Gap   (10-20)  


 


BUN   (7-17)  mg/dl


 


Creatinine   (0.7-1.2)  mg/dL


 


Est GFR (African Amer)   


 


Est GFR (Non-Af Amer)   


 


POC Glucose (mg/dL)  221 H  ()  mg/dL


 


Random Glucose   ()  mg/dL


 


Calcium   (8.4-10.2)  mg/dL








 Laboratory Results - last 24 hr











  10/03/17 10/03/17 10/03/17





  15:52 20:30 20:56


 


WBC   


 


RBC   


 


Hgb   


 


Hct   


 


MCV   


 


MCH   


 


MCHC   


 


RDW   


 


Plt Count   


 


Sodium   141 


 


Potassium   3.3 L 


 


Chloride   98 


 


Carbon Dioxide   29 


 


Anion Gap   17 


 


BUN   13 


 


Creatinine   2.0 H 


 


Est GFR ( Amer)   29 


 


Est GFR (Non-Af Amer)   24 


 


POC Glucose (mg/dL)  221 H   165 H


 


Random Glucose   172 H 


 


Calcium   9.1 














  10/04/17 10/04/17 10/04/17





  04:54 05:00 05:00


 


WBC   11.0 H 


 


RBC   4.57 


 


Hgb   11.9 L 


 


Hct   39.5 


 


MCV   86.5 


 


MCH   26.0 L 


 


MCHC   30.1 L 


 


RDW   21.3 H 


 


Plt Count   214 


 


Sodium    141


 


Potassium    4.0


 


Chloride    100


 


Carbon Dioxide    25


 


Anion Gap    20


 


BUN    18 H


 


Creatinine    2.4 H


 


Est GFR ( Amer)    24


 


Est GFR (Non-Af Amer)    20


 


POC Glucose (mg/dL)  200 H  


 


Random Glucose    196 H


 


Calcium    9.2














  10/04/17





  11:17


 


WBC 


 


RBC 


 


Hgb 


 


Hct 


 


MCV 


 


MCH 


 


MCHC 


 


RDW 


 


Plt Count 


 


Sodium 


 


Potassium 


 


Chloride 


 


Carbon Dioxide 


 


Anion Gap 


 


BUN 


 


Creatinine 


 


Est GFR ( Amer) 


 


Est GFR (Non-Af Amer) 


 


POC Glucose (mg/dL)  216 H


 


Random Glucose 


 


Calcium 











Fingerstick Blood Sugar Results: 216





Review of Systems





- Review of Systems


All systems: reviewed and no additional remarkable complaints except (as above)





Critical Care Progress Note





- Extremities/Vascular


Does the Patient have a Central Venous Catheter?: Yes


Insertion Site: PICC


Does the Patient have a Cedeno Catheter?: No


Does the Patient need a Cedeno Catheter?: No





- Nutrition


Nutrition: 


 Nutrition











 Category Date Time Status


 


 Dysphagia/Modified Consistency Diet [DIET] Diets  10/01/17 Dinner Active

## 2017-10-04 NOTE — CP.PCM.PN
<Garland Fowler - Last Filed: 10/04/17 16:35>





Subjective





- Date & Time of Evaluation


Date of Evaluation: 10/04/17


Time of Evaluation: 07:30





- Subjective


Subjective: 





- Patient seen and examined at bedside with Dr. Calix.


- Patient appears to be in no acute distress.


- Able to follow commands. No deterioration noted compared to yesterday





Objective





- Vital Signs/Intake and Output


Vital Signs (last 24 hours): 


 











Temp Pulse Resp BP Pulse Ox


 


 98.2 F   92 H  23   165/65 H  95 


 


 10/04/17 16:00  10/04/17 16:00  10/04/17 16:00  10/04/17 16:00  10/04/17 16:00








Intake and Output: 


 











 10/04/17 10/04/17





 06:59 18:59


 


Intake Total 52 660


 


Output Total 2060 


 


Balance -2008 660














- Medications


Medications: 


 Current Medications





Acetaminophen (Tylenol 650mg/20.3ml Solution Ud)  650 mg PO Q6 PRN


   PRN Reason: Headache


   Last Admin: 10/02/17 20:06 Dose:  650 mg


Amlodipine Besylate (Norvasc)  5 mg PO DAILY [facility]


   Last Admin: 10/04/17 09:11 Dose:  5 mg


Artificial Tears (Artificial Tears)  2 drop OU Q4 PRN


   PRN Reason: Dry eyes


   Last Admin: 10/04/17 09:09 Dose:  2 drop


Aspirin (Ecotrin)  81 mg PO DAILY [facility]


   Last Admin: 10/04/17 09:10 Dose:  81 mg


Home Med (Rosuvastatin Calcium [Crestor])  40 mg PO HS [facility]


   Last Admin: 10/03/17 21:30 Dose:  40 mg


Vancomycin HCl 1 gm/ Sodium (Chloride)  250 mls @ 166.667 mls/hr IVPB MWF [facility]


   Last Admin: 10/04/17 09:55 Dose:  166.667 mls/hr


Insulin Human Regular (Humulin R)  0 units SC ACCU-CHECK [facility]


   PRN Reason: Protocol


   Last Admin: 10/04/17 11:25 Dose:  2 units


Levothyroxine Sodium (Synthroid)  75 mcg PO DAILY@0630 [facility]


   Last Admin: 10/04/17 05:51 Dose:  75 mcg


Metoprolol Succinate (Toprol Xl)  50 mg PO DAILY [facility]


   Last Admin: 10/04/17 09:12 Dose:  50 mg


Pantoprazole Sodium (Protonix Ec Tab)  40 mg PO DAILY [facility]


Vitamin B Complex/Vit C/Folic Acid (Nephro-Brianna)  1 tab PO DAILY [facility]











- Labs


Labs: 


 





 10/04/17 05:00 





 10/04/17 05:00 





 











PT  13.7 Seconds (9.8-13.1)  H  09/30/17  16:44    


 


INR  1.3  (0.9-1.2)  H  09/30/17  16:44    


 


APTT  27.9 Seconds (25.6-37.1)   09/30/17  16:44    














- Constitutional


Appears: No Acute Distress





- Head Exam


Head Exam: NORMAL INSPECTION





- Eye Exam


Eye Exam: EOMI


Additional comments: 





left eye opacified





- Respiratory Exam


Respiratory Exam: Decreased Breath Sounds





- Cardiovascular Exam


Cardiovascular Exam: REGULAR RHYTHM, +S1, +S2





- GI/Abdominal Exam


GI & Abdominal Exam: Soft, Normal Bowel Sounds





- Extremities Exam


Additional comments: 





LEft foot TMA





- Neurological Exam


Neurological Exam: Awake, CN II-XII Intact





- Skin


Skin Exam: Normal Color, Warm





Assessment and Plan





- Assessment and Plan (Free Text)


Assessment: 








1) Right cerebellar Acute/ Sub acute infarct


- Head elevation of bed


- monitor osmolarity


- Continue with ICU and neuro recommendation





2) Systolic heart failure





3) End stage renal disease


- Nephro on board


- Dialysis [facility]





4) Non healing left TMA site ulcer


-IV vanco


- ID on board





4) Code status DNR/DNI





<Victor M Calix K - Last Filed: 10/05/17 16:42>





Objective





- Vital Signs/Intake and Output


Vital Signs (last 24 hours): 


 











Temp Pulse Resp BP Pulse Ox


 


 98.6 F   80   23   157/76 H  100 


 


 10/05/17 12:00  10/05/17 12:00  10/05/17 12:00  10/05/17 12:00  10/05/17 12:00








Intake and Output: 


 











 10/05/17 10/05/17





 06:59 18:59


 


Intake Total 50 


 


Output Total 100 


 


Balance -50 














- Medications


Medications: 


 Current Medications





Acetaminophen (Tylenol 650mg/20.3ml Solution Ud)  650 mg PO Q6 PRN


   PRN Reason: Headache


   Last Admin: 10/04/17 19:17 Dose:  650 mg


Amlodipine Besylate (Norvasc)  5 mg PO DAILY [facility]


   Last Admin: 10/05/17 10:12 Dose:  5 mg


Artificial Tears (Artificial Tears)  2 drop OU Q4 PRN


   PRN Reason: Dry eyes


   Last Admin: 10/05/17 10:11 Dose:  2 drop


Aspirin (Ecotrin)  81 mg PO DAILY [facility]


   Last Admin: 10/05/17 10:12 Dose:  81 mg


Home Med (Rosuvastatin Calcium [Crestor])  40 mg PO HS [facility]


   Last Admin: 10/04/17 21:14 Dose:  40 mg


Vancomycin HCl 1 gm/ Sodium (Chloride)  250 mls @ 166.667 mls/hr IVPB MWF [facility]


   Last Admin: 10/04/17 09:55 Dose:  166.667 mls/hr


Insulin Human Regular (Humulin R)  0 units SC ACCU-CHECK [facility]


   PRN Reason: Protocol


   Last Admin: 10/05/17 06:24 Dose:  Not Given


Levothyroxine Sodium (Synthroid)  75 mcg PO DAILY@0630 [facility]


   Last Admin: 10/05/17 06:21 Dose:  75 mcg


Metoprolol Succinate (Toprol Xl)  50 mg PO DAILY [facility]


   Last Admin: 10/05/17 10:13 Dose:  50 mg


Pantoprazole Sodium (Protonix Ec Tab)  40 mg PO DAILY [facility]


   Last Admin: 10/05/17 10:12 Dose:  40 mg


Tobramycin/Dexamethasone (Tobradex 0.3%-0.1% Opht Oint)  1 appl OU TID [facility]


Vitamin B Complex/Vit C/Folic Acid (Nephro-Brianna)  1 tab PO DAILY [facility]











- Labs


Labs: 


 





 10/05/17 04:15 





 10/05/17 04:15 





 











PT  13.7 Seconds (9.8-13.1)  H  09/30/17  16:44    


 


INR  1.3  (0.9-1.2)  H  09/30/17  16:44    


 


APTT  27.9 Seconds (25.6-37.1)   09/30/17  16:44    














Assessment and Plan





- Assessment and Plan (Free Text)


Assessment: 


Patient was personally seen and examined by me in rounds with residents.


Available labs and diagnostic data reviewed.


Case, Patient's condition and management plan Discussed with residents in 

rounds.


Agree with resident's progress note.


Plan: As ordered.

## 2017-10-04 NOTE — CP.PCM.PN
Subjective





- Date & Time of Evaluation


Date of Evaluation: 10/04/17


Time of Evaluation: 09:01





- Subjective


Subjective: 





Ms. Osuna was seen and examined at the bedside. She is responsive to all stimuli 

and able to follow commands. She is not in any kind of distress. There is no 

untoward events overnight.





Objective





- Vital Signs/Intake and Output


Vital Signs (last 24 hours): 


 











Temp Pulse Resp BP Pulse Ox


 


 98.5 F   94 H  16   165/87 H  92 L


 


 10/04/17 08:00  10/04/17 08:00  10/04/17 08:00  10/04/17 08:00  10/04/17 08:00








Intake and Output: 


 











 10/04/17 10/04/17





 06:59 18:59


 


Intake Total 52 


 


Output Total 2060 


 


Balance -2008 














- Medications


Medications: 


 Current Medications





Acetaminophen (Tylenol 650mg/20.3ml Solution Ud)  650 mg PO Q6 PRN


   PRN Reason: Headache


   Last Admin: 10/02/17 20:06 Dose:  650 mg


Amlodipine Besylate (Norvasc)  5 mg PO DAILY [facility]


   Last Admin: 10/03/17 09:38 Dose:  5 mg


Artificial Tears (Artificial Tears)  2 drop OU Q4 PRN


   PRN Reason: Dry eyes


   Last Admin: 10/03/17 09:37 Dose:  2 drop


Aspirin (Ecotrin)  81 mg PO DAILY [facility]


   Last Admin: 10/03/17 09:38 Dose:  81 mg


Home Med (Rosuvastatin Calcium [Crestor])  40 mg PO HS [facility]


   Last Admin: 10/03/17 21:30 Dose:  40 mg


Vancomycin HCl 1 gm/ Sodium (Chloride)  250 mls @ 166.667 mls/hr IVPB MWF [facility]


   Last Admin: 10/02/17 22:30 Dose:  166.667 mls/hr


Insulin Human Regular (Humulin R)  0 units SC ACCU-CHECK [facility]


   PRN Reason: Protocol


   Last Admin: 10/03/17 22:00 Dose:  Not Given


Levothyroxine Sodium (Synthroid)  75 mcg PO DAILY@0630 [facility]


   Last Admin: 10/04/17 05:51 Dose:  75 mcg


Metoprolol Succinate (Toprol Xl)  50 mg PO DAILY [facility]


   Last Admin: 10/03/17 09:41 Dose:  50 mg


Pantoprazole Sodium (Protonix Inj)  40 mg IVP DAILY [facility]


   Last Admin: 10/03/17 09:46 Dose:  40 mg


Vitamin B Complex/Vit C/Folic Acid (Nephro-Brianna)  1 tab PO DAILY [facility]











- Labs


Labs: 


 





 10/04/17 05:00 





 10/04/17 05:00 





 











PT  13.7 Seconds (9.8-13.1)  H  09/30/17  16:44    


 


INR  1.3  (0.9-1.2)  H  09/30/17  16:44    


 


APTT  27.9 Seconds (25.6-37.1)   09/30/17  16:44    














- Constitutional


Appears: Well





- Head Exam


Head Exam: ATRAUMATIC, NORMAL INSPECTION, NORMOCEPHALIC





- Neurological Exam


Neurological Exam: Alert, Awake, CN II-XII Intact, Oriented x3


Neuro motor strength exam: Left Upper Extremity: 3, Right Upper Extremity: 3, 

Left Lower Extremity: 3, Right Lower Extremity: 3


Additional comments: 





Neurological unchanged from previous examination. She is able to follow 

commands such as finger to nose, accommodation, sensation remains intact.





Assessment and Plan


(1) Cerebellar infarct


Assessment & Plan: 


Case discussed with Dr. Jones, continue conservative management with DVT 

prophylaxis. Continue physical and occupational therapies.


Status: Acute

## 2017-10-05 LAB
BUN SERPL-MCNC: 28 MG/DL (ref 7–17)
CALCIUM SERPL-MCNC: 8.9 MG/DL (ref 8.4–10.2)
CHLORIDE SERPL-SCNC: 100 MMOL/L (ref 98–107)
CO2 SERPL-SCNC: 27 MMOL/L (ref 22–30)
ERYTHROCYTE [DISTWIDTH] IN BLOOD BY AUTOMATED COUNT: 21.3 % (ref 11.5–14.5)
GLUCOSE SERPL-MCNC: 178 MG/DL (ref 65–105)
HCT VFR BLD CALC: 39.3 % (ref 34–47)
MCH RBC QN AUTO: 25.7 PG (ref 27–31)
MCHC RBC AUTO-ENTMCNC: 29.8 G/DL (ref 33–37)
MCV RBC AUTO: 86.2 FL (ref 81–99)
PHOSPHATE SERPL-MCNC: 3 MG/DL (ref 2.5–4.5)
PLATELET # BLD: 200 K/UL (ref 130–400)
POTASSIUM SERPL-SCNC: 3.6 MMOL/L (ref 3.6–5)
SODIUM SERPL-SCNC: 139 MMOL/L (ref 132–148)
WBC # BLD AUTO: 8.7 K/UL (ref 4.8–10.8)

## 2017-10-05 RX ADMIN — TOBRAMYCIN AND DEXAMETHASONE SCH OIN: 3; 1 OINTMENT OPHTHALMIC at 10:00

## 2017-10-05 RX ADMIN — POLYVINYL ALCOHOL PRN DROP: 14 SOLUTION/ DROPS OPHTHALMIC at 10:11

## 2017-10-05 RX ADMIN — TOBRAMYCIN AND DEXAMETHASONE SCH OIN: 3; 1 OINTMENT OPHTHALMIC at 17:55

## 2017-10-05 RX ADMIN — METOPROLOL SUCCINATE SCH MG: 50 TABLET, EXTENDED RELEASE ORAL at 10:13

## 2017-10-05 RX ADMIN — PANTOPRAZOLE SODIUM SCH MG: 40 TABLET, DELAYED RELEASE ORAL at 10:12

## 2017-10-05 RX ADMIN — TOBRAMYCIN AND DEXAMETHASONE SCH OIN: 3; 1 OINTMENT OPHTHALMIC at 13:00

## 2017-10-05 NOTE — CP.PCM.PN
<Garland Fowler - Last Filed: 10/05/17 08:47>





Subjective





- Date & Time of Evaluation


Date of Evaluation: 10/05/17


Time of Evaluation: 08:35





- Subjective


Subjective: 





Patient seen and examined at bedside with Dr. Calix


- Patient was seen getting dialysis this morning. She is feeling better. No 

nausea or vomiting reported. 





Objective





- Vital Signs/Intake and Output


Vital Signs (last 24 hours): 


 











Temp Pulse Resp BP Pulse Ox


 


 97.1 F L  92 H  14   131/96 H  98 


 


 10/05/17 04:00  10/05/17 06:00  10/05/17 06:00  10/05/17 06:00  10/05/17 06:00








Intake and Output: 


 











 10/05/17 10/05/17





 06:59 18:59


 


Intake Total 50 


 


Output Total 100 


 


Balance -50 














- Medications


Medications: 


 Current Medications





Acetaminophen (Tylenol 650mg/20.3ml Solution Ud)  650 mg PO Q6 PRN


   PRN Reason: Headache


   Last Admin: 10/04/17 19:17 Dose:  650 mg


Amlodipine Besylate (Norvasc)  5 mg PO DAILY [facility]


   Last Admin: 10/04/17 09:11 Dose:  5 mg


Artificial Tears (Artificial Tears)  2 drop OU Q4 PRN


   PRN Reason: Dry eyes


   Last Admin: 10/04/17 21:15 Dose:  2 drop


Aspirin (Ecotrin)  81 mg PO DAILY [facility]


   Last Admin: 10/04/17 09:10 Dose:  81 mg


Home Med (Rosuvastatin Calcium [Crestor])  40 mg PO HS [facility]


   Last Admin: 10/04/17 21:14 Dose:  40 mg


Vancomycin HCl 1 gm/ Sodium (Chloride)  250 mls @ 166.667 mls/hr IVPB MWF [facility]


   Last Admin: 10/04/17 09:55 Dose:  166.667 mls/hr


Insulin Human Regular (Humulin R)  0 units SC ACCU-CHECK [facility]


   PRN Reason: Protocol


   Last Admin: 10/05/17 06:24 Dose:  Not Given


Levothyroxine Sodium (Synthroid)  75 mcg PO DAILY@0630 [facility]


   Last Admin: 10/05/17 06:21 Dose:  75 mcg


Metoprolol Succinate (Toprol Xl)  50 mg PO DAILY [facility]


   Last Admin: 10/04/17 09:12 Dose:  50 mg


Pantoprazole Sodium (Protonix Ec Tab)  40 mg PO DAILY [facility]


Tobramycin/Dexamethasone (Tobradex 0.3%-0.1% Opht Oint)  1 appl OU TID [facility]


Vitamin B Complex/Vit C/Folic Acid (Nephro-Brianna)  1 tab PO DAILY [facility]











- Labs


Labs: 


 





 10/05/17 04:15 





 10/05/17 04:15 





 











PT  13.7 Seconds (9.8-13.1)  H  09/30/17  16:44    


 


INR  1.3  (0.9-1.2)  H  09/30/17  16:44    


 


APTT  27.9 Seconds (25.6-37.1)   09/30/17  16:44    














- Head Exam


Head Exam: ATRAUMATIC





- Eye Exam


Additional comments: 





left eye opacification. 





- ENT Exam


ENT Exam: Mucous Membranes Moist





- Neck Exam


Neck Exam: Normal Inspection





- Respiratory Exam


Respiratory Exam: Decreased Breath Sounds





- Cardiovascular Exam


Cardiovascular Exam: REGULAR RHYTHM, +S1, +S2





- GI/Abdominal Exam


GI & Abdominal Exam: Soft





- Extremities Exam


Additional comments: 





RUE AV shunt


Left foot TMA





- Neurological Exam


Neurological Exam: Awake





Assessment and Plan





- Assessment and Plan (Free Text)


Assessment: 





1) Right cerebellar Acute/ Sub acute infarct


- Head elevation of bed


- monitor osmolarity


- Continue with ICU and neuro recommendation





2) Systolic heart failure





3) End stage renal disease


- Nephro on board


- Dialysis [facility]





4) Non healing left TMA site ulcer


-Antibiotics as per ID


- ID on board





4) Code status DNR/DNI





<Victor M Calix K - Last Filed: 10/05/17 16:46>





Objective





- Vital Signs/Intake and Output


Vital Signs (last 24 hours): 


 











Temp Pulse Resp BP Pulse Ox


 


 98.6 F   80   23   157/76 H  100 


 


 10/05/17 12:00  10/05/17 12:00  10/05/17 12:00  10/05/17 12:00  10/05/17 12:00








Intake and Output: 


 











 10/05/17 10/05/17





 06:59 18:59


 


Intake Total 50 


 


Output Total 100 


 


Balance -50 














- Medications


Medications: 


 Current Medications





Acetaminophen (Tylenol 650mg/20.3ml Solution Ud)  650 mg PO Q6 PRN


   PRN Reason: Headache


   Last Admin: 10/04/17 19:17 Dose:  650 mg


Amlodipine Besylate (Norvasc)  5 mg PO DAILY [facility]


   Last Admin: 10/05/17 10:12 Dose:  5 mg


Artificial Tears (Artificial Tears)  2 drop OU Q4 PRN


   PRN Reason: Dry eyes


   Last Admin: 10/05/17 10:11 Dose:  2 drop


Aspirin (Ecotrin)  81 mg PO DAILY [facility]


   Last Admin: 10/05/17 10:12 Dose:  81 mg


Home Med (Rosuvastatin Calcium [Crestor])  40 mg PO HS [facility]


   Last Admin: 10/04/17 21:14 Dose:  40 mg


Vancomycin HCl 1 gm/ Sodium (Chloride)  250 mls @ 166.667 mls/hr IVPB MWF [facility]


   Last Admin: 10/04/17 09:55 Dose:  166.667 mls/hr


Insulin Human Regular (Humulin R)  0 units SC ACCU-CHECK [facility]


   PRN Reason: Protocol


   Last Admin: 10/05/17 06:24 Dose:  Not Given


Levothyroxine Sodium (Synthroid)  75 mcg PO DAILY@0630 [facility]


   Last Admin: 10/05/17 06:21 Dose:  75 mcg


Metoprolol Succinate (Toprol Xl)  50 mg PO DAILY [facility]


   Last Admin: 10/05/17 10:13 Dose:  50 mg


Pantoprazole Sodium (Protonix Ec Tab)  40 mg PO DAILY [facility]


   Last Admin: 10/05/17 10:12 Dose:  40 mg


Tobramycin/Dexamethasone (Tobradex 0.3%-0.1% Opht Oint)  1 appl OU TID [facility]


Vitamin B Complex/Vit C/Folic Acid (Nephro-Brianna)  1 tab PO DAILY [facility]











- Labs


Labs: 


 





 10/05/17 04:15 





 10/05/17 04:15 





 











PT  13.7 Seconds (9.8-13.1)  H  09/30/17  16:44    


 


INR  1.3  (0.9-1.2)  H  09/30/17  16:44    


 


APTT  27.9 Seconds (25.6-37.1)   09/30/17  16:44    














Assessment and Plan





- Assessment and Plan (Free Text)


Assessment: 


Patient was personally seen and examined by me in rounds with residents.


Available labs and diagnostic data reviewed.


Case, Patient's condition and management plan Discussed with residents in 

rounds.


Agree with resident's progress note.


Plan: As ordered.

## 2017-10-05 NOTE — CP.PCM.PN
Subjective





- Date & Time of Evaluation


Date of Evaluation: 10/05/17


Time of Evaluation: 07:31





- Subjective


Subjective: 





Podiatry Progress Note - Dr. Serna





77 year old female patient with extensive PMHx seen at bedside in ICU for non-

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 Patient seen receiving dialysis at time of 

visit. Patient more awake and alert today, responds to commands and answers 

questions. Patient denies any pain to her left foot today. Patient wearing 

multipodus boots b/l at time of visit.





Objective





- Vital Signs/Intake and Output


Vital Signs (last 24 hours): 


 











Temp Pulse Resp BP Pulse Ox


 


 97.1 F L  92 H  14   131/96 H  98 


 


 10/05/17 04:00  10/05/17 06:00  10/05/17 06:00  10/05/17 06:00  10/05/17 06:00








Intake and Output: 


 











 10/05/17 10/05/17





 06:59 18:59


 


Intake Total 50 


 


Output Total 100 


 


Balance -50 














- Medications


Medications: 


 Current Medications





Acetaminophen (Tylenol 650mg/20.3ml Solution Ud)  650 mg PO Q6 PRN


   PRN Reason: Headache


   Last Admin: 10/04/17 19:17 Dose:  650 mg


Amlodipine Besylate (Norvasc)  5 mg PO DAILY [facility]


   Last Admin: 10/04/17 09:11 Dose:  5 mg


Artificial Tears (Artificial Tears)  2 drop OU Q4 PRN


   PRN Reason: Dry eyes


   Last Admin: 10/04/17 21:15 Dose:  2 drop


Aspirin (Ecotrin)  81 mg PO DAILY [facility]


   Last Admin: 10/04/17 09:10 Dose:  81 mg


Home Med (Rosuvastatin Calcium [Crestor])  40 mg PO HS [facility]


   Last Admin: 10/04/17 21:14 Dose:  40 mg


Vancomycin HCl 1 gm/ Sodium (Chloride)  250 mls @ 166.667 mls/hr IVPB MWF [facility]


   Last Admin: 10/04/17 09:55 Dose:  166.667 mls/hr


Insulin Human Regular (Humulin R)  0 units SC ACCU-CHECK [facility]


   PRN Reason: Protocol


   Last Admin: 10/05/17 06:24 Dose:  Not Given


Levothyroxine Sodium (Synthroid)  75 mcg PO DAILY@0630 [facility]


   Last Admin: 10/05/17 06:21 Dose:  75 mcg


Metoprolol Succinate (Toprol Xl)  50 mg PO DAILY [facility]


   Last Admin: 10/04/17 09:12 Dose:  50 mg


Pantoprazole Sodium (Protonix Ec Tab)  40 mg PO DAILY [facility]


Vitamin B Complex/Vit C/Folic Acid (Nephro-Brianna)  1 tab PO DAILY [facility]











- Labs


Labs: 


 





 10/05/17 04:15 





 10/05/17 04:15 





 











PT  13.7 Seconds (9.8-13.1)  H  09/30/17  16:44    


 


INR  1.3  (0.9-1.2)  H  09/30/17  16:44    


 


APTT  27.9 Seconds (25.6-37.1)   09/30/17  16:44    














- Constitutional


Appears: Well, Non-toxic, No Acute Distress





- Extremities Exam


Additional comments: 





Patient wearing offloading boots at time of visit. Dressing to LLE appears clean

/dry/intact with no strikethrough noted.





VASC: DP pulses palpable 2/4 b/l. Right PT pulse palpable 2/4, left PT pulse non

-palpable. TG warm to warm. No edema noted.


NEURO: Gross sensation diminished bilaterally.


DERM: Ulceration noted to distolateral aspect of left TMA stump measuring 

approximately 5 x 2 x 0.2 cm with overlying eschar. Mild erythema noted to 

proximal aspect of wound, decreasing since last visit. No active drainage, 

purulence, malodor, fluctuance, or ascending cellulitis noted. (-)probe to bone


ORTHO: No tenderness to palpation bilateral lower extremity.





- Neurological Exam


Neurological Exam: Alert, Awake





Assessment and Plan





- Assessment and Plan (Free Text)


Assessment: 





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


Plan: 





Patient seen and evaluated at bedside


Discussed with attending, Dr. Serna


Chart, vitals, labs reviewed = afebrile, WBC wnl 8.7


Wound was dressed with DSD


C/w pain mgmt per medicine


C/w multipodus boots at all times in bed


Stable per podiatry


Podiatry will continue to follow patient while in house

## 2017-10-05 NOTE — CP.CCUPN
CCU Subjective





- Physician Review


Subjective (Free Text): 








Awake and alert, had usual HD today, follow commands, no new deficits observed. 

Denies any headaches, dizziness, palpitations, nausea, vomiting, SOB, CP. 

Repeat CT Brain as per Neuro: prelim report is negative for any hemorrhagic 

transformation and even slightly improved. 








ROS:  No other pertinent negs or positives on 10+  system review. 





PMSFH:  Anemia, Arthritis, Atrial Fibrillation, CAD, CHF, DM II,  Gall Bladder 

Disease with Breanna, HTN, Hypercholesterolemia, Kidney Stones, Pneumonia, End 

Stage Renal Disease (TTS)


All Nursing and physician documentation reviewed to date; no new pertinent info 

noted relevant to current medical problems.





MAJOR PROBLEMS:


1.   Right Cerebellar Acute / SubAcute Infarct with localized edema and shift 

to the Left


2.   Accelerated HTN


3.   ESRD


4.   Mild Coagulopathy


5.   Mild Troponin Elevation





PLAN:


1.   Has been under neuro-observation in ICU over the past 6 days including 

today. No new neurologic deterioration anticipated, will discuss with Neuro 

regarding stability for transfer to StepDown bed


2.   Apixaban. 


3.   Conservative Neuro mgmt noted.




















CCU Objective





- Vital Signs / Intake & Output


Intake and Output (Last 8hrs): 


 Intake & Output











 10/05/17 10/05/17 10/05/17





 06:59 14:59 22:59


 


Intake Total 30  


 


Output Total 80  


 


Balance -50  


 


Intake:   


 


  IV 30  


 


Output:   


 


  Urine 80  


 


    Urine, Voided 80  














- Physical Exam


Head: Positive for: Atraumatic


Pupils: Positive for: Other


Extroacular Muscles: Positive for: EOMI (L pupil opacified, R pupil 3 mm and 

reactive.)


Conjunctiva: Positive for: Normal


Mouth: Positive for: Moist Mucous Membranes


Nose (External): Positive for: Atraumatic


Neck: Positive for: Normal Range of Motion.  Negative for: JVD, Lymphadenopathy


Respiratory/Chest: Positive for: Decreased Breath Sounds.  Negative for: 

Accessory Muscle Use, Rales, Rhonchi


Cardiovascular: Positive for: Regular Rate and Rhythm.  Negative for: Murmurs, 

Rub


Abdomen: Positive for: Normal Bowel Sounds.  Negative for: Tenderness, 

Distention


Upper Extremity: Positive for: Other (RUE AV Shunt with good bruit and thrill)


Lower Extremity: Positive for: Edema (trace), Deformity (Left foot TMA).  

Negative for: CALF TENDERNESS


Neurological: Positive for: Motor Func Grossly Intact


Skin: Positive for: Warm, Normal Color.  Negative for: Rashes, Abscess


Psychiatric: Positive for: Alert





- Medications


Active Medications: 


Active Medications











Generic Name Dose Route Start Last Admin





  Trade Name Freq  PRN Reason Stop Dose Admin


 


Acetaminophen  650 mg  09/30/17 21:44  10/04/17 19:17





  Tylenol 650mg/20.3ml Solution Ud  PO   650 mg





  Q6 PRN   Administration





  Headache   


 


Amlodipine Besylate  5 mg  10/02/17 09:00  10/05/17 10:12





  Norvasc  PO   5 mg





  DAILY BROCK   Administration


 


Artificial Tears  2 drop  10/01/17 12:52  10/05/17 10:11





  Artificial Tears  OU   2 drop





  Q4 PRN   Administration





  Dry eyes   


 


Aspirin  81 mg  10/01/17 09:00  10/05/17 10:12





  Ecotrin  PO   81 mg





  DAILY BROCK   Administration


 


Home Med  40 mg  10/01/17 22:00  10/04/17 21:14





  Rosuvastatin Calcium [Crestor]  PO   40 mg





  [facility] BROCK   Administration


 


Vancomycin HCl 1 gm/ Sodium  250 mls @ 166.667 mls/hr  10/02/17 20:29  10/04/17 

09:55





  Chloride  IVPB   166.667 mls/hr





  MWF BROCK   Administration


 


Insulin Human Regular  0 units  09/30/17 23:00  10/05/17 06:24





  Humulin R  SC   Not Given





  ACCU-CHECK [facility]   





  Protocol   


 


Levothyroxine Sodium  75 mcg  10/02/17 06:30  10/05/17 06:21





  Synthroid  PO   75 mcg





  DAILY@0630 BROCK   Administration


 


Metoprolol Succinate  50 mg  10/01/17 09:00  10/05/17 10:13





  Toprol Xl  PO   50 mg





  DAILY BROCK   Administration


 


Pantoprazole Sodium  40 mg  10/05/17 09:00  10/05/17 10:12





  Protonix Ec Tab  PO   40 mg





  DAILY BROCK   Administration


 


Tobramycin/Dexamethasone  1 appl  10/05/17 09:00  





  Tobradex 0.3%-0.1% Opht Oint  OU   





  TID [facility]   


 


Vitamin B Complex/Vit C/Folic Acid  1 tab  10/01/17 09:00  





  Nephro-Brianna  PO   





  DAILY BROCK   














- Patient Studies


Lab Studies: 


 Microbiology Studies











 10/03/17 03:29 Urine Culture - Final





 Urine,Cedeno    Yeast Species


 


 10/02/17 05:00 Blood Culture - Preliminary





 Blood-Venous    NO GROWTH AFTER 3 DAYS


 


 09/30/17 18:00 Blood Culture - Preliminary





 Blood-Venous    NO GROWTH AFTER 4 DAYS








 Lab Studies











  10/05/17 10/05/17 10/05/17 Range/Units





  15:59 11:30 10:25 


 


WBC     (4.8-10.8)  K/uL


 


RBC     (3.80-5.20)  Mil/uL


 


Hgb     (12.0-16.0)  g/dL


 


Hct     (34.0-47.0)  %


 


MCV     (81.0-99.0)  fl


 


MCH     (27.0-31.0)  pg


 


MCHC     (33.0-37.0)  g/dL


 


RDW     (11.5-14.5)  %


 


Plt Count     (130-400)  K/uL


 


Sodium     (132-148)  mmol/l


 


Potassium     (3.6-5.0)  MMOL/L


 


Chloride     ()  mmol/L


 


Carbon Dioxide     (22-30)  mmol/L


 


Anion Gap     (10-20)  


 


BUN     (7-17)  mg/dl


 


Creatinine     (0.7-1.2)  mg/dL


 


Est GFR (African Amer)     


 


Est GFR (Non-Af Amer)     


 


POC Glucose (mg/dL)  219 H  153 H   ()  mg/dL


 


Random Glucose     ()  mg/dL


 


Calcium     (8.4-10.2)  mg/dL


 


Phosphorus     (2.5-4.5)  mg/dl


 


Iron    25 L  ()  ug/dL


 


Ferritin     (11.1-264.0)  ng/Ml


 


Vancomycin Trough     (5.0-10.0)  ug/mL














  10/05/17 10/05/17 10/05/17 Range/Units





  10:25 05:00 04:31 


 


WBC     (4.8-10.8)  K/uL


 


RBC     (3.80-5.20)  Mil/uL


 


Hgb     (12.0-16.0)  g/dL


 


Hct     (34.0-47.0)  %


 


MCV     (81.0-99.0)  fl


 


MCH     (27.0-31.0)  pg


 


MCHC     (33.0-37.0)  g/dL


 


RDW     (11.5-14.5)  %


 


Plt Count     (130-400)  K/uL


 


Sodium     (132-148)  mmol/l


 


Potassium     (3.6-5.0)  MMOL/L


 


Chloride     ()  mmol/L


 


Carbon Dioxide     (22-30)  mmol/L


 


Anion Gap     (10-20)  


 


BUN     (7-17)  mg/dl


 


Creatinine     (0.7-1.2)  mg/dL


 


Est GFR (African Amer)     


 


Est GFR (Non-Af Amer)     


 


POC Glucose (mg/dL)    167 H  ()  mg/dL


 


Random Glucose     ()  mg/dL


 


Calcium     (8.4-10.2)  mg/dL


 


Phosphorus  3.0    (2.5-4.5)  mg/dl


 


Iron     ()  ug/dL


 


Ferritin  751.0 H    (11.1-264.0)  ng/Ml


 


Vancomycin Trough   40.0 H   (5.0-10.0)  ug/mL














  10/05/17 10/05/17 10/04/17 Range/Units





  04:15 04:15 19:52 


 


WBC   8.7   (4.8-10.8)  K/uL


 


RBC   4.56   (3.80-5.20)  Mil/uL


 


Hgb   11.7 L   (12.0-16.0)  g/dL


 


Hct   39.3   (34.0-47.0)  %


 


MCV   86.2   (81.0-99.0)  fl


 


MCH   25.7 L   (27.0-31.0)  pg


 


MCHC   29.8 L   (33.0-37.0)  g/dL


 


RDW   21.3 H   (11.5-14.5)  %


 


Plt Count   200   (130-400)  K/uL


 


Sodium  139    (132-148)  mmol/l


 


Potassium  3.6    (3.6-5.0)  MMOL/L


 


Chloride  100    ()  mmol/L


 


Carbon Dioxide  27    (22-30)  mmol/L


 


Anion Gap  15    (10-20)  


 


BUN  28 H    (7-17)  mg/dl


 


Creatinine  3.2 H    (0.7-1.2)  mg/dL


 


Est GFR ( Amer)  17    


 


Est GFR (Non-Af Amer)  14    


 


POC Glucose (mg/dL)    220 H  ()  mg/dL


 


Random Glucose  178 H    ()  mg/dL


 


Calcium  8.9    (8.4-10.2)  mg/dL


 


Phosphorus     (2.5-4.5)  mg/dl


 


Iron     ()  ug/dL


 


Ferritin     (11.1-264.0)  ng/Ml


 


Vancomycin Trough     (5.0-10.0)  ug/mL














  10/04/17 Range/Units





  16:20 


 


WBC   (4.8-10.8)  K/uL


 


RBC   (3.80-5.20)  Mil/uL


 


Hgb   (12.0-16.0)  g/dL


 


Hct   (34.0-47.0)  %


 


MCV   (81.0-99.0)  fl


 


MCH   (27.0-31.0)  pg


 


MCHC   (33.0-37.0)  g/dL


 


RDW   (11.5-14.5)  %


 


Plt Count   (130-400)  K/uL


 


Sodium  142  (132-148)  mmol/l


 


Potassium  3.7  (3.6-5.0)  MMOL/L


 


Chloride  99  ()  mmol/L


 


Carbon Dioxide  27  (22-30)  mmol/L


 


Anion Gap  20  (10-20)  


 


BUN  24 H  (7-17)  mg/dl


 


Creatinine  2.9 H  (0.7-1.2)  mg/dL


 


Est GFR (African Amer)  19  


 


Est GFR (Non-Af Amer)  16  


 


POC Glucose (mg/dL)   ()  mg/dL


 


Random Glucose  221 H  ()  mg/dL


 


Calcium  9.1  (8.4-10.2)  mg/dL


 


Phosphorus   (2.5-4.5)  mg/dl


 


Iron   ()  ug/dL


 


Ferritin   (11.1-264.0)  ng/Ml


 


Vancomycin Trough   (5.0-10.0)  ug/mL








 Laboratory Results - last 24 hr











  10/04/17 10/04/17 10/05/17





  16:20 19:52 04:15


 


WBC    8.7


 


RBC    4.56


 


Hgb    11.7 L


 


Hct    39.3


 


MCV    86.2


 


MCH    25.7 L


 


MCHC    29.8 L


 


RDW    21.3 H


 


Plt Count    200


 


Sodium  142  


 


Potassium  3.7  


 


Chloride  99  


 


Carbon Dioxide  27  


 


Anion Gap  20  


 


BUN  24 H  


 


Creatinine  2.9 H  


 


Est GFR ( Amer)  19  


 


Est GFR (Non-Af Amer)  16  


 


POC Glucose (mg/dL)   220 H 


 


Random Glucose  221 H  


 


Calcium  9.1  


 


Phosphorus   


 


Iron   


 


Ferritin   


 


Vancomycin Trough   














  10/05/17 10/05/17 10/05/17





  04:15 04:31 05:00


 


WBC   


 


RBC   


 


Hgb   


 


Hct   


 


MCV   


 


MCH   


 


MCHC   


 


RDW   


 


Plt Count   


 


Sodium  139  


 


Potassium  3.6  


 


Chloride  100  


 


Carbon Dioxide  27  


 


Anion Gap  15  


 


BUN  28 H  


 


Creatinine  3.2 H  


 


Est GFR ( Amer)  17  


 


Est GFR (Non-Af Amer)  14  


 


POC Glucose (mg/dL)   167 H 


 


Random Glucose  178 H  


 


Calcium  8.9  


 


Phosphorus   


 


Iron   


 


Ferritin   


 


Vancomycin Trough    40.0 H














  10/05/17 10/05/17 10/05/17





  10:25 10:25 11:30


 


WBC   


 


RBC   


 


Hgb   


 


Hct   


 


MCV   


 


MCH   


 


MCHC   


 


RDW   


 


Plt Count   


 


Sodium   


 


Potassium   


 


Chloride   


 


Carbon Dioxide   


 


Anion Gap   


 


BUN   


 


Creatinine   


 


Est GFR ( Amer)   


 


Est GFR (Non-Af Amer)   


 


POC Glucose (mg/dL)    153 H


 


Random Glucose   


 


Calcium   


 


Phosphorus  3.0  


 


Iron   25 L 


 


Ferritin  751.0 H  


 


Vancomycin Trough   














  10/05/17





  15:59


 


WBC 


 


RBC 


 


Hgb 


 


Hct 


 


MCV 


 


MCH 


 


MCHC 


 


RDW 


 


Plt Count 


 


Sodium 


 


Potassium 


 


Chloride 


 


Carbon Dioxide 


 


Anion Gap 


 


BUN 


 


Creatinine 


 


Est GFR ( Amer) 


 


Est GFR (Non-Af Amer) 


 


POC Glucose (mg/dL)  219 H


 


Random Glucose 


 


Calcium 


 


Phosphorus 


 


Iron 


 


Ferritin 


 


Vancomycin Trough 











Fingerstick Blood Sugar Results: 167





Review of Systems





- Review of Systems


All systems: reviewed and no additional remarkable complaints except (as above.)





Critical Care Progress Note





- Nutrition


Nutrition: 


 Nutrition











 Category Date Time Status


 


 Dysphagia/Modified Consistency Diet [DIET] Diets  10/01/17 Dinner Active

## 2017-10-05 NOTE — CP.PCM.PN
Subjective





- Date & Time of Evaluation


Date of Evaluation: 10/05/17


Time of Evaluation: 15:50





- Subjective


Subjective: 





ID Note-





Pt. seen and examined today in ICU.


 no new events overnight.


awake and in nad.











Objective





- Vital Signs/Intake and Output


Vital Signs (last 24 hours): 


 











Temp Pulse Resp BP Pulse Ox


 


 98.6 F   80   23   157/76 H  100 


 


 10/05/17 12:00  10/05/17 12:00  10/05/17 12:00  10/05/17 12:00  10/05/17 12:00








Intake and Output: 


 











 10/05/17 10/05/17





 06:59 18:59


 


Intake Total 50 


 


Output Total 100 


 


Balance -50 














- Medications


Medications: 


 Current Medications





Acetaminophen (Tylenol 650mg/20.3ml Solution Ud)  650 mg PO Q6 PRN


   PRN Reason: Headache


   Last Admin: 10/04/17 19:17 Dose:  650 mg


Amlodipine Besylate (Norvasc)  5 mg PO DAILY [facility]


   Last Admin: 10/05/17 10:12 Dose:  5 mg


Artificial Tears (Artificial Tears)  2 drop OU Q4 PRN


   PRN Reason: Dry eyes


   Last Admin: 10/05/17 10:11 Dose:  2 drop


Aspirin (Ecotrin)  81 mg PO DAILY [facility]


   Last Admin: 10/05/17 10:12 Dose:  81 mg


Home Med (Rosuvastatin Calcium [Crestor])  40 mg PO HS [facility]


   Last Admin: 10/04/17 21:14 Dose:  40 mg


Vancomycin HCl 1 gm/ Sodium (Chloride)  250 mls @ 166.667 mls/hr IVPB MWF [facility]


   Last Admin: 10/04/17 09:55 Dose:  166.667 mls/hr


Insulin Human Regular (Humulin R)  0 units SC ACCU-CHECK [facility]


   PRN Reason: Protocol


   Last Admin: 10/05/17 06:24 Dose:  Not Given


Levothyroxine Sodium (Synthroid)  75 mcg PO DAILY@0630 [facility]


   Last Admin: 10/05/17 06:21 Dose:  75 mcg


Metoprolol Succinate (Toprol Xl)  50 mg PO DAILY [facility]


   Last Admin: 10/05/17 10:13 Dose:  50 mg


Pantoprazole Sodium (Protonix Ec Tab)  40 mg PO DAILY [facility]


   Last Admin: 10/05/17 10:12 Dose:  40 mg


Tobramycin/Dexamethasone (Tobradex 0.3%-0.1% Opht Oint)  1 appl OU TID [facility]


Vitamin B Complex/Vit C/Folic Acid (Nephro-Brianna)  1 tab PO DAILY [facility]











- Labs


Labs: 


 








- Additional Findings


Additional findings: 








- Constitutional


Appears: No Acute Distress, Chronically Ill





- Eye Exam


Eye Exam: EOMI





- ENT Exam


ENT Exam: Normal Oropharynx





- Neck Exam


Neck exam: Positive for: Full Rom





- Respiratory Exam


Respiratory Exam: NORMAL BREATHING PATTERN


Additional comments: 





slightly decreased breath sounds at bases








- Cardiovascular Exam


Cardiovascular Exam: RRR, +S1, +S2


Additional comments: 





mid-sternal round lesion with scant sanguinous discharge and raised borders (

chronic)


no pus





- GI/Abdominal Exam


GI & Abdominal Exam: Normal Bowel Sounds, Soft


Additional comments: 





NT, ND





- Extremities Exam


Additional comments: 





left TMA site with round dry ulcer at the lateral site, no discharge, dry, no 

malodor, no erythema





- Neurological Exam


Additional comments: 





awake 











 Laboratory Results - last 72 hr











  10/02/17 10/02/17 10/02/17





  15:26 15:26 16:27


 


WBC   


 


RBC   


 


Hgb   


 


Hct   


 


MCV   


 


MCH   


 


MCHC   


 


RDW   


 


Plt Count   


 


Sodium  148  


 


Potassium  4.5  


 


Chloride  104  


 


Carbon Dioxide  28  


 


Anion Gap  20  


 


BUN  32 H  


 


Creatinine  3.7 H  


 


Est GFR ( Amer)  14  


 


Est GFR (Non-Af Amer)  12  


 


POC Glucose (mg/dL)    192 H


 


Random Glucose  203 H  


 


Serum Osmolality   312 H 


 


Calcium  9.2  


 


Phosphorus   


 


Iron   


 


Ferritin   


 


Total Bilirubin   


 


AST   


 


ALT   


 


Alkaline Phosphatase   


 


Total Protein   


 


Albumin   


 


Globulin   


 


Albumin/Globulin Ratio   


 


Urine Color   


 


Urine Clarity   


 


Urine pH   


 


Ur Specific Gravity   


 


Urine Protein   


 


Urine Glucose (UA)   


 


Urine Ketones   


 


Urine Blood   


 


Urine Nitrate   


 


Urine Bilirubin   


 


Urine Urobilinogen   


 


Ur Leukocyte Esterase   


 


Urine RBC (Auto)   


 


Urine WBC Clumps (Auto)   


 


Urine Microscopic WBC   


 


Ur Squamous Epith Cells   


 


Amorphous Sediment   


 


Urine Bacteria   


 


Urine Yeast (Budding)   


 


Vancomycin Trough   














  10/02/17 10/02/17 10/02/17





  20:09 23:07 23:07


 


WBC   


 


RBC   


 


Hgb   


 


Hct   


 


MCV   


 


MCH   


 


MCHC   


 


RDW   


 


Plt Count   


 


Sodium   148 


 


Potassium   4.5 


 


Chloride   106 


 


Carbon Dioxide   27 


 


Anion Gap   19 


 


BUN   34 H 


 


Creatinine   3.9 H 


 


Est GFR ( Amer)   13 


 


Est GFR (Non-Af Amer)   11 


 


POC Glucose (mg/dL)  195 H  


 


Random Glucose   183 H 


 


Serum Osmolality    320 H


 


Calcium   9.4 


 


Phosphorus   


 


Iron   


 


Ferritin   


 


Total Bilirubin   


 


AST   


 


ALT   


 


Alkaline Phosphatase   


 


Total Protein   


 


Albumin   


 


Globulin   


 


Albumin/Globulin Ratio   


 


Urine Color   


 


Urine Clarity   


 


Urine pH   


 


Ur Specific Gravity   


 


Urine Protein   


 


Urine Glucose (UA)   


 


Urine Ketones   


 


Urine Blood   


 


Urine Nitrate   


 


Urine Bilirubin   


 


Urine Urobilinogen   


 


Ur Leukocyte Esterase   


 


Urine RBC (Auto)   


 


Urine WBC Clumps (Auto)   


 


Urine Microscopic WBC   


 


Ur Squamous Epith Cells   


 


Amorphous Sediment   


 


Urine Bacteria   


 


Urine Yeast (Budding)   


 


Vancomycin Trough   














  10/03/17 10/03/17 10/03/17





  03:29 04:15 04:15


 


WBC    14.5 H


 


RBC    5.03


 


Hgb    12.9


 


Hct    44.3


 


MCV    88.1


 


MCH    25.7 L


 


MCHC    29.2 L


 


RDW    21.4 H


 


Plt Count    297


 


Sodium   


 


Potassium   


 


Chloride   


 


Carbon Dioxide   


 


Anion Gap   


 


BUN   


 


Creatinine   


 


Est GFR ( Amer)   


 


Est GFR (Non-Af Amer)   


 


POC Glucose (mg/dL)   


 


Random Glucose   


 


Serum Osmolality   


 


Calcium   


 


Phosphorus   


 


Iron   


 


Ferritin   


 


Total Bilirubin   


 


AST   


 


ALT   


 


Alkaline Phosphatase   


 


Total Protein   


 


Albumin   


 


Globulin   


 


Albumin/Globulin Ratio   


 


Urine Color  Light brown  


 


Urine Clarity  Turbid  


 


Urine pH  5.5  


 


Ur Specific Gravity  1.025  


 


Urine Protein  > 300  


 


Urine Glucose (UA)  100  


 


Urine Ketones  Trace  


 


Urine Blood  Large  


 


Urine Nitrate  Positive H  


 


Urine Bilirubin  Small  


 


Urine Urobilinogen  0.2  


 


Ur Leukocyte Esterase  Large  


 


Urine RBC (Auto)  1095 H  


 


Urine WBC Clumps (Auto)  Mod H  


 


Urine Microscopic WBC  800 H  


 


Ur Squamous Epith Cells  3  


 


Amorphous Sediment  Small  


 


Urine Bacteria  Mod H  


 


Urine Yeast (Budding)  Mod H  


 


Vancomycin Trough   42.4 H 














  10/03/17 10/03/17 10/03/17





  04:15 04:35 11:33


 


WBC   


 


RBC   


 


Hgb   


 


Hct   


 


MCV   


 


MCH   


 


MCHC   


 


RDW   


 


Plt Count   


 


Sodium  151 H  


 


Potassium  4.7  


 


Chloride  108 H  


 


Carbon Dioxide  26  


 


Anion Gap  22 H  


 


BUN  34 H  


 


Creatinine  4.0 H  


 


Est GFR ( Amer)  13  


 


Est GFR (Non-Af Amer)  11  


 


POC Glucose (mg/dL)   254 H  291 H


 


Random Glucose  213 H  


 


Serum Osmolality   


 


Calcium  9.5  


 


Phosphorus   


 


Iron   


 


Ferritin   


 


Total Bilirubin  0.5  


 


AST  36  


 


ALT  31  


 


Alkaline Phosphatase  99  


 


Total Protein  7.3  


 


Albumin  3.6  


 


Globulin  3.8  


 


Albumin/Globulin Ratio  1.0  


 


Urine Color   


 


Urine Clarity   


 


Urine pH   


 


Ur Specific Gravity   


 


Urine Protein   


 


Urine Glucose (UA)   


 


Urine Ketones   


 


Urine Blood   


 


Urine Nitrate   


 


Urine Bilirubin   


 


Urine Urobilinogen   


 


Ur Leukocyte Esterase   


 


Urine RBC (Auto)   


 


Urine WBC Clumps (Auto)   


 


Urine Microscopic WBC   


 


Ur Squamous Epith Cells   


 


Amorphous Sediment   


 


Urine Bacteria   


 


Urine Yeast (Budding)   


 


Vancomycin Trough   














  10/03/17 10/03/17 10/03/17





  15:52 20:30 20:56


 


WBC   


 


RBC   


 


Hgb   


 


Hct   


 


MCV   


 


MCH   


 


MCHC   


 


RDW   


 


Plt Count   


 


Sodium   141 


 


Potassium   3.3 L 


 


Chloride   98 


 


Carbon Dioxide   29 


 


Anion Gap   17 


 


BUN   13 


 


Creatinine   2.0 H 


 


Est GFR ( Amer)   29 


 


Est GFR (Non-Af Amer)   24 


 


POC Glucose (mg/dL)  221 H   165 H


 


Random Glucose   172 H 


 


Serum Osmolality   


 


Calcium   9.1 


 


Phosphorus   


 


Iron   


 


Ferritin   


 


Total Bilirubin   


 


AST   


 


ALT   


 


Alkaline Phosphatase   


 


Total Protein   


 


Albumin   


 


Globulin   


 


Albumin/Globulin Ratio   


 


Urine Color   


 


Urine Clarity   


 


Urine pH   


 


Ur Specific Gravity   


 


Urine Protein   


 


Urine Glucose (UA)   


 


Urine Ketones   


 


Urine Blood   


 


Urine Nitrate   


 


Urine Bilirubin   


 


Urine Urobilinogen   


 


Ur Leukocyte Esterase   


 


Urine RBC (Auto)   


 


Urine WBC Clumps (Auto)   


 


Urine Microscopic WBC   


 


Ur Squamous Epith Cells   


 


Amorphous Sediment   


 


Urine Bacteria   


 


Urine Yeast (Budding)   


 


Vancomycin Trough   














  10/04/17 10/04/17 10/04/17





  04:54 05:00 05:00


 


WBC   11.0 H 


 


RBC   4.57 


 


Hgb   11.9 L 


 


Hct   39.5 


 


MCV   86.5 


 


MCH   26.0 L 


 


MCHC   30.1 L 


 


RDW   21.3 H 


 


Plt Count   214 


 


Sodium    141


 


Potassium    4.0


 


Chloride    100


 


Carbon Dioxide    25


 


Anion Gap    20


 


BUN    18 H


 


Creatinine    2.4 H


 


Est GFR ( Amer)    24


 


Est GFR (Non-Af Amer)    20


 


POC Glucose (mg/dL)  200 H  


 


Random Glucose    196 H


 


Serum Osmolality   


 


Calcium    9.2


 


Phosphorus   


 


Iron   


 


Ferritin   


 


Total Bilirubin   


 


AST   


 


ALT   


 


Alkaline Phosphatase   


 


Total Protein   


 


Albumin   


 


Globulin   


 


Albumin/Globulin Ratio   


 


Urine Color   


 


Urine Clarity   


 


Urine pH   


 


Ur Specific Gravity   


 


Urine Protein   


 


Urine Glucose (UA)   


 


Urine Ketones   


 


Urine Blood   


 


Urine Nitrate   


 


Urine Bilirubin   


 


Urine Urobilinogen   


 


Ur Leukocyte Esterase   


 


Urine RBC (Auto)   


 


Urine WBC Clumps (Auto)   


 


Urine Microscopic WBC   


 


Ur Squamous Epith Cells   


 


Amorphous Sediment   


 


Urine Bacteria   


 


Urine Yeast (Budding)   


 


Vancomycin Trough   














  10/04/17 10/04/17 10/04/17





  11:17 15:52 16:20


 


WBC   


 


RBC   


 


Hgb   


 


Hct   


 


MCV   


 


MCH   


 


MCHC   


 


RDW   


 


Plt Count   


 


Sodium    142


 


Potassium    3.7


 


Chloride    99


 


Carbon Dioxide    27


 


Anion Gap    20


 


BUN    24 H


 


Creatinine    2.9 H


 


Est GFR ( Amer)    19


 


Est GFR (Non-Af Amer)    16


 


POC Glucose (mg/dL)  216 H  233 H 


 


Random Glucose    221 H


 


Serum Osmolality   


 


Calcium    9.1


 


Phosphorus   


 


Iron   


 


Ferritin   


 


Total Bilirubin   


 


AST   


 


ALT   


 


Alkaline Phosphatase   


 


Total Protein   


 


Albumin   


 


Globulin   


 


Albumin/Globulin Ratio   


 


Urine Color   


 


Urine Clarity   


 


Urine pH   


 


Ur Specific Gravity   


 


Urine Protein   


 


Urine Glucose (UA)   


 


Urine Ketones   


 


Urine Blood   


 


Urine Nitrate   


 


Urine Bilirubin   


 


Urine Urobilinogen   


 


Ur Leukocyte Esterase   


 


Urine RBC (Auto)   


 


Urine WBC Clumps (Auto)   


 


Urine Microscopic WBC   


 


Ur Squamous Epith Cells   


 


Amorphous Sediment   


 


Urine Bacteria   


 


Urine Yeast (Budding)   


 


Vancomycin Trough   














  10/04/17 10/05/17 10/05/17





  19:52 04:15 04:15


 


WBC   8.7 


 


RBC   4.56 


 


Hgb   11.7 L 


 


Hct   39.3 


 


MCV   86.2 


 


MCH   25.7 L 


 


MCHC   29.8 L 


 


RDW   21.3 H 


 


Plt Count   200 


 


Sodium    139


 


Potassium    3.6


 


Chloride    100


 


Carbon Dioxide    27


 


Anion Gap    15


 


BUN    28 H


 


Creatinine    3.2 H


 


Est GFR ( Amer)    17


 


Est GFR (Non-Af Amer)    14


 


POC Glucose (mg/dL)  220 H  


 


Random Glucose    178 H


 


Serum Osmolality   


 


Calcium    8.9


 


Phosphorus   


 


Iron   


 


Ferritin   


 


Total Bilirubin   


 


AST   


 


ALT   


 


Alkaline Phosphatase   


 


Total Protein   


 


Albumin   


 


Globulin   


 


Albumin/Globulin Ratio   


 


Urine Color   


 


Urine Clarity   


 


Urine pH   


 


Ur Specific Gravity   


 


Urine Protein   


 


Urine Glucose (UA)   


 


Urine Ketones   


 


Urine Blood   


 


Urine Nitrate   


 


Urine Bilirubin   


 


Urine Urobilinogen   


 


Ur Leukocyte Esterase   


 


Urine RBC (Auto)   


 


Urine WBC Clumps (Auto)   


 


Urine Microscopic WBC   


 


Ur Squamous Epith Cells   


 


Amorphous Sediment   


 


Urine Bacteria   


 


Urine Yeast (Budding)   


 


Vancomycin Trough   














  10/05/17 10/05/17 10/05/17





  04:31 05:00 10:25


 


WBC   


 


RBC   


 


Hgb   


 


Hct   


 


MCV   


 


MCH   


 


MCHC   


 


RDW   


 


Plt Count   


 


Sodium   


 


Potassium   


 


Chloride   


 


Carbon Dioxide   


 


Anion Gap   


 


BUN   


 


Creatinine   


 


Est GFR ( Amer)   


 


Est GFR (Non-Af Amer)   


 


POC Glucose (mg/dL)  167 H  


 


Random Glucose   


 


Serum Osmolality   


 


Calcium   


 


Phosphorus    3.0


 


Iron   


 


Ferritin    751.0 H


 


Total Bilirubin   


 


AST   


 


ALT   


 


Alkaline Phosphatase   


 


Total Protein   


 


Albumin   


 


Globulin   


 


Albumin/Globulin Ratio   


 


Urine Color   


 


Urine Clarity   


 


Urine pH   


 


Ur Specific Gravity   


 


Urine Protein   


 


Urine Glucose (UA)   


 


Urine Ketones   


 


Urine Blood   


 


Urine Nitrate   


 


Urine Bilirubin   


 


Urine Urobilinogen   


 


Ur Leukocyte Esterase   


 


Urine RBC (Auto)   


 


Urine WBC Clumps (Auto)   


 


Urine Microscopic WBC   


 


Ur Squamous Epith Cells   


 


Amorphous Sediment   


 


Urine Bacteria   


 


Urine Yeast (Budding)   


 


Vancomycin Trough   40.0 H 














  10/05/17 10/05/17





  10:25 11:30


 


WBC  


 


RBC  


 


Hgb  


 


Hct  


 


MCV  


 


MCH  


 


MCHC  


 


RDW  


 


Plt Count  


 


Sodium  


 


Potassium  


 


Chloride  


 


Carbon Dioxide  


 


Anion Gap  


 


BUN  


 


Creatinine  


 


Est GFR ( Amer)  


 


Est GFR (Non-Af Amer)  


 


POC Glucose (mg/dL)   153 H


 


Random Glucose  


 


Serum Osmolality  


 


Calcium  


 


Phosphorus  


 


Iron  25 L 


 


Ferritin  


 


Total Bilirubin  


 


AST  


 


ALT  


 


Alkaline Phosphatase  


 


Total Protein  


 


Albumin  


 


Globulin  


 


Albumin/Globulin Ratio  


 


Urine Color  


 


Urine Clarity  


 


Urine pH  


 


Ur Specific Gravity  


 


Urine Protein  


 


Urine Glucose (UA)  


 


Urine Ketones  


 


Urine Blood  


 


Urine Nitrate  


 


Urine Bilirubin  


 


Urine Urobilinogen  


 


Ur Leukocyte Esterase  


 


Urine RBC (Auto)  


 


Urine WBC Clumps (Auto)  


 


Urine Microscopic WBC  


 


Ur Squamous Epith Cells  


 


Amorphous Sediment  


 


Urine Bacteria  


 


Urine Yeast (Budding)  


 


Vancomycin Trough  








 Microbiology





10/03/17 03:29   Urine,Cedeno   Urine Culture - Final


                            Yeast Species


10/02/17 05:00   Blood-Venous   Blood Culture - Preliminary


                            NO GROWTH AFTER 3 DAYS


09/30/17 18:00   Blood-Venous   Blood Culture - Preliminary


                            NO GROWTH AFTER 4 DAYS


09/30/17 16:00   Blood-Venous   S.aureus & Coag-Neg Staph PNA FISH - Final


09/30/17 16:00   Blood-Venous   Blood Culture - Final


                            Coagulase Neg Staphylococcus


09/30/17 16:00   Blood-Venous   Gram Stain - Final


09/30/17 20:00   Naris   MRSA Culture (Admit) - Final


                            MRSA DETECTED





 

















Assessment and Plan


(1) Cerebellar infarct


Status: Acute   





(2) Basal cell carcinoma of chest


Status: Acute   





(3) ESRD on hemodialysis


Status: Acute   





(4) Bacteremia due to Staphylococcus epidermidis


Status: Acute   





(5) DM2 (diabetes mellitus, type 2)


Status: Chronic   





- Assessment and Plan (Free Text)


Assessment: 





A/P-


Patient is a 77 year old female with multiple medical conditions including DM II

, CAD, ESRD on HD, left foot TMA , A.fib admitted with nausea found to have 

cerebellar infarct as per Neurosurgery not a surgical candidate.


found to have coag neg staph in one blood cx on this admission.











afebrile


one blood cx- coag neg staph


repeat blood cx- neg x 2


leukocytosis-resolved


TTE- no mention of any vegetations on report as read by cardiologist.











Plan-


check 2 more blood cx.


vanco trough high despite being held, perhaps the trough is being drawn at 

incorrect time.


continue to hold vanco  till trough is <20 and then redose post HD days only.


podiatry to evaluate the nonhealing left TMA site ulcer.


check left foot xray .


cerebellar infarct management as per neuro.


primary doc to determine if the basal cell CA of the chest was treated and if 

not advise  to get oncology evaluation.





 all above d/w ICu team





ICu time 45 min.

## 2017-10-05 NOTE — CP.PCM.PN
Subjective





- Date & Time of Evaluation


Date of Evaluation: 10/05/17


Time of Evaluation: 07:59





- Subjective


Subjective: 





Patient in bed


Patient is awake


She is feeling much better.


No nausea or vomiting reported


Vital sign noted and reviewed to be stable





Objective





- Vital Signs/Intake and Output


Vital Signs (last 24 hours): 


 











Temp Pulse Resp BP Pulse Ox


 


 97.1 F L  92 H  14   131/96 H  98 


 


 10/05/17 04:00  10/05/17 06:00  10/05/17 06:00  10/05/17 06:00  10/05/17 06:00








Intake and Output: 


 











 10/05/17 10/05/17





 06:59 18:59


 


Intake Total 50 


 


Output Total 100 


 


Balance -50 














- Medications


Medications: 


 Current Medications





Acetaminophen (Tylenol 650mg/20.3ml Solution Ud)  650 mg PO Q6 PRN


   PRN Reason: Headache


   Last Admin: 10/04/17 19:17 Dose:  650 mg


Amlodipine Besylate (Norvasc)  5 mg PO DAILY [facility]


   Last Admin: 10/04/17 09:11 Dose:  5 mg


Artificial Tears (Artificial Tears)  2 drop OU Q4 PRN


   PRN Reason: Dry eyes


   Last Admin: 10/04/17 21:15 Dose:  2 drop


Aspirin (Ecotrin)  81 mg PO DAILY [facility]


   Last Admin: 10/04/17 09:10 Dose:  81 mg


Home Med (Rosuvastatin Calcium [Crestor])  40 mg PO HS [facility]


   Last Admin: 10/04/17 21:14 Dose:  40 mg


Vancomycin HCl 1 gm/ Sodium (Chloride)  250 mls @ 166.667 mls/hr IVPB MWF [facility]


   Last Admin: 10/04/17 09:55 Dose:  166.667 mls/hr


Insulin Human Regular (Humulin R)  0 units SC ACCU-CHECK [facility]


   PRN Reason: Protocol


   Last Admin: 10/05/17 06:24 Dose:  Not Given


Levothyroxine Sodium (Synthroid)  75 mcg PO DAILY@0630 [facility]


   Last Admin: 10/05/17 06:21 Dose:  75 mcg


Metoprolol Succinate (Toprol Xl)  50 mg PO DAILY [facility]


   Last Admin: 10/04/17 09:12 Dose:  50 mg


Pantoprazole Sodium (Protonix Ec Tab)  40 mg PO DAILY [facility]


Vitamin B Complex/Vit C/Folic Acid (Nephro-Brianna)  1 tab PO DAILY [facility]











- Labs


Labs: 


 





 10/05/17 04:15 





 10/05/17 04:15 





 











PT  13.7 Seconds (9.8-13.1)  H  09/30/17  16:44    


 


INR  1.3  (0.9-1.2)  H  09/30/17  16:44    


 


APTT  27.9 Seconds (25.6-37.1)   09/30/17  16:44    














- Constitutional


Appears: No Acute Distress





- Eye Exam


Eye Exam: Conjunctival injection





- ENT Exam


ENT Exam: Mucous Membranes Moist





- Neck Exam


Neck Exam: absent: Lymphadenopathy





- Respiratory Exam


Respiratory Exam: Clear to Ausculation Bilateral, NORMAL BREATHING PATTERN.  

absent: Chest Wall Tenderness, Rales





- Cardiovascular Exam


Cardiovascular Exam: absent: Irregular Rhythm, JVD, Rubs





- GI/Abdominal Exam


GI & Abdominal Exam: Soft, Normal Bowel Sounds.  absent: Rigid





- Extremities Exam


Extremities Exam: absent: Calf Tenderness





- Back Exam


Back Exam: absent: CVA tenderness (L), CVA tenderness (R)





- Psychiatric Exam


Psychiatric exam: Normal Affect





Assessment and Plan


(1) Cerebellar infarct


Status: Acute   





(2) CKD (chronic kidney disease) stage V requiring chronic dialysis


Assessment & Plan: 


In summary the patient has the following problem


#1 end stage renal disease patient receiving dialysis now. Discussed with the 

dialysis nurse at the bedside.


Ultrafiltration 2000 mL


Sodium bath 138


Potassium bath 2 mEq


Patient is tolerating dialysis


#2 hypertension, controlled on medication


#3 patient has history of chronic A. fib as per primary team.


#4 status post CVA for this particular admission and patient making better 

recovery,


As noted per neurology


#5 diabetes mellitus management as per primary team


#6 hypothyroidism patient is on medication.


#7 hyperphosphatemia patient to be given phosphorus binder


#8 secondary hyperparathyroidism to repeat serum PTH.


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 

patient receiving antibiotics as per ID and podiatry

















Status: Acute

## 2017-10-05 NOTE — CP.PCM.PN
Subjective





- Date & Time of Evaluation


Date of Evaluation: 10/05/17


Time of Evaluation: 09:38





- Subjective


Subjective: 





Ms. Osuna was seen and examined at the bedside. She remains responsive to all 

stimuli. Currently hooked up with dialysis machine. She is not in any kind of 

distress. There was no untoward events overnight.





Objective





- Vital Signs/Intake and Output


Vital Signs (last 24 hours): 


 











Temp Pulse Resp BP Pulse Ox


 


 98.6 F   80   23   157/76 H  100 


 


 10/05/17 08:00  10/05/17 08:00  10/05/17 08:00  10/05/17 08:00  10/05/17 08:00








Intake and Output: 


 











 10/05/17 10/05/17





 06:59 18:59


 


Intake Total 50 


 


Output Total 100 


 


Balance -50 














- Medications


Medications: 


 Current Medications





Acetaminophen (Tylenol 650mg/20.3ml Solution Ud)  650 mg PO Q6 PRN


   PRN Reason: Headache


   Last Admin: 10/04/17 19:17 Dose:  650 mg


Amlodipine Besylate (Norvasc)  5 mg PO DAILY [facility]


   Last Admin: 10/04/17 09:11 Dose:  5 mg


Artificial Tears (Artificial Tears)  2 drop OU Q4 PRN


   PRN Reason: Dry eyes


   Last Admin: 10/04/17 21:15 Dose:  2 drop


Aspirin (Ecotrin)  81 mg PO DAILY [facility]


   Last Admin: 10/04/17 09:10 Dose:  81 mg


Home Med (Rosuvastatin Calcium [Crestor])  40 mg PO HS [facility]


   Last Admin: 10/04/17 21:14 Dose:  40 mg


Vancomycin HCl 1 gm/ Sodium (Chloride)  250 mls @ 166.667 mls/hr IVPB MWF [facility]


   Last Admin: 10/04/17 09:55 Dose:  166.667 mls/hr


Insulin Human Regular (Humulin R)  0 units SC ACCU-CHECK [facility]


   PRN Reason: Protocol


   Last Admin: 10/05/17 06:24 Dose:  Not Given


Levothyroxine Sodium (Synthroid)  75 mcg PO DAILY@0630 [facility]


   Last Admin: 10/05/17 06:21 Dose:  75 mcg


Metoprolol Succinate (Toprol Xl)  50 mg PO DAILY [facility]


   Last Admin: 10/04/17 09:12 Dose:  50 mg


Pantoprazole Sodium (Protonix Ec Tab)  40 mg PO DAILY [facility]


Tobramycin/Dexamethasone (Tobradex 0.3%-0.1% Opht Oint)  1 appl OU TID [facility]


Vitamin B Complex/Vit C/Folic Acid (Nephro-Brianna)  1 tab PO DAILY [facility]











- Labs


Labs: 


 





 10/05/17 04:15 





 10/05/17 04:15 





 











PT  13.7 Seconds (9.8-13.1)  H  09/30/17  16:44    


 


INR  1.3  (0.9-1.2)  H  09/30/17  16:44    


 


APTT  27.9 Seconds (25.6-37.1)   09/30/17  16:44    














- Constitutional


Appears: No Acute Distress





- Head Exam


Head Exam: ATRAUMATIC, NORMAL INSPECTION, NORMOCEPHALIC





- Neurological Exam


Neurological Exam: Alert, Awake


Neuro motor strength exam: Left Upper Extremity: 3, Right Upper Extremity: 3, 

Left Lower Extremity: 3, Right Lower Extremity: 3


Additional comments: 





Neurological unchanged from previous examination. She is able to follow 

commands.





Assessment and Plan


(1) Cerebellar infarct


Assessment & Plan: 


Case discussed with Dr. Jones, continue current medical, physical, occupational

, and speech therapies. DVT prophylaxis of SCD. There is no new recommendation 

from neurology. 


Status: Acute

## 2017-10-05 NOTE — CT
PROCEDURE:  CT HEAD WITHOUT CONTRAST.



HISTORY:

post CVA



COMPARISON:

9/30/2017 



TECHNIQUE:

Axial computed tomography images were obtained through the head/brain 

without intravenous contrast.  



Radiation dose:



Total exam DLP = 767.06 mGy-cm.



This CT exam was performed using one or more of the following dose 

reduction techniques: Automated exposure control, adjustment of the 

mA and/or kV according to patient size, and/or use of iterative 

reconstruction technique.



FINDINGS:



HEMORRHAGE:

No intracranial hemorrhage. 



BRAIN:

No intracranial mass.  There is evidence of right cerebellar 

hemispheric infarct. The extent of infarct is less than seen on prior 

CT examination.  There is probable luxury perfusion in the right 

cerebellar hemisphere, with gyriform pattern of increased attenuation 

particularly seen on series 4, image 16. . There is probable infarct 

of the right cerebellar vermis versus displacement of the vermis 

towards the left and infarct of the medial right cerebellar 

hemisphere. There is mass effect upon the right lateral aspect of the 

4th ventricle supporting this observation. There is no acute infarct 

elsewhere. There is moderate chronic periventricular white matter 

ischemic change, unchanged in extent from prior examination. 



VENTRICLES:

As noted above, mass-effect upon the right lateral aspect of the 4th 

ventricle. No hydrocephalus. No midline shift. Basilar cisterns are 

preserved. 



CALVARIUM:

Unremarkable.



PARANASAL SINUSES:

Unremarkable as visualized. No significant inflammatory changes.



MASTOID AIR CELLS:

There is bilateral mastoid effusion, significance uncertain.



OTHER FINDINGS:

None.



IMPRESSION:

Probable subacute infarct right cerebellar hemisphere with some 

luxury perfusion.  Infarct right sided vermis versus medial aspect 

right cerebellar hemisphere. The extent of infarct has decreased 

markedly when compared to the examination of 9/30/2017.  Consistent 

with subacute infarct. Mass-effect upon right lateral aspect of 4th 

ventricle. No cerebral infarct evident. Chronic periventricular white 

matter ischemic change. Bilateral mastoid effusion, nonspecific.

## 2017-10-06 RX ADMIN — PANTOPRAZOLE SODIUM SCH MG: 40 TABLET, DELAYED RELEASE ORAL at 08:23

## 2017-10-06 RX ADMIN — TOBRAMYCIN AND DEXAMETHASONE SCH OIN: 3; 1 OINTMENT OPHTHALMIC at 16:28

## 2017-10-06 RX ADMIN — TOBRAMYCIN AND DEXAMETHASONE SCH OIN: 3; 1 OINTMENT OPHTHALMIC at 13:06

## 2017-10-06 RX ADMIN — ACETAMINOPHEN PRN MG: 160 SOLUTION ORAL at 14:51

## 2017-10-06 RX ADMIN — POLYVINYL ALCOHOL PRN DROP: 14 SOLUTION/ DROPS OPHTHALMIC at 15:00

## 2017-10-06 RX ADMIN — TOBRAMYCIN AND DEXAMETHASONE SCH OIN: 3; 1 OINTMENT OPHTHALMIC at 08:22

## 2017-10-06 RX ADMIN — METOPROLOL SUCCINATE SCH MG: 50 TABLET, EXTENDED RELEASE ORAL at 08:23

## 2017-10-06 RX ADMIN — POLYVINYL ALCOHOL PRN DROP: 14 SOLUTION/ DROPS OPHTHALMIC at 08:22

## 2017-10-06 NOTE — CP.PCM.PN
<Garland Fowler - Last Filed: 10/06/17 09:41>





Subjective





- Date & Time of Evaluation


Date of Evaluation: 10/06/17


Time of Evaluation: 09:32





- Subjective


Subjective: 


76 YO F seen and evaluated at bedside with Dr. Calix. Patient appears be doing 

well is in no acute distress. Patient is now in Tele, has been transferred from 

ICU.











Objective





- Vital Signs/Intake and Output


Vital Signs (last 24 hours): 


 











Temp Pulse Resp BP Pulse Ox


 


 98.4 F   85   20   155/73 H  100 


 


 10/06/17 08:00  10/06/17 08:23  10/06/17 08:00  10/06/17 08:23  10/06/17 08:00








Intake and Output: 


 











 10/06/17 10/06/17





 06:59 18:59


 


Output Total 50 


 


Balance -50 














- Medications


Medications: 


 Current Medications





Acetaminophen (Tylenol 650mg/20.3ml Solution Ud)  650 mg PO Q6 PRN


   PRN Reason: Headache


   Last Admin: 10/04/17 19:17 Dose:  650 mg


Amlodipine Besylate (Norvasc)  5 mg PO DAILY [facility]


   Last Admin: 10/06/17 08:23 Dose:  5 mg


Apixaban (Eliquis)  2.5 mg PO BID [facility]


   PRN Reason: Protocol


   Last Admin: 10/06/17 08:22 Dose:  2.5 mg


Artificial Tears (Artificial Tears)  2 drop OU Q4 PRN


   PRN Reason: Dry eyes


   Last Admin: 10/06/17 08:22 Dose:  2 drop


Aspirin (Ecotrin)  81 mg PO DAILY [facility]


   Last Admin: 10/06/17 08:23 Dose:  81 mg


Home Med (Rosuvastatin Calcium [Crestor])  40 mg PO HS [facility]


   Last Admin: 10/05/17 21:04 Dose:  40 mg


Vancomycin HCl 1 gm/ Sodium (Chloride)  250 mls @ 166.667 mls/hr IVPB MWF [facility]


   Last Admin: 10/04/17 09:55 Dose:  166.667 mls/hr


Insulin Human Regular (Humulin R)  0 units SC ACCU-CHECK [facility]


   PRN Reason: Protocol


   Last Admin: 10/06/17 06:08 Dose:  1 units


Levothyroxine Sodium (Synthroid)  75 mcg PO DAILY@0630 [facility]


   Last Admin: 10/06/17 06:07 Dose:  75 mcg


Metoprolol Succinate (Toprol Xl)  50 mg PO DAILY [facility]


   Last Admin: 10/06/17 08:23 Dose:  50 mg


Pantoprazole Sodium (Protonix Ec Tab)  40 mg PO DAILY [facility]


   Last Admin: 10/06/17 08:23 Dose:  40 mg


Tobramycin/Dexamethasone (Tobradex 0.3%-0.1% Opht Oint)  1 appl OU TID [facility]


   Last Admin: 10/06/17 08:22 Dose:  1 oin


Vitamin B Complex/Vit C/Folic Acid (Nephro-Brianna)  1 tab PO DAILY [facility]











- Labs


Labs: 


 





 10/05/17 04:15 





 10/05/17 04:15 





 











PT  13.7 Seconds (9.8-13.1)  H  09/30/17  16:44    


 


INR  1.3  (0.9-1.2)  H  09/30/17  16:44    


 


APTT  27.9 Seconds (25.6-37.1)   09/30/17  16:44    














Assessment and Plan





- Assessment and Plan (Free Text)


Assessment: 





1) Right cerebellar Acute/ Sub acute infarct


- Head elevation of bed


- monitor osmolarity


- Continue with neuro recommendation


- Apixaban started





2) Systolic heart failure





3) End stage renal disease


- Nephro on board


- Dialysis [facility]





4) Non healing left TMA site ulcer


-Currently holding vanco, untill trough comes down <20, redose post dialysis 

days


- ID on board





4) Code status DNR/DNI





<Victor M Calix K - Last Filed: 10/12/17 15:39>





Objective





- Vital Signs/Intake and Output


Vital Signs (last 24 hours): 


 











Temp Pulse Resp BP Pulse Ox


 


 98.1 F   84   17   140/79   100 


 


 10/09/17 16:44  10/09/17 16:44  10/09/17 16:44  10/09/17 16:44  10/09/17 16:44











- Labs


Labs: 


 





 10/09/17 04:30 





 10/09/17 06:41 





 











PT  13.7 Seconds (9.8-13.1)  H  09/30/17  16:44    


 


INR  1.3  (0.9-1.2)  H  09/30/17  16:44    


 


APTT  27.9 Seconds (25.6-37.1)   09/30/17  16:44    














Assessment and Plan





- Assessment and Plan (Free Text)


Assessment: 


Patient was personally seen and examined by me in rounds with residents.


Available labs and diagnostic data reviewed.


Case, Patients's condition and management plan discussed with residents in 

rounds.


Agree with residents's progress note.


Plan: As ordered.

## 2017-10-06 NOTE — CP.PCM.PN
Subjective





- Date & Time of Evaluation


Date of Evaluation: 10/06/17


Time of Evaluation: 12:57





- Subjective


Subjective: 





Patient in bed no new events reported


No changes clinically


Vital sign noted





Objective





- Vital Signs/Intake and Output


Vital Signs (last 24 hours): 


 











Temp Pulse Resp BP Pulse Ox


 


 98.4 F   85   20   155/73 H  100 


 


 10/06/17 08:00  10/06/17 08:23  10/06/17 08:00  10/06/17 08:23  10/06/17 08:00








Intake and Output: 


 











 10/06/17 10/06/17





 06:59 18:59


 


Output Total 50 


 


Balance -50 














- Medications


Medications: 


 Current Medications





Acetaminophen (Tylenol 650mg/20.3ml Solution Ud)  650 mg PO Q6 PRN


   PRN Reason: Headache


   Last Admin: 10/04/17 19:17 Dose:  650 mg


Amlodipine Besylate (Norvasc)  5 mg PO DAILY [facility]


   Last Admin: 10/06/17 08:23 Dose:  5 mg


Apixaban (Eliquis)  2.5 mg PO BID [facility]


   PRN Reason: Protocol


   Last Admin: 10/06/17 08:22 Dose:  2.5 mg


Artificial Tears (Artificial Tears)  2 drop OU Q4 PRN


   PRN Reason: Dry eyes


   Last Admin: 10/06/17 08:22 Dose:  2 drop


Aspirin (Ecotrin)  81 mg PO DAILY [facility]


   Last Admin: 10/06/17 08:23 Dose:  81 mg


Home Med (Rosuvastatin Calcium [Crestor])  40 mg PO HS [facility]


   Last Admin: 10/05/17 21:04 Dose:  40 mg


Vancomycin HCl 1 gm/ Sodium (Chloride)  250 mls @ 166.667 mls/hr IVPB MWF [facility]


   Last Admin: 10/04/17 09:55 Dose:  166.667 mls/hr


Insulin Human Regular (Humulin R)  0 units SC ACCU-CHECK [facility]


   PRN Reason: Protocol


   Last Admin: 10/06/17 06:08 Dose:  1 units


Levothyroxine Sodium (Synthroid)  75 mcg PO DAILY@0630 [facility]


   Last Admin: 10/06/17 06:07 Dose:  75 mcg


Metoprolol Succinate (Toprol Xl)  50 mg PO DAILY [facility]


   Last Admin: 10/06/17 08:23 Dose:  50 mg


Pantoprazole Sodium (Protonix Ec Tab)  40 mg PO DAILY [facility]


   Last Admin: 10/06/17 08:23 Dose:  40 mg


Tobramycin/Dexamethasone (Tobradex 0.3%-0.1% Opht Oint)  1 appl OU TID [facility]


   Last Admin: 10/06/17 08:22 Dose:  1 oin


Vitamin B Complex/Vit C/Folic Acid (Nephro-Brianna)  1 tab PO DAILY [facility]











- Labs


Labs: 


 





 10/05/17 04:15 





 10/05/17 04:15 





 











PT  13.7 Seconds (9.8-13.1)  H  09/30/17  16:44    


 


INR  1.3  (0.9-1.2)  H  09/30/17  16:44    


 


APTT  27.9 Seconds (25.6-37.1)   09/30/17  16:44    














- Constitutional


Appears: No Acute Distress





- ENT Exam


ENT Exam: Mucous Membranes Moist





- Respiratory Exam


Respiratory Exam: NORMAL BREATHING PATTERN.  absent: Chest Wall Tenderness





- Cardiovascular Exam


Cardiovascular Exam: absent: JVD, Rubs





- GI/Abdominal Exam


GI & Abdominal Exam: Soft, Normal Bowel Sounds





- Extremities Exam


Extremities Exam: absent: Calf Tenderness





- Back Exam


Back Exam: absent: CVA tenderness (L), CVA tenderness (R)





- Neurological Exam


Neurological Exam: Altered





Assessment and Plan


(1) Cerebellar infarct


Status: Acute   





(2) CKD (chronic kidney disease) stage V requiring chronic dialysis


Assessment & Plan: 


Patient with end stage renal disease on dialysis TTS


Vital signs stable


No significant changes reported


The rest of the problem as noted


#2 hypertension, controlled on medication


#3 patient has history of chronic A. fib as per primary team.


#4 status post CVA for this particular admission and patient making better 

recovery,


As noted per neurology


#5 diabetes mellitus management as per primary team


#6 hypothyroidism patient is on medication.


#7 hyperphosphatemia patient to be given phosphorus binder


#8 secondary hyperparathyroidism to repeat serum PTH.


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 

patient receiving antibiotics as per ID and podiatry





Status: Acute

## 2017-10-06 NOTE — CP.PCM.PN
Subjective





- Date & Time of Evaluation


Date of Evaluation: 10/06/17


Time of Evaluation: 10:56





- Subjective


Subjective: 





Ms. Osuna was seen and examined at the bedside. She remains responsive to all 

stimuli. She denies any headache, dizziness, lightheadedness, nausea, or 

vomiting. She has several episodes of foul- smelling diarrhea, no blood noted. 

There was no untoward events overnight. 





Objective





- Vital Signs/Intake and Output


Vital Signs (last 24 hours): 


 











Temp Pulse Resp BP Pulse Ox


 


 98.4 F   85   20   155/73 H  100 


 


 10/06/17 08:00  10/06/17 08:23  10/06/17 08:00  10/06/17 08:23  10/06/17 08:00








Intake and Output: 


 











 10/06/17 10/06/17





 06:59 18:59


 


Output Total 50 


 


Balance -50 














- Medications


Medications: 


 Current Medications





Acetaminophen (Tylenol 650mg/20.3ml Solution Ud)  650 mg PO Q6 PRN


   PRN Reason: Headache


   Last Admin: 10/04/17 19:17 Dose:  650 mg


Amlodipine Besylate (Norvasc)  5 mg PO DAILY [facility]


   Last Admin: 10/06/17 08:23 Dose:  5 mg


Apixaban (Eliquis)  2.5 mg PO BID [facility]


   PRN Reason: Protocol


   Last Admin: 10/06/17 08:22 Dose:  2.5 mg


Artificial Tears (Artificial Tears)  2 drop OU Q4 PRN


   PRN Reason: Dry eyes


   Last Admin: 10/06/17 08:22 Dose:  2 drop


Aspirin (Ecotrin)  81 mg PO DAILY [facility]


   Last Admin: 10/06/17 08:23 Dose:  81 mg


Home Med (Rosuvastatin Calcium [Crestor])  40 mg PO HS [facility]


   Last Admin: 10/05/17 21:04 Dose:  40 mg


Vancomycin HCl 1 gm/ Sodium (Chloride)  250 mls @ 166.667 mls/hr IVPB MWF [facility]


   Last Admin: 10/04/17 09:55 Dose:  166.667 mls/hr


Insulin Human Regular (Humulin R)  0 units SC ACCU-CHECK [facility]


   PRN Reason: Protocol


   Last Admin: 10/06/17 06:08 Dose:  1 units


Levothyroxine Sodium (Synthroid)  75 mcg PO DAILY@0630 [facility]


   Last Admin: 10/06/17 06:07 Dose:  75 mcg


Metoprolol Succinate (Toprol Xl)  50 mg PO DAILY [facility]


   Last Admin: 10/06/17 08:23 Dose:  50 mg


Pantoprazole Sodium (Protonix Ec Tab)  40 mg PO DAILY [facility]


   Last Admin: 10/06/17 08:23 Dose:  40 mg


Tobramycin/Dexamethasone (Tobradex 0.3%-0.1% Opht Oint)  1 appl OU TID [facility]


   Last Admin: 10/06/17 08:22 Dose:  1 oin


Vitamin B Complex/Vit C/Folic Acid (Nephro-Brianna)  1 tab PO DAILY [facility]











- Labs


Labs: 


 





 10/05/17 04:15 





 10/05/17 04:15 





 











PT  13.7 Seconds (9.8-13.1)  H  09/30/17  16:44    


 


INR  1.3  (0.9-1.2)  H  09/30/17  16:44    


 


APTT  27.9 Seconds (25.6-37.1)   09/30/17  16:44    














- Constitutional


Appears: No Acute Distress





- Head Exam


Head Exam: ATRAUMATIC, NORMAL INSPECTION, NORMOCEPHALIC





- GI/Abdominal Exam


Additional comments: 





had several episodes of foul smelling diarrhea, no blood noted.





- Neurological Exam


Neurological Exam: Alert, Awake


Neuro motor strength exam: Left Upper Extremity: 4, Right Upper Extremity: 4, 

Left Lower Extremity: 3, Right Lower Extremity: 3


Additional comments: 





She is able to follow commands and responds appropriately. 





Assessment and Plan


(1) Cerebellar infarct


Assessment & Plan: 


Case discussed with Dr. Jones, continue current medical, physical, and 

occupational therapies. Please refer primary regarding her foul smelling 

episodes of diarrhea. There is no new recommendation from neurology.


Status: Acute

## 2017-10-07 LAB
BUN SERPL-MCNC: 31 MG/DL (ref 7–17)
CALCIUM SERPL-MCNC: 8.6 MG/DL (ref 8.4–10.2)
CHLORIDE SERPL-SCNC: 94 MMOL/L (ref 98–107)
CO2 SERPL-SCNC: 26 MMOL/L (ref 22–30)
GLUCOSE SERPL-MCNC: 240 MG/DL (ref 65–105)
POTASSIUM SERPL-SCNC: 4.1 MMOL/L (ref 3.6–5)
SODIUM SERPL-SCNC: 135 MMOL/L (ref 132–148)

## 2017-10-07 RX ADMIN — TOBRAMYCIN AND DEXAMETHASONE SCH OIN: 3; 1 OINTMENT OPHTHALMIC at 09:20

## 2017-10-07 RX ADMIN — TOBRAMYCIN AND DEXAMETHASONE SCH OIN: 3; 1 OINTMENT OPHTHALMIC at 17:16

## 2017-10-07 RX ADMIN — POLYVINYL ALCOHOL PRN DROP: 14 SOLUTION/ DROPS OPHTHALMIC at 09:18

## 2017-10-07 RX ADMIN — ACETAMINOPHEN PRN MG: 160 SOLUTION ORAL at 11:24

## 2017-10-07 RX ADMIN — TOBRAMYCIN AND DEXAMETHASONE SCH OIN: 3; 1 OINTMENT OPHTHALMIC at 13:06

## 2017-10-07 RX ADMIN — PANTOPRAZOLE SODIUM SCH MG: 40 TABLET, DELAYED RELEASE ORAL at 09:20

## 2017-10-07 RX ADMIN — METOPROLOL SUCCINATE SCH: 50 TABLET, EXTENDED RELEASE ORAL at 11:24

## 2017-10-07 RX ADMIN — ACETAMINOPHEN PRN MG: 160 SOLUTION ORAL at 21:12

## 2017-10-07 NOTE — CP.PCM.PN
Subjective





- Date & Time of Evaluation


Date of Evaluation: 10/07/17


Time of Evaluation: 09:15





- Subjective


Subjective: 





seen and examined


no complaints











PE:





vs: reviewed


gen: nad


sclera: anicteric


op: poor dentition


neck:supple


cv: +s1+s2


lungs: reduced bs at b/l lower lobes


ext: trace edema


neuro: following some commands


psych: flat affect


skin: no appreciable rash





Objective





- Vital Signs/Intake and Output


Vital Signs (last 24 hours): 


 











Temp Pulse Resp BP Pulse Ox


 


 97.4 F L  79   18   145/59 L  100 


 


 10/07/17 08:11  10/07/17 08:11  10/07/17 08:11  10/07/17 08:11  10/07/17 08:11











- Medications


Medications: 


 Current Medications





Acetaminophen (Tylenol 650mg/20.3ml Solution Ud)  650 mg PO Q6 PRN


   PRN Reason: Headache


   Last Admin: 10/07/17 11:24 Dose:  650 mg


Amlodipine Besylate (Norvasc)  5 mg PO DAILY [facility]


   Last Admin: 10/07/17 09:21 Dose:  Not Given


Apixaban (Eliquis)  2.5 mg PO BID [facility]


   PRN Reason: Protocol


   Last Admin: 10/07/17 09:20 Dose:  2.5 mg


Artificial Tears (Artificial Tears)  2 drop OU Q4 PRN


   PRN Reason: Dry eyes


   Last Admin: 10/07/17 09:18 Dose:  2 drop


Aspirin (Ecotrin)  81 mg PO DAILY [facility]


   Last Admin: 10/07/17 09:20 Dose:  81 mg


Home Med (Rosuvastatin Calcium [Crestor])  40 mg PO HS [facility]


   Last Admin: 10/06/17 22:17 Dose:  40 mg


Vancomycin HCl 1 gm/ Sodium (Chloride)  250 mls @ 166.667 mls/hr IVPB MWF [facility]


   Last Admin: 10/04/17 09:55 Dose:  166.667 mls/hr


Vancomycin HCl 750 mg/ Sodium (Chloride)  250 mls @ 166.667 mls/hr IVPB [facility] 

[facility]


   PRN Reason: Protocol


   Stop: 10/07/17 16:59


Insulin Human Regular (Humulin R)  0 units SC ACCU-CHECK [facility]


   PRN Reason: Protocol


   Last Admin: 10/07/17 07:02 Dose:  Not Given


Levothyroxine Sodium (Synthroid)  75 mcg PO DAILY@0630 [facility]


   Last Admin: 10/07/17 07:09 Dose:  75 mcg


Metoprolol Succinate (Toprol Xl)  50 mg PO DAILY [facility]


   Last Admin: 10/07/17 11:24 Dose:  Not Given


Pantoprazole Sodium (Protonix Ec Tab)  40 mg PO DAILY [facility]


   Last Admin: 10/07/17 09:20 Dose:  40 mg


Tobramycin/Dexamethasone (Tobradex 0.3%-0.1% Opht Oint)  1 appl OU TID [facility]


   Last Admin: 10/07/17 09:20 Dose:  1 oin


Vitamin B Complex/Vit C/Folic Acid (Nephro-Brianna)  1 tab PO DAILY [facility]











- Labs


Labs: 


 





 10/05/17 04:15 





 10/07/17 10:20 





 











PT  13.7 Seconds (9.8-13.1)  H  09/30/17  16:44    


 


INR  1.3  (0.9-1.2)  H  09/30/17  16:44    


 


APTT  27.9 Seconds (25.6-37.1)   09/30/17  16:44    














Assessment and Plan





- Assessment and Plan (Free Text)


Assessment: 





ESRD/ Diabetic Kidney Disease/ CVA / HTN nephropathy / anemia of renal disease











plan:


HD today


bp improving can inc norvasc if necessary


epo currently on hold


phos at goal off binders


on nephrocap


check vanc levels and dose per ID- last level was 40 on 10/3, recc recheck

## 2017-10-07 NOTE — PN
DATE:  10/07/2017



SUBJECTIVE:  The patient is seen and examined.  Interim events noted. 

Consults noted and appreciated.  The patient remains in Progressive Care

Unit on telemetry monitoring.  The patient is awake, responsive, but

non-conversant and not able to provide informative history or review of

systems.  According to daughter at bedside, no specific complaint.



PHYSICAL EXAMINATION

GENERAL:  The patient is in no acute distress.

VITAL SIGNS:  Stable.

HEART:  S1 and S2 normal and regular.

LUNGS:  Good bilateral air exchange.

ABDOMEN:  Soft and nontender.

EXTREMITIES:  No edema, no calf swelling, no tenderness, no acute ischemia.

CENTRAL NERVOUS SYSTEM:  Essentially unchanged.



DIAGNOSTIC DATA:  Available diagnostic data reviewed.  Telemetry monitoring

does not show a significant arrhythmia.



ASSESSMENT AND PLAN:  Overall, the patient's general medical condition is

hemodynamically stable.  Long-term functional prognosis remains poor.  Plan

discussed with the patient's daughter at bedside and daughter does not want

the patient to go to subacute rehab.  Overall, the patient's general

medical condition is stable.  Plan as ordered.





__________________________________________

Victor M Calix MD





DD:  10/07/2017 7:27:20

DT:  10/07/2017 8:53:51

Job # [id number]

## 2017-10-07 NOTE — CP.PCM.PN
Subjective





- Date & Time of Evaluation


Date of Evaluation: 10/07/17


Time of Evaluation: 15:25





- Subjective


Subjective: 





Podiatry Progress Note - Dr. Serna





77 year old female patient seen at bedside for non-healing ulceration to L TMA 

stump. Patient is alert and awake and is in no acute distress.  Patient denies 

any pain to her left foot today. Patient wearing multipodus boots b/l at time 

of visit.








Objective





- Vital Signs/Intake and Output


Vital Signs (last 24 hours): 


 











Temp Pulse Resp BP Pulse Ox


 


 97.7 F   79   18   149/57 L  100 


 


 10/07/17 12:17  10/07/17 12:17  10/07/17 12:17  10/07/17 12:17  10/07/17 12:17











- Medications


Medications: 


 Current Medications





Acetaminophen (Tylenol 650mg/20.3ml Solution Ud)  650 mg PO Q6 PRN


   PRN Reason: Headache


   Last Admin: 10/07/17 11:24 Dose:  650 mg


Amlodipine Besylate (Norvasc)  5 mg PO DAILY [facility]


   Last Admin: 10/07/17 09:21 Dose:  Not Given


Apixaban (Eliquis)  2.5 mg PO BID [facility]


   PRN Reason: Protocol


   Last Admin: 10/07/17 09:20 Dose:  2.5 mg


Artificial Tears (Artificial Tears)  2 drop OU Q4 PRN


   PRN Reason: Dry eyes


   Last Admin: 10/07/17 09:18 Dose:  2 drop


Aspirin (Ecotrin)  81 mg PO DAILY [facility]


   Last Admin: 10/07/17 09:20 Dose:  81 mg


Home Med (Rosuvastatin Calcium [Crestor])  40 mg PO HS [facility]


   Last Admin: 10/06/17 22:17 Dose:  40 mg


Vancomycin HCl 1 gm/ Sodium (Chloride)  250 mls @ 166.667 mls/hr IVPB MWF [facility]


   Last Admin: 10/04/17 09:55 Dose:  166.667 mls/hr


Vancomycin HCl 750 mg/ Sodium (Chloride)  250 mls @ 166.667 mls/hr IVPB [facility] 

[facility]


   PRN Reason: Protocol


   Stop: 10/07/17 16:59


Insulin Human Regular (Humulin R)  0 units SC ACCU-CHECK [facility]


   PRN Reason: Protocol


   Last Admin: 10/07/17 13:06 Dose:  2 units


Levothyroxine Sodium (Synthroid)  75 mcg PO DAILY@0630 [facility]


   Last Admin: 10/07/17 07:09 Dose:  75 mcg


Metoprolol Succinate (Toprol Xl)  50 mg PO DAILY [facility]


   Last Admin: 10/07/17 11:24 Dose:  Not Given


Pantoprazole Sodium (Protonix Ec Tab)  40 mg PO DAILY [facility]


   Last Admin: 10/07/17 09:20 Dose:  40 mg


Tobramycin/Dexamethasone (Tobradex 0.3%-0.1% Opht Oint)  1 appl OU TID [facility]


   Last Admin: 10/07/17 13:06 Dose:  1 oin


Vitamin B Complex/Vit C/Folic Acid (Nephro-Brianna)  1 tab PO DAILY [facility]











- Labs


Labs: 


 





 10/05/17 04:15 





 10/07/17 10:20 





 











PT  13.7 Seconds (9.8-13.1)  H  09/30/17  16:44    


 


INR  1.3  (0.9-1.2)  H  09/30/17  16:44    


 


APTT  27.9 Seconds (25.6-37.1)   09/30/17  16:44    














- Constitutional


Appears: Well, Non-toxic, No Acute Distress





- Extremities Exam


Additional comments: 





Patient wearing offloading boots at time of visit. Dressing to LLE appears clean

/dry/intact with no strikethrough noted.





VASC: DP pulses palpable 2/4 b/l. Right PT pulse palpable 2/4, left PT pulse non

-palpable. TG warm to warm. No edema noted.





DERM: Ulceration noted to distolateral aspect of left TMA stump measuring 

approximately 5 x 2 x 0.2 cm with overlying eschar. Mild erythema noted to 

proximal aspect of wound, decreasing since last visit. No active drainage, 

purulence, malodor, fluctuance, or ascending cellulitis noted. (-)probe to bone





NEURO: Gross sensation diminished bilaterally.





ORTHO: No tenderness to palpation bilateral lower extremity.





- Neurological Exam


Neurological Exam: Alert, Awake, Oriented x3





- Psychiatric Exam


Psychiatric exam: Normal Affect, Normal Mood





Assessment and Plan





- Assessment and Plan (Free Text)


Assessment: 





77 year old female with non-healing ulceration to L TMA


Plan: 





Patient seen and evaluated at bedside


Discussed with attending, Dr. Serna


Chart, vitals, labs reviewed = afebrile, WBC @ 8.7 (10/05)


Wound was dressed with DSD


C/w pain mgmt per medicine


C/w multipodus boots at all times in bed


Stable per podiatry


Podiatry will continue to follow patient while in house

## 2017-10-08 LAB
ALBUMIN/GLOB SERPL: 0.9 {RATIO} (ref 1–2.1)
ALP SERPL-CCNC: 82 U/L (ref 38–126)
ALT SERPL-CCNC: 39 U/L (ref 9–52)
AST SERPL-CCNC: 38 U/L (ref 14–36)
BILIRUB SERPL-MCNC: 0.4 MG/DL (ref 0.2–1.3)
BUN SERPL-MCNC: 17 MG/DL (ref 7–17)
CALCIUM SERPL-MCNC: 8.9 MG/DL (ref 8.4–10.2)
CHLORIDE SERPL-SCNC: 97 MMOL/L (ref 98–107)
CO2 SERPL-SCNC: 27 MMOL/L (ref 22–30)
ERYTHROCYTE [DISTWIDTH] IN BLOOD BY AUTOMATED COUNT: 21 % (ref 11.5–14.5)
GLOBULIN SER-MCNC: 3.5 GM/DL (ref 2.2–3.9)
GLUCOSE SERPL-MCNC: 153 MG/DL (ref 65–105)
HCT VFR BLD CALC: 41.9 % (ref 34–47)
MCH RBC QN AUTO: 25.2 PG (ref 27–31)
MCHC RBC AUTO-ENTMCNC: 29.7 G/DL (ref 33–37)
MCV RBC AUTO: 84.9 FL (ref 81–99)
PLATELET # BLD: 159 K/UL (ref 130–400)
POTASSIUM SERPL-SCNC: 4.2 MMOL/L (ref 3.6–5)
PROT SERPL-MCNC: 6.8 G/DL (ref 6.3–8.2)
SODIUM SERPL-SCNC: 138 MMOL/L (ref 132–148)
WBC # BLD AUTO: 6.4 K/UL (ref 4.8–10.8)

## 2017-10-08 RX ADMIN — PANTOPRAZOLE SODIUM SCH MG: 40 TABLET, DELAYED RELEASE ORAL at 08:40

## 2017-10-08 RX ADMIN — TOBRAMYCIN AND DEXAMETHASONE SCH OIN: 3; 1 OINTMENT OPHTHALMIC at 13:55

## 2017-10-08 RX ADMIN — POLYVINYL ALCOHOL PRN DROP: 14 SOLUTION/ DROPS OPHTHALMIC at 16:54

## 2017-10-08 RX ADMIN — TOBRAMYCIN AND DEXAMETHASONE SCH OIN: 3; 1 OINTMENT OPHTHALMIC at 16:54

## 2017-10-08 RX ADMIN — TOBRAMYCIN AND DEXAMETHASONE SCH OIN: 3; 1 OINTMENT OPHTHALMIC at 08:41

## 2017-10-08 RX ADMIN — METOPROLOL SUCCINATE SCH MG: 50 TABLET, EXTENDED RELEASE ORAL at 08:40

## 2017-10-08 NOTE — CT
PROCEDURE:  CT HEAD WITHOUT CONTRAST.



HISTORY:

follow up stroke



COMPARISON:

The noncontrast head CT performed 10/5/17 



TECHNIQUE:

Axial computed tomography images were obtained through the head/brain 

without intravenous contrast.  



Radiation dose:



Total exam DLP = 782.79 mGy-cm.



This CT exam was performed using one or more of the following dose 

reduction techniques: Automated exposure control, adjustment of the 

mA and/or kV according to patient size, and/or use of iterative 

reconstruction technique.



FINDINGS:



HEMORRHAGE:

No intracranial hemorrhage. 



BRAIN:

Diffuse atrophy with prominence of the ventricles and sulci noted. No 

mass effect or edema.  Dense intracranial atherosclerosis. 



Moderate scattered periventricular and subcortical white matter 

hypodensities, which are nonspecific, but often seen with chronic 

microvascular ischemic disease. Hypodensity in the right cerebellum 

compatible with infarction. Mass effect on the 4th ventricle as on 

prior study. 



Please note that MRI with diffusion imaging is more sensitive in the 

detection of acute ischemic event.



VENTRICLES:

Mass-effect upon the right lateral aspect of the 4th ventricle 

re-identified. No hydrocephalus. 



CALVARIUM:

Unremarkable.



PARANASAL SINUSES:

Unremarkable as visualized. No significant inflammatory changes.



MASTOID AIR CELLS:

Opacification/fluid bilateral mastoid air cells ; correlate 

clinically for mastoiditis.



OTHER FINDINGS:

None.



IMPRESSION:

Findings as above remain consistent with infarction involving the 

right cerebellar hemisphere. Re-identified mass-effect upon the right 

lateral aspect of the 4th ventricle. 



Chronic nonspecific white matter changes evident. 



Bilateral mastoid effusions; correlate clinically for mastoiditis.

## 2017-10-08 NOTE — CP.PCM.PN
Subjective





- Date & Time of Evaluation


Date of Evaluation: 10/08/17


Time of Evaluation: 10:25





- Subjective


Subjective: 





seen and examined


no complaints











PE:





vs: reviewed


gen: nad


sclera: anicteric


op: poor dentition


neck:supple


cv: +s1+s2


lungs: reduced bs at b/l lower lobes


ext: trace edema


neuro: following some commands


psych: flat affect


skin: dressing LLE





Objective





- Vital Signs/Intake and Output


Vital Signs (last 24 hours): 


 











Temp Pulse Resp BP Pulse Ox


 


 98.2 F   81   20   133/58 L  99 


 


 10/08/17 08:00  10/08/17 08:42  10/08/17 08:00  10/08/17 08:42  10/08/17 08:00











- Medications


Medications: 


 Current Medications





Acetaminophen (Tylenol 650mg/20.3ml Solution Ud)  650 mg PO Q6 PRN


   PRN Reason: Headache


   Last Admin: 10/07/17 21:12 Dose:  650 mg


Amlodipine Besylate (Norvasc)  5 mg PO DAILY [facility]


   Last Admin: 10/08/17 08:42 Dose:  5 mg


Apixaban (Eliquis)  2.5 mg PO BID [facility]


   PRN Reason: Protocol


   Last Admin: 10/08/17 08:43 Dose:  2.5 mg


Artificial Tears (Artificial Tears)  2 drop OU Q4 PRN


   PRN Reason: Dry eyes


   Last Admin: 10/07/17 09:18 Dose:  2 drop


Aspirin (Aspirin Chewable)  81 mg PO DAILY [facility]


   Last Admin: 10/08/17 08:51 Dose:  81 mg


Home Med (Rosuvastatin Calcium [Crestor])  40 mg PO HS [facility]


   Last Admin: 10/07/17 21:10 Dose:  40 mg


Vancomycin HCl 1 gm/ Sodium (Chloride)  250 mls @ 166.667 mls/hr IVPB MWF [facility]


   Last Admin: 10/04/17 09:55 Dose:  166.667 mls/hr


Insulin Human Regular (Humulin R)  0 units SC ACCU-CHECK [facility]


   PRN Reason: Protocol


   Last Admin: 10/08/17 07:14 Dose:  1 units


Levothyroxine Sodium (Synthroid)  75 mcg PO DAILY@0630 [facility]


   Last Admin: 10/08/17 06:11 Dose:  75 mcg


Metoprolol Succinate (Toprol Xl)  50 mg PO DAILY [facility]


   Last Admin: 10/08/17 08:40 Dose:  50 mg


Pantoprazole Sodium (Protonix Ec Tab)  40 mg PO DAILY [facility]


   Last Admin: 10/08/17 08:40 Dose:  40 mg


Tobramycin/Dexamethasone (Tobradex 0.3%-0.1% Opht Oint)  1 appl OU TID [facility]


   Last Admin: 10/08/17 08:41 Dose:  1 oin


Vitamin B Complex/Vit C/Folic Acid (Nephro-Brianna)  1 tab PO DAILY [facility]











- Labs


Labs: 


 





 10/08/17 05:30 





 10/08/17 05:30 





 











PT  13.7 Seconds (9.8-13.1)  H  09/30/17  16:44    


 


INR  1.3  (0.9-1.2)  H  09/30/17  16:44    


 


APTT  27.9 Seconds (25.6-37.1)   09/30/17  16:44    














Assessment and Plan





- Assessment and Plan (Free Text)


Assessment: 





ESRD/ Diabetic Kidney Disease/ CVA / HTN nephropathy / anemia of renal disease











plan:


HD next tuesday


bp stable


epo currently on hold


phos at goal off binders


on nephrocap


f/u ID re: abx dose for ESRD

## 2017-10-09 VITALS
SYSTOLIC BLOOD PRESSURE: 140 MMHG | DIASTOLIC BLOOD PRESSURE: 79 MMHG | RESPIRATION RATE: 17 BRPM | TEMPERATURE: 98.1 F | HEART RATE: 84 BPM

## 2017-10-09 VITALS — OXYGEN SATURATION: 100 %

## 2017-10-09 LAB
ALBUMIN/GLOB SERPL: 0.9 {RATIO} (ref 1–2.1)
ALBUMIN/GLOB SERPL: 0.9 {RATIO} (ref 1–2.1)
ALP SERPL-CCNC: 78 U/L (ref 38–126)
ALP SERPL-CCNC: 79 U/L (ref 38–126)
ALT SERPL-CCNC: 33 U/L (ref 9–52)
ALT SERPL-CCNC: 37 U/L (ref 9–52)
AST SERPL-CCNC: 58 U/L (ref 14–36)
AST SERPL-CCNC: 60 U/L (ref 14–36)
BILIRUB SERPL-MCNC: 0.6 MG/DL (ref 0.2–1.3)
BILIRUB SERPL-MCNC: 0.6 MG/DL (ref 0.2–1.3)
BUN SERPL-MCNC: 27 MG/DL (ref 7–17)
BUN SERPL-MCNC: 28 MG/DL (ref 7–17)
CALCIUM SERPL-MCNC: 8.9 MG/DL (ref 8.4–10.2)
CALCIUM SERPL-MCNC: 8.9 MG/DL (ref 8.4–10.2)
CHLORIDE SERPL-SCNC: 95 MMOL/L (ref 98–107)
CHLORIDE SERPL-SCNC: 96 MMOL/L (ref 98–107)
CO2 SERPL-SCNC: 28 MMOL/L (ref 22–30)
CO2 SERPL-SCNC: 28 MMOL/L (ref 22–30)
ERYTHROCYTE [DISTWIDTH] IN BLOOD BY AUTOMATED COUNT: 20.9 % (ref 11.5–14.5)
GLOBULIN SER-MCNC: 3.7 GM/DL (ref 2.2–3.9)
GLOBULIN SER-MCNC: 3.8 GM/DL (ref 2.2–3.9)
GLUCOSE SERPL-MCNC: 138 MG/DL (ref 65–105)
GLUCOSE SERPL-MCNC: 148 MG/DL (ref 65–105)
HCT VFR BLD CALC: 41.2 % (ref 34–47)
MCH RBC QN AUTO: 25.4 PG (ref 27–31)
MCHC RBC AUTO-ENTMCNC: 30.2 G/DL (ref 33–37)
MCV RBC AUTO: 84.3 FL (ref 81–99)
PLATELET # BLD: 179 K/UL (ref 130–400)
POTASSIUM SERPL-SCNC: 6.1 MMOL/L (ref 3.6–5)
POTASSIUM SERPL-SCNC: 6.4 MMOL/L (ref 3.6–5)
PROT SERPL-MCNC: 7 G/DL (ref 6.3–8.2)
PROT SERPL-MCNC: 7.2 G/DL (ref 6.3–8.2)
SODIUM SERPL-SCNC: 135 MMOL/L (ref 132–148)
SODIUM SERPL-SCNC: 136 MMOL/L (ref 132–148)
WBC # BLD AUTO: 6.5 K/UL (ref 4.8–10.8)

## 2017-10-09 RX ADMIN — TOBRAMYCIN AND DEXAMETHASONE SCH OIN: 3; 1 OINTMENT OPHTHALMIC at 13:24

## 2017-10-09 RX ADMIN — TOBRAMYCIN AND DEXAMETHASONE SCH OIN: 3; 1 OINTMENT OPHTHALMIC at 08:34

## 2017-10-09 RX ADMIN — TOBRAMYCIN AND DEXAMETHASONE SCH OIN: 3; 1 OINTMENT OPHTHALMIC at 17:50

## 2017-10-09 RX ADMIN — PANTOPRAZOLE SODIUM SCH MG: 40 TABLET, DELAYED RELEASE ORAL at 08:35

## 2017-10-09 RX ADMIN — POLYVINYL ALCOHOL PRN DROP: 14 SOLUTION/ DROPS OPHTHALMIC at 08:33

## 2017-10-09 RX ADMIN — METOPROLOL SUCCINATE SCH MG: 50 TABLET, EXTENDED RELEASE ORAL at 08:34

## 2017-10-09 NOTE — PN
DATE:  10/09/2017



SUBJECTIVE:  The patient is seen and examined.  Interim events noted. 

Consults noted and appreciated.  Nephrology followup and interventions

noted and appreciated.  The patient remains in Progressive Care Unit on

telemetry monitoring.  The patient is awake, responsive, but

non-communicative.  According to the patient's daughter, the patient did

have some pain when moved in bed, but no chest pain or shortness of breath.



PHYSICAL EXAMINATION:

GENERAL:  The patient is in no acute distress.

VITAL SIGNS:  Stable.

HEART:  S1 and S2, normal and regular.

LUNGS:  Good bilateral air exchange.

ABDOMEN:  Soft, nontender.

EXTREMITIES:  No calf swelling.  No tenderness.  No acute ischemia.

CENTRAL NERVOUS SYSTEM:  Essentially unchanged.



DIAGNOSTIC DATA:  Available diagnostic data reviewed.  Potassium level is

6.1.  Telephone order was given to give 60 g of Kayexalate 1 time.  This

potassium was repeated and confirmed twice.



ASSESSMENT AND PLAN:  Overall, the patient's general medical condition is

stable.  Plan as ordered.







__________________________________________

Victor M Calix MD



DD:  10/09/2017 8:36:16

DT:  10/09/2017 10:59:44

Job # [id number]

## 2017-10-09 NOTE — CP.PCM.PN
Subjective





- Date & Time of Evaluation


Date of Evaluation: 10/09/17


Time of Evaluation: 08:28





- Subjective


Subjective: 





Podiatry Progress Note - Dr. Serna





77 year old female patient seen at bedside for non-healing ulceration to L TMA 

stump. Patient is accompanied by daughter at bedside. Patient seen sleeping at 

time of visit, NAD. Patient denies any pain to bilateral LE today. Patient 

wearing multipodus boots at time of visit. Per patient's daughter, patient does 

not wear boots at all times while in bed. Patient denies N/V/F/D/C/SOB/calf 

pain. Offers no other pedal complaints at this time.





Objective





- Vital Signs/Intake and Output


Vital Signs (last 24 hours): 


 











Temp Pulse Resp BP Pulse Ox


 


 97.2 F L  80   20   150/47 L  100 


 


 10/09/17 08:00  10/09/17 08:00  10/09/17 08:00  10/09/17 08:00  10/09/17 08:00











- Medications


Medications: 


 Current Medications





Acetaminophen (Tylenol 650mg/20.3ml Solution Ud)  650 mg PO Q6 PRN


   PRN Reason: Headache


   Last Admin: 10/07/17 21:12 Dose:  650 mg


Amlodipine Besylate (Norvasc)  5 mg PO DAILY [facility]


   Last Admin: 10/08/17 08:42 Dose:  5 mg


Apixaban (Eliquis)  2.5 mg PO BID [facility]


   PRN Reason: Protocol


   Last Admin: 10/08/17 16:54 Dose:  2.5 mg


Artificial Tears (Artificial Tears)  2 drop OU Q4 PRN


   PRN Reason: Dry eyes


   Last Admin: 10/08/17 16:54 Dose:  2 drop


Aspirin (Aspirin Chewable)  81 mg PO DAILY [facility]


   Last Admin: 10/08/17 08:51 Dose:  81 mg


Home Med (Rosuvastatin Calcium [Crestor])  40 mg PO HS [facility]


   Last Admin: 10/08/17 21:36 Dose:  40 mg


Vancomycin HCl 1 gm/ Sodium (Chloride)  250 mls @ 166.667 mls/hr IVPB MWF [facility]


   Last Admin: 10/04/17 09:55 Dose:  166.667 mls/hr


Insulin Human Regular (Humulin R)  0 units SC ACCU-CHECK [facility]


   PRN Reason: Protocol


   Last Admin: 10/09/17 06:07 Dose:  Not Given


Levothyroxine Sodium (Synthroid)  75 mcg PO DAILY@0630 [facility]


   Last Admin: 10/09/17 06:09 Dose:  75 mcg


Metoprolol Succinate (Toprol Xl)  50 mg PO DAILY [facility]


   Last Admin: 10/08/17 08:40 Dose:  50 mg


Pantoprazole Sodium (Protonix Ec Tab)  40 mg PO DAILY [facility]


   Last Admin: 10/08/17 08:40 Dose:  40 mg


Tobramycin/Dexamethasone (Tobradex 0.3%-0.1% Opht Oint)  1 appl OU TID [facility]


   Last Admin: 10/08/17 16:54 Dose:  1 oin


Vitamin B Complex/Vit C/Folic Acid (Nephro-Brianna)  1 tab PO DAILY [facility]











- Labs


Labs: 


 





 10/09/17 04:30 





 10/09/17 06:41 





 











PT  13.7 Seconds (9.8-13.1)  H  09/30/17  16:44    


 


INR  1.3  (0.9-1.2)  H  09/30/17  16:44    


 


APTT  27.9 Seconds (25.6-37.1)   09/30/17  16:44    














- Constitutional


Appears: Well, Non-toxic, No Acute Distress





- Extremities Exam


Additional comments: 





Patient wearing offloading boots at time of visit. Dressing to LLE appears clean

/dry/intact with no strikethrough noted.





VASC: DP pulses palpable 2/4 b/l. Right PT pulse palpable 2/4, left PT pulse non

-palpable. TG warm to warm. No edema noted.





DERM: Ulceration noted to distolateral aspect of left TMA stump measuring 

approximately 5 x 2 x 0.2 cm with overlying eschar. Mild erythema noted to 

proximal aspect of wound, decreasing since last visit. No active drainage, 

purulence, malodor, fluctuance, or ascending cellulitis noted. (-)probe to bone





NEURO: Gross sensation diminished bilaterally.





ORTHO: No tenderness to palpation bilateral lower extremity.





- Neurological Exam


Neurological Exam: Alert, Awake, Oriented x3





- Psychiatric Exam


Psychiatric exam: Normal Affect, Normal Mood





Assessment and Plan





- Assessment and Plan (Free Text)


Assessment: 





77 year old female with non-healing ulceration to L [facility]


Plan: 





Patient seen and evaluated at bedside


Discussed with attending, Dr. Serna


Chart, vitals, labs reviewed = afebrile, WBC WNL @ 6.5


DSD to L TMA


Home nursing orders - 4x4 and kerlix to L TMA; patient to offload heels at all 

times while in mbed


C/w pain mgmt per medicine


Stable per podiatry


Podiatry will continue to follow patient while in house

## 2017-10-09 NOTE — CP.PCM.PN
Subjective





- Date & Time of Evaluation


Date of Evaluation: 10/09/17


Time of Evaluation: 09:54





- Subjective


Subjective: 





Ms. Osuna was seen and examined at the bedside. She is responsive to all stimuli 

follows simple commands.  She is not not in any kind of distress. There is no 

untoward events overnight.





Objective





- Vital Signs/Intake and Output


Vital Signs (last 24 hours): 


 











Temp Pulse Resp BP Pulse Ox


 


 97.2 F L  80   20   150/47 L  100 


 


 10/09/17 08:00  10/09/17 08:35  10/09/17 08:00  10/09/17 08:35  10/09/17 08:00











- Medications


Medications: 


 Current Medications





Acetaminophen (Tylenol 650mg/20.3ml Solution Ud)  650 mg PO Q6 PRN


   PRN Reason: Headache


   Last Admin: 10/07/17 21:12 Dose:  650 mg


Amlodipine Besylate (Norvasc)  5 mg PO DAILY [facility]


   Last Admin: 10/09/17 08:35 Dose:  5 mg


Apixaban (Eliquis)  2.5 mg PO BID [facility]


   PRN Reason: Protocol


   Last Admin: 10/08/17 16:54 Dose:  2.5 mg


Artificial Tears (Artificial Tears)  2 drop OU Q4 PRN


   PRN Reason: Dry eyes


   Last Admin: 10/09/17 08:33 Dose:  2 drop


Aspirin (Aspirin Chewable)  81 mg PO DAILY [facility]


   Last Admin: 10/09/17 08:33 Dose:  81 mg


Home Med (Rosuvastatin Calcium [Crestor])  40 mg PO HS [facility]


   Last Admin: 10/08/17 21:36 Dose:  40 mg


Vancomycin HCl 1 gm/ Sodium (Chloride)  250 mls @ 166.667 mls/hr IVPB MWF [facility]


   Last Admin: 10/04/17 09:55 Dose:  166.667 mls/hr


Insulin Human Regular (Humulin R)  0 units SC ACCU-CHECK [facility]


   PRN Reason: Protocol


   Last Admin: 10/09/17 06:07 Dose:  Not Given


Levothyroxine Sodium (Synthroid)  75 mcg PO DAILY@0630 [facility]


   Last Admin: 10/09/17 06:09 Dose:  75 mcg


Metoprolol Succinate (Toprol Xl)  50 mg PO DAILY [facility]


   Last Admin: 10/09/17 08:34 Dose:  50 mg


Pantoprazole Sodium (Protonix Ec Tab)  40 mg PO DAILY [facility]


   Last Admin: 10/09/17 08:35 Dose:  40 mg


Tobramycin/Dexamethasone (Tobradex 0.3%-0.1% Opht Oint)  1 appl OU TID [facility]


   Last Admin: 10/09/17 08:34 Dose:  1 oin


Vitamin B Complex/Vit C/Folic Acid (Nephro-Brianna)  1 tab PO DAILY [facility]











- Labs


Labs: 


 





 10/09/17 04:30 





 10/09/17 06:41 





 











PT  13.7 Seconds (9.8-13.1)  H  09/30/17  16:44    


 


INR  1.3  (0.9-1.2)  H  09/30/17  16:44    


 


APTT  27.9 Seconds (25.6-37.1)   09/30/17  16:44    














- Constitutional


Appears: No Acute Distress





- Head Exam


Head Exam: ATRAUMATIC





- Neurological Exam


Neurological Exam: Awake


Neuro motor strength exam: Left Upper Extremity: 3, Right Upper Extremity: 3, 

Left Lower Extremity: 2/1, Right Lower Extremity: 2/1


Additional comments: 





She is able to follow simple commands, sensation remainsintact.





- Skin


Additional comments: 





with right arm remains swollen 





Assessment and Plan


(1) Cerebellar infarct


Assessment & Plan: 


Case dsicussed with Dr. Jones, continue all current medical, physical, and 

occupational therapy. DVT prophylaxis.


Status: Acute

## 2017-10-09 NOTE — CP.PCM.PN
Subjective





- Date & Time of Evaluation


Date of Evaluation: 10/09/17


Time of Evaluation: 13:00





- Subjective


Subjective: 





ID Note-





Pt. seen and examined today on tele floor.


Pt. sitting up in the chair and in good spirits.


denies any complaints.





 as per NP Men[place] pt. being d/c home today.





pt. has picc line in place.





Objective





- Vital Signs/Intake and Output


Vital Signs (last 24 hours): 


 











Temp Pulse Resp BP Pulse Ox


 


 97.2 F L  85   20   150/47 L  100 


 


 10/09/17 08:00  10/09/17 09:00  10/09/17 08:00  10/09/17 08:35  10/09/17 08:00











- Medications


Medications: 


 Current Medications





Acetaminophen (Tylenol 650mg/20.3ml Solution Ud)  650 mg PO Q6 PRN


   PRN Reason: Headache


   Last Admin: 10/07/17 21:12 Dose:  650 mg


Amlodipine Besylate (Norvasc)  5 mg PO DAILY [facility]


   Last Admin: 10/09/17 08:35 Dose:  5 mg


Apixaban (Eliquis)  2.5 mg PO BID [facility]


   PRN Reason: Protocol


   Last Admin: 10/08/17 16:54 Dose:  2.5 mg


Artificial Tears (Artificial Tears)  2 drop OU Q4 PRN


   PRN Reason: Dry eyes


   Last Admin: 10/09/17 08:33 Dose:  2 drop


Aspirin (Aspirin Chewable)  81 mg PO DAILY [facility]


   Last Admin: 10/09/17 08:33 Dose:  81 mg


Home Med (Rosuvastatin Calcium [Crestor])  40 mg PO HS [facility]


   Last Admin: 10/08/17 21:36 Dose:  40 mg


Vancomycin HCl 1 gm/ Sodium (Chloride)  250 mls @ 166.667 mls/hr IVPB MWF [facility]


   Last Admin: 10/04/17 09:55 Dose:  166.667 mls/hr


Insulin Human Regular (Humulin R)  0 units SC ACCU-CHECK [facility]


   PRN Reason: Protocol


   Last Admin: 10/09/17 06:07 Dose:  Not Given


Levothyroxine Sodium (Synthroid)  75 mcg PO DAILY@0630 [facility]


   Last Admin: 10/09/17 06:09 Dose:  75 mcg


Metoprolol Succinate (Toprol Xl)  50 mg PO DAILY [facility]


   Last Admin: 10/09/17 08:34 Dose:  50 mg


Pantoprazole Sodium (Protonix Ec Tab)  40 mg PO DAILY [facility]


   Last Admin: 10/09/17 08:35 Dose:  40 mg


Tobramycin/Dexamethasone (Tobradex 0.3%-0.1% Opht Oint)  1 appl OU TID [facility]


   Last Admin: 10/09/17 08:34 Dose:  1 oin


Vitamin B Complex/Vit C/Folic Acid (Nephro-Brianna)  1 tab PO DAILY [facility]











- Labs


Labs: 


 





 10/09/17 04:30 





 10/09/17 06:41 





 











PT  13.7 Seconds (9.8-13.1)  H  09/30/17  16:44    


 


INR  1.3  (0.9-1.2)  H  09/30/17  16:44    


 


APTT  27.9 Seconds (25.6-37.1)   09/30/17  16:44    














- Additional Findings


Additional findings: 








- Constitutional


Appears: No Acute Distress,





- Eye Exam


Eye Exam: EOMI





- ENT Exam


ENT Exam: Normal Oropharynx





- Neck Exam


Neck exam: Positive for: Full Rom





- Respiratory Exam


Respiratory Exam: NORMAL BREATHING PATTERN


Additional comments: 








- Cardiovascular Exam


Cardiovascular Exam: RRR, +S1, +S2


Additional comments: 





mid-sternal round lesion with scant sanguinous discharge and raised borders (

chronic)


no pus





- GI/Abdominal Exam


GI & Abdominal Exam: Normal Bowel Sounds, Soft


Additional comments: 





NT, ND





- Extremities Exam


Additional comments: 





left TMA site with round dry ulcer at the lateral site, no discharge, dry, no 

malodor, no erythema





- Neurological Exam


Additional comments: 





awake , alert





 Laboratory Results - last 72 hr











  10/05/17 10/06/17 10/06/17





  10:25 15:54 21:55


 


WBC   


 


RBC   


 


Hgb   


 


Hct   


 


MCV   


 


MCH   


 


MCHC   


 


RDW   


 


Plt Count   


 


Sodium   


 


Potassium   


 


Chloride   


 


Carbon Dioxide   


 


Anion Gap   


 


BUN   


 


Creatinine   


 


Est GFR ( Amer)   


 


Est GFR (Non-Af Amer)   


 


POC Glucose (mg/dL)   265 H  172 H


 


Random Glucose   


 


Calcium   


 


Total Bilirubin   


 


AST   


 


ALT   


 


Alkaline Phosphatase   


 


Total Protein   


 


Albumin   


 


Globulin   


 


Albumin/Globulin Ratio   


 


PTH Intact Whole Molec  156 H  


 


Vancomycin Trough   














  10/07/17 10/07/17 10/07/17





  05:48 10:20 11:31


 


WBC   


 


RBC   


 


Hgb   


 


Hct   


 


MCV   


 


MCH   


 


MCHC   


 


RDW   


 


Plt Count   


 


Sodium   135 


 


Potassium   4.1 


 


Chloride   94 L 


 


Carbon Dioxide   26 


 


Anion Gap   19 


 


BUN   31 H 


 


Creatinine   3.4 H 


 


Est GFR ( Amer)   16 


 


Est GFR (Non-Af Amer)   13 


 


POC Glucose (mg/dL)  150 H   232 H


 


Random Glucose   240 H 


 


Calcium   8.6 


 


Total Bilirubin   


 


AST   


 


ALT   


 


Alkaline Phosphatase   


 


Total Protein   


 


Albumin   


 


Globulin   


 


Albumin/Globulin Ratio   


 


PTH Intact Whole Molec   


 


Vancomycin Trough   














  10/07/17 10/07/17 10/07/17





  12:40 16:10 21:15


 


WBC   


 


RBC   


 


Hgb   


 


Hct   


 


MCV   


 


MCH   


 


MCHC   


 


RDW   


 


Plt Count   


 


Sodium   


 


Potassium   


 


Chloride   


 


Carbon Dioxide   


 


Anion Gap   


 


BUN   


 


Creatinine   


 


Est GFR ( Amer)   


 


Est GFR (Non-Af Amer)   


 


POC Glucose (mg/dL)   146 H  235 H


 


Random Glucose   


 


Calcium   


 


Total Bilirubin   


 


AST   


 


ALT   


 


Alkaline Phosphatase   


 


Total Protein   


 


Albumin   


 


Globulin   


 


Albumin/Globulin Ratio   


 


PTH Intact Whole Molec   


 


Vancomycin Trough  26.0 H  














  10/08/17 10/08/17 10/08/17





  05:30 05:30 05:34


 


WBC  6.4  


 


RBC  4.94  


 


Hgb  12.4  


 


Hct  41.9  


 


MCV  84.9  


 


MCH  25.2 L  


 


MCHC  29.7 L  


 


RDW  21.0 H  


 


Plt Count  159  


 


Sodium   138 


 


Potassium   4.2 


 


Chloride   97 L 


 


Carbon Dioxide   27 


 


Anion Gap   18 


 


BUN   17 


 


Creatinine   2.3 H 


 


Est GFR ( Amer)   25 


 


Est GFR (Non-Af Amer)   21 


 


POC Glucose (mg/dL)    152 H


 


Random Glucose   153 H 


 


Calcium   8.9 


 


Total Bilirubin   0.4 


 


AST   38 H 


 


ALT   39 


 


Alkaline Phosphatase   82 


 


Total Protein   6.8 


 


Albumin   3.3 L 


 


Globulin   3.5 


 


Albumin/Globulin Ratio   0.9 L 


 


PTH Intact Whole Molec   


 


Vancomycin Trough   














  10/08/17 10/08/17 10/08/17





  11:14 16:01 21:34


 


WBC   


 


RBC   


 


Hgb   


 


Hct   


 


MCV   


 


MCH   


 


MCHC   


 


RDW   


 


Plt Count   


 


Sodium   


 


Potassium   


 


Chloride   


 


Carbon Dioxide   


 


Anion Gap   


 


BUN   


 


Creatinine   


 


Est GFR ( Amer)   


 


Est GFR (Non-Af Amer)   


 


POC Glucose (mg/dL)  231 H  173 H  186 H


 


Random Glucose   


 


Calcium   


 


Total Bilirubin   


 


AST   


 


ALT   


 


Alkaline Phosphatase   


 


Total Protein   


 


Albumin   


 


Globulin   


 


Albumin/Globulin Ratio   


 


PTH Intact Whole Molec   


 


Vancomycin Trough   














  10/09/17 10/09/17 10/09/17





  04:30 04:30 05:15


 


WBC  6.5  


 


RBC  4.89  


 


Hgb  12.4  


 


Hct  41.2  


 


MCV  84.3  


 


MCH  25.4 L  


 


MCHC  30.2 L  


 


RDW  20.9 H  


 


Plt Count  179  


 


Sodium   136 


 


Potassium   6.4 H* D 


 


Chloride   96 L 


 


Carbon Dioxide   28 


 


Anion Gap   18 


 


BUN   27 H 


 


Creatinine   2.9 H 


 


Est GFR ( Amer)   19 


 


Est GFR (Non-Af Amer)   16 


 


POC Glucose (mg/dL)    136 H


 


Random Glucose   138 H 


 


Calcium   8.9 


 


Total Bilirubin   0.6 


 


AST   60 H D 


 


ALT   33 


 


Alkaline Phosphatase   78 


 


Total Protein   7.0 


 


Albumin   3.4 L 


 


Globulin   3.7 


 


Albumin/Globulin Ratio   0.9 L 


 


PTH Intact Whole Molec   


 


Vancomycin Trough   














  10/09/17 10/09/17 10/09/17





  06:41 11:32 12:35


 


WBC   


 


RBC   


 


Hgb   


 


Hct   


 


MCV   


 


MCH   


 


MCHC   


 


RDW   


 


Plt Count   


 


Sodium  135  


 


Potassium  6.1 H  


 


Chloride  95 L  


 


Carbon Dioxide  28  


 


Anion Gap  18  


 


BUN  28 H  


 


Creatinine  3.0 H  


 


Est GFR ( Amer)  18  


 


Est GFR (Non-Af Amer)  15  


 


POC Glucose (mg/dL)   266 H 


 


Random Glucose  148 H  


 


Calcium  8.9  


 


Total Bilirubin  0.6  


 


AST  58 H  


 


ALT  37  


 


Alkaline Phosphatase  79  


 


Total Protein  7.2  


 


Albumin  3.4 L  


 


Globulin  3.8  


 


Albumin/Globulin Ratio  0.9 L  


 


PTH Intact Whole Molec   


 


Vancomycin Trough    19.7 H








 Microbiology





10/05/17 05:20   Blood-Venous   Blood Culture - Preliminary


                            NO GROWTH AFTER 3 DAYS


10/05/17 04:50   Blood-Venous   Blood Culture - Preliminary


                            NO GROWTH AFTER 3 DAYS


10/05/17 09:04   Naris   MRSA Culture (Admit) - Final


                            MRSA NOT DETECTED


10/02/17 05:00   Blood-Venous   Blood Culture - Final


                            NO GROWTH AFTER 5 DAYS


10/02/17 05:00   Blood-Venous   Gram Stain - Final


                            TEST NOT PERFORMED


09/30/17 18:00   Blood-Venous   Blood Culture - Final


                            NO GROWTH AFTER 5 DAYS


09/30/17 18:00   Blood-Venous   Gram Stain - Final


                            TEST NOT PERFORMED


10/03/17 03:29   Urine,Cedeno   Urine Culture - Final


                            Yeast Species


09/30/17 16:00   Blood-Venous   S.aureus & Coag-Neg Staph PNA FISH - Final


09/30/17 16:00   Blood-Venous   Blood Culture - Final


                            Coagulase Neg Staphylococcus


09/30/17 16:00   Blood-Venous   Gram Stain - Final


09/30/17 20:00   Naris   MRSA Culture (Admit) - Final


                            MRSA DETECTED





 





 











Assessment and Plan


(1) Cerebellar infarct


Status: Acute   





(2) Basal cell carcinoma of chest


Status: Acute   





(3) ESRD on hemodialysis


Status: Acute   





(4) Bacteremia due to Staphylococcus epidermidis


Status: Acute   





(5) DM2 (diabetes mellitus, type 2)


Status: Chronic   





- Assessment and Plan (Free Text)


Assessment: 





A/P-


Patient is a 77 year old female with multiple medical conditions including DM II

, CAD, ESRD on HD, left foot TMA , A.fib admitted with nausea found to have 

cerebellar infarct as per Neurosurgery not a surgical candidate.


found to have coag neg staph in one blood cx on this admission.











has remained afebrile


one blood cx- coag neg staph x 1


repeat blood cx- neg x 4


leukocytosis-resolved


TTE- no mention of any vegetations on report as read by cardiologist.











Plan-


vanco had been held since trough was high but can redose now since trough is <

20.


to be given only post HD days.


if patient is being d/c home then advise vanco post HD days 750 mg  for total 

of 14 doses.


check trough and make sure it's less than 20 before redosing.


pt. to f/u with podiatry in regards to the  nonhealing left TMA site ulcer.


cerebellar infarct management as per neuro.


primary doc to determine if the basal cell CA of the chest was treated and if 

not advise  to get oncology evaluation.





 all above d/w NP Menita at length.

## 2017-10-09 NOTE — CP.PCM.PN
Subjective





- Date & Time of Evaluation


Date of Evaluation: 10/09/17


Time of Evaluation: 09:35





- Subjective


Subjective: 





No significant changes reported overnight


No new event reported


No changes





Objective





- Vital Signs/Intake and Output


Vital Signs (last 24 hours): 


 











Temp Pulse Resp BP Pulse Ox


 


 97.2 F L  80   20   150/47 L  100 


 


 10/09/17 08:00  10/09/17 08:35  10/09/17 08:00  10/09/17 08:35  10/09/17 08:00











- Medications


Medications: 


 Current Medications





Acetaminophen (Tylenol 650mg/20.3ml Solution Ud)  650 mg PO Q6 PRN


   PRN Reason: Headache


   Last Admin: 10/07/17 21:12 Dose:  650 mg


Amlodipine Besylate (Norvasc)  5 mg PO DAILY [facility]


   Last Admin: 10/09/17 08:35 Dose:  5 mg


Apixaban (Eliquis)  2.5 mg PO BID [facility]


   PRN Reason: Protocol


   Last Admin: 10/08/17 16:54 Dose:  2.5 mg


Artificial Tears (Artificial Tears)  2 drop OU Q4 PRN


   PRN Reason: Dry eyes


   Last Admin: 10/09/17 08:33 Dose:  2 drop


Aspirin (Aspirin Chewable)  81 mg PO DAILY [facility]


   Last Admin: 10/09/17 08:33 Dose:  81 mg


Home Med (Rosuvastatin Calcium [Crestor])  40 mg PO HS [facility]


   Last Admin: 10/08/17 21:36 Dose:  40 mg


Vancomycin HCl 1 gm/ Sodium (Chloride)  250 mls @ 166.667 mls/hr IVPB MWF [facility]


   Last Admin: 10/04/17 09:55 Dose:  166.667 mls/hr


Insulin Human Regular (Humulin R)  0 units SC ACCU-CHECK [facility]


   PRN Reason: Protocol


   Last Admin: 10/09/17 06:07 Dose:  Not Given


Levothyroxine Sodium (Synthroid)  75 mcg PO DAILY@0630 [facility]


   Last Admin: 10/09/17 06:09 Dose:  75 mcg


Metoprolol Succinate (Toprol Xl)  50 mg PO DAILY [facility]


   Last Admin: 10/09/17 08:34 Dose:  50 mg


Pantoprazole Sodium (Protonix Ec Tab)  40 mg PO DAILY [facility]


   Last Admin: 10/09/17 08:35 Dose:  40 mg


Tobramycin/Dexamethasone (Tobradex 0.3%-0.1% Opht Oint)  1 appl OU TID [facility]


   Last Admin: 10/09/17 08:34 Dose:  1 oin


Vitamin B Complex/Vit C/Folic Acid (Nephro-Brianna)  1 tab PO DAILY [facility]











- Labs


Labs: 


 





 10/09/17 04:30 





 10/09/17 06:41 





 











PT  13.7 Seconds (9.8-13.1)  H  09/30/17  16:44    


 


INR  1.3  (0.9-1.2)  H  09/30/17  16:44    


 


APTT  27.9 Seconds (25.6-37.1)   09/30/17  16:44    














- Constitutional


Appears: No Acute Distress





- ENT Exam


ENT Exam: Mucous Membranes Moist





- Respiratory Exam


Respiratory Exam: NORMAL BREATHING PATTERN





- Cardiovascular Exam


Cardiovascular Exam: absent: JVD, Rubs





- GI/Abdominal Exam


GI & Abdominal Exam: Soft, Normal Bowel Sounds.  absent: Guarding





- Extremities Exam


Extremities Exam: absent: Calf Tenderness





- Back Exam


Back Exam: absent: CVA tenderness (L), CVA tenderness (R)





- Neurological Exam


Neurological Exam: Altered





- Psychiatric Exam


Psychiatric exam: Flat Affect





Assessment and Plan


(1) Cerebellar infarct


Status: Acute   





(2) CKD (chronic kidney disease) stage V requiring chronic dialysis


Assessment & Plan: 


And this stage renal disease patient scheduled to have dialysis for tomorrow


Patient need rehabilitation


Discussed with the nurse practitioner from neurology


The rest of the problem about the same blood pressure better controlled


The rest of the problem as noted


#2 hypertension, controlled on medication


#3 patient has history of chronic A. fib as per primary team.


#4 status post CVA for this particular admission and patient making better 

recovery,


As noted per neurology


#5 diabetes mellitus management as per primary team


#6 hypothyroidism patient is on medication.


#7 hyperphosphatemia patient to be given phosphorus binder


#8 secondary hyperparathyroidism to repeat serum PTH.


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 

patient receiving antibiotics as per ID and podiatry


Status: Acute

## 2019-01-09 ENCOUNTER — HOSPITAL ENCOUNTER (INPATIENT)
Dept: HOSPITAL 31 - C.ER | Age: 79
LOS: 9 days | Discharge: HOME | DRG: 193 | End: 2019-01-18
Attending: FAMILY MEDICINE | Admitting: FAMILY MEDICINE
Payer: MEDICARE

## 2019-01-09 VITALS — BODY MASS INDEX: 22.6 KG/M2

## 2019-01-09 DIAGNOSIS — E83.39: ICD-10-CM

## 2019-01-09 DIAGNOSIS — B19.20: ICD-10-CM

## 2019-01-09 DIAGNOSIS — N25.81: ICD-10-CM

## 2019-01-09 DIAGNOSIS — E87.5: ICD-10-CM

## 2019-01-09 DIAGNOSIS — Z89.422: ICD-10-CM

## 2019-01-09 DIAGNOSIS — I50.23: ICD-10-CM

## 2019-01-09 DIAGNOSIS — E78.00: ICD-10-CM

## 2019-01-09 DIAGNOSIS — I21.4: ICD-10-CM

## 2019-01-09 DIAGNOSIS — N18.6: ICD-10-CM

## 2019-01-09 DIAGNOSIS — Z99.2: ICD-10-CM

## 2019-01-09 DIAGNOSIS — D64.9: ICD-10-CM

## 2019-01-09 DIAGNOSIS — Z79.4: ICD-10-CM

## 2019-01-09 DIAGNOSIS — J15.9: Primary | ICD-10-CM

## 2019-01-09 DIAGNOSIS — Z95.5: ICD-10-CM

## 2019-01-09 DIAGNOSIS — C44.91: ICD-10-CM

## 2019-01-09 DIAGNOSIS — E03.9: ICD-10-CM

## 2019-01-09 DIAGNOSIS — E87.1: ICD-10-CM

## 2019-01-09 DIAGNOSIS — K59.00: ICD-10-CM

## 2019-01-09 DIAGNOSIS — Z87.442: ICD-10-CM

## 2019-01-09 DIAGNOSIS — H54.62: ICD-10-CM

## 2019-01-09 DIAGNOSIS — E78.5: ICD-10-CM

## 2019-01-09 DIAGNOSIS — E87.70: ICD-10-CM

## 2019-01-09 DIAGNOSIS — Z79.82: ICD-10-CM

## 2019-01-09 DIAGNOSIS — I13.2: ICD-10-CM

## 2019-01-09 DIAGNOSIS — Z90.49: ICD-10-CM

## 2019-01-09 DIAGNOSIS — I16.0: ICD-10-CM

## 2019-01-09 DIAGNOSIS — Z95.1: ICD-10-CM

## 2019-01-09 DIAGNOSIS — E87.0: ICD-10-CM

## 2019-01-09 DIAGNOSIS — I50.9: ICD-10-CM

## 2019-01-09 DIAGNOSIS — Z86.73: ICD-10-CM

## 2019-01-09 DIAGNOSIS — I25.10: ICD-10-CM

## 2019-01-09 DIAGNOSIS — Z87.01: ICD-10-CM

## 2019-01-09 DIAGNOSIS — Z74.01: ICD-10-CM

## 2019-01-09 DIAGNOSIS — I48.91: ICD-10-CM

## 2019-01-09 DIAGNOSIS — H40.9: ICD-10-CM

## 2019-01-09 DIAGNOSIS — I48.2: ICD-10-CM

## 2019-01-09 DIAGNOSIS — D63.1: ICD-10-CM

## 2019-01-09 DIAGNOSIS — Z95.0: ICD-10-CM

## 2019-01-09 DIAGNOSIS — E11.22: ICD-10-CM

## 2019-01-09 DIAGNOSIS — Z79.01: ICD-10-CM

## 2019-01-09 LAB
ALBUMIN SERPL-MCNC: 4.3 G/DL (ref 3.5–5)
ALBUMIN/GLOB SERPL: 1.1 {RATIO} (ref 1–2.1)
ALT SERPL-CCNC: 16 U/L (ref 9–52)
APTT BLD: 50 SECONDS (ref 21–34)
AST SERPL-CCNC: 38 U/L (ref 14–36)
BASOPHILS # BLD AUTO: 0.1 K/UL (ref 0–0.2)
BASOPHILS NFR BLD: 0.7 % (ref 0–2)
BNP SERPL-MCNC: (no result) PG/ML (ref 0–900)
BUN SERPL-MCNC: 46 MG/DL (ref 7–17)
CALCIUM SERPL-MCNC: 8.8 MG/DL (ref 8.6–10.4)
EOSINOPHIL # BLD AUTO: 0 K/UL (ref 0–0.7)
EOSINOPHIL NFR BLD: 0.3 % (ref 0–4)
EOSINOPHIL NFR BLD: 1 % (ref 0–4)
ERYTHROCYTE [DISTWIDTH] IN BLOOD BY AUTOMATED COUNT: 14.8 % (ref 11.5–14.5)
GFR NON-AFRICAN AMERICAN: 10
HGB BLD-MCNC: 11.6 G/DL (ref 11–16)
INR PPP: 1.5
LYMPHOCYTE: 4 % (ref 20–40)
LYMPHOCYTES # BLD AUTO: 0.9 K/UL (ref 1–4.3)
LYMPHOCYTES NFR BLD AUTO: 8.5 % (ref 20–40)
MCH RBC QN AUTO: 31.9 PG (ref 27–31)
MCHC RBC AUTO-ENTMCNC: 33.5 G/DL (ref 33–37)
MCV RBC AUTO: 95.2 FL (ref 81–99)
MONOCYTE: 8 % (ref 0–10)
MONOCYTES # BLD: 1 K/UL (ref 0–0.8)
MONOCYTES NFR BLD: 8.9 % (ref 0–10)
NEUTROPHILS # BLD: 8.8 K/UL (ref 1.8–7)
NEUTROPHILS NFR BLD AUTO: 81.6 % (ref 50–75)
NEUTROPHILS NFR BLD AUTO: 87 % (ref 50–75)
NRBC BLD AUTO-RTO: 0 % (ref 0–2)
PLATELET # BLD EST: NORMAL 10*3/UL
PLATELET # BLD: 231 K/UL (ref 130–400)
PMV BLD AUTO: 9.7 FL (ref 7.2–11.7)
PROTHROMBIN TIME: 16.2 SECONDS (ref 9.7–12.2)
RBC # BLD AUTO: 3.64 MIL/UL (ref 3.8–5.2)
RBC MORPH BLD: NORMAL
TOTAL CELLS COUNTED BLD: 100
TROPONIN I SERPL-MCNC: 0.95 NG/ML (ref 0–0.12)
WBC # BLD AUTO: 10.8 K/UL (ref 4.8–10.8)

## 2019-01-10 LAB
ALBUMIN SERPL-MCNC: 4.1 G/DL (ref 3.5–5)
ALBUMIN/GLOB SERPL: 1.2 {RATIO} (ref 1–2.1)
ALT SERPL-CCNC: 19 U/L (ref 9–52)
ANISOCYTOSIS BLD QL SMEAR: SLIGHT
AST SERPL-CCNC: 29 U/L (ref 14–36)
BASOPHILS # BLD AUTO: 0.1 K/UL (ref 0–0.2)
BASOPHILS NFR BLD: 0.6 % (ref 0–2)
BASOPHILS NFR BLD: 1 % (ref 0–2)
BUN SERPL-MCNC: 52 MG/DL (ref 7–17)
CALCIUM SERPL-MCNC: 8.9 MG/DL (ref 8.6–10.4)
CK MB SERPL-MCNC: 0.58 NG/ML (ref 0–3.38)
CK MB SERPL-MCNC: 0.89 NG/ML (ref 0–3.38)
CK MB SERPL-MCNC: 1.13 NG/ML (ref 0–3.38)
CK MB SERPL-MCNC: 1.23 NG/ML (ref 0–3.38)
EOSINOPHIL # BLD AUTO: 0 K/UL (ref 0–0.7)
EOSINOPHIL NFR BLD: 0.3 % (ref 0–4)
ERYTHROCYTE [DISTWIDTH] IN BLOOD BY AUTOMATED COUNT: 14.6 % (ref 11.5–14.5)
GFR NON-AFRICAN AMERICAN: 9
GIANT PLATELETS BLD QL SMEAR: PRESENT
HDLC SERPL-MCNC: 36 MG/DL (ref 30–70)
HGB BLD-MCNC: 11.1 G/DL (ref 11–16)
HYPOCHROMIC: SLIGHT
LDLC SERPL-MCNC: 32 MG/DL (ref 0–129)
LG PLATELETS BLD QL SMEAR: PRESENT
LYMPHOCYTE: 10 % (ref 20–40)
LYMPHOCYTES # BLD AUTO: 1 K/UL (ref 1–4.3)
LYMPHOCYTES NFR BLD AUTO: 8.6 % (ref 20–40)
MCH RBC QN AUTO: 31.9 PG (ref 27–31)
MCHC RBC AUTO-ENTMCNC: 33.2 G/DL (ref 33–37)
MCV RBC AUTO: 96.1 FL (ref 81–99)
MONOCYTE: 7 % (ref 0–10)
MONOCYTES # BLD: 0.9 K/UL (ref 0–0.8)
MONOCYTES NFR BLD: 8 % (ref 0–10)
NEUTROPHILS # BLD: 9.3 K/UL (ref 1.8–7)
NEUTROPHILS NFR BLD AUTO: 81 % (ref 50–75)
NEUTROPHILS NFR BLD AUTO: 82.5 % (ref 50–75)
NRBC BLD AUTO-RTO: 0 % (ref 0–2)
PLATELET # BLD EST: NORMAL 10*3/UL
PLATELET # BLD: 202 K/UL (ref 130–400)
PMV BLD AUTO: 9.4 FL (ref 7.2–11.7)
POIKILOCYTOSIS BLD QL SMEAR: SLIGHT
RBC # BLD AUTO: 3.46 MIL/UL (ref 3.8–5.2)
TARGETS BLD QL SMEAR: SLIGHT
TOTAL CELLS COUNTED BLD: 100
TROPONIN I SERPL-MCNC: 0.84 NG/ML (ref 0–0.12)
TROPONIN I SERPL-MCNC: 0.86 NG/ML (ref 0–0.12)
TROPONIN I SERPL-MCNC: 1.07 NG/ML (ref 0–0.12)
TROPONIN I SERPL-MCNC: 1.33 NG/ML (ref 0–0.12)
VARIANT LYMPHS NFR BLD MANUAL: 1 % (ref 0–0)
WBC # BLD AUTO: 11.3 K/UL (ref 4.8–10.8)

## 2019-01-10 PROCEDURE — 5A1D70Z PERFORMANCE OF URINARY FILTRATION, INTERMITTENT, LESS THAN 6 HOURS PER DAY: ICD-10-PCS

## 2019-01-10 RX ADMIN — PANTOPRAZOLE SODIUM SCH: 40 TABLET, DELAYED RELEASE ORAL at 13:37

## 2019-01-10 RX ADMIN — HUMAN INSULIN SCH: 100 INJECTION, SOLUTION SUBCUTANEOUS at 10:50

## 2019-01-10 RX ADMIN — HUMAN INSULIN SCH: 100 INJECTION, SOLUTION SUBCUTANEOUS at 12:00

## 2019-01-10 RX ADMIN — Medication SCH: at 13:37

## 2019-01-10 RX ADMIN — METOPROLOL SUCCINATE SCH: 50 TABLET, EXTENDED RELEASE ORAL at 13:37

## 2019-01-10 RX ADMIN — TOBRAMYCIN AND DEXAMETHASONE SCH: 3; 1 OINTMENT OPHTHALMIC at 13:37

## 2019-01-10 RX ADMIN — HUMAN INSULIN SCH UNITS: 100 INJECTION, SOLUTION SUBCUTANEOUS at 17:25

## 2019-01-10 RX ADMIN — TOBRAMYCIN AND DEXAMETHASONE SCH: 3; 1 OINTMENT OPHTHALMIC at 14:31

## 2019-01-10 RX ADMIN — HUMAN INSULIN SCH: 100 INJECTION, SOLUTION SUBCUTANEOUS at 22:07

## 2019-01-10 RX ADMIN — TOBRAMYCIN AND DEXAMETHASONE SCH: 3; 1 OINTMENT OPHTHALMIC at 21:49

## 2019-01-11 LAB
ALBUMIN SERPL-MCNC: 4.1 G/DL (ref 3.5–5)
ALBUMIN/GLOB SERPL: 1.1 {RATIO} (ref 1–2.1)
ALT SERPL-CCNC: 27 U/L (ref 9–52)
AST SERPL-CCNC: 52 U/L (ref 14–36)
BUN SERPL-MCNC: 33 MG/DL (ref 7–17)
CALCIUM SERPL-MCNC: 9.1 MG/DL (ref 8.6–10.4)
GFR NON-AFRICAN AMERICAN: 11

## 2019-01-11 RX ADMIN — HUMAN INSULIN SCH: 100 INJECTION, SOLUTION SUBCUTANEOUS at 08:30

## 2019-01-11 RX ADMIN — HUMAN INSULIN SCH UNITS: 100 INJECTION, SOLUTION SUBCUTANEOUS at 12:05

## 2019-01-11 RX ADMIN — TOBRAMYCIN AND DEXAMETHASONE SCH APPLIC: 3; 1 OINTMENT OPHTHALMIC at 10:57

## 2019-01-11 RX ADMIN — TOBRAMYCIN AND DEXAMETHASONE SCH APPLIC: 3; 1 OINTMENT OPHTHALMIC at 14:07

## 2019-01-11 RX ADMIN — HUMAN INSULIN SCH UNITS: 100 INJECTION, SOLUTION SUBCUTANEOUS at 17:30

## 2019-01-11 RX ADMIN — METOPROLOL SUCCINATE SCH MG: 50 TABLET, EXTENDED RELEASE ORAL at 10:57

## 2019-01-11 RX ADMIN — TOBRAMYCIN AND DEXAMETHASONE SCH APPLIC: 3; 1 OINTMENT OPHTHALMIC at 19:14

## 2019-01-11 RX ADMIN — HUMAN INSULIN SCH: 100 INJECTION, SOLUTION SUBCUTANEOUS at 21:53

## 2019-01-11 RX ADMIN — Medication SCH TAB: at 10:57

## 2019-01-11 RX ADMIN — PANTOPRAZOLE SODIUM SCH MG: 40 TABLET, DELAYED RELEASE ORAL at 10:57

## 2019-01-12 LAB
ALBUMIN SERPL-MCNC: 4 G/DL (ref 3.5–5)
ALBUMIN/GLOB SERPL: 1.1 {RATIO} (ref 1–2.1)
ALT SERPL-CCNC: 30 U/L (ref 9–52)
AST SERPL-CCNC: 44 U/L (ref 14–36)
BASOPHILS # BLD AUTO: 0.1 K/UL (ref 0–0.2)
BASOPHILS NFR BLD: 0.5 % (ref 0–2)
BUN SERPL-MCNC: 50 MG/DL (ref 7–17)
CALCIUM SERPL-MCNC: 8.8 MG/DL (ref 8.6–10.4)
EOSINOPHIL # BLD AUTO: 0 K/UL (ref 0–0.7)
EOSINOPHIL NFR BLD: 0.1 % (ref 0–4)
ERYTHROCYTE [DISTWIDTH] IN BLOOD BY AUTOMATED COUNT: 14.7 % (ref 11.5–14.5)
GFR NON-AFRICAN AMERICAN: 8
HGB BLD-MCNC: 10.9 G/DL (ref 11–16)
LYMPHOCYTE: 6 % (ref 20–40)
LYMPHOCYTES # BLD AUTO: 1.1 K/UL (ref 1–4.3)
LYMPHOCYTES NFR BLD AUTO: 6.6 % (ref 20–40)
MCH RBC QN AUTO: 31.5 PG (ref 27–31)
MCHC RBC AUTO-ENTMCNC: 33.1 G/DL (ref 33–37)
MCV RBC AUTO: 95.4 FL (ref 81–99)
MONOCYTE: 3 % (ref 0–10)
MONOCYTES # BLD: 1.2 K/UL (ref 0–0.8)
MONOCYTES NFR BLD: 7.4 % (ref 0–10)
NEUTROPHILS # BLD: 14.2 K/UL (ref 1.8–7)
NEUTROPHILS NFR BLD AUTO: 85.4 % (ref 50–75)
NEUTROPHILS NFR BLD AUTO: 91 % (ref 50–75)
NRBC BLD AUTO-RTO: 0 % (ref 0–2)
PLATELET # BLD EST: NORMAL 10*3/UL
PLATELET # BLD: 212 K/UL (ref 130–400)
PMV BLD AUTO: 9.8 FL (ref 7.2–11.7)
RBC # BLD AUTO: 3.46 MIL/UL (ref 3.8–5.2)
RBC MORPH BLD: NORMAL
TOTAL CELLS COUNTED BLD: 100
WBC # BLD AUTO: 16.7 K/UL (ref 4.8–10.8)

## 2019-01-12 PROCEDURE — 5A1D70Z PERFORMANCE OF URINARY FILTRATION, INTERMITTENT, LESS THAN 6 HOURS PER DAY: ICD-10-PCS

## 2019-01-12 RX ADMIN — PANTOPRAZOLE SODIUM SCH MG: 40 TABLET, DELAYED RELEASE ORAL at 09:33

## 2019-01-12 RX ADMIN — METOPROLOL SUCCINATE SCH: 50 TABLET, EXTENDED RELEASE ORAL at 09:45

## 2019-01-12 RX ADMIN — HUMAN INSULIN SCH: 100 INJECTION, SOLUTION SUBCUTANEOUS at 16:43

## 2019-01-12 RX ADMIN — Medication SCH TAB: at 09:33

## 2019-01-12 RX ADMIN — TOBRAMYCIN AND DEXAMETHASONE SCH APPLIC: 3; 1 OINTMENT OPHTHALMIC at 18:06

## 2019-01-12 RX ADMIN — INSULIN DETEMIR SCH UNIT: 100 INJECTION, SOLUTION SUBCUTANEOUS at 22:00

## 2019-01-12 RX ADMIN — Medication SCH CAP: at 18:06

## 2019-01-12 RX ADMIN — HUMAN INSULIN SCH: 100 INJECTION, SOLUTION SUBCUTANEOUS at 21:37

## 2019-01-12 RX ADMIN — TOBRAMYCIN AND DEXAMETHASONE SCH: 3; 1 OINTMENT OPHTHALMIC at 14:23

## 2019-01-12 RX ADMIN — TOBRAMYCIN AND DEXAMETHASONE SCH APPLIC: 3; 1 OINTMENT OPHTHALMIC at 09:39

## 2019-01-12 RX ADMIN — IPRATROPIUM BROMIDE AND ALBUTEROL SULFATE SCH ML: .5; 3 SOLUTION RESPIRATORY (INHALATION) at 20:23

## 2019-01-12 RX ADMIN — HUMAN INSULIN SCH UNITS: 100 INJECTION, SOLUTION SUBCUTANEOUS at 12:42

## 2019-01-12 RX ADMIN — HUMAN INSULIN SCH UNITS: 100 INJECTION, SOLUTION SUBCUTANEOUS at 08:48

## 2019-01-13 LAB
ALBUMIN SERPL-MCNC: 4.2 G/DL (ref 3.5–5)
ALBUMIN/GLOB SERPL: 1 {RATIO} (ref 1–2.1)
ALT SERPL-CCNC: 43 U/L (ref 9–52)
AST SERPL-CCNC: 66 U/L (ref 14–36)
BASOPHILS # BLD AUTO: 0 K/UL (ref 0–0.2)
BASOPHILS NFR BLD: 0.3 % (ref 0–2)
BUN SERPL-MCNC: 33 MG/DL (ref 7–17)
CALCIUM SERPL-MCNC: 9 MG/DL (ref 8.6–10.4)
EOSINOPHIL # BLD AUTO: 0 K/UL (ref 0–0.7)
EOSINOPHIL NFR BLD: 0.1 % (ref 0–4)
ERYTHROCYTE [DISTWIDTH] IN BLOOD BY AUTOMATED COUNT: 14.4 % (ref 11.5–14.5)
GFR NON-AFRICAN AMERICAN: 9
HGB BLD-MCNC: 11.2 G/DL (ref 11–16)
LYMPHOCYTE: 7 % (ref 20–40)
LYMPHOCYTES # BLD AUTO: 1.1 K/UL (ref 1–4.3)
LYMPHOCYTES NFR BLD AUTO: 7.8 % (ref 20–40)
MCH RBC QN AUTO: 32.4 PG (ref 27–31)
MCHC RBC AUTO-ENTMCNC: 34 G/DL (ref 33–37)
MCV RBC AUTO: 95.4 FL (ref 81–99)
MONOCYTE: 2 % (ref 0–10)
MONOCYTES # BLD: 1.1 K/UL (ref 0–0.8)
MONOCYTES NFR BLD: 8 % (ref 0–10)
NEUTROPHILS # BLD: 11.3 K/UL (ref 1.8–7)
NEUTROPHILS NFR BLD AUTO: 83.8 % (ref 50–75)
NEUTROPHILS NFR BLD AUTO: 91 % (ref 50–75)
NRBC BLD AUTO-RTO: 0 % (ref 0–2)
PLATELET # BLD EST: NORMAL 10*3/UL
PLATELET # BLD: 259 K/UL (ref 130–400)
PMV BLD AUTO: 9.6 FL (ref 7.2–11.7)
RBC # BLD AUTO: 3.46 MIL/UL (ref 3.8–5.2)
RBC MORPH BLD: NORMAL
TOTAL CELLS COUNTED BLD: 100
WBC # BLD AUTO: 13.5 K/UL (ref 4.8–10.8)

## 2019-01-13 RX ADMIN — TOBRAMYCIN AND DEXAMETHASONE SCH APPLIC: 3; 1 OINTMENT OPHTHALMIC at 17:18

## 2019-01-13 RX ADMIN — HUMAN INSULIN SCH UNITS: 100 INJECTION, SOLUTION SUBCUTANEOUS at 12:45

## 2019-01-13 RX ADMIN — TOBRAMYCIN AND DEXAMETHASONE SCH APPLIC: 3; 1 OINTMENT OPHTHALMIC at 13:14

## 2019-01-13 RX ADMIN — METOPROLOL SUCCINATE SCH MG: 50 TABLET, EXTENDED RELEASE ORAL at 09:21

## 2019-01-13 RX ADMIN — Medication SCH CAP: at 17:18

## 2019-01-13 RX ADMIN — TOBRAMYCIN AND DEXAMETHASONE SCH APPLIC: 3; 1 OINTMENT OPHTHALMIC at 09:21

## 2019-01-13 RX ADMIN — PANTOPRAZOLE SODIUM SCH MG: 40 TABLET, DELAYED RELEASE ORAL at 09:21

## 2019-01-13 RX ADMIN — HUMAN INSULIN SCH UNITS: 100 INJECTION, SOLUTION SUBCUTANEOUS at 08:29

## 2019-01-13 RX ADMIN — Medication SCH CAP: at 09:20

## 2019-01-13 RX ADMIN — INSULIN DETEMIR SCH UNIT: 100 INJECTION, SOLUTION SUBCUTANEOUS at 22:09

## 2019-01-13 RX ADMIN — HUMAN INSULIN SCH UNITS: 100 INJECTION, SOLUTION SUBCUTANEOUS at 17:19

## 2019-01-13 RX ADMIN — IPRATROPIUM BROMIDE AND ALBUTEROL SULFATE SCH ML: .5; 3 SOLUTION RESPIRATORY (INHALATION) at 08:39

## 2019-01-13 RX ADMIN — PURIFIED WATER SCH LOZ: 99.05 LIQUID OPHTHALMIC at 20:54

## 2019-01-13 RX ADMIN — HUMAN INSULIN SCH: 100 INJECTION, SOLUTION SUBCUTANEOUS at 21:26

## 2019-01-13 RX ADMIN — Medication SCH TAB: at 09:21

## 2019-01-13 RX ADMIN — IPRATROPIUM BROMIDE AND ALBUTEROL SULFATE SCH ML: .5; 3 SOLUTION RESPIRATORY (INHALATION) at 01:53

## 2019-01-13 RX ADMIN — IPRATROPIUM BROMIDE AND ALBUTEROL SULFATE PRN ML: .5; 3 SOLUTION RESPIRATORY (INHALATION) at 13:37

## 2019-01-13 RX ADMIN — PROMETHAZINE HYDROCHLORIDE PRN MG: 6.25 SYRUP ORAL at 13:15

## 2019-01-14 LAB
ALBUMIN SERPL-MCNC: 3.8 G/DL (ref 3.5–5)
ALBUMIN/GLOB SERPL: 1.1 {RATIO} (ref 1–2.1)
ALT SERPL-CCNC: 59 U/L (ref 9–52)
ANISOCYTOSIS BLD QL SMEAR: SLIGHT
AST SERPL-CCNC: 77 U/L (ref 14–36)
BASOPHILS # BLD AUTO: 0.1 K/UL (ref 0–0.2)
BASOPHILS NFR BLD: 0.5 % (ref 0–2)
BUN SERPL-MCNC: 47 MG/DL (ref 7–17)
CALCIUM SERPL-MCNC: 8.7 MG/DL (ref 8.6–10.4)
EOSINOPHIL # BLD AUTO: 0 K/UL (ref 0–0.7)
EOSINOPHIL NFR BLD: 0.4 % (ref 0–4)
ERYTHROCYTE [DISTWIDTH] IN BLOOD BY AUTOMATED COUNT: 14.6 % (ref 11.5–14.5)
GFR NON-AFRICAN AMERICAN: 8
HGB BLD-MCNC: 10.8 G/DL (ref 11–16)
HYPOCHROMIC: SLIGHT
LG PLATELETS BLD QL SMEAR: PRESENT
LYMPHOCYTE: 6 % (ref 20–40)
LYMPHOCYTES # BLD AUTO: 0.9 K/UL (ref 1–4.3)
LYMPHOCYTES NFR BLD AUTO: 6.5 % (ref 20–40)
MCH RBC QN AUTO: 31.8 PG (ref 27–31)
MCHC RBC AUTO-ENTMCNC: 33.6 G/DL (ref 33–37)
MCV RBC AUTO: 94.7 FL (ref 81–99)
MONOCYTE: 3 % (ref 0–10)
MONOCYTES # BLD: 0.7 K/UL (ref 0–0.8)
MONOCYTES NFR BLD: 5.6 % (ref 0–10)
NEUTROPHILS # BLD: 11.5 K/UL (ref 1.8–7)
NEUTROPHILS NFR BLD AUTO: 87 % (ref 50–75)
NEUTROPHILS NFR BLD AUTO: 91 % (ref 50–75)
NRBC BLD AUTO-RTO: 0 % (ref 0–2)
PLATELET # BLD EST: NORMAL 10*3/UL
PLATELET # BLD: 245 K/UL (ref 130–400)
PMV BLD AUTO: 9.8 FL (ref 7.2–11.7)
POIKILOCYTOSIS BLD QL SMEAR: SLIGHT
RBC # BLD AUTO: 3.39 MIL/UL (ref 3.8–5.2)
TOTAL CELLS COUNTED BLD: 100
WBC # BLD AUTO: 13.2 K/UL (ref 4.8–10.8)

## 2019-01-14 PROCEDURE — 5A1D70Z PERFORMANCE OF URINARY FILTRATION, INTERMITTENT, LESS THAN 6 HOURS PER DAY: ICD-10-PCS

## 2019-01-14 RX ADMIN — PURIFIED WATER SCH LOZ: 99.05 LIQUID OPHTHALMIC at 07:38

## 2019-01-14 RX ADMIN — PURIFIED WATER SCH LOZ: 99.05 LIQUID OPHTHALMIC at 13:25

## 2019-01-14 RX ADMIN — Medication SCH CAP: at 09:37

## 2019-01-14 RX ADMIN — HUMAN INSULIN SCH: 100 INJECTION, SOLUTION SUBCUTANEOUS at 22:05

## 2019-01-14 RX ADMIN — TOBRAMYCIN AND DEXAMETHASONE SCH APPLIC: 3; 1 OINTMENT OPHTHALMIC at 18:37

## 2019-01-14 RX ADMIN — HUMAN INSULIN SCH: 100 INJECTION, SOLUTION SUBCUTANEOUS at 18:39

## 2019-01-14 RX ADMIN — HUMAN INSULIN SCH UNITS: 100 INJECTION, SOLUTION SUBCUTANEOUS at 12:23

## 2019-01-14 RX ADMIN — INSULIN DETEMIR SCH UNIT: 100 INJECTION, SOLUTION SUBCUTANEOUS at 22:16

## 2019-01-14 RX ADMIN — PROMETHAZINE HYDROCHLORIDE PRN MG: 6.25 SYRUP ORAL at 09:38

## 2019-01-14 RX ADMIN — PURIFIED WATER SCH LOZ: 99.05 LIQUID OPHTHALMIC at 18:39

## 2019-01-14 RX ADMIN — Medication SCH TAB: at 09:36

## 2019-01-14 RX ADMIN — TOBRAMYCIN AND DEXAMETHASONE SCH APPLIC: 3; 1 OINTMENT OPHTHALMIC at 09:39

## 2019-01-14 RX ADMIN — HUMAN INSULIN SCH UNITS: 100 INJECTION, SOLUTION SUBCUTANEOUS at 08:28

## 2019-01-14 RX ADMIN — Medication SCH CAP: at 18:38

## 2019-01-14 RX ADMIN — PROMETHAZINE HYDROCHLORIDE PRN MG: 6.25 SYRUP ORAL at 18:40

## 2019-01-14 RX ADMIN — PANTOPRAZOLE SODIUM SCH MG: 40 TABLET, DELAYED RELEASE ORAL at 09:37

## 2019-01-14 RX ADMIN — TOBRAMYCIN AND DEXAMETHASONE SCH APPLIC: 3; 1 OINTMENT OPHTHALMIC at 13:23

## 2019-01-14 RX ADMIN — METOPROLOL SUCCINATE SCH MG: 50 TABLET, EXTENDED RELEASE ORAL at 09:36

## 2019-01-15 LAB
ALBUMIN SERPL-MCNC: 3.6 G/DL (ref 3.5–5)
ALBUMIN/GLOB SERPL: 1 {RATIO} (ref 1–2.1)
ALT SERPL-CCNC: 48 U/L (ref 9–52)
AST SERPL-CCNC: 42 U/L (ref 14–36)
BASOPHILS # BLD AUTO: 0.1 K/UL (ref 0–0.2)
BASOPHILS NFR BLD: 0.6 % (ref 0–2)
BASOPHILS NFR BLD: 1 % (ref 0–2)
BUN SERPL-MCNC: 33 MG/DL (ref 7–17)
CALCIUM SERPL-MCNC: 8.5 MG/DL (ref 8.6–10.4)
EOSINOPHIL # BLD AUTO: 0.1 K/UL (ref 0–0.7)
EOSINOPHIL NFR BLD: 1 % (ref 0–4)
EOSINOPHIL NFR BLD: 1.2 % (ref 0–4)
ERYTHROCYTE [DISTWIDTH] IN BLOOD BY AUTOMATED COUNT: 14.6 % (ref 11.5–14.5)
GFR NON-AFRICAN AMERICAN: 9
HGB BLD-MCNC: 10.2 G/DL (ref 11–16)
LYMPHOCYTE: 10 % (ref 20–40)
LYMPHOCYTES # BLD AUTO: 1 K/UL (ref 1–4.3)
LYMPHOCYTES NFR BLD AUTO: 8.3 % (ref 20–40)
MCH RBC QN AUTO: 31.3 PG (ref 27–31)
MCHC RBC AUTO-ENTMCNC: 32.9 G/DL (ref 33–37)
MCV RBC AUTO: 95.1 FL (ref 81–99)
MONOCYTE: 6 % (ref 0–10)
MONOCYTES # BLD: 0.8 K/UL (ref 0–0.8)
MONOCYTES NFR BLD: 6.6 % (ref 0–10)
NEUTROPHILS # BLD: 9.7 K/UL (ref 1.8–7)
NEUTROPHILS NFR BLD AUTO: 82 % (ref 50–75)
NEUTROPHILS NFR BLD AUTO: 83.3 % (ref 50–75)
NRBC BLD AUTO-RTO: 0.1 % (ref 0–2)
PLATELET # BLD EST: NORMAL 10*3/UL
PLATELET # BLD: 270 K/UL (ref 130–400)
PMV BLD AUTO: 9.1 FL (ref 7.2–11.7)
RBC # BLD AUTO: 3.25 MIL/UL (ref 3.8–5.2)
TOTAL CELLS COUNTED BLD: 100
WBC # BLD AUTO: 11.6 K/UL (ref 4.8–10.8)

## 2019-01-15 PROCEDURE — 5A1D70Z PERFORMANCE OF URINARY FILTRATION, INTERMITTENT, LESS THAN 6 HOURS PER DAY: ICD-10-PCS

## 2019-01-15 RX ADMIN — HUMAN INSULIN SCH: 100 INJECTION, SOLUTION SUBCUTANEOUS at 07:40

## 2019-01-15 RX ADMIN — HUMAN INSULIN SCH UNITS: 100 INJECTION, SOLUTION SUBCUTANEOUS at 12:25

## 2019-01-15 RX ADMIN — Medication SCH CAP: at 09:49

## 2019-01-15 RX ADMIN — INSULIN DETEMIR SCH UNIT: 100 INJECTION, SOLUTION SUBCUTANEOUS at 21:38

## 2019-01-15 RX ADMIN — TOBRAMYCIN AND DEXAMETHASONE SCH: 3; 1 OINTMENT OPHTHALMIC at 18:06

## 2019-01-15 RX ADMIN — PROMETHAZINE HYDROCHLORIDE PRN MG: 6.25 SYRUP ORAL at 09:49

## 2019-01-15 RX ADMIN — HUMAN INSULIN SCH: 100 INJECTION, SOLUTION SUBCUTANEOUS at 21:39

## 2019-01-15 RX ADMIN — TOBRAMYCIN AND DEXAMETHASONE SCH APPLIC: 3; 1 OINTMENT OPHTHALMIC at 09:49

## 2019-01-15 RX ADMIN — HUMAN INSULIN SCH: 100 INJECTION, SOLUTION SUBCUTANEOUS at 18:05

## 2019-01-15 RX ADMIN — TOBRAMYCIN AND DEXAMETHASONE SCH: 3; 1 OINTMENT OPHTHALMIC at 14:24

## 2019-01-15 RX ADMIN — Medication SCH: at 18:04

## 2019-01-15 RX ADMIN — METOPROLOL SUCCINATE SCH: 50 TABLET, EXTENDED RELEASE ORAL at 09:50

## 2019-01-15 RX ADMIN — PANTOPRAZOLE SODIUM SCH MG: 40 TABLET, DELAYED RELEASE ORAL at 09:49

## 2019-01-15 RX ADMIN — Medication SCH TAB: at 09:49

## 2019-01-16 LAB
ALBUMIN SERPL-MCNC: 3.9 G/DL (ref 3.5–5)
ALBUMIN/GLOB SERPL: 1 {RATIO} (ref 1–2.1)
ALT SERPL-CCNC: 48 U/L (ref 9–52)
ANISOCYTOSIS BLD QL SMEAR: SLIGHT
AST SERPL-CCNC: 58 U/L (ref 14–36)
BASOPHILS # BLD AUTO: 0.1 K/UL (ref 0–0.2)
BASOPHILS NFR BLD: 0.6 % (ref 0–2)
BUN SERPL-MCNC: 14 MG/DL (ref 7–17)
CALCIUM SERPL-MCNC: 8.9 MG/DL (ref 8.6–10.4)
EOSINOPHIL # BLD AUTO: 0.2 K/UL (ref 0–0.7)
EOSINOPHIL NFR BLD: 1.5 % (ref 0–4)
EOSINOPHIL NFR BLD: 2 % (ref 0–4)
ERYTHROCYTE [DISTWIDTH] IN BLOOD BY AUTOMATED COUNT: 14.3 % (ref 11.5–14.5)
GFR NON-AFRICAN AMERICAN: 15
HGB BLD-MCNC: 11.3 G/DL (ref 11–16)
HYPOCHROMIC: SLIGHT
LYMPHOCYTE: 10 % (ref 20–40)
LYMPHOCYTES # BLD AUTO: 1 K/UL (ref 1–4.3)
LYMPHOCYTES NFR BLD AUTO: 9.1 % (ref 20–40)
MCH RBC QN AUTO: 31.9 PG (ref 27–31)
MCHC RBC AUTO-ENTMCNC: 33.5 G/DL (ref 33–37)
MCV RBC AUTO: 95.5 FL (ref 81–99)
MONOCYTE: 7 % (ref 0–10)
MONOCYTES # BLD: 0.9 K/UL (ref 0–0.8)
MONOCYTES NFR BLD: 7.5 % (ref 0–10)
NEUTROPHILS # BLD: 9.3 K/UL (ref 1.8–7)
NEUTROPHILS NFR BLD AUTO: 81 % (ref 50–75)
NEUTROPHILS NFR BLD AUTO: 81.3 % (ref 50–75)
NRBC BLD AUTO-RTO: 0.1 % (ref 0–2)
PLATELET # BLD EST: NORMAL 10*3/UL
PLATELET # BLD: 291 K/UL (ref 130–400)
PMV BLD AUTO: 9.1 FL (ref 7.2–11.7)
POIKILOCYTOSIS BLD QL SMEAR: SLIGHT
RBC # BLD AUTO: 3.54 MIL/UL (ref 3.8–5.2)
TOTAL CELLS COUNTED BLD: 100
WBC # BLD AUTO: 11.4 K/UL (ref 4.8–10.8)

## 2019-01-16 RX ADMIN — TOBRAMYCIN AND DEXAMETHASONE SCH APPLIC: 3; 1 OINTMENT OPHTHALMIC at 13:03

## 2019-01-16 RX ADMIN — Medication SCH TAB: at 09:27

## 2019-01-16 RX ADMIN — PROMETHAZINE HYDROCHLORIDE PRN MG: 6.25 SYRUP ORAL at 13:05

## 2019-01-16 RX ADMIN — Medication SCH CAP: at 17:38

## 2019-01-16 RX ADMIN — TOBRAMYCIN AND DEXAMETHASONE SCH APPLIC: 3; 1 OINTMENT OPHTHALMIC at 09:28

## 2019-01-16 RX ADMIN — TOBRAMYCIN AND DEXAMETHASONE SCH APPLIC: 3; 1 OINTMENT OPHTHALMIC at 17:44

## 2019-01-16 RX ADMIN — INSULIN DETEMIR SCH UNIT: 100 INJECTION, SOLUTION SUBCUTANEOUS at 22:00

## 2019-01-16 RX ADMIN — HUMAN INSULIN SCH: 100 INJECTION, SOLUTION SUBCUTANEOUS at 21:51

## 2019-01-16 RX ADMIN — HUMAN INSULIN SCH: 100 INJECTION, SOLUTION SUBCUTANEOUS at 07:33

## 2019-01-16 RX ADMIN — HUMAN INSULIN SCH UNITS: 100 INJECTION, SOLUTION SUBCUTANEOUS at 17:39

## 2019-01-16 RX ADMIN — Medication SCH CAP: at 09:27

## 2019-01-16 RX ADMIN — GUAIFENESIN PRN MG: 100 SOLUTION ORAL at 20:00

## 2019-01-16 RX ADMIN — METOPROLOL SUCCINATE SCH MG: 50 TABLET, EXTENDED RELEASE ORAL at 09:49

## 2019-01-16 RX ADMIN — GUAIFENESIN PRN MG: 100 SOLUTION ORAL at 09:28

## 2019-01-16 RX ADMIN — PANTOPRAZOLE SODIUM SCH MG: 40 TABLET, DELAYED RELEASE ORAL at 09:27

## 2019-01-16 RX ADMIN — HUMAN INSULIN SCH UNITS: 100 INJECTION, SOLUTION SUBCUTANEOUS at 12:24

## 2019-01-17 VITALS — RESPIRATION RATE: 20 BRPM

## 2019-01-17 LAB
ALBUMIN SERPL-MCNC: 3.9 G/DL (ref 3.5–5)
ALBUMIN/GLOB SERPL: 1 {RATIO} (ref 1–2.1)
ALT SERPL-CCNC: 44 U/L (ref 9–52)
ANISOCYTOSIS BLD QL SMEAR: SLIGHT
AST SERPL-CCNC: 41 U/L (ref 14–36)
BASOPHILS # BLD AUTO: 0.1 K/UL (ref 0–0.2)
BASOPHILS NFR BLD: 0.7 % (ref 0–2)
BUN SERPL-MCNC: 24 MG/DL (ref 7–17)
CALCIUM SERPL-MCNC: 9.1 MG/DL (ref 8.6–10.4)
EOSINOPHIL # BLD AUTO: 0.2 K/UL (ref 0–0.7)
EOSINOPHIL NFR BLD: 1 % (ref 0–4)
EOSINOPHIL NFR BLD: 1.9 % (ref 0–4)
ERYTHROCYTE [DISTWIDTH] IN BLOOD BY AUTOMATED COUNT: 14 % (ref 11.5–14.5)
GFR NON-AFRICAN AMERICAN: 10
HGB BLD-MCNC: 10.7 G/DL (ref 11–16)
HYPOCHROMIC: SLIGHT
LYMPHOCYTE: 9 % (ref 20–40)
LYMPHOCYTES # BLD AUTO: 1 K/UL (ref 1–4.3)
LYMPHOCYTES NFR BLD AUTO: 7.5 % (ref 20–40)
MCH RBC QN AUTO: 32.1 PG (ref 27–31)
MCHC RBC AUTO-ENTMCNC: 33.8 G/DL (ref 33–37)
MCV RBC AUTO: 95.1 FL (ref 81–99)
MONOCYTE: 8 % (ref 0–10)
MONOCYTES # BLD: 0.9 K/UL (ref 0–0.8)
MONOCYTES NFR BLD: 6.7 % (ref 0–10)
NEUTROPHILS # BLD: 10.5 K/UL (ref 1.8–7)
NEUTROPHILS NFR BLD AUTO: 80 % (ref 50–75)
NEUTROPHILS NFR BLD AUTO: 83.2 % (ref 50–75)
NEUTS BAND NFR BLD: 2 % (ref 0–2)
NRBC BLD AUTO-RTO: 0.1 % (ref 0–2)
PLATELET # BLD EST: NORMAL 10*3/UL
PLATELET # BLD: 329 K/UL (ref 130–400)
PMV BLD AUTO: 8.8 FL (ref 7.2–11.7)
POIKILOCYTOSIS BLD QL SMEAR: SLIGHT
RBC # BLD AUTO: 3.33 MIL/UL (ref 3.8–5.2)
TOTAL CELLS COUNTED BLD: 100
WBC # BLD AUTO: 12.7 K/UL (ref 4.8–10.8)

## 2019-01-17 PROCEDURE — 5A1D70Z PERFORMANCE OF URINARY FILTRATION, INTERMITTENT, LESS THAN 6 HOURS PER DAY: ICD-10-PCS

## 2019-01-17 RX ADMIN — TOBRAMYCIN AND DEXAMETHASONE SCH: 3; 1 OINTMENT OPHTHALMIC at 10:00

## 2019-01-17 RX ADMIN — HUMAN INSULIN SCH: 100 INJECTION, SOLUTION SUBCUTANEOUS at 08:48

## 2019-01-17 RX ADMIN — INSULIN DETEMIR SCH UNIT: 100 INJECTION, SOLUTION SUBCUTANEOUS at 21:24

## 2019-01-17 RX ADMIN — HUMAN INSULIN SCH UNITS: 100 INJECTION, SOLUTION SUBCUTANEOUS at 18:07

## 2019-01-17 RX ADMIN — TOBRAMYCIN AND DEXAMETHASONE SCH APPLIC: 3; 1 OINTMENT OPHTHALMIC at 18:10

## 2019-01-17 RX ADMIN — Medication SCH: at 10:00

## 2019-01-17 RX ADMIN — HUMAN INSULIN SCH: 100 INJECTION, SOLUTION SUBCUTANEOUS at 21:28

## 2019-01-17 RX ADMIN — PANTOPRAZOLE SODIUM SCH: 40 TABLET, DELAYED RELEASE ORAL at 10:00

## 2019-01-17 RX ADMIN — METOPROLOL SUCCINATE SCH: 50 TABLET, EXTENDED RELEASE ORAL at 10:00

## 2019-01-17 RX ADMIN — HUMAN INSULIN SCH: 100 INJECTION, SOLUTION SUBCUTANEOUS at 10:00

## 2019-01-17 RX ADMIN — TOBRAMYCIN AND DEXAMETHASONE SCH APPLIC: 3; 1 OINTMENT OPHTHALMIC at 14:34

## 2019-01-17 RX ADMIN — Medication SCH CAP: at 18:09

## 2019-01-18 VITALS
TEMPERATURE: 98.1 F | OXYGEN SATURATION: 94 % | DIASTOLIC BLOOD PRESSURE: 78 MMHG | SYSTOLIC BLOOD PRESSURE: 146 MMHG | HEART RATE: 80 BPM

## 2019-01-18 LAB
ALBUMIN SERPL-MCNC: 4.2 G/DL (ref 3.5–5)
ALBUMIN/GLOB SERPL: 1.1 {RATIO} (ref 1–2.1)
ALT SERPL-CCNC: 39 U/L (ref 9–52)
AST SERPL-CCNC: 33 U/L (ref 14–36)
BASOPHILS # BLD AUTO: 0.1 K/UL (ref 0–0.2)
BASOPHILS NFR BLD: 1 % (ref 0–2)
BUN SERPL-MCNC: 16 MG/DL (ref 7–17)
CALCIUM SERPL-MCNC: 9.3 MG/DL (ref 8.6–10.4)
EOSINOPHIL # BLD AUTO: 0.2 K/UL (ref 0–0.7)
EOSINOPHIL NFR BLD: 2 % (ref 0–4)
ERYTHROCYTE [DISTWIDTH] IN BLOOD BY AUTOMATED COUNT: 14.7 % (ref 11.5–14.5)
GFR NON-AFRICAN AMERICAN: 13
HGB BLD-MCNC: 11.5 G/DL (ref 11–16)
LYMPHOCYTES # BLD AUTO: 1.3 K/UL (ref 1–4.3)
LYMPHOCYTES NFR BLD AUTO: 12.1 % (ref 20–40)
MCH RBC QN AUTO: 31.7 PG (ref 27–31)
MCHC RBC AUTO-ENTMCNC: 33.2 G/DL (ref 33–37)
MCV RBC AUTO: 95.5 FL (ref 81–99)
MONOCYTES # BLD: 0.9 K/UL (ref 0–0.8)
MONOCYTES NFR BLD: 8.4 % (ref 0–10)
NEUTROPHILS # BLD: 8.5 K/UL (ref 1.8–7)
NEUTROPHILS NFR BLD AUTO: 76.5 % (ref 50–75)
NRBC BLD AUTO-RTO: 0.1 % (ref 0–2)
PLATELET # BLD: 357 K/UL (ref 130–400)
PMV BLD AUTO: 8.8 FL (ref 7.2–11.7)
RBC # BLD AUTO: 3.61 MIL/UL (ref 3.8–5.2)
WBC # BLD AUTO: 11.1 K/UL (ref 4.8–10.8)

## 2019-01-18 RX ADMIN — IPRATROPIUM BROMIDE AND ALBUTEROL SULFATE PRN ML: .5; 3 SOLUTION RESPIRATORY (INHALATION) at 08:00

## 2019-01-18 RX ADMIN — HUMAN INSULIN SCH UNITS: 100 INJECTION, SOLUTION SUBCUTANEOUS at 12:23

## 2019-01-18 RX ADMIN — Medication SCH TAB: at 09:56

## 2019-01-18 RX ADMIN — PANTOPRAZOLE SODIUM SCH MG: 40 TABLET, DELAYED RELEASE ORAL at 09:56

## 2019-01-18 RX ADMIN — TOBRAMYCIN AND DEXAMETHASONE SCH APPLIC: 3; 1 OINTMENT OPHTHALMIC at 13:00

## 2019-01-18 RX ADMIN — Medication SCH CAP: at 18:09

## 2019-01-18 RX ADMIN — Medication SCH CAP: at 09:57

## 2019-01-18 RX ADMIN — HUMAN INSULIN SCH: 100 INJECTION, SOLUTION SUBCUTANEOUS at 08:07

## 2019-01-18 RX ADMIN — HUMAN INSULIN SCH UNITS: 100 INJECTION, SOLUTION SUBCUTANEOUS at 17:25

## 2019-01-18 RX ADMIN — TOBRAMYCIN AND DEXAMETHASONE SCH APPLIC: 3; 1 OINTMENT OPHTHALMIC at 09:57

## 2019-01-18 RX ADMIN — METOPROLOL SUCCINATE SCH MG: 50 TABLET, EXTENDED RELEASE ORAL at 09:57

## 2019-01-18 RX ADMIN — GUAIFENESIN PRN MG: 100 SOLUTION ORAL at 09:57

## 2019-01-18 RX ADMIN — TOBRAMYCIN AND DEXAMETHASONE SCH APPLIC: 3; 1 OINTMENT OPHTHALMIC at 18:09

## 2019-09-17 NOTE — PQF CHF
***** This form is a permanent part of the medical record*****



4/3/17 Dr. Guillen,



Documentation of a history of CHF. Would you please clarify the type and acuity



Clarification of your documentation is requested to better reflect the severity 
of illness and intensity of treatment of your patient.  



Indicators present   



[x] Diagnosis of a history of CHF            



[] BNP > 200                        

 

[] Imaging Finding of Pulmonary Edema /Pleural Effusions      

 

[] Fluid/Volume Overload                  

 

[] Pitting edema                     



[] Ejection Fraction < 40% (Indicative of Systolic Heart Failure)      



[x] Ejection Fraction > 40% (Indicative of Diastolic Heart Failure) Echo 
January 2017   

 

[] Dyspnea / Orthopenea / Paroxysmal Nocturnal Dyspnea      

 

[] Other:

                  

Location in the medical record that reflects the above clinical findings: []





Treatment Provided: []



PHYSICIAN'S RESPONSE





Based on your medical judgment of the clinical indicators outlined above, are 
you treating this patient for a known or suspected: 



[] Acute CHF         [] Systolic   []  Diastolic    []  Combined

 

[] Chronic CHF                         [] Systolic    [] Diastolic     [] 
Combined



[] Acute on Chronic CHF     []Systolic    [] Diastolic     [] Combined



[] CHF due hypertension       [] Acute  systolic  []Chronic systolic  [] Acute/
chronic systolic

 

[] Other, please indicate: []



[] If Unable to Determine, please check the box, sign and date.   



Present On Admission (POA) Indicator:



[] Present at the time of admission



[] Not present at the time of admission 



[] Clinically Undetermined







In responding to this query, please exercise your independent professional 
judgment.  The fact that a question is asked does not imply that any particular 
answer is desired or expected.  Thank you for your clarification on this 
documentation.





If you have any questions please [phone]

* Thank you,

   Jasmina Early RN

   CDMP
[facility]